# Patient Record
Sex: MALE | Race: WHITE | Employment: OTHER | URBAN - NONMETROPOLITAN AREA
[De-identification: names, ages, dates, MRNs, and addresses within clinical notes are randomized per-mention and may not be internally consistent; named-entity substitution may affect disease eponyms.]

---

## 2017-09-25 ENCOUNTER — HOSPITAL ENCOUNTER (INPATIENT)
Age: 42
LOS: 1 days | Discharge: PSYCHIATRIC OR SUBSTANCE USE FACILITY | DRG: 387 | End: 2017-09-26
Attending: INTERNAL MEDICINE | Admitting: INTERNAL MEDICINE
Payer: MEDICARE

## 2017-09-25 ENCOUNTER — APPOINTMENT (OUTPATIENT)
Dept: CT IMAGING | Age: 42
DRG: 387 | End: 2017-09-25
Payer: MEDICARE

## 2017-09-25 DIAGNOSIS — K51.911 ULCERATIVE COLITIS WITH RECTAL BLEEDING, UNSPECIFIED LOCATION (HCC): Primary | ICD-10-CM

## 2017-09-25 PROBLEM — K51.90 ULCERATIVE COLITIS (HCC): Status: ACTIVE | Noted: 2017-09-25

## 2017-09-25 LAB
ABO: NORMAL
ADENOVIRUS F 40 41 PCR: NOT DETECTED
ALBUMIN SERPL-MCNC: 3.7 G/DL (ref 3.5–5.1)
ALP BLD-CCNC: 89 U/L (ref 38–126)
ALT SERPL-CCNC: 45 U/L (ref 11–66)
ANION GAP SERPL CALCULATED.3IONS-SCNC: 12 MEQ/L (ref 8–16)
ANISOCYTOSIS: ABNORMAL
ANTIBODY SCREEN: NORMAL
AST SERPL-CCNC: 53 U/L (ref 5–40)
ASTROVIRUS PCR: NOT DETECTED
BASOPHILS # BLD: 0.3 %
BASOPHILS ABSOLUTE: 0 THOU/MM3 (ref 0–0.1)
BILIRUB SERPL-MCNC: 0.5 MG/DL (ref 0.3–1.2)
BILIRUBIN DIRECT: < 0.2 MG/DL (ref 0–0.3)
BUN BLDV-MCNC: 14 MG/DL (ref 7–22)
CALCIUM SERPL-MCNC: 8.6 MG/DL (ref 8.5–10.5)
CAMPYLOBACTER PCR: NOT DETECTED
CHLORIDE BLD-SCNC: 101 MEQ/L (ref 98–111)
CLOSTRIDIUM DIFFICILE, PCR: NOT DETECTED
CO2: 23 MEQ/L (ref 23–33)
CREAT SERPL-MCNC: 0.7 MG/DL (ref 0.4–1.2)
CRYPTOSPORIDIUM PCR: NOT DETECTED
CYCLOSPORIDIUM GI FILM ARRAY: NOT DETECTED
E COLI 0157 PCR: NORMAL
E COLI ENTEROAGGREGATIVE PCR: NOT DETECTED
E COLI ENTEROPATHOGENIC PCR: NOT DETECTED
E COLI ENTEROTOXIGENIC PCR: NOT DETECTED
E COLI SHIGA LIKE TOXIN PCR: NOT DETECTED
E COLI SHIGELLA/ENTEROINVASIVE PCR: NOT DETECTED
E HISTOLYTICA GI FILM ARRAY: NOT DETECTED
EOSINOPHIL # BLD: 3.5 %
EOSINOPHILS ABSOLUTE: 0.2 THOU/MM3 (ref 0–0.4)
GFR SERPL CREATININE-BSD FRML MDRD: > 90 ML/MIN/1.73M2
GIARDIA LAMBLIA PCR: NOT DETECTED
GLUCOSE BLD-MCNC: 109 MG/DL (ref 70–108)
HCT VFR BLD CALC: 35.8 % (ref 42–52)
HEMOCCULT STL QL: POSITIVE
HEMOGLOBIN: 11.4 GM/DL (ref 14–18)
HEMOGLOBIN: 12 GM/DL (ref 14–18)
LIPASE: 37.3 U/L (ref 5.6–51.3)
LYMPHOCYTES # BLD: 46.8 %
LYMPHOCYTES ABSOLUTE: 2.9 THOU/MM3 (ref 1–4.8)
MCH RBC QN AUTO: 30.2 PG (ref 27–31)
MCHC RBC AUTO-ENTMCNC: 33.6 GM/DL (ref 33–37)
MCV RBC AUTO: 89.9 FL (ref 80–94)
MONOCYTES # BLD: 15.4 %
MONOCYTES ABSOLUTE: 0.9 THOU/MM3 (ref 0.4–1.3)
NOROVIRUS GI GII PCR: NOT DETECTED
NUCLEATED RED BLOOD CELLS: 0 /100 WBC
OSMOLALITY CALCULATION: 273 MOSMOL/KG (ref 275–300)
PDW BLD-RTO: 17.3 % (ref 11.5–14.5)
PLATELET # BLD: 106 THOU/MM3 (ref 130–400)
PLESIOMONAS SHIGELLOIDES PCR: NOT DETECTED
PMV BLD AUTO: 8.1 MCM (ref 7.4–10.4)
POTASSIUM SERPL-SCNC: 3.8 MEQ/L (ref 3.5–5.2)
RBC # BLD: 3.98 MILL/MM3 (ref 4.7–6.1)
RBC # BLD: NORMAL 10*6/UL
RH FACTOR: NORMAL
ROTAVIRUS A PCR: NOT DETECTED
SALMONELLA PCR: NOT DETECTED
SAPOVIRUS PCR: NOT DETECTED
SEG NEUTROPHILS: 34 %
SEGMENTED NEUTROPHILS ABSOLUTE COUNT: 2.1 THOU/MM3 (ref 1.8–7.7)
SODIUM BLD-SCNC: 136 MEQ/L (ref 135–145)
TOTAL PROTEIN: 7 G/DL (ref 6.1–8)
VIBRIO CHOLERAE PCR: NOT DETECTED
VIBRIO PCR: NOT DETECTED
WBC # BLD: 6.1 THOU/MM3 (ref 4.8–10.8)
YERSINIA ENTEROCOLITICA PCR: NOT DETECTED

## 2017-09-25 PROCEDURE — 1210000002 HC MED SURG R&B - NEUROSCIENCE

## 2017-09-25 PROCEDURE — 6370000000 HC RX 637 (ALT 250 FOR IP): Performed by: INTERNAL MEDICINE

## 2017-09-25 PROCEDURE — 86900 BLOOD TYPING SEROLOGIC ABO: CPT

## 2017-09-25 PROCEDURE — 6360000002 HC RX W HCPCS: Performed by: INTERNAL MEDICINE

## 2017-09-25 PROCEDURE — 86850 RBC ANTIBODY SCREEN: CPT

## 2017-09-25 PROCEDURE — 99223 1ST HOSP IP/OBS HIGH 75: CPT | Performed by: INTERNAL MEDICINE

## 2017-09-25 PROCEDURE — 2580000003 HC RX 258: Performed by: PHYSICIAN ASSISTANT

## 2017-09-25 PROCEDURE — 82272 OCCULT BLD FECES 1-3 TESTS: CPT

## 2017-09-25 PROCEDURE — 36592 COLLECT BLOOD FROM PICC: CPT

## 2017-09-25 PROCEDURE — 85025 COMPLETE CBC W/AUTO DIFF WBC: CPT

## 2017-09-25 PROCEDURE — 76937 US GUIDE VASCULAR ACCESS: CPT

## 2017-09-25 PROCEDURE — 99285 EMERGENCY DEPT VISIT HI MDM: CPT

## 2017-09-25 PROCEDURE — 86901 BLOOD TYPING SEROLOGIC RH(D): CPT

## 2017-09-25 PROCEDURE — 36569 INSJ PICC 5 YR+ W/O IMAGING: CPT

## 2017-09-25 PROCEDURE — 05H733Z INSERTION OF INFUSION DEVICE INTO RIGHT AXILLARY VEIN, PERCUTANEOUS APPROACH: ICD-10-PCS | Performed by: INTERNAL MEDICINE

## 2017-09-25 PROCEDURE — C1751 CATH, INF, PER/CENT/MIDLINE: HCPCS

## 2017-09-25 PROCEDURE — 36415 COLL VENOUS BLD VENIPUNCTURE: CPT

## 2017-09-25 PROCEDURE — 85018 HEMOGLOBIN: CPT

## 2017-09-25 PROCEDURE — 80053 COMPREHEN METABOLIC PANEL: CPT

## 2017-09-25 PROCEDURE — 87507 IADNA-DNA/RNA PROBE TQ 12-25: CPT

## 2017-09-25 PROCEDURE — 83690 ASSAY OF LIPASE: CPT

## 2017-09-25 PROCEDURE — 82248 BILIRUBIN DIRECT: CPT

## 2017-09-25 PROCEDURE — 74176 CT ABD & PELVIS W/O CONTRAST: CPT

## 2017-09-25 PROCEDURE — 2580000003 HC RX 258: Performed by: INTERNAL MEDICINE

## 2017-09-25 RX ORDER — LOPERAMIDE HYDROCHLORIDE 2 MG/1
2 CAPSULE ORAL 4 TIMES DAILY PRN
Status: ON HOLD | COMMUNITY
End: 2017-09-26 | Stop reason: HOSPADM

## 2017-09-25 RX ORDER — BENZTROPINE MESYLATE 2 MG/1
2 TABLET ORAL 2 TIMES DAILY
Status: ON HOLD | COMMUNITY
End: 2017-09-25 | Stop reason: ALTCHOICE

## 2017-09-25 RX ORDER — METHYLPREDNISOLONE SODIUM SUCCINATE 40 MG/ML
40 INJECTION, POWDER, LYOPHILIZED, FOR SOLUTION INTRAMUSCULAR; INTRAVENOUS EVERY 6 HOURS
Status: DISCONTINUED | OUTPATIENT
Start: 2017-09-25 | End: 2017-09-26

## 2017-09-25 RX ORDER — ACETAMINOPHEN 325 MG/1
650 TABLET ORAL EVERY 4 HOURS PRN
Status: DISCONTINUED | OUTPATIENT
Start: 2017-09-25 | End: 2017-09-26 | Stop reason: HOSPADM

## 2017-09-25 RX ORDER — PANTOPRAZOLE SODIUM 40 MG/1
40 TABLET, DELAYED RELEASE ORAL 2 TIMES DAILY
COMMUNITY

## 2017-09-25 RX ORDER — HYDROXYZINE HYDROCHLORIDE 25 MG/1
25 TABLET, FILM COATED ORAL NIGHTLY PRN
Status: ON HOLD | COMMUNITY
End: 2017-09-26 | Stop reason: HOSPADM

## 2017-09-25 RX ORDER — POLYETHYLENE GLYCOL 3350 17 G/17G
17 POWDER, FOR SOLUTION ORAL
Status: DISCONTINUED | OUTPATIENT
Start: 2017-09-25 | End: 2017-09-26

## 2017-09-25 RX ORDER — BUPRENORPHINE AND NALOXONE 8; 2 MG/1; MG/1
1 FILM, SOLUBLE BUCCAL; SUBLINGUAL 2 TIMES DAILY
COMMUNITY
End: 2020-03-19 | Stop reason: ALTCHOICE

## 2017-09-25 RX ORDER — MULTIVITAMIN
TABLET ORAL
Status: ON HOLD | COMMUNITY
End: 2021-01-15 | Stop reason: HOSPADM

## 2017-09-25 RX ORDER — PAROXETINE HYDROCHLORIDE 20 MG/1
20 TABLET, FILM COATED ORAL NIGHTLY
Status: ON HOLD | COMMUNITY
End: 2020-08-17

## 2017-09-25 RX ORDER — PAROXETINE HYDROCHLORIDE 20 MG/1
20 TABLET, FILM COATED ORAL EVERY MORNING
Status: DISCONTINUED | OUTPATIENT
Start: 2017-09-25 | End: 2017-09-26 | Stop reason: HOSPADM

## 2017-09-25 RX ORDER — MESALAMINE 400 MG/1
800 CAPSULE, DELAYED RELEASE ORAL 3 TIMES DAILY
Status: DISCONTINUED | OUTPATIENT
Start: 2017-09-25 | End: 2017-09-26 | Stop reason: HOSPADM

## 2017-09-25 RX ORDER — SODIUM CHLORIDE 0.9 % (FLUSH) 0.9 %
10 SYRINGE (ML) INJECTION EVERY 12 HOURS SCHEDULED
Status: DISCONTINUED | OUTPATIENT
Start: 2017-09-25 | End: 2017-09-26 | Stop reason: HOSPADM

## 2017-09-25 RX ORDER — SODIUM CHLORIDE 9 MG/ML
INJECTION, SOLUTION INTRAVENOUS CONTINUOUS
Status: DISCONTINUED | OUTPATIENT
Start: 2017-09-25 | End: 2017-09-26 | Stop reason: HOSPADM

## 2017-09-25 RX ORDER — IBUPROFEN 400 MG/1
400 TABLET ORAL EVERY 6 HOURS PRN
Status: ON HOLD | COMMUNITY
End: 2017-09-26

## 2017-09-25 RX ORDER — ENTECAVIR 0.5 MG/1
0.5 TABLET, FILM COATED ORAL DAILY
Status: ON HOLD | COMMUNITY
End: 2017-09-26 | Stop reason: HOSPADM

## 2017-09-25 RX ORDER — PANTOPRAZOLE SODIUM 40 MG/1
40 TABLET, DELAYED RELEASE ORAL DAILY
Status: DISCONTINUED | OUTPATIENT
Start: 2017-09-25 | End: 2017-09-26 | Stop reason: HOSPADM

## 2017-09-25 RX ORDER — BUPRENORPHINE AND NALOXONE 8; 2 MG/1; MG/1
1 FILM, SOLUBLE BUCCAL; SUBLINGUAL 2 TIMES DAILY
Status: DISCONTINUED | OUTPATIENT
Start: 2017-09-25 | End: 2017-09-26 | Stop reason: HOSPADM

## 2017-09-25 RX ORDER — SODIUM CHLORIDE 0.9 % (FLUSH) 0.9 %
10 SYRINGE (ML) INJECTION PRN
Status: DISCONTINUED | OUTPATIENT
Start: 2017-09-25 | End: 2017-09-26 | Stop reason: HOSPADM

## 2017-09-25 RX ORDER — ONDANSETRON 2 MG/ML
4 INJECTION INTRAMUSCULAR; INTRAVENOUS EVERY 6 HOURS PRN
Status: DISCONTINUED | OUTPATIENT
Start: 2017-09-25 | End: 2017-09-26 | Stop reason: HOSPADM

## 2017-09-25 RX ORDER — LIDOCAINE HYDROCHLORIDE 10 MG/ML
5 INJECTION, SOLUTION EPIDURAL; INFILTRATION; INTRACAUDAL; PERINEURAL ONCE
Status: DISCONTINUED | OUTPATIENT
Start: 2017-09-25 | End: 2017-09-26 | Stop reason: HOSPADM

## 2017-09-25 RX ORDER — SENNA AND DOCUSATE SODIUM 50; 8.6 MG/1; MG/1
1 TABLET, FILM COATED ORAL DAILY
COMMUNITY
End: 2018-01-05

## 2017-09-25 RX ORDER — MESALAMINE 400 MG/1
800 CAPSULE, DELAYED RELEASE ORAL 3 TIMES DAILY
Status: ON HOLD | COMMUNITY
End: 2017-09-26

## 2017-09-25 RX ADMIN — Medication 10 ML: at 21:07

## 2017-09-25 RX ADMIN — POLYETHYLENE GLYCOL 3350 17 G: 17 POWDER, FOR SOLUTION ORAL at 21:17

## 2017-09-25 RX ADMIN — BUPRENORPHINE HYDROCHLORIDE, NALOXONE HYDROCHLORIDE 1 FILM: 8; 2 FILM, SOLUBLE BUCCAL; SUBLINGUAL at 13:53

## 2017-09-25 RX ADMIN — METHYLPREDNISOLONE SODIUM SUCCINATE 40 MG: 40 INJECTION, POWDER, FOR SOLUTION INTRAMUSCULAR; INTRAVENOUS at 22:20

## 2017-09-25 RX ADMIN — MESALAMINE 800 MG: 400 CAPSULE, DELAYED RELEASE ORAL at 14:45

## 2017-09-25 RX ADMIN — POLYETHYLENE GLYCOL 3350 17 G: 17 POWDER, FOR SOLUTION ORAL at 23:47

## 2017-09-25 RX ADMIN — SODIUM CHLORIDE: 9 INJECTION, SOLUTION INTRAVENOUS at 17:00

## 2017-09-25 RX ADMIN — POLYETHYLENE GLYCOL 3350 17 G: 17 POWDER, FOR SOLUTION ORAL at 23:57

## 2017-09-25 RX ADMIN — POLYETHYLENE GLYCOL 3350 17 G: 17 POWDER, FOR SOLUTION ORAL at 22:18

## 2017-09-25 RX ADMIN — Medication 10 ML: at 21:09

## 2017-09-25 RX ADMIN — POLYETHYLENE GLYCOL 3350 17 G: 17 POWDER, FOR SOLUTION ORAL at 21:21

## 2017-09-25 RX ADMIN — MESALAMINE 800 MG: 400 CAPSULE, DELAYED RELEASE ORAL at 21:07

## 2017-09-25 RX ADMIN — BUPRENORPHINE HYDROCHLORIDE, NALOXONE HYDROCHLORIDE 1 FILM: 8; 2 FILM, SOLUBLE BUCCAL; SUBLINGUAL at 21:07

## 2017-09-25 RX ADMIN — METHYLPREDNISOLONE SODIUM SUCCINATE 40 MG: 40 INJECTION, POWDER, FOR SOLUTION INTRAMUSCULAR; INTRAVENOUS at 17:00

## 2017-09-25 RX ADMIN — PAROXETINE HYDROCHLORIDE 20 MG: 20 TABLET, FILM COATED ORAL at 12:05

## 2017-09-25 RX ADMIN — PANTOPRAZOLE SODIUM 40 MG: 40 TABLET, DELAYED RELEASE ORAL at 12:05

## 2017-09-25 RX ADMIN — BISACODYL 10 MG: 5 TABLET, DELAYED RELEASE ORAL at 21:20

## 2017-09-25 ASSESSMENT — ENCOUNTER SYMPTOMS
RHINORRHEA: 0
DIARRHEA: 0
COUGH: 0
BACK PAIN: 0
EYE DISCHARGE: 0
VOMITING: 0
SORE THROAT: 0
CONSTIPATION: 0
SHORTNESS OF BREATH: 0
BLOOD IN STOOL: 1
EYE REDNESS: 0
WHEEZING: 0
NAUSEA: 0
ABDOMINAL PAIN: 1

## 2017-09-25 ASSESSMENT — PAIN DESCRIPTION - LOCATION
LOCATION: ABDOMEN
LOCATION: ABDOMEN

## 2017-09-25 ASSESSMENT — PAIN SCALES - GENERAL
PAINLEVEL_OUTOF10: 3
PAINLEVEL_OUTOF10: 2
PAINLEVEL_OUTOF10: 7

## 2017-09-25 ASSESSMENT — PAIN DESCRIPTION - PAIN TYPE: TYPE: ACUTE PAIN

## 2017-09-25 ASSESSMENT — PAIN DESCRIPTION - ORIENTATION: ORIENTATION: MID

## 2017-09-25 NOTE — ED NOTES
Report called to [address] [place] [address] Matt Hardin.      Richard Church RN  09/25/17 1133

## 2017-09-25 NOTE — ED PROVIDER NOTES
discharge, redness and visual disturbance. Respiratory: Negative for cough, shortness of breath and wheezing. Cardiovascular: Negative for chest pain, palpitations and leg swelling. Gastrointestinal: Positive for abdominal pain and blood in stool. Negative for constipation, diarrhea, nausea and vomiting. Genitourinary: Negative for decreased urine volume, difficulty urinating, dysuria, flank pain, frequency, hematuria and urgency. Musculoskeletal: Negative for arthralgias, back pain, joint swelling and neck pain. Skin: Negative for pallor and rash. Allergic/Immunologic: Negative for environmental allergies. Neurological: Negative for dizziness, syncope, weakness, light-headedness and headaches. Hematological: Negative for adenopathy. Psychiatric/Behavioral: Negative for agitation, confusion, dysphoric mood and suicidal ideas. The patient is not nervous/anxious. PAST MEDICAL HISTORY    has a past medical history of GERD (gastroesophageal reflux disease); Hepatic cirrhosis ([facility] Hepatitis B; Hepatitis C; Intravenous drug user; Psychiatric problem; and [facility] 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     CURRENT MEDICATIONS       Discharge Medication List as of 9/26/2017  3:48 PM      CONTINUE these medications which have NOT CHANGED    Details   LACTULOSE PO Take 20 g by mouth 3 times daily Historical Med      buprenorphine-naloxone (SUBOXONE) 8-2 MG FILM SL film Place 1 Film under the tongue 2 times daily Historical Med      pantoprazole (PROTONIX) 40 MG tablet Take 40 mg by mouth 2 times daily Historical Med      PARoxetine (PAXIL) 20 MG tablet Take 20 mg by mouth nightly Historical Med      sennosides-docusate sodium (SENOKOT-S) 8.6-50 MG tablet Take 1 tablet by mouth dailyHistorical Med      Multiple Vitamin (MULTI VITAMIN MENS) TABS Take by mouthHistorical Med      ALUM & MAG HYDROXIDE-SIMETH PO Take by mouthHistorical Med      magnesium hydroxide (MILK OF MAGNESIA CONCENTRATE) 2400 MG/10ML SUSP Take 2,400 mg by mouth once as needed, Oral, ONCE PRN, Until Discontinued, Historical Med             ALLERGIES     is allergic to penicillins. FAMILY HISTORY     has no family status information on file. family history is not on file. SOCIAL HISTORY      reports that he has been smoking. He has been smoking about 0.50 packs per day. He does not have any smokeless tobacco history on file. He reports that he uses drugs, including Opiates , Cocaine, and IV. He reports that he does not drink alcohol. PHYSICAL EXAM     INITIAL VITALS:  weight is 189 lb 6.4 oz (85.9 kg). His axillary temperature is 97.6 °F (36.4 °C). His blood pressure is 141/75 (abnormal) and his pulse is 72. His respiration is 16 and oxygen saturation is 97%. Physical Exam   Constitutional: He is oriented to person, place, and time. Vital signs are normal. He appears well-developed and well-nourished. He is active and cooperative. HENT:   Head: Normocephalic and atraumatic. Right Ear: External ear normal.   Left Ear: External ear normal.   Eyes: Conjunctivae and EOM are normal. Right eye exhibits no discharge. Left eye exhibits no discharge. No scleral icterus. Neck: Trachea normal and normal range of motion. Neck supple. No JVD present. Cardiovascular: Normal rate, normal heart sounds, intact distal pulses and normal pulses. Exam reveals no gallop and no friction rub. No murmur heard. Pulmonary/Chest: Effort normal and breath sounds normal. No accessory muscle usage. No respiratory distress. He has no decreased breath sounds. He has no wheezes. He has no rhonchi. He has no rales. Abdominal: Normal appearance. He exhibits no distension. There is tenderness (Diffuse). There is no rebound and no guarding. Abdomen is semi-firm.    Musculoskeletal: Normal range of motion. He exhibits no edema. Neurological: He is alert and oriented to person, place, and time. No sensory deficit. He exhibits normal muscle tone. He displays no seizure activity. GCS eye subscore is 4. GCS verbal subscore is 5. GCS motor subscore is 6. Skin: Skin is warm and dry. No rash noted. He is not diaphoretic. There is pallor. Psychiatric: He has a normal mood and affect. His speech is normal and behavior is normal. Thought content normal.   Nursing note and vitals reviewed. DIFFERENTIAL DIAGNOSIS:   Colitis, Perforation    DIAGNOSTIC RESULTS     EKG: All EKG's are interpreted by the Emergency Department Physician who either signs or Co-signs this chart in the absence of a cardiologist.    None    RADIOLOGY: non-plain film images(s) such as CT, Ultrasound and MRI are read by the radiologist.    RADIOLOGY REPORT   Final Result      CT ABDOMEN PELVIS WO IV CONTRAST Additional Contrast? Oral   Final Result   1. Diffuse bowel wall thickening and areas of the pericolonic edema, consistent with patient's known history of ulcerative colitis. 2. Fluid distended appendix. Borderline diameter. Appendicitis is not excludable. 3. Splenomegaly with possible esophageal varices   4. Retroperitoneal and intraperitoneal adenopathy            **This report has been created using voice recognition software. It may contain minor errors which are inherent in voice recognition technology. **      Final report electronically signed by Dr. Sal Given on 9/25/2017 9:50 AM          LABS:     Labs Reviewed   CBC WITH AUTO DIFFERENTIAL - Abnormal; Notable for the following:        Result Value    RBC 3.98 (*)     Hemoglobin 12.0 (*)     Hematocrit 35.8 (*)     RDW 17.3 (*)     Platelets 581 (*)     All other components within normal limits   BASIC METABOLIC PANEL - Abnormal; Notable for the following:     Glucose 109 (*)     All other components within normal limits   HEPATIC FUNCTION PANEL - Abnormal; Notable for the portions of this note were completed with a voice recognition program.  Efforts were made to edit the dictations but occasionally words are mis-transcribed.)    The patient was given an opportunity to see the Emergency Attending. The patient voiced understanding that I was a Mid-Level Provider and was in agreement with being seen independently by myself. Scribe:  Natanael Guzmán 9/25/17 2:54 AM Scribing for and in the presence of Danelle Heaton CNP. Signed by: Anika Barker, 10/16/17 9:03 AM    Provider:  I personally performed the services described in the documentation, reviewed and edited the documentation which was dictated to the scribe in my presence, and it accurately records my words and actions.     Danelle Heaton CNP 9/25/17 9:03 AM       Latonya Benito NP  10/16/17 [id number]

## 2017-09-25 NOTE — IP AVS SNAPSHOT
After Visit Summary  (Discharge Instructions)    Medication List for Home    Based on the information you provided to us as well as any changes during this visit, the following is your updated medication list.  Compare this with your prescription bottles at home. If you have any questions or concerns, contact your primary care physician's office. Daily Medication List (This medication list can be shared with any healthcare provider who is helping you manage your medications)      There are NEW medications for you. START taking them after you leave the hospital        Last Dose    Next Dose Due AM NOON PM NIGHT    folic acid 1 MG tablet   Commonly known as:  FOLVITE   Take 2 tablets by mouth daily   Notes to Patient:     To replace folic acid in your body                  Tomorrow  9/27/17                          predniSONE 10 MG tablet   Commonly known as:  DELTASONE   3 tabs daily for 2 weeks then 2 tabs daily for 2 weeks then 1 tab daily for 2 weeks then stop                30 mg on 9/26/2017  3:47 PM                              These are medications you told us you were taking at home, CONTINUE taking them after you leave the hospital        Last Dose    Next Dose Due AM NOON PM NIGHT    ALUM & MAG HYDROXIDE-SIMETH PO   Take by mouth                  As Needed                       buprenorphine-naloxone 8-2 MG Film SL film   Commonly known as:  SUBOXONE   Place 1 Film under the tongue 2 times daily                1 Film on 9/26/2017 12:39 PM     Tonight                             LACTULOSE PO   Take 20 g by mouth 3 times daily                  Tonight                                magnesium hydroxide 2400 MG/10ML Susp   Commonly known as:  MILK OF MAGNESIA CONCENTRATE   Take 2,400 mg by mouth once as needed                  As Needed                       mesalamine 400 MG Cpdr delayed release capsule   Commonly known as:  DELZICOL   Take 2 capsules by mouth 3 times daily 800 mg on 9/26/2017  3:47 PM     Tonight                                MULTI VITAMIN MENS Tabs   Take by mouth                  Tomorrow  9/27/17                            pantoprazole 40 MG tablet   Commonly known as:  PROTONIX   Take 40 mg by mouth 2 times daily                40 mg on 9/26/2017  5:56 AM     Tonight before supper                             PARoxetine 20 MG tablet   Commonly known as:  PAXIL   Take 20 mg by mouth nightly                20 mg on 9/26/2017  9:07 AM     Tomorrow  9/27/17                            sennosides-docusate sodium 8.6-50 MG tablet   Commonly known as:  SENOKOT-S   Take 1 tablet by mouth daily                  Tomorrow  9/27/17                              These are the medications you have told us you were taking at home, STOP taking them after you leave the hospital     aspirin 81 MG tablet       entecavir 0.5 MG tablet   Commonly known as:  BARACLUDE       hydrOXYzine 25 MG tablet   Commonly known as:  ATARAX       ibuprofen 400 MG tablet   Commonly known as:  ADVIL;MOTRIN       loperamide 2 MG capsule   Commonly known as:  IMODIUM            Where to Get Your Medications      You can get these medications from any pharmacy     Bring a paper prescription for each of these medications     folic acid 1 MG tablet    mesalamine 400 MG Cpdr delayed release capsule    predniSONE 10 MG tablet               Allergies as of 9/26/2017        Reactions    Penicillins Anaphylaxis      Immunizations as of 9/26/2017     Name Date Dose VIS Date Route    Influenza Vaccine, unspecified formulation 9/19/2017 -- -- --    External: Patient reported    Comment: recieved at [facility]    Pneumococcal Vaccine, unspecified formulation 9/19/2017 -- -- --    External: Patient reported    Comment: recieved [address]          Most Recent Value    Temp  97.6 °F (36.4 °C)    Pulse  72    Resp  16    BP  (!)  141/75         After Visit Summary This summary was created for you. Thank you for entrusting your care to us. The following information includes details about your hospital/visit stay along with steps you should take to help with your recovery once you leave the hospital.  In this packet, you will find information about the topics listed below:    · Instructions about your medications including a list of your home medications  · A summary of your hospital visit  · Follow-up appointments once you have left the hospital  · Your care plan at home      You may receive a survey regarding the care you received during your stay. Your input is valuable to us. We encourage you to complete and return your survey in the envelope provided. We hope you will choose us in the future for your healthcare needs. Patient Information     Patient Name CRISTO Bustamante Given 1975      Care Provided at:     Name Address Phone       [address] [phone]            Your Visit    Here you will find information about your visit, including the reason for your visit. Please take this sheet with you when you visit your doctor or other health care provider in the future. It will help determine the best possible medical care for you at that time. If you have any questions once you leave the hospital, please call the department phone number listed below. Why you were here     Your primary diagnosis was:  Ulcerative Colitis (Hcc)      Visit Information     Date & Time Provider Department Dept. Phone    2017 Stacie Brothers, [address] [phone]       Follow-up Appointments    Below is a list of your follow-up and future appointments. This may not be a complete list as you may have made appointments directly with providers that we are not aware of or your providers may have made some for you. Please call your providers to confirm appointments.

## 2017-09-25 NOTE — ED PROVIDER NOTES
Patient was seen and evaluated in the emergency department, patient appeared to be in no acute distress. Patient was seen and evaluated previously by Son Le CNP, from whom I took over this case and patient care. I have personally performed and/or participated in the history, exam and medical decision making and agree with all pertinent clinical information as noted by the previous provider. I have also reviewed and agree with the past medical, family and social history unless otherwise noted. I have personally performed a face-to-face diagnostic evaluation on this patient. I have reviewed the previous provider's orders for imaging and labs as well as the previous provider's documentation. The patient was seen and evaluated within the ED today with a 2 day history of abdominal pain and bloody stools secondary to UC. I took over care of this patient pending admission consult. Within the department, I observed the patient's vital signs to be within acceptable range, and he was afebrile. Patient was hypertensive during his stay, but this was believed to be secondary to pain. He will need to follow up with his PCP for further BP monitoring. Radiologic studies within the department revealed inflammatory changes in the colon consistent with UC. Laboratory work was reassuring, but he was hemoccult positive. I observed the patient's condition to remain stable during the duration of the stay. I explained my proposed course of treatment to the patient, who was amenable to my treatment and admission decisions. Dr. Jose Barnes was consulted and kindly agreed to admit the patient for further evaluation and management. The patient remained stable and maintained stable vital signs until admission to the floor.     Kaitlyn Barrientos PA-C  9/25/17  VELMA Bhatti  09/25/17 8628

## 2017-09-25 NOTE — ED NOTES
Pt's cot ajusted for comfort. Pt resting quietly in room no needs expressed. Side rails up x2 with call light in reach. Will continue to monitor.        Maribeth Ruff RN  09/25/17 1510

## 2017-09-25 NOTE — ED NOTES
Pt resting quietly in room no needs expressed. Respirations are even and unlabored. Side rails up x2 with call light in reach. Will continue to monitor.        Luke Louie RN  09/25/17 4588

## 2017-09-25 NOTE — H&P
History & Physical    Patient:  Tonny Agee  YOB: 1975  Date of Service: 9/25/2017  MRN: [id number]   Acct:  [de-identified]   Primary Care Physician: No primary care provider on file. Chief Complaint:rectal bleed    History of Present Illness:   History obtained from chart review and the patient. Tonny Agee is a 43 y.o. male with hx long standing hx of ulcerative colitis, chronic abd pain, who presents to the Ed for the evaluation of abdominal pain and rectal bleeding. Patient states that he has been experiencing abdominal cramping over the past 4 days, and that he has noticed blood in his stool since yesterday. He states that he had one bowel movement with bright red blood and melena in it yesterday and that he has had 2 similar bowel movements today. He rates his pain at an 7/10 in severity, and states that the pain is intermittent in duration. He states that the pain is cramping and stabbing in quality. Patient reports a history of ulcerative colitis, hepatitis B, hepatic cirrhosis and IV drug use. He states that he has not abused IV drugs in 50 days  And has been in behavioural- [facility]= . He states that he takes Suboxone and that he has been compliant with his ulcerative colitis medications. threatening to leave AMA if his subxone not resumed  Significant hx of heroine/opiate abuse in the past      Past Medical History:        Diagnosis Date    Hepatic cirrhosis (HCC)     Hepatitis B     Hepatitis C     Intravenous drug user     Ulcerative colitis ([facility]        Past Surgical History:        Procedure Laterality Date    ARM AMPUTATION AT SHOULDER Right        Home Medications:   No current facility-administered medications on file prior to encounter. No current outpatient prescriptions on file prior to encounter. Allergies:  Penicillins    Social History:    reports that he has been smoking. He has been smoking about 0.50 packs per day.  He does not have any smokeless tobacco history on file. He reports that he uses illicit drugs, including Opiates , Cocaine, and IV. He reports that he does not drink alcohol. Family History:   No family history on file. Review of systems:abd pain, rectal bleed  Constitutional: no fever, no night sweats, no fatigue  Head: no headache, no head injury, no migranes. Eye: no blurring of vision, no double vision. Ears: no hearing difficulty, no tinnitus  Mouth/throat: no ulceration, dental caries, dysphagia  Lungs: no cough, no shortness of breath, no wheeze  CVS: no palpitation, no chest pain, no shortness of breath  GI: no abdominal pain, no nausea , no vomiting, no constipation  BAKARI: no dysuria, frequency and urgency, no hematuria, no kidney stones  Musculoskeletal: no joint pain, swelling , stiffness  Endocrine: no polyuria, polydypsia, no cold or heat intolerence  Hematology: no anemia, no easy brusing or bleeding, no hx of clotting disorder  Dermatology: no skin rash, no eczema, no prurities,  Psychiatry: no depression, no anxiety,no panic attacks, no suicide ideation  Neurology: no syncope, no seizures, no numbness or tingling of hands, no numbness or tingling of feet, no paresis    10 point review of systems completed, all other than noted above are negative. Vitals:   Vitals:    09/25/17 0914   BP: (!) 140/93   Pulse: 65   Resp: 18   Temp: 97.7 °F (36.5 °C)   SpO2: 99%      BMI: There is no height or weight on file to calculate BMI.                 Exam:  Physical Examination: General appearance - alert, well appearing, and in no distress  Mental status - alert, oriented to person, place, and time  Neck - supple, no significant adenopathy, no JVD, or carotid bruits  Chest - clear to auscultation, no wheezes, rales or rhonchi, symmetric air entry  Heart - normal rate, regular rhythm, normal S1, S2, no murmurs, rubs, clicks or gallops  Abdomen - soft, nontender, nondistended, no masses or Neutrophils 34.0 %    Lymphocytes 46.8 %    Monocytes 15.4 %    Eosinophils 3.5 %    Basophils 0.3 %    nRBC 0 /100 wbc    Anisocytosis 1+     Segs Absolute 2.1 1.8 - 7.7 thou/mm3    Lymphocytes # 2.9 1.0 - 4.8 thou/mm3    Monocytes # 0.9 0.4 - 1.3 thou/mm3    Eosinophils # 0.2 0.0 - 0.4 thou/mm3    Basophils # 0.0 0.0 - 0.1 thou/mm3   Basic Metabolic Panel    Collection Time: 09/25/17  1:58 AM   Result Value Ref Range    Sodium 136 135 - 145 meq/L    Potassium 3.8 3.5 - 5.2 meq/L    Chloride 101 98 - 111 meq/L    CO2 23 23 - 33 meq/L    Glucose 109 (H) 70 - 108 mg/dL    BUN 14 7 - 22 mg/dL    CREATININE 0.7 0.4 - 1.2 mg/dL    Calcium 8.6 8.5 - 10.5 mg/dL   Lipase    Collection Time: 09/25/17  1:58 AM   Result Value Ref Range    Lipase 37.3 5.6 - 51.3 U/L   Hepatic function panel    Collection Time: 09/25/17  1:58 AM   Result Value Ref Range    Alb 3.7 3.5 - 5.1 g/dL    Total Bilirubin 0.5 0.3 - 1.2 mg/dL    Bilirubin, Direct <0.2 0.0 - 0.3 mg/dL    Alkaline Phosphatase 89 38 - 126 U/L    AST 53 (H) 5 - 40 U/L    ALT 45 11 - 66 U/L    Total Protein 7.0 6.1 - 8.0 g/dL   TYPE AND SCREEN    Collection Time: 09/25/17  1:58 AM   Result Value Ref Range    ABO A     Rh Factor POS     Antibody Screen NEG    Anion Gap    Collection Time: 09/25/17  1:58 AM   Result Value Ref Range    Anion Gap 12.0 8.0 - 16.0 meq/L   Glomerular Filtration Rate, Estimated    Collection Time: 09/25/17  1:58 AM   Result Value Ref Range    Est, Glom Filt Rate >90 ml/min/1.73m2   Osmolality    Collection Time: 09/25/17  1:58 AM   Result Value Ref Range    Osmolality Calc 273.0 (L) 275.0 - 300 mOsmol/kg       Radiology:     Ct Abdomen Pelvis Wo Iv Contrast Additional Contrast? Oral    Result Date: 9/25/2017  PRELIMINARY REPORT EXAM:  CT ABDOMEN PELVIS WO IV CONTRAST HISTORY:  rectal bleeding , known ulcerative colitis, IV drug use TECHNIQUE:  CT images obtained through the Abdomen and Pelvis following ingestion of positive enteric contrast and without without contrast. Transaxial,coronal and sagittal reformats are provided. PRIORS:  None. FINDINGS:  Imaged intrathoracic contents are unremarkable. Kidneys are normal in size, axis and position. No hydronephrosis or nephrolithiasis. The ureters are normal in course and caliber. No stones are seen within the urinary bladder. Pelvic phleboliths are noted. Minimally nodular contour of the liver. The gallbladder, pancreas, spleen, and adrenal glands demonstrate a normal noncontrast appearance. Positive enteric contrast is seen as far distally as the ileum. Distal colorectal mild wall thickening is present with fluid levels in the ascending and descending colon and rectum. Mild splenic flexure pericolonic stranding is suggested. Prominent pericolonic lymph nodes also seen in the region of the splenic flexure on axial series 2, image 17. The appendix is fluid-filled and minimally dilated in the 7 millimeter range. No periappendiceal edema identified. No intra-abdominal focal fluid collection to suggest abscess formation. No pneumoperitoneum. Aorta is normal in course and caliber. Superficial soft tissues are unremarkable. No acute or aggressive appearing skeletal findings. Impression:  Mild inflammatory findings involving the colon are compatible with known ulcerative colitis. No evident toxic megacolon or pneumoperitoneum. The appendix is fluid-filled and minimally dilated, which may be secondary to underlying inflammatory bowel disease or superimposed acute appendicitis. Clinical correlation is requested. Consider short interval clinical/imaging follow-up as warranted. Hepatic contour is slightly nodular. Please correlate for cirrhosis. Dr. Silvio Yeboah discussed findings with PAOLO Lowe at 6584 CST following the examination.  Electronically Signed By: Gustavo Connosr         EKG: normal sinus rhythm, no blocks or conduction defects, no ischemic changes    Assessment:    Principal Problem:    Ulcerative colitis

## 2017-09-26 ENCOUNTER — ANESTHESIA EVENT (OUTPATIENT)
Dept: ENDOSCOPY | Age: 42
End: 2017-09-26

## 2017-09-26 ENCOUNTER — ANESTHESIA (OUTPATIENT)
Dept: ENDOSCOPY | Age: 42
End: 2017-09-26

## 2017-09-26 VITALS
RESPIRATION RATE: 16 BRPM | HEART RATE: 72 BPM | TEMPERATURE: 97.6 F | DIASTOLIC BLOOD PRESSURE: 75 MMHG | OXYGEN SATURATION: 97 % | WEIGHT: 189.4 LBS | SYSTOLIC BLOOD PRESSURE: 141 MMHG

## 2017-09-26 LAB
ANION GAP SERPL CALCULATED.3IONS-SCNC: 13 MEQ/L (ref 8–16)
ANISOCYTOSIS: ABNORMAL
BASOPHILS # BLD: 0.2 %
BASOPHILS ABSOLUTE: 0 THOU/MM3 (ref 0–0.1)
BUN BLDV-MCNC: 7 MG/DL (ref 7–22)
CALCIUM SERPL-MCNC: 8.9 MG/DL (ref 8.5–10.5)
CHLORIDE BLD-SCNC: 103 MEQ/L (ref 98–111)
CO2: 24 MEQ/L (ref 23–33)
CREAT SERPL-MCNC: 0.5 MG/DL (ref 0.4–1.2)
DIFFERENTIAL TYPE: ABNORMAL
EOSINOPHIL # BLD: 0.6 %
EOSINOPHILS ABSOLUTE: 0 THOU/MM3 (ref 0–0.4)
GFR SERPL CREATININE-BSD FRML MDRD: > 90 ML/MIN/1.73M2
GLUCOSE BLD-MCNC: 176 MG/DL (ref 70–108)
HCT VFR BLD CALC: 35.3 % (ref 42–52)
HEMOGLOBIN: 12.1 GM/DL (ref 14–18)
HEMOGLOBIN: 12.3 GM/DL (ref 14–18)
LYMPHOCYTES # BLD: 40.2 %
LYMPHOCYTES ABSOLUTE: 1.4 THOU/MM3 (ref 1–4.8)
MCH RBC QN AUTO: 31.4 PG (ref 27–31)
MCHC RBC AUTO-ENTMCNC: 34.9 GM/DL (ref 33–37)
MCV RBC AUTO: 89.9 FL (ref 80–94)
MONOCYTES # BLD: 3.1 %
MONOCYTES ABSOLUTE: 0.1 THOU/MM3 (ref 0.4–1.3)
NUCLEATED RED BLOOD CELLS: 0 /100 WBC
PATHOLOGIST REVIEW: ABNORMAL
PDW BLD-RTO: 16.1 % (ref 11.5–14.5)
PLATELET # BLD: 89 THOU/MM3 (ref 130–400)
PLATELET ESTIMATE: ABNORMAL
PMV BLD AUTO: 8.2 MCM (ref 7.4–10.4)
POTASSIUM SERPL-SCNC: 3.8 MEQ/L (ref 3.5–5.2)
RBC # BLD: 3.93 MILL/MM3 (ref 4.7–6.1)
RBC # BLD: ABNORMAL 10*6/UL
SCAN OF BLOOD SMEAR: NORMAL
SEG NEUTROPHILS: 55.9 %
SEGMENTED NEUTROPHILS ABSOLUTE COUNT: 2 THOU/MM3 (ref 1.8–7.7)
SODIUM BLD-SCNC: 140 MEQ/L (ref 135–145)
WBC # BLD: 3.6 THOU/MM3 (ref 4.8–10.8)

## 2017-09-26 PROCEDURE — 6360000002 HC RX W HCPCS: Performed by: INTERNAL MEDICINE

## 2017-09-26 PROCEDURE — 99153 MOD SED SAME PHYS/QHP EA: CPT | Performed by: INTERNAL MEDICINE

## 2017-09-26 PROCEDURE — 36415 COLL VENOUS BLD VENIPUNCTURE: CPT

## 2017-09-26 PROCEDURE — 80048 BASIC METABOLIC PNL TOTAL CA: CPT

## 2017-09-26 PROCEDURE — 7100000000 HC PACU RECOVERY - FIRST 15 MIN: Performed by: INTERNAL MEDICINE

## 2017-09-26 PROCEDURE — 6370000000 HC RX 637 (ALT 250 FOR IP)

## 2017-09-26 PROCEDURE — 88305 TISSUE EXAM BY PATHOLOGIST: CPT

## 2017-09-26 PROCEDURE — 85025 COMPLETE CBC W/AUTO DIFF WBC: CPT

## 2017-09-26 PROCEDURE — 0DBL8ZX EXCISION OF TRANSVERSE COLON, VIA NATURAL OR ARTIFICIAL OPENING ENDOSCOPIC, DIAGNOSTIC: ICD-10-PCS | Performed by: INTERNAL MEDICINE

## 2017-09-26 PROCEDURE — 2580000003 HC RX 258: Performed by: INTERNAL MEDICINE

## 2017-09-26 PROCEDURE — 99239 HOSP IP/OBS DSCHRG MGMT >30: CPT | Performed by: HOSPITALIST

## 2017-09-26 PROCEDURE — 99152 MOD SED SAME PHYS/QHP 5/>YRS: CPT | Performed by: INTERNAL MEDICINE

## 2017-09-26 PROCEDURE — 3609010300 HC COLONOSCOPY W/BIOPSY SINGLE/MULTIPLE: Performed by: INTERNAL MEDICINE

## 2017-09-26 PROCEDURE — A6257 TRANSPARENT FILM <= 16 SQ IN: HCPCS

## 2017-09-26 PROCEDURE — 6370000000 HC RX 637 (ALT 250 FOR IP): Performed by: INTERNAL MEDICINE

## 2017-09-26 PROCEDURE — 0DBM8ZX EXCISION OF DESCENDING COLON, VIA NATURAL OR ARTIFICIAL OPENING ENDOSCOPIC, DIAGNOSTIC: ICD-10-PCS | Performed by: INTERNAL MEDICINE

## 2017-09-26 PROCEDURE — 0DBN8ZX EXCISION OF SIGMOID COLON, VIA NATURAL OR ARTIFICIAL OPENING ENDOSCOPIC, DIAGNOSTIC: ICD-10-PCS | Performed by: INTERNAL MEDICINE

## 2017-09-26 PROCEDURE — 0DBK8ZX EXCISION OF ASCENDING COLON, VIA NATURAL OR ARTIFICIAL OPENING ENDOSCOPIC, DIAGNOSTIC: ICD-10-PCS | Performed by: INTERNAL MEDICINE

## 2017-09-26 RX ORDER — PREDNISONE 10 MG/1
TABLET ORAL
Qty: 84 TABLET | Refills: 0 | Status: SHIPPED | OUTPATIENT
Start: 2017-09-26 | End: 2018-01-05

## 2017-09-26 RX ORDER — PREDNISONE 20 MG/1
20 TABLET ORAL DAILY
Qty: 14 TABLET | Refills: 0 | Status: SHIPPED | OUTPATIENT
Start: 2017-10-10 | End: 2017-09-26 | Stop reason: HOSPADM

## 2017-09-26 RX ORDER — SENNA AND DOCUSATE SODIUM 50; 8.6 MG/1; MG/1
1 TABLET, FILM COATED ORAL DAILY
Status: CANCELLED | OUTPATIENT
Start: 2017-09-26

## 2017-09-26 RX ORDER — HYDROXYZINE HYDROCHLORIDE 25 MG/1
25 TABLET, FILM COATED ORAL NIGHTLY PRN
Status: CANCELLED | OUTPATIENT
Start: 2017-09-26

## 2017-09-26 RX ORDER — PREDNISONE 20 MG/1
20 TABLET ORAL DAILY
Status: DISCONTINUED | OUTPATIENT
Start: 2017-10-10 | End: 2017-09-26 | Stop reason: HOSPADM

## 2017-09-26 RX ORDER — PREDNISONE 10 MG/1
10 TABLET ORAL DAILY
Status: DISCONTINUED | OUTPATIENT
Start: 2017-10-24 | End: 2017-09-26 | Stop reason: HOSPADM

## 2017-09-26 RX ORDER — LOPERAMIDE HYDROCHLORIDE 2 MG/1
2 CAPSULE ORAL 4 TIMES DAILY PRN
Status: CANCELLED | OUTPATIENT
Start: 2017-09-26

## 2017-09-26 RX ORDER — FOLIC ACID 1 MG/1
2 TABLET ORAL DAILY
Qty: 30 TABLET | Refills: 3 | Status: SHIPPED | OUTPATIENT
Start: 2017-09-26

## 2017-09-26 RX ORDER — FENTANYL CITRATE 50 UG/ML
INJECTION, SOLUTION INTRAMUSCULAR; INTRAVENOUS PRN
Status: DISCONTINUED | OUTPATIENT
Start: 2017-09-26 | End: 2017-09-26 | Stop reason: HOSPADM

## 2017-09-26 RX ORDER — ENTECAVIR 0.5 MG/1
0.5 TABLET, FILM COATED ORAL DAILY
Status: CANCELLED | OUTPATIENT
Start: 2017-09-26

## 2017-09-26 RX ORDER — MESALAMINE 400 MG/1
800 CAPSULE, DELAYED RELEASE ORAL 3 TIMES DAILY
Qty: 180 CAPSULE | Refills: 0 | Status: SHIPPED | OUTPATIENT
Start: 2017-09-26 | End: 2020-03-19 | Stop reason: ALTCHOICE

## 2017-09-26 RX ORDER — MESALAMINE 400 MG/1
1600 CAPSULE, DELAYED RELEASE ORAL 3 TIMES DAILY
Status: CANCELLED | OUTPATIENT
Start: 2017-09-26

## 2017-09-26 RX ORDER — PREDNISONE 10 MG/1
30 TABLET ORAL DAILY
Qty: 42 TABLET | Refills: 0 | Status: SHIPPED | OUTPATIENT
Start: 2017-09-26 | End: 2017-09-26 | Stop reason: HOSPADM

## 2017-09-26 RX ORDER — PREDNISONE 10 MG/1
10 TABLET ORAL DAILY
Qty: 14 TABLET | Refills: 0 | Status: SHIPPED | OUTPATIENT
Start: 2017-10-24 | End: 2017-09-26 | Stop reason: HOSPADM

## 2017-09-26 RX ORDER — MIDAZOLAM HYDROCHLORIDE 1 MG/ML
INJECTION INTRAMUSCULAR; INTRAVENOUS PRN
Status: DISCONTINUED | OUTPATIENT
Start: 2017-09-26 | End: 2017-09-26 | Stop reason: HOSPADM

## 2017-09-26 RX ADMIN — ACETAMINOPHEN 650 MG: 325 TABLET ORAL at 05:55

## 2017-09-26 RX ADMIN — POLYETHYLENE GLYCOL 3350 17 G: 17 POWDER, FOR SOLUTION ORAL at 02:47

## 2017-09-26 RX ADMIN — POLYETHYLENE GLYCOL 3350 17 G: 17 POWDER, FOR SOLUTION ORAL at 00:47

## 2017-09-26 RX ADMIN — MESALAMINE 800 MG: 400 CAPSULE, DELAYED RELEASE ORAL at 15:47

## 2017-09-26 RX ADMIN — SODIUM CHLORIDE: 9 INJECTION, SOLUTION INTRAVENOUS at 02:47

## 2017-09-26 RX ADMIN — POLYETHYLENE GLYCOL 3350 17 G: 17 POWDER, FOR SOLUTION ORAL at 07:46

## 2017-09-26 RX ADMIN — POLYETHYLENE GLYCOL 3350 17 G: 17 POWDER, FOR SOLUTION ORAL at 05:55

## 2017-09-26 RX ADMIN — POLYETHYLENE GLYCOL 3350 17 G: 17 POWDER, FOR SOLUTION ORAL at 05:48

## 2017-09-26 RX ADMIN — PREDNISONE 30 MG: 10 TABLET ORAL at 15:47

## 2017-09-26 RX ADMIN — PAROXETINE HYDROCHLORIDE 20 MG: 20 TABLET, FILM COATED ORAL at 09:07

## 2017-09-26 RX ADMIN — BUPRENORPHINE HYDROCHLORIDE, NALOXONE HYDROCHLORIDE 1 FILM: 8; 2 FILM, SOLUBLE BUCCAL; SUBLINGUAL at 12:39

## 2017-09-26 RX ADMIN — POLYETHYLENE GLYCOL 3350 17 G: 17 POWDER, FOR SOLUTION ORAL at 01:56

## 2017-09-26 RX ADMIN — POLYETHYLENE GLYCOL 3350 17 G: 17 POWDER, FOR SOLUTION ORAL at 03:58

## 2017-09-26 RX ADMIN — MESALAMINE 800 MG: 400 CAPSULE, DELAYED RELEASE ORAL at 09:07

## 2017-09-26 RX ADMIN — PANTOPRAZOLE SODIUM 40 MG: 40 TABLET, DELAYED RELEASE ORAL at 05:56

## 2017-09-26 RX ADMIN — Medication 10 ML: at 09:10

## 2017-09-26 RX ADMIN — METHYLPREDNISOLONE SODIUM SUCCINATE 40 MG: 40 INJECTION, POWDER, FOR SOLUTION INTRAMUSCULAR; INTRAVENOUS at 05:49

## 2017-09-26 ASSESSMENT — PAIN SCALES - GENERAL
PAINLEVEL_OUTOF10: 6
PAINLEVEL_OUTOF10: 0
PAINLEVEL_OUTOF10: 2

## 2017-09-26 NOTE — DISCHARGE SUMMARY
folic acid 1 MG tablet   Commonly known as:  FOLVITE   Take 2 tablets by mouth daily       * predniSONE 10 MG tablet   Commonly known as:  DELTASONE   Take 3 tablets by mouth daily for 14 days       * predniSONE 20 MG tablet   Commonly known as:  DELTASONE   Take 1 tablet by mouth daily for 14 days   Start taking on:  10/10/2017       * predniSONE 10 MG tablet   Commonly known as:  DELTASONE   Take 1 tablet by mouth daily for 14 days   Start taking on:  10/24/2017       * Notice: This list has 3 medication(s) that are the same as other medications prescribed for you. Read the directions carefully, and ask your doctor or other care provider to review them with you.       CONTINUE taking these medications          ALUM & MAG HYDROXIDE-SIMETH PO       buprenorphine-naloxone 8-2 MG Film SL film   Commonly known as:  SUBOXONE       LACTULOSE PO       magnesium hydroxide 2400 MG/10ML Susp   Commonly known as:  MILK OF MAGNESIA CONCENTRATE       mesalamine 400 MG Cpdr delayed release capsule   Commonly known as:  DELZICOL   Take 2 capsules by mouth 3 times daily       MULTI VITAMIN MENS Tabs       pantoprazole 40 MG tablet   Commonly known as:  PROTONIX       PARoxetine 20 MG tablet   Commonly known as:  PAXIL       sennosides-docusate sodium 8.6-50 MG tablet   Commonly known as:  SENOKOT-S         STOP taking these medications          aspirin 81 MG tablet       entecavir 0.5 MG tablet   Commonly known as:  BARACLUDE       hydrOXYzine 25 MG tablet   Commonly known as:  ATARAX       ibuprofen 400 MG tablet   Commonly known as:  ADVIL;MOTRIN       loperamide 2 MG capsule   Commonly known as:  IMODIUM            Where to Get Your Medications      You can get these medications from any pharmacy     Bring a paper prescription for each of these medications     folic acid 1 MG tablet    mesalamine 400 MG Cpdr delayed release capsule    predniSONE 10 MG tablet    predniSONE 10 MG tablet    predniSONE 20 MG tablet Physical Exam:    Vitals:  Vitals:    09/26/17 0415 09/26/17 0905 09/26/17 0930 09/26/17 1026   BP:  131/76 (!) 191/86 (!) 173/77   Pulse:  53 51 57   Resp:  16 16 16   Temp:  98 °F (36.7 °C)     TempSrc:  Axillary     SpO2:  96% 97% 97%   Weight: 189 lb 6.4 oz (85.9 kg)        Weight: Weight: 189 lb 6.4 oz (85.9 kg)     24 hour intake/output:  Intake/Output Summary (Last 24 hours) at 09/26/17 1119  Last data filed at 09/26/17 0418   Gross per 24 hour   Intake          3070.19 ml   Output                0 ml   Net          3070.19 ml       General appearance - alert awake appears to be in no acute distress  Chest - Bilateral air entry, no wheeze  Heart - S1S2 RRR, no murmurs or gallops  Abdomen - soft, non tender, normoactive bowel sounds  Neurological - non focal  Extremities - no edema  Skin - no rashes or lesions    Procedures:  EGD and colonoscopy     Diagnostic Test:  na    Radiology reports as per the Radiologist  Radiology:  Ct Abdomen Pelvis Wo Iv Contrast Additional Contrast? Oral    Result Date: 9/25/2017  PROCEDURE: CT ABDOMEN PELVIS WO IV CONTRAST CLINICAL INFORMATION: rectal bleeding . COMPARISON: No prior study. TECHNIQUE: Images of the abdomen and pelvis after oral contrast only. Multiplanar reconstructions. All CT scans at this facility use dose modulation, iterative reconstruction, and/or weight-based dosing when appropriate to reduce radiation dose to as low as reasonably achievable. FINDINGS: Lung bases Multiple retrocrural paraesophageal lymph nodes are present. At least one enlarged retrocrural node. Is also suggestion of possible varices. Lung bases are otherwise clear undersurface of the heart is unremarkable. Abdomen pelvis Solid organ evaluation is limited without IV contrast. The stomach wall appears thickened but this is likely due to incomplete distention with oral contrast. There is splenomegaly. The liver has an unusual contour but is otherwise normal in appearance.  The adrenals are normal. Kidneys are unremarkable. Multiple retroperitoneal lymph nodes. The pancreas appears normal. Gallbladder is distended. There is a diffuse bowel wall thickening beginning at the hepatic flexure involving the remainder of the colon to the distal rectosigmoid colon. Liquid stool is present throughout the colon. There is a pericolonic mesenteric edematous stranding best seen  along the descending colon. Additional scattered prominent mesenteric lymph nodes are identified. The appendix is fluid-filled and 7 mm in diameter early appendicitis is not excludable. No drainable fluid collections are seen. Urinary bladder demonstrates a thickened wall. Prostate gland is normal size. No suspicious bone lesions L4-5 disc space narrowing and vacuum disc phenomenon with marginal osteophytes and bilateral neural foraminal narrowing     1. Diffuse bowel wall thickening and areas of the pericolonic edema, consistent with patient's known history of ulcerative colitis. 2. Fluid distended appendix. Borderline diameter. Appendicitis is not excludable. 3. Splenomegaly with possible esophageal varices 4. Retroperitoneal and intraperitoneal adenopathy **This report has been created using voice recognition software. It may contain minor errors which are inherent in voice recognition technology. ** Final report electronically signed by Dr. Shira Galeana on 9/25/2017 9:50 AM      Results for orders placed or performed during the hospital encounter of 09/25/17   Gastrointestinal Panel by DNA   Result Value Ref Range    Campylobacter PCR Not Detected     Clostridium difficile, PCR Not Detected     Plesiomonas Shigelloides PCR Not Detected     Salmonella PCR Not Detected     Vibrio PCR Not Detected     Vibrio Cholerae PCR Not Detected     Yersinia Enterocolitica PCR Not Detected     E Coli Enteroaggregative PCR Not Detected     E Coli Enteropathogenic PCR Not Detected     E Coli Enterotoxigenic PCR Not Detected     E Coli Shiga Like Toxin PCR Not Detected     E Coli 0157 PCR NA     E Coli Shigella/Enteroinvasive PCR Not Detected     Cryptosporidium PCR Not Detected     CYCLOSPORIDIUM GI FILM ARRAY Not Detected     E HISTOLYTICA GI FILM ARRAY Not Detected     Giardia Lamblia PCR Not Detected     Adenovirus F 40 41 PCR Not Detected     Astrovirus PCR Not Detected     Norovirus GI GII PCR Not Detected     Rotavirus A PCR Not Detected     Sapovirus PCR Not Detected    CBC auto differential   Result Value Ref Range    WBC 6.1 4.8 - 10.8 thou/mm3    RBC 3.98 (L) 4.70 - 6.10 mill/mm3    Hemoglobin 12.0 (L) 14.0 - 18.0 gm/dl    Hematocrit 35.8 (L) 42.0 - 52.0 %    MCV 89.9 80.0 - 94.0 fL    MCH 30.2 27.0 - 31.0 pg    MCHC 33.6 33.0 - 37.0 gm/dl    RDW 17.3 (H) 11.5 - 14.5 %    Platelets 729 (L) 827 - 400 thou/mm3    MPV 8.1 7.4 - 10.4 mcm    RBC Morphology NORMAL     Seg Neutrophils 34.0 %    Lymphocytes 46.8 %    Monocytes 15.4 %    Eosinophils 3.5 %    Basophils 0.3 %    nRBC 0 /100 wbc    Anisocytosis 1+     Segs Absolute 2.1 1.8 - 7.7 thou/mm3    Lymphocytes # 2.9 1.0 - 4.8 thou/mm3    Monocytes # 0.9 0.4 - 1.3 thou/mm3    Eosinophils # 0.2 0.0 - 0.4 thou/mm3    Basophils # 0.0 0.0 - 0.1 thou/mm3   Basic Metabolic Panel   Result Value Ref Range    Sodium 136 135 - 145 meq/L    Potassium 3.8 3.5 - 5.2 meq/L    Chloride 101 98 - 111 meq/L    CO2 23 23 - 33 meq/L    Glucose 109 (H) 70 - 108 mg/dL    BUN 14 7 - 22 mg/dL    CREATININE 0.7 0.4 - 1.2 mg/dL    Calcium 8.6 8.5 - 10.5 mg/dL   Lipase   Result Value Ref Range    Lipase 37.3 5.6 - 51.3 U/L   Hepatic function panel   Result Value Ref Range    Alb 3.7 3.5 - 5.1 g/dL    Total Bilirubin 0.5 0.3 - 1.2 mg/dL    Bilirubin, Direct <0.2 0.0 - 0.3 mg/dL    Alkaline Phosphatase 89 38 - 126 U/L    AST 53 (H) 5 - 40 U/L    ALT 45 11 - 66 U/L    Total Protein 7.0 6.1 - 8.0 g/dL   Blood occult stool screen #1   Result Value Ref Range    OCCULT BLOOD FECAL Positive    Anion Gap   Result Value Ref Range (Patient may move about with assist as indicated or with supervision.)     Follow-up:  [facility]    Disposition: [facility]    Condition: Stable      Time Spent: 35 minutes    Electronically signed by Mai Weathers MD on 9/26/2017 at 11:19 AM    Discharging Hospitalist

## 2017-09-26 NOTE — PROGRESS NOTES
CLINICAL PHARMACY: DISCHARGE MED RECONCILIATION/REVIEW    [facility]) [facility] Patient?: No  Total # of Interventions Recommended: 0   - Increased Dose #: 0  Total # Interventions Accepted: 0  Intervention Severity:   - Level 1 Intervention Present?: No   - Level 2 #: 0   - Level 3 #: 0   Time Spent (min): 5    Coretta Adkins, PharmD  9/26/2017 11:43 AM

## 2017-09-26 NOTE — PLAN OF CARE
Problem: Pain:  Goal: Pain level will decrease  Pain level will decrease   Outcome: Ongoing  Pt able to use 0-10 pain scale. Denies pain so far this shift. Will monitor. Problem: Fluid Volume - Imbalance:  Goal: Absence of imbalanced fluid volume signs and symptoms  Absence of imbalanced fluid volume signs and symptoms   Outcome: Ongoing  Pt on receives IV and PO fluids. Pt has had 1 soft, medium brown/bright red stool so far this shift. Problem: Skin Integrity:  Goal: Skin integrity will stabilize  Skin integrity will stabilize   Outcome: Ongoing  No signs of new skin breakdown with each assessment. Skin remains warm, dry, intact. Mucous membranes pink & moist. Patient is able to turn self. Problem: Discharge Planning:  Goal: Patients continuum of care needs are met  Patients continuum of care needs are met   Outcome: Ongoing  Pt states he is from [place] but is in [facility] and would like to return there at discharge. SW on board. Comments:   Care plan reviewed with patient. Patient verbalizes understanding of the plan of care and contribute to goal setting.

## 2017-09-26 NOTE — CONSULTS
36.8, MCV 89.9, and  platelet count [phone]. The patient is undergoing further evaluation. PAST MEDICAL HISTORY:  History of ulcerative colitis, diagnosed more  than 6 years ago at the [facility], history of IV drug  use, history of cirrhosis of the liver, history of hepatitis C,  hepatitis B treated at the [facility]. PAST SURGICAL HISTORY:  Amputation of the right shoulder. FAMILY HISTORY:  No family history of any GI-related problems. SOCIAL HISTORY:  The patient has been smoking 0.5 packs per day for 30  years. History of illicit drug use including cocaine, history of IV  drug use. Denied any alcohol. ADMITTING MEDICATIONS:  Simethicone p.o. by mouth; aspirin 81 mg by  mouth daily; benztropine 2 mg by mouth two times daily;  buprenorphine/naloxone, Suboxone placed one film under the tongue  daily; entecavir 0.5 mg tablet by mouth daily; hydroxyzine 25 mg by  mouth three times daily; ibuprofen 400 mg tablet by mouth every 6  hours as needed; lactulose by mouth; loperamide 2 mg capsule by mouth  4 times daily; magnesium hydroxide 2400 mg by mouth as needed;  mesalamine 400 mg by mouth three times daily; multivitamin with  mineral by mouth daily; pantoprazole 40 mg by mouth daily; paroxetine  20 mg by mouth in the evening; Senokot-S one tablet by mouth daily. ALLERGIES:  PENICILLIN. REVIEW OF SYSTEMS:  A 12-point review of systems was reviewed. Pertinent positives was mentioned in HPI and past medical history. PHYSICAL EXAMINATION:  VITAL SIGNS:  Weight 188 pounds, temperature 97.9. blood pressure  133/93, pulse 76, respiratory rate 16. GENERAL:  A 43year-old pleasant male, well built, appears to be in no  acute distress. HEENT:  Normocephalic, atraumatic. Pupils are equal, round, reactive  to light. Anicteric sclerae. No erythema of oropharynx. NECK:  Supple. No JVD. No thyromegaly. CHEST:  No axillary or supraclavicular adenopathy.   LUNGS:  Equal

## 2017-09-26 NOTE — PLAN OF CARE
Problem: Discharge Planning:  Goal: Patients continuum of care needs are met  Patients continuum of care needs are met   Outcome: Ongoing  Patient is from [facility] and wants to return. See SW note for details.

## 2017-09-26 NOTE — CARE COORDINATION
9/26/17, 2:01 PM      [address]       Admitted from: ER, patient arrived from [facility] due to blood in stool and abdominal pain. 9/25/2017/ [address] day: 1   Location: [facility]12/012-A Reason for admit: Ulcerative colitis with rectal bleeding (HCC) [K51.911]  Ulcerative colitis, acute, other complication [Q94.370] Status: Inpt. Admit order signed?: yes  PMH:  has a past medical history of GERD (gastroesophageal reflux disease); Hepatic cirrhosis ([facility] [address] Hepatitis B; Hepatitis C; Intravenous drug user; Psychiatric problem; and Ulcerative colitis ([facility] [address] Procedure: N/A  Pertinent abnormal Imaging:CT of abdomen. Medications:  Scheduled Meds:   predniSONE  30 mg Oral Daily    Followed by   Alisha Hernandez ON 10/10/2017] predniSONE  20 mg Oral Daily    Followed by   Alisha Hernandez ON 10/24/2017] predniSONE  10 mg Oral Daily    lidocaine PF  5 mL Intradermal Once    sodium chloride flush  10 mL Intravenous 2 times per day    pantoprazole  40 mg Oral Daily    PARoxetine  20 mg Oral QAM    sodium chloride flush  10 mL Intravenous 2 times per day    buprenorphine-naloxone  1 Film Sublingual BID    mesalamine  800 mg Oral TID     Continuous Infusions:   sodium chloride 150 mL/hr at 09/26/17 0247      Pertinent Info/Orders/Treatment Plan:  IV fluids, Prednisone tapering dose, home medications addressed, discharge orders on chrt. Diet: DIET GENERAL;   DVT Prophylaxis: SCD's ordered  Smoking status:  reports that he has been smoking. He has been smoking about 0.50 packs per day. He does not have any smokeless tobacco history on file. Influenza Vaccination Screening Completed: Primary RN Kaur Sabine to address  Pneumonia Vaccination Screening Completed: Primary RN Kaur Sabine to address. Core measures: VTE  PCP: No primary care provider on file.   Readmission:   No  Risk Score: 0   Discharge Planning  Current Residence:  [facility] [address]:  Other (Comment) ([facility]) Support Systems:  Family Members  Current Services PTA:     Potential Assistance Needed:  N/A  Potential Assistance Purchasing Medications:  No  Does patient want to participate in local refill/ meds to beds program?  No  Type of Home Care Services:  None  Patient expects to be discharged to:  [address]  Expected Discharge date:  09/27/17  Follow Up Appointment: Best Day/ Time: Monday AM  Discharge Plan: Return to [facility].    Evaluation: yes

## 2017-09-27 NOTE — PROCEDURES
[address] [place] [address]                                PROCEDURE NOTE    PATIENT NAME: Donald Foster                           :       1975  MED REC NO:   [id number]                                      ROOM:      0012  ACCOUNT NO:   [de-identified]                                      ADMISSION  DATE:  2017  PROVIDER:     MALICK Nevarezer OF PROCEDURE:  2017        PROCEDURE:  Colonoscopy. INDICATION:  A 43year-old pleasant male with personal history of  ulcerative colitis diagnosed more than 6 years ago now presented to  the hospital with diarrhea and rectal bleeding. He is undergoing  further evaluation. PHYSICAL EXAMINATION:  VITAL SIGNS:  Afebrile. Vital signs are stable. Please see  electronic medical records for details. HEART:  Regular. Normal S1 and S2. No S3.  LUNGS:  Equal to expansion on inspection. ABDOMEN:  Soft. Normoactive bowel sounds. ASA CLASSIFICATION:  Class III. MEDICATIONS:  Fentanyl 125 mcg IV, Versed 5 mg IV. Conscious sedation  was given in incremental fashion to induce and sustain conscious  sedation. INSTRUMENT:  PCF-H190AL pediatric colonoscope. PHOTOGRAPHS:  Yes. BIOPSIES:  Yes. CONSENT:  Procedure, indications, and complications including but not  limited to perforation, bleeding, infection, adverse reaction to  medicine, very slight chance of missing significant lesions discussed  with the patient and the patient expressed his understanding and a  written consent was obtained. DESCRIPTION OF PROCEDURE:  The patient was placed in the left lateral  decubitus position in the endo room #11. The patient was placed on  appropriate monitoring of vitals including blood pressure, heart rate,  and pulse oximetry.   After the rectal examination, after the adequate  total intravenous anesthesia was given by Anesthesia, the PCF-H190AL  pediatric colonoscope was inserted into the rectum and advanced up to  the cecum without any difficulty and terminal ileum was intubated. On  careful inspection, the preparation was good. On withdrawal of the  scope, the terminal ileum looks normal as shown in picture #A2 and  #A3. Appendiceal orifice looks normal as shown in picture #A1. In  the cecum, the patient had fine mucosal erythema, loss of mucosal  markings consistent with active colitis noted as shown in picture #A1. Biopsies obtained in the ascending colon. Mild colitis noted as shown  in picture #A4. Multiple biopsies obtained. Distal ascending colon  looks normal as shown in picture #A5. In the transverse colon,  descending colon, and sigmoid colon, the patient had diffuse mucosal  erythema, loss of mucosal markings. Biopsies obtained. Distal  sigmoid colon looks normal.  On retroflex, rectum looks normal as  shown in picture #A9. Air was withdrawn as the scope was removed. The patient tolerated the procedure well without any immediate  complications. Vitals including blood pressure, heart rate, and pulse  oximetry were stable during the procedure and after the procedure. The patient transferred to the recovery room in stable condition. IMPRESSION:  Colonoscopy to distal ileum. Distal ileum looks normal.   The patient has fine mucosal erythema extending from cecum to the  rectum consistent with mild to moderate active colitis. Biopsies  obtained. MY RECOMMENDATIONS:  Increase the Asacol to 800 mg 2 tablets p.o.  t.i.d. total of 4.8 g, prednisone 40 mg tapering dose 40 mg for 2  weeks, 30 mg for 2 weeks, 10 mg for 2 weeks and follow up with me in 6  weeks, get the biopsy results and to reassess the clinical response. Folic acid 1 mg daily. If remained stable, may discharge home. Thank you Dr. Eduarda Prajapati for letting me do procedure on the patient. Do not  hesitate to call me if you have any questions.   My recommendations are  above.         Soniya Chaparro M.D.    D: 09/26/2017 10:30:07       T: 09/26/2017 12:19:40     [email or web address]  Job#: [id number]     Doc#: [id number]    CC:  Dr. Norma Msitry

## 2018-01-05 ENCOUNTER — APPOINTMENT (OUTPATIENT)
Dept: GENERAL RADIOLOGY | Age: 43
End: 2018-01-05
Payer: MEDICARE

## 2018-01-05 ENCOUNTER — HOSPITAL ENCOUNTER (EMERGENCY)
Age: 43
Discharge: HOME | End: 2018-01-05
Payer: MEDICARE

## 2018-01-05 VITALS
BODY MASS INDEX: 32.51 KG/M2 | HEART RATE: 94 BPM | RESPIRATION RATE: 19 BRPM | DIASTOLIC BLOOD PRESSURE: 88 MMHG | TEMPERATURE: 97.9 F | OXYGEN SATURATION: 98 % | HEIGHT: 72 IN | WEIGHT: 240 LBS | SYSTOLIC BLOOD PRESSURE: 132 MMHG

## 2018-01-05 DIAGNOSIS — R63.5 WEIGHT GAIN: ICD-10-CM

## 2018-01-05 DIAGNOSIS — R60.0 LOWER EXTREMITY EDEMA: Primary | ICD-10-CM

## 2018-01-05 DIAGNOSIS — Z87.19 HISTORY OF CIRRHOSIS: ICD-10-CM

## 2018-01-05 LAB
ALBUMIN SERPL-MCNC: 3.5 G/DL (ref 3.5–5.1)
ALP BLD-CCNC: 79 U/L (ref 38–126)
ALT SERPL-CCNC: 57 U/L (ref 11–66)
ANION GAP SERPL CALCULATED.3IONS-SCNC: 10 MEQ/L (ref 8–16)
ANISOCYTOSIS: ABNORMAL
AST SERPL-CCNC: 76 U/L (ref 5–40)
BASOPHILS # BLD: 0.6 %
BASOPHILS ABSOLUTE: 0 THOU/MM3 (ref 0–0.1)
BILIRUB SERPL-MCNC: 0.6 MG/DL (ref 0.3–1.2)
BILIRUBIN DIRECT: < 0.2 MG/DL (ref 0–0.3)
BUN BLDV-MCNC: 17 MG/DL (ref 7–22)
CALCIUM SERPL-MCNC: 8.7 MG/DL (ref 8.5–10.5)
CHLORIDE BLD-SCNC: 103 MEQ/L (ref 98–111)
CO2: 26 MEQ/L (ref 23–33)
CREAT SERPL-MCNC: 0.5 MG/DL (ref 0.4–1.2)
EKG ATRIAL RATE: 92 BPM
EKG P AXIS: 57 DEGREES
EKG P-R INTERVAL: 146 MS
EKG Q-T INTERVAL: 352 MS
EKG QRS DURATION: 74 MS
EKG QTC CALCULATION (BAZETT): 435 MS
EKG R AXIS: 21 DEGREES
EKG T AXIS: 45 DEGREES
EKG VENTRICULAR RATE: 92 BPM
EOSINOPHIL # BLD: 3.9 %
EOSINOPHILS ABSOLUTE: 0.2 THOU/MM3 (ref 0–0.4)
GFR SERPL CREATININE-BSD FRML MDRD: > 90 ML/MIN/1.73M2
GLUCOSE BLD-MCNC: 121 MG/DL (ref 70–108)
HCT VFR BLD CALC: 36.1 % (ref 42–52)
HEMOGLOBIN: 12.6 GM/DL (ref 14–18)
LYMPHOCYTES # BLD: 45.3 %
LYMPHOCYTES ABSOLUTE: 2.4 THOU/MM3 (ref 1–4.8)
MCH RBC QN AUTO: 32.4 PG (ref 27–31)
MCHC RBC AUTO-ENTMCNC: 34.8 GM/DL (ref 33–37)
MCV RBC AUTO: 93.1 FL (ref 80–94)
MONOCYTES # BLD: 11 %
MONOCYTES ABSOLUTE: 0.6 THOU/MM3 (ref 0.4–1.3)
NUCLEATED RED BLOOD CELLS: 0 /100 WBC
OSMOLALITY CALCULATION: 280.3 MOSMOL/KG (ref 275–300)
PDW BLD-RTO: 16.1 % (ref 11.5–14.5)
PLATELET # BLD: 88 THOU/MM3 (ref 130–400)
PLATELET ESTIMATE: ABNORMAL
PMV BLD AUTO: 8.7 MCM (ref 7.4–10.4)
POTASSIUM SERPL-SCNC: 4.8 MEQ/L (ref 3.5–5.2)
PRO-BNP: 28 PG/ML (ref 0–450)
RBC # BLD: 3.88 MILL/MM3 (ref 4.7–6.1)
SCAN OF BLOOD SMEAR: NORMAL
SEG NEUTROPHILS: 39.2 %
SEGMENTED NEUTROPHILS ABSOLUTE COUNT: 2.1 THOU/MM3 (ref 1.8–7.7)
SODIUM BLD-SCNC: 139 MEQ/L (ref 135–145)
TOTAL PROTEIN: 7.1 G/DL (ref 6.1–8)
WBC # BLD: 5.4 THOU/MM3 (ref 4.8–10.8)

## 2018-01-05 PROCEDURE — 93005 ELECTROCARDIOGRAM TRACING: CPT

## 2018-01-05 PROCEDURE — 71046 X-RAY EXAM CHEST 2 VIEWS: CPT

## 2018-01-05 PROCEDURE — 99284 EMERGENCY DEPT VISIT MOD MDM: CPT

## 2018-01-05 PROCEDURE — 83880 ASSAY OF NATRIURETIC PEPTIDE: CPT

## 2018-01-05 PROCEDURE — 36415 COLL VENOUS BLD VENIPUNCTURE: CPT

## 2018-01-05 PROCEDURE — 82248 BILIRUBIN DIRECT: CPT

## 2018-01-05 PROCEDURE — 80053 COMPREHEN METABOLIC PANEL: CPT

## 2018-01-05 PROCEDURE — 85025 COMPLETE CBC W/AUTO DIFF WBC: CPT

## 2018-01-05 RX ORDER — MESALAMINE 400 MG/1
400 CAPSULE, DELAYED RELEASE ORAL 2 TIMES DAILY
COMMUNITY

## 2018-01-05 RX ORDER — POTASSIUM CHLORIDE 1.5 G/1.77G
20 POWDER, FOR SOLUTION ORAL 2 TIMES DAILY
Status: ON HOLD | COMMUNITY
End: 2020-08-17

## 2018-01-05 RX ORDER — FUROSEMIDE 40 MG/1
60 TABLET ORAL 2 TIMES DAILY
Qty: 21 TABLET | Refills: 0 | Status: SHIPPED | OUTPATIENT
Start: 2018-01-05 | End: 2020-03-19 | Stop reason: ALTCHOICE

## 2018-01-05 RX ORDER — ENTECAVIR 0.5 MG/1
1 TABLET, FILM COATED ORAL DAILY
COMMUNITY
End: 2020-03-19 | Stop reason: ALTCHOICE

## 2018-01-05 RX ORDER — FUROSEMIDE 40 MG/1
40 TABLET ORAL 2 TIMES DAILY
COMMUNITY
End: 2018-01-05

## 2018-01-05 RX ORDER — HYDROXYZINE PAMOATE 25 MG/1
25 CAPSULE ORAL 3 TIMES DAILY PRN
COMMUNITY
End: 2018-08-23

## 2018-01-05 ASSESSMENT — ENCOUNTER SYMPTOMS
BLOOD IN STOOL: 0
ABDOMINAL DISTENTION: 1
NAUSEA: 0
RHINORRHEA: 0
CONSTIPATION: 0
SHORTNESS OF BREATH: 0
COUGH: 0
SINUS PRESSURE: 0
COLOR CHANGE: 0
WHEEZING: 0
SORE THROAT: 0
DIARRHEA: 1
VOICE CHANGE: 0
BACK PAIN: 0
EYE REDNESS: 0
ABDOMINAL PAIN: 0
VOMITING: 0
CHEST TIGHTNESS: 0
PHOTOPHOBIA: 0

## 2018-08-23 ENCOUNTER — APPOINTMENT (OUTPATIENT)
Dept: ULTRASOUND IMAGING | Age: 43
End: 2018-08-23
Payer: MEDICARE

## 2018-08-23 ENCOUNTER — APPOINTMENT (OUTPATIENT)
Dept: CT IMAGING | Age: 43
End: 2018-08-23
Payer: MEDICARE

## 2018-08-23 ENCOUNTER — APPOINTMENT (OUTPATIENT)
Dept: GENERAL RADIOLOGY | Age: 43
End: 2018-08-23
Payer: MEDICARE

## 2018-08-23 ENCOUNTER — HOSPITAL ENCOUNTER (EMERGENCY)
Age: 43
Discharge: HOME | End: 2018-08-23
Payer: MEDICARE

## 2018-08-23 VITALS
TEMPERATURE: 99.3 F | SYSTOLIC BLOOD PRESSURE: 108 MMHG | RESPIRATION RATE: 16 BRPM | BODY MASS INDEX: 32.51 KG/M2 | OXYGEN SATURATION: 96 % | DIASTOLIC BLOOD PRESSURE: 89 MMHG | HEIGHT: 72 IN | WEIGHT: 240 LBS | HEART RATE: 80 BPM

## 2018-08-23 DIAGNOSIS — R10.11 RIGHT UPPER QUADRANT ABDOMINAL PAIN: Primary | ICD-10-CM

## 2018-08-23 DIAGNOSIS — Z87.19 HISTORY OF CIRRHOSIS: ICD-10-CM

## 2018-08-23 DIAGNOSIS — Z86.19 HISTORY OF HEPATITIS B: ICD-10-CM

## 2018-08-23 DIAGNOSIS — R79.89 ELEVATED LFTS: ICD-10-CM

## 2018-08-23 DIAGNOSIS — Z86.19 HISTORY OF HEPATITIS C: ICD-10-CM

## 2018-08-23 DIAGNOSIS — R14.0 ABDOMINAL DISTENSION: ICD-10-CM

## 2018-08-23 LAB
ALBUMIN SERPL-MCNC: 3.9 G/DL (ref 3.5–5.1)
ALP BLD-CCNC: 101 U/L (ref 38–126)
ALT SERPL-CCNC: 181 U/L (ref 11–66)
AMPHETAMINE+METHAMPHETAMINE URINE SCREEN: NEGATIVE
ANION GAP SERPL CALCULATED.3IONS-SCNC: 14 MEQ/L (ref 8–16)
AST SERPL-CCNC: 147 U/L (ref 5–40)
BARBITURATE QUANTITATIVE URINE: NEGATIVE
BASOPHILS # BLD: 0.4 %
BASOPHILS ABSOLUTE: 0 THOU/MM3 (ref 0–0.1)
BENZODIAZEPINE QUANTITATIVE URINE: NEGATIVE
BILIRUB SERPL-MCNC: 0.8 MG/DL (ref 0.3–1.2)
BILIRUBIN DIRECT: 0.3 MG/DL (ref 0–0.3)
BILIRUBIN URINE: NEGATIVE
BLOOD, URINE: NEGATIVE
BUN BLDV-MCNC: 15 MG/DL (ref 7–22)
CALCIUM SERPL-MCNC: 9.1 MG/DL (ref 8.5–10.5)
CANNABINOID QUANTITATIVE URINE: NEGATIVE
CHARACTER, URINE: CLEAR
CHLORIDE BLD-SCNC: 95 MEQ/L (ref 98–111)
CO2: 22 MEQ/L (ref 23–33)
COCAINE METABOLITE QUANTITATIVE URINE: NEGATIVE
COLOR: YELLOW
CREAT SERPL-MCNC: 0.9 MG/DL (ref 0.4–1.2)
EKG ATRIAL RATE: 85 BPM
EKG P AXIS: 63 DEGREES
EKG P-R INTERVAL: 152 MS
EKG Q-T INTERVAL: 360 MS
EKG QRS DURATION: 80 MS
EKG QTC CALCULATION (BAZETT): 428 MS
EKG R AXIS: 20 DEGREES
EKG T AXIS: 36 DEGREES
EKG VENTRICULAR RATE: 85 BPM
EOSINOPHIL # BLD: 2.5 %
EOSINOPHILS ABSOLUTE: 0.1 THOU/MM3 (ref 0–0.4)
ERYTHROCYTE [DISTWIDTH] IN BLOOD BY AUTOMATED COUNT: 12.4 % (ref 11.5–14.5)
ERYTHROCYTE [DISTWIDTH] IN BLOOD BY AUTOMATED COUNT: 41.2 FL (ref 35–45)
GFR SERPL CREATININE-BSD FRML MDRD: > 90 ML/MIN/1.73M2
GLUCOSE BLD-MCNC: 135 MG/DL (ref 70–108)
GLUCOSE URINE: NEGATIVE MG/DL
HCT VFR BLD CALC: 35.8 % (ref 42–52)
HEMOGLOBIN: 13.3 GM/DL (ref 14–18)
IMMATURE GRANS (ABS): 0.02 THOU/MM3 (ref 0–0.07)
IMMATURE GRANULOCYTES: 0.4 %
KETONES, URINE: NEGATIVE
LEUKOCYTE ESTERASE, URINE: NEGATIVE
LIPASE: 23.6 U/L (ref 5.6–51.3)
LYMPHOCYTES # BLD: 37.1 %
LYMPHOCYTES ABSOLUTE: 2 THOU/MM3 (ref 1–4.8)
MAGNESIUM: 1.7 MG/DL (ref 1.6–2.4)
MCH RBC QN AUTO: 34.1 PG (ref 26–33)
MCHC RBC AUTO-ENTMCNC: 37.2 GM/DL (ref 32.2–35.5)
MCV RBC AUTO: 91.8 FL (ref 80–94)
MONOCYTES # BLD: 11.6 %
MONOCYTES ABSOLUTE: 0.6 THOU/MM3 (ref 0.4–1.3)
NITRITE, URINE: NEGATIVE
NUCLEATED RED BLOOD CELLS: 0 /100 WBC
OPIATES, URINE: NEGATIVE
OSMOLALITY CALCULATION: 265.5 MOSMOL/KG (ref 275–300)
OXYCODONE: NEGATIVE
PH UA: 6
PHENCYCLIDINE QUANTITATIVE URINE: NEGATIVE
PLATELET # BLD: 87 THOU/MM3 (ref 130–400)
PMV BLD AUTO: 9.9 FL (ref 9.4–12.4)
POTASSIUM SERPL-SCNC: 5.1 MEQ/L (ref 3.5–5.2)
PRO-BNP: 12.3 PG/ML (ref 0–450)
PROTEIN UA: NEGATIVE
RBC # BLD: 3.9 MILL/MM3 (ref 4.7–6.1)
SCAN OF BLOOD SMEAR: NORMAL
SEG NEUTROPHILS: 48 %
SEGMENTED NEUTROPHILS ABSOLUTE COUNT: 2.6 THOU/MM3 (ref 1.8–7.7)
SODIUM BLD-SCNC: 131 MEQ/L (ref 135–145)
SPECIFIC GRAVITY, URINE: 1.01 (ref 1–1.03)
TOTAL PROTEIN: 7 G/DL (ref 6.1–8)
TROPONIN T: < 0.01 NG/ML
UROBILINOGEN, URINE: 0.2 EU/DL
WBC # BLD: 5.5 THOU/MM3 (ref 4.8–10.8)

## 2018-08-23 PROCEDURE — 83880 ASSAY OF NATRIURETIC PEPTIDE: CPT

## 2018-08-23 PROCEDURE — 36415 COLL VENOUS BLD VENIPUNCTURE: CPT

## 2018-08-23 PROCEDURE — 71045 X-RAY EXAM CHEST 1 VIEW: CPT

## 2018-08-23 PROCEDURE — 85025 COMPLETE CBC W/AUTO DIFF WBC: CPT

## 2018-08-23 PROCEDURE — 2580000003 HC RX 258: Performed by: PHYSICIAN ASSISTANT

## 2018-08-23 PROCEDURE — 83690 ASSAY OF LIPASE: CPT

## 2018-08-23 PROCEDURE — 74177 CT ABD & PELVIS W/CONTRAST: CPT

## 2018-08-23 PROCEDURE — 81003 URINALYSIS AUTO W/O SCOPE: CPT

## 2018-08-23 PROCEDURE — 96374 THER/PROPH/DIAG INJ IV PUSH: CPT

## 2018-08-23 PROCEDURE — 93005 ELECTROCARDIOGRAM TRACING: CPT | Performed by: PHYSICIAN ASSISTANT

## 2018-08-23 PROCEDURE — 6360000004 HC RX CONTRAST MEDICATION: Performed by: PHYSICIAN ASSISTANT

## 2018-08-23 PROCEDURE — 2500000003 HC RX 250 WO HCPCS: Performed by: PHYSICIAN ASSISTANT

## 2018-08-23 PROCEDURE — 83735 ASSAY OF MAGNESIUM: CPT

## 2018-08-23 PROCEDURE — 80053 COMPREHEN METABOLIC PANEL: CPT

## 2018-08-23 PROCEDURE — S0028 INJECTION, FAMOTIDINE, 20 MG: HCPCS | Performed by: PHYSICIAN ASSISTANT

## 2018-08-23 PROCEDURE — 82248 BILIRUBIN DIRECT: CPT

## 2018-08-23 PROCEDURE — 84484 ASSAY OF TROPONIN QUANT: CPT

## 2018-08-23 PROCEDURE — 6360000002 HC RX W HCPCS: Performed by: PHYSICIAN ASSISTANT

## 2018-08-23 PROCEDURE — 80307 DRUG TEST PRSMV CHEM ANLYZR: CPT

## 2018-08-23 PROCEDURE — 6370000000 HC RX 637 (ALT 250 FOR IP): Performed by: PHYSICIAN ASSISTANT

## 2018-08-23 PROCEDURE — 99285 EMERGENCY DEPT VISIT HI MDM: CPT

## 2018-08-23 RX ORDER — DICYCLOMINE HYDROCHLORIDE 10 MG/1
10 CAPSULE ORAL ONCE
Status: COMPLETED | OUTPATIENT
Start: 2018-08-23 | End: 2018-08-23

## 2018-08-23 RX ORDER — MORPHINE SULFATE 2 MG/ML
2 INJECTION, SOLUTION INTRAMUSCULAR; INTRAVENOUS ONCE
Status: DISCONTINUED | OUTPATIENT
Start: 2018-08-23 | End: 2018-08-23

## 2018-08-23 RX ORDER — CYCLOBENZAPRINE HCL 10 MG
10 TABLET ORAL 3 TIMES DAILY PRN
COMMUNITY
End: 2020-03-12

## 2018-08-23 RX ORDER — DICYCLOMINE HYDROCHLORIDE 10 MG/1
10 CAPSULE ORAL EVERY 6 HOURS PRN
Qty: 20 CAPSULE | Refills: 0 | Status: ON HOLD | OUTPATIENT
Start: 2018-08-23 | End: 2021-01-04

## 2018-08-23 RX ORDER — 0.9 % SODIUM CHLORIDE 0.9 %
1000 INTRAVENOUS SOLUTION INTRAVENOUS ONCE
Status: COMPLETED | OUTPATIENT
Start: 2018-08-23 | End: 2018-08-23

## 2018-08-23 RX ORDER — ONDANSETRON 4 MG/1
4 TABLET, ORALLY DISINTEGRATING ORAL EVERY 8 HOURS PRN
Qty: 20 TABLET | Refills: 0 | Status: ON HOLD | OUTPATIENT
Start: 2018-08-23 | End: 2021-01-04

## 2018-08-23 RX ORDER — ONDANSETRON 4 MG/1
4 TABLET, ORALLY DISINTEGRATING ORAL ONCE
Status: COMPLETED | OUTPATIENT
Start: 2018-08-23 | End: 2018-08-23

## 2018-08-23 RX ORDER — SPIRONOLACTONE 50 MG/1
50 TABLET, FILM COATED ORAL DAILY
COMMUNITY
End: 2020-03-19 | Stop reason: ALTCHOICE

## 2018-08-23 RX ADMIN — FAMOTIDINE 20 MG: 10 INJECTION, SOLUTION INTRAVENOUS at 19:28

## 2018-08-23 RX ADMIN — IOPAMIDOL 80 ML: 755 INJECTION, SOLUTION INTRAVENOUS at 20:16

## 2018-08-23 RX ADMIN — DICYCLOMINE HYDROCHLORIDE 10 MG: 10 CAPSULE ORAL at 19:28

## 2018-08-23 RX ADMIN — ONDANSETRON 4 MG: 4 TABLET, ORALLY DISINTEGRATING ORAL at 19:28

## 2018-08-23 RX ADMIN — SODIUM CHLORIDE 1000 ML: 9 INJECTION, SOLUTION INTRAVENOUS at 19:30

## 2018-08-23 ASSESSMENT — ENCOUNTER SYMPTOMS
EYE DISCHARGE: 0
EYE REDNESS: 0
SORE THROAT: 0
RHINORRHEA: 0
ABDOMINAL DISTENTION: 1
BACK PAIN: 0
COLOR CHANGE: 0
ABDOMINAL PAIN: 1
NAUSEA: 1
SHORTNESS OF BREATH: 1
COUGH: 1
BLOOD IN STOOL: 0
DIARRHEA: 0
VOMITING: 0
CONSTIPATION: 0
WHEEZING: 0

## 2018-08-23 ASSESSMENT — PAIN SCALES - GENERAL: PAINLEVEL_OUTOF10: 8

## 2018-08-23 NOTE — ED PROVIDER NOTES
Positive for abdominal distention, abdominal pain and nausea. Negative for blood in stool, constipation, diarrhea and vomiting. Genitourinary: Negative for decreased urine volume, difficulty urinating and dysuria. Musculoskeletal: Negative for arthralgias, back pain, myalgias, neck pain and neck stiffness. Skin: Negative for color change, pallor and rash. Neurological: Negative for dizziness, syncope, weakness, light-headedness, numbness and headaches. Hematological: Negative for adenopathy. Psychiatric/Behavioral: Negative for agitation, confusion, dysphoric mood and suicidal ideas. The patient is not nervous/anxious. PAST MEDICAL HISTORY    has a past medical history of GERD (gastroesophageal reflux disease); Hepatic cirrhosis ([facility] [address] Hepatitis B; Hepatitis C; Intravenous drug user; Psychiatric problem; and Ulcerative colitis ([facility] [address] 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     CURRENT MEDICATIONS       Previous Medications    ALUM & MAG HYDROXIDE-SIMETH PO    Take by mouth    BUPRENORPHINE-NALOXONE (SUBOXONE) 8-2 MG FILM SL FILM    Place 1 Film under the tongue 2 times daily     CYCLOBENZAPRINE (FLEXERIL) 10 MG TABLET    Take 10 mg by mouth 3 times daily as needed for Muscle spasms    ENTECAVIR (BARACLUDE) 0.5 MG TABLET    Take 1 mg by mouth daily    FOLIC ACID (FOLVITE) 1 MG TABLET    Take 2 tablets by mouth daily    FUROSEMIDE (LASIX) 40 MG TABLET    Take 1.5 tablets by mouth 2 times daily for 7 days    LACTULOSE PO    Take 20 g by mouth 3 times daily     MESALAMINE (DELZICOL) 400 MG CPDR DELAYED RELEASE CAPSULE    Take 2 capsules by mouth 3 times daily    MESALAMINE (DELZICOL) 400 MG CPDR DELAYED RELEASE CAPSULE    Take 800 mg by mouth 3 times daily    MULTIPLE VITAMIN (MULTI VITAMIN MENS) TABS    Take by mouth    PANTOPRAZOLE (PROTONIX) 40 MG TABLET    Take 40 mg by mouth 2 Newman's sign. No hernia. Musculoskeletal: Normal range of motion. He exhibits no edema. Lymphadenopathy:     He has no cervical adenopathy. Neurological: He is alert and oriented to person, place, and time. He exhibits normal muscle tone. Coordination normal. GCS eye subscore is 4. GCS verbal subscore is 5. GCS motor subscore is 6. Skin: Skin is warm and dry. No rash noted. He is not diaphoretic. No erythema. No pallor. Psychiatric: He has a normal mood and affect. His behavior is normal. Thought content normal.   Nursing note and vitals reviewed. DIFFERENTIAL DIAGNOSIS:   Includes but not limited to: Gastritis, gastroenteritis, enteritis, colitis, diverticulitis, mesenteric adenitis, hepatitis, cirrhosis, ascites, pancreatitis, pyelonephritis, UTI, cholelithiasis, cholecystitis, cholangitis, IBS, IBD, SBO, PUD, GERD    DIAGNOSTIC RESULTS     EKG: All EKG's are interpreted by the Emergency Department Physician who either signs or Co-signs this chart in the absence of a cardiologist.  Aniyah Mckinley. Rate: 85 bpm  NC interval: 152 ms  QRS duration: 80 ms  QTc: 360/428 ms  P-R-T axes: 63, 20, 36  Normal sinus rhythm. Normal EKG. RADIOLOGY: non-plain film images(s) such as CT, Ultrasound and MRI are read by the radiologist.  Plain radiographic images are visualized and preliminarily interpreted by the emergency physician unless otherwise stated below. CT ABDOMEN PELVIS W IV CONTRAST Additional Contrast? None   Final Result      1. There is fat-containing paraesophageal hernia with small amount of fluid and upper abdominal lymph nodes. Multiple abdominal and retroperitoneal lymph nodes the majority of these were seen on prior study. Further evaluation with PET/CT can be performed if clinically indicated. 2. There is moderate stool throughout the colon. Correlate for constipation. **This report has been created using voice recognition software.  It may contain minor errors which are inherent in voice recognition technology. **      Final report electronically signed by Dr. Lonny Santos on 8/23/2018 8:48 PM      XR CHEST PORTABLE   Final Result   The interstitium is somewhat prominent. This is likely related to portable technique. No discrete lobar consolidation   There is blunting of the left costophrenic angle which could represent scarring or small left pleural effusion. Correlation with symptoms is advised. **This report has been created using voice recognition software. It may contain minor errors which are inherent in voice recognition technology. **      Final report electronically signed by Dr. Lonny Santos on 8/23/2018 8:06 PM          LABS:   Labs Reviewed   CBC WITH AUTO DIFFERENTIAL - Abnormal; Notable for the following:        Result Value    RBC 3.90 (*)     Hemoglobin 13.3 (*)     Hematocrit 35.8 (*)     MCH 34.1 (*)     MCHC 37.2 (*)     Platelets 87 (*)     All other components within normal limits   BASIC METABOLIC PANEL - Abnormal; Notable for the following:     Sodium 131 (*)     Chloride 95 (*)     CO2 22 (*)     Glucose 135 (*)     All other components within normal limits   HEPATIC FUNCTION PANEL - Abnormal; Notable for the following:      (*)      (*)     All other components within normal limits   OSMOLALITY - Abnormal; Notable for the following:     Osmolality Calc 265.5 (*)     All other components within normal limits   LIPASE   MAGNESIUM   TROPONIN   URINE DRUG SCREEN   URINE RT REFLEX TO CULTURE   BRAIN NATRIURETIC PEPTIDE   SCAN OF BLOOD SMEAR   ANION GAP   GLOMERULAR FILTRATION RATE, ESTIMATED       EMERGENCY DEPARTMENT COURSE:   Vitals:    Vitals:    08/23/18 1835 08/23/18 1841 08/23/18 1936 08/23/18 2101   BP: (!) 152/99  (!) 145/95 108/89   Pulse: 92  82 80   Resp: 16   16   Temp:  99.3 °F (37.4 °C)     TempSrc: Oral      SpO2: 95%  95% 96%   Weight: 240 lb (108.9 kg)      Height: 6' (1.829 m)        The patient was seen and evaluated within the ED today with right-sided abdominal pain and bloating. Within the department, I observed the patient's vital signs to be within acceptable range, and he was afebrile. On exam, I appreciated mild RUQ tenderness without rigidity, guarding, or rebound tenderness. Newman's sign was negative. Abdominal distension is noted, which is consistent with ascites. Radiologic studies within the department revealed a fat-containing paraesophageal hernia with a small amount of fluid and upper abdominal lymph nodes seen on prior studies. Laboratory work revealed AST of 147 and ALT of 181. Within the department, the patient was treated with IV saline, Bentyl, Zofran, and Pepcid. I offered the patient consult for admission and paracentesis, which he declined. He stated that he would rather follow up with his GI specialist first before accepting admission, but he did agree to return to the department if he develops any fevers, vomiting, or worsening or changing abdominal pain. I observed the patient's condition to improve during the duration of the stay. Abdominal re-exam was benign. I explained my proposed course of treatment to the patient, who was amenable to my treatment and discharge decisions. He was discharged home in stable condition with prescriptions for Bentyl and Zofran, and the patient will return to the ED if the symptoms become more severe in nature or otherwise change. I estimate there is LOW risk for ACUTE APPENDICITIS, BOWEL OBSTRUCTION, CHOLECYSTITIS, DIVERTICULITIS, INCARCERATED HERNIA, PANCREATITIS, or PERFORATED BOWEL or ULCER, thus I consider the discharge disposition reasonable. Also, there is no evidence of peritonitis, sepsis, or toxicity. The patient and I have discussed the diagnosis and risks, and we agree with discharging home to follow-up with his primary doctor. We also discussed returning to the Emergency Department immediately if new or worsening symptoms occur.  We have discussed the symptoms which are most concerning (e.g., bloody stool, fever, changing or worsening pain, vomiting) that necessitate immediate return. CRITICAL CARE:   None    CONSULTS:  Discussed the case with my attending physician in the Emergency Department, who agreed with my workup, treatment, and disposition decisions. PROCEDURES:  None    FINAL IMPRESSION      1. Right upper quadrant abdominal pain    2. Abdominal distension    3. History of cirrhosis    4. History of hepatitis B    5. History of hepatitis C    6. Elevated LFTs          DISPOSITION/PLAN     1. Right upper quadrant abdominal pain    2. Abdominal distension    3. History of cirrhosis    4. History of hepatitis B    5. History of hepatitis C    6. Elevated LFTs       I have given the patient strict written and verbal instructions about care at home, follow-up, and signs and symptoms of worsening of condition, and the patient did verbalize understanding of these instructions. Patient was discharged in stable condition. Will return if symptoms change or worsen, or for any sign or symptom deemed emergent by the patient or family members. Follow up as an outpatient or sooner if symptoms warrant.      PATIENT REFERRED TO:  Sudheer Alamo  [address] [facility]  [phone]    Call in 1 day  As needed, If symptoms worsen, For re-check    [address]  [phone]  Call in 1 day  As needed, If symptoms worsen    [facility] EMERGENCY DEPT  [address]  Go today  If symptoms worsen      DISCHARGE MEDICATIONS:  New Prescriptions    DICYCLOMINE (BENTYL) 10 MG CAPSULE    Take 1 capsule by mouth every 6 hours as needed (cramps)    ONDANSETRON (ZOFRAN ODT) 4 MG DISINTEGRATING TABLET    Take 1 tablet by mouth every 8 hours as needed for Nausea       (Please note that portions of this note were completed with a voice recognition program.  Efforts were made to edit the

## 2018-08-24 PROCEDURE — 93010 ELECTROCARDIOGRAM REPORT: CPT | Performed by: INTERNAL MEDICINE

## 2018-08-24 NOTE — ED NOTES
Urine sent to lab. Resp regular. Denies needs. Call light in reach.       Carlos Alberto Flores RN  08/23/18 2100

## 2018-09-28 NOTE — ED PROVIDER NOTES
[address] [facility]  NATRIURETIC PEPTIDE   ANION GAP   GLOMERULAR FILTRATION RATE, ESTIMATED   OSMOLALITY   SCAN OF BLOOD SMEAR       EMERGENCY DEPARTMENT COURSE:   Vitals:    Vitals:    01/05/18 1221   BP: 132/88   Pulse: 94   Resp: 19   Temp: 97.9 °F (36.6 °C)   TempSrc: Oral   SpO2: 98%   Weight: 240 lb (108.9 kg)   Height: 6' (1.829 m)       MDM    Patient seen and evaluated in the emergency department, patient appears to be in no acute distress, initial evaluation. He has some minimal nonpitting edema in his lower extremities. He has a distended abdomen with active bowel sounds. Labs are obtained no significant abnormalities are noted. Chest x-ray shows no data of any fluid overload or congestion. Patient likely has some ascites from history of cirrhosis causing fluid distention. Doesn't really meet any criteria for emergent paracentesis at this time. I discussed my findings my plan of care the patient will increase his diuretics for the next 7 days, advised follow-up with his family doctor for repeat evaluation. Also advised follow-up with his gastroenterologist for reevaluation of cirrhosis and ascites. He verbalized understanding of plan of care. While here in the emergency department he maintained stable course is appropriate for discharge. Medications - No data to display    Patient was seen independently by myself. The patient's final impression and disposition and plan was determined by myself. CRITICAL CARE:   None    CONSULTS:  None    PROCEDURES:  None    FINAL IMPRESSION      1. Lower extremity edema    2. History of cirrhosis    3.  Weight gain          DISPOSITION/PLAN   Patient discharged in stable condition    PATIENT REFERRED TO:  Donte Barrera NP  [address]  [phone]    Call in 1 week  For follow up and evaluation    Your gastroenterologist    Call in 3 days  For follow up and evaluation of Cirrosis and abdominal swelling      DISCHARGE MEDICATIONS:  Discharge Medication List

## 2019-04-04 ENCOUNTER — HOSPITAL ENCOUNTER (OUTPATIENT)
Age: 44
Setting detail: SPECIMEN
Discharge: HOME | End: 2019-04-04
Payer: MEDICARE

## 2019-04-04 LAB
ABSOLUTE EOS #: 0.14 K/UL (ref 0–0.44)
ABSOLUTE IMMATURE GRANULOCYTE: <0.03 K/UL (ref 0–0.3)
ABSOLUTE LYMPH #: 4.35 K/UL (ref 1.1–3.7)
ABSOLUTE MONO #: 0.65 K/UL (ref 0.1–1.2)
ALBUMIN SERPL-MCNC: 4.6 G/DL (ref 3.5–5.2)
ALBUMIN/GLOBULIN RATIO: 1.5 (ref 1–2.5)
ALP BLD-CCNC: 106 U/L (ref 40–129)
ALT SERPL-CCNC: 99 U/L (ref 5–41)
ANION GAP SERPL CALCULATED.3IONS-SCNC: 16 MMOL/L (ref 9–17)
AST SERPL-CCNC: 88 U/L
BASOPHILS # BLD: 1 % (ref 0–2)
BASOPHILS ABSOLUTE: 0.05 K/UL (ref 0–0.2)
BILIRUB SERPL-MCNC: 0.98 MG/DL (ref 0.3–1.2)
BUN BLDV-MCNC: 11 MG/DL (ref 6–20)
BUN/CREAT BLD: ABNORMAL (ref 9–20)
CALCIUM SERPL-MCNC: 9.5 MG/DL (ref 8.6–10.4)
CHLORIDE BLD-SCNC: 105 MMOL/L (ref 98–107)
CHOLESTEROL/HDL RATIO: 3.8
CHOLESTEROL: 100 MG/DL
CO2: 19 MMOL/L (ref 20–31)
CREAT SERPL-MCNC: 0.62 MG/DL (ref 0.7–1.2)
DIFFERENTIAL TYPE: ABNORMAL
EOSINOPHILS RELATIVE PERCENT: 2 % (ref 1–4)
GFR AFRICAN AMERICAN: >60 ML/MIN
GFR NON-AFRICAN AMERICAN: >60 ML/MIN
GFR SERPL CREATININE-BSD FRML MDRD: ABNORMAL ML/MIN/{1.73_M2}
GFR SERPL CREATININE-BSD FRML MDRD: ABNORMAL ML/MIN/{1.73_M2}
GLUCOSE BLD-MCNC: 115 MG/DL (ref 70–99)
HCT VFR BLD CALC: 52.5 % (ref 40.7–50.3)
HDLC SERPL-MCNC: 26 MG/DL
HEMOGLOBIN: 17.7 G/DL (ref 13–17)
IMMATURE GRANULOCYTES: 0 %
LDL CHOLESTEROL: 55 MG/DL (ref 0–130)
LYMPHOCYTES # BLD: 53 % (ref 24–43)
MCH RBC QN AUTO: 32.4 PG (ref 25.2–33.5)
MCHC RBC AUTO-ENTMCNC: 33.7 G/DL (ref 28.4–34.8)
MCV RBC AUTO: 96.2 FL (ref 82.6–102.9)
MONOCYTES # BLD: 8 % (ref 3–12)
NRBC AUTOMATED: 0 PER 100 WBC
PDW BLD-RTO: 12.3 % (ref 11.8–14.4)
PLATELET # BLD: 156 K/UL (ref 138–453)
PLATELET ESTIMATE: ABNORMAL
PMV BLD AUTO: 10.7 FL (ref 8.1–13.5)
POTASSIUM SERPL-SCNC: 4.4 MMOL/L (ref 3.7–5.3)
RBC # BLD: 5.46 M/UL (ref 4.21–5.77)
RBC # BLD: ABNORMAL 10*6/UL
SEG NEUTROPHILS: 36 % (ref 36–65)
SEGMENTED NEUTROPHILS ABSOLUTE COUNT: 2.92 K/UL (ref 1.5–8.1)
SODIUM BLD-SCNC: 140 MMOL/L (ref 135–144)
TOTAL PROTEIN: 7.7 G/DL (ref 6.4–8.3)
TRIGL SERPL-MCNC: 95 MG/DL
TSH SERPL DL<=0.05 MIU/L-ACNC: 0.85 MIU/L (ref 0.3–5)
VLDLC SERPL CALC-MCNC: ABNORMAL MG/DL (ref 1–30)
WBC # BLD: 8.1 K/UL (ref 3.5–11.3)
WBC # BLD: ABNORMAL 10*3/UL

## 2019-09-23 DIAGNOSIS — K51.911 ULCERATIVE COLITIS WITH RECTAL BLEEDING, UNSPECIFIED LOCATION (HCC): ICD-10-CM

## 2019-10-02 PROBLEM — K51.00 ULCERATIVE (CHRONIC) ENTEROCOLITIS (HCC): Status: ACTIVE | Noted: 2019-10-02

## 2019-10-02 PROBLEM — K51.90 MILD CHRONIC ULCERATIVE COLITIS (HCC): Status: ACTIVE | Noted: 2019-10-02

## 2019-10-02 RX ORDER — SODIUM CHLORIDE 9 MG/ML
INJECTION, SOLUTION INTRAVENOUS CONTINUOUS
Status: CANCELLED | OUTPATIENT
Start: 2019-10-02

## 2019-10-02 RX ORDER — METHYLPREDNISOLONE SODIUM SUCCINATE 125 MG/2ML
125 INJECTION, POWDER, LYOPHILIZED, FOR SOLUTION INTRAMUSCULAR; INTRAVENOUS ONCE
Status: CANCELLED | OUTPATIENT
Start: 2019-10-02

## 2019-10-02 RX ORDER — HEPARIN SODIUM (PORCINE) LOCK FLUSH IV SOLN 100 UNIT/ML 100 UNIT/ML
500 SOLUTION INTRAVENOUS PRN
Status: CANCELLED | OUTPATIENT
Start: 2019-10-02

## 2019-10-02 RX ORDER — SODIUM CHLORIDE 0.9 % (FLUSH) 0.9 %
10 SYRINGE (ML) INJECTION PRN
Status: CANCELLED | OUTPATIENT
Start: 2019-10-02

## 2019-10-02 RX ORDER — SODIUM CHLORIDE 0.9 % (FLUSH) 0.9 %
5 SYRINGE (ML) INJECTION PRN
Status: CANCELLED | OUTPATIENT
Start: 2019-10-02

## 2019-10-02 RX ORDER — DIPHENHYDRAMINE HYDROCHLORIDE 50 MG/ML
50 INJECTION INTRAMUSCULAR; INTRAVENOUS ONCE
Status: CANCELLED | OUTPATIENT
Start: 2019-10-02

## 2019-10-03 ENCOUNTER — HOSPITAL ENCOUNTER (OUTPATIENT)
Dept: NURSING | Age: 44
Discharge: HOME | End: 2019-10-03
Payer: MEDICARE

## 2019-10-03 VITALS
SYSTOLIC BLOOD PRESSURE: 124 MMHG | TEMPERATURE: 98 F | DIASTOLIC BLOOD PRESSURE: 84 MMHG | HEART RATE: 90 BPM | OXYGEN SATURATION: 98 % | RESPIRATION RATE: 18 BRPM

## 2019-10-03 DIAGNOSIS — K51.00 CHRONIC ULCERATIVE ENTEROCOLITIS WITHOUT COMPLICATION (HCC): ICD-10-CM

## 2019-10-03 DIAGNOSIS — K51.911: ICD-10-CM

## 2019-10-03 LAB
ALBUMIN SERPL-MCNC: 4.3 G/DL (ref 3.5–5.1)
ALP BLD-CCNC: 86 U/L (ref 38–126)
ALT SERPL-CCNC: 56 U/L (ref 11–66)
ANION GAP SERPL CALCULATED.3IONS-SCNC: 16 MEQ/L (ref 8–16)
AST SERPL-CCNC: 68 U/L (ref 5–40)
BASOPHILS # BLD: 0.4 %
BASOPHILS ABSOLUTE: 0 THOU/MM3 (ref 0–0.1)
BILIRUB SERPL-MCNC: 1.5 MG/DL (ref 0.3–1.2)
BUN BLDV-MCNC: 9 MG/DL (ref 7–22)
CALCIUM SERPL-MCNC: 9.4 MG/DL (ref 8.5–10.5)
CHLORIDE BLD-SCNC: 95 MEQ/L (ref 98–111)
CO2: 22 MEQ/L (ref 23–33)
CREAT SERPL-MCNC: 0.6 MG/DL (ref 0.4–1.2)
EOSINOPHIL # BLD: 2.3 %
EOSINOPHILS ABSOLUTE: 0.2 THOU/MM3 (ref 0–0.4)
ERYTHROCYTE [DISTWIDTH] IN BLOOD BY AUTOMATED COUNT: 11.7 % (ref 11.5–14.5)
ERYTHROCYTE [DISTWIDTH] IN BLOOD BY AUTOMATED COUNT: 41.7 FL (ref 35–45)
GFR SERPL CREATININE-BSD FRML MDRD: > 90 ML/MIN/1.73M2
GLUCOSE BLD-MCNC: 181 MG/DL (ref 70–108)
HBV SURFACE AB TITR SER: POSITIVE {TITER}
HCT VFR BLD CALC: 42.5 % (ref 42–52)
HEMOGLOBIN: 15 GM/DL (ref 14–18)
HEPATITIS B CORE IGM ANTIBODY: NEGATIVE
HEPATITIS B SURFACE ANTIGEN: POSITIVE
HEPATITIS C ANTIBODY: ABNORMAL
IMMATURE GRANS (ABS): 0.02 THOU/MM3 (ref 0–0.07)
IMMATURE GRANULOCYTES: 0.3 %
INR BLD: 1.09 (ref 0.85–1.13)
LYMPHOCYTES # BLD: 43.5 %
LYMPHOCYTES ABSOLUTE: 3 THOU/MM3 (ref 1–4.8)
MCH RBC QN AUTO: 34.2 PG (ref 26–33)
MCHC RBC AUTO-ENTMCNC: 35.3 GM/DL (ref 32.2–35.5)
MCV RBC AUTO: 96.8 FL (ref 80–94)
MONOCYTES # BLD: 8.7 %
MONOCYTES ABSOLUTE: 0.6 THOU/MM3 (ref 0.4–1.3)
NUCLEATED RED BLOOD CELLS: 0 /100 WBC
PLATELET # BLD: 116 THOU/MM3 (ref 130–400)
PMV BLD AUTO: 9.5 FL (ref 9.4–12.4)
POTASSIUM SERPL-SCNC: 3.3 MEQ/L (ref 3.5–5.2)
RBC # BLD: 4.39 MILL/MM3 (ref 4.7–6.1)
SEG NEUTROPHILS: 44.8 %
SEGMENTED NEUTROPHILS ABSOLUTE COUNT: 3.1 THOU/MM3 (ref 1.8–7.7)
SODIUM BLD-SCNC: 133 MEQ/L (ref 135–145)
TOTAL PROTEIN: 8.3 G/DL (ref 6.1–8)
TSH SERPL DL<=0.05 MIU/L-ACNC: 0.74 UIU/ML (ref 0.4–4.2)
WBC # BLD: 6.9 THOU/MM3 (ref 4.8–10.8)

## 2019-10-03 PROCEDURE — 86803 HEPATITIS C AB TEST: CPT

## 2019-10-03 PROCEDURE — 87389 HIV-1 AG W/HIV-1&-2 AB AG IA: CPT

## 2019-10-03 PROCEDURE — 85610 PROTHROMBIN TIME: CPT

## 2019-10-03 PROCEDURE — 87522 HEPATITIS C REVRS TRNSCRPJ: CPT

## 2019-10-03 PROCEDURE — 85025 COMPLETE CBC W/AUTO DIFF WBC: CPT

## 2019-10-03 PROCEDURE — 84460 ALANINE AMINO (ALT) (SGPT): CPT

## 2019-10-03 PROCEDURE — 87340 HEPATITIS B SURFACE AG IA: CPT

## 2019-10-03 PROCEDURE — 84443 ASSAY THYROID STIM HORMONE: CPT

## 2019-10-03 PROCEDURE — 80053 COMPREHEN METABOLIC PANEL: CPT

## 2019-10-03 PROCEDURE — 83883 ASSAY NEPHELOMETRY NOT SPEC: CPT

## 2019-10-03 PROCEDURE — 87517 HEPATITIS B DNA QUANT: CPT

## 2019-10-03 PROCEDURE — 82977 ASSAY OF GGT: CPT

## 2019-10-03 PROCEDURE — 86706 HEP B SURFACE ANTIBODY: CPT

## 2019-10-03 PROCEDURE — 86705 HEP B CORE ANTIBODY IGM: CPT

## 2019-10-03 PROCEDURE — 87902 NFCT AGT GNTYP ALYS HEP C: CPT

## 2019-10-03 PROCEDURE — 84520 ASSAY OF UREA NITROGEN: CPT

## 2019-10-03 PROCEDURE — 36415 COLL VENOUS BLD VENIPUNCTURE: CPT

## 2019-10-03 PROCEDURE — 84450 TRANSFERASE (AST) (SGOT): CPT

## 2019-10-03 RX ORDER — LISINOPRIL 20 MG/1
20 TABLET ORAL DAILY
Status: ON HOLD | COMMUNITY
End: 2021-01-15 | Stop reason: HOSPADM

## 2019-10-03 RX ORDER — FERROUS SULFATE 325(65) MG
325 TABLET ORAL
Status: ON HOLD | COMMUNITY
End: 2021-01-26 | Stop reason: SDUPTHER

## 2019-10-03 RX ORDER — DOCUSATE SODIUM 100 MG/1
100 CAPSULE, LIQUID FILLED ORAL DAILY
Status: ON HOLD | COMMUNITY
End: 2021-01-15 | Stop reason: HOSPADM

## 2019-10-03 ASSESSMENT — PAIN - FUNCTIONAL ASSESSMENT: PAIN_FUNCTIONAL_ASSESSMENT: 0-10

## 2019-10-03 ASSESSMENT — PAIN DESCRIPTION - DESCRIPTORS: DESCRIPTORS: SQUEEZING;ACHING

## 2019-10-03 NOTE — PROGRESS NOTES
1235 pt arrives ambulatory for entyvio infusion. Infusion explained and questions answered. PT RIGHTS AND RESPONSIBILITIES OFFERED TO PT. Pt states he is on day #1 of clindamycin out of 7 days. He is taking ATB for a tooth abscess. 200 Dr. Willy Yin cell phone called and voicemail left. 1244 Dr. Willy Yin office called. [address] Harley Quinteros called back and states to reschedule pt for infusion after ATB course is finished and tooth abscess is healed. Pt educated and verbalized understanding. 1303 labs drawn as ordered. 1315 pt discharged ambulatory with caregiver with instructions with no complaints.        __m__ Safety:       (Environmental)   [place] to environment   Ensure ID band is correct and in place/ allergy band as needed   Assess for fall risk   Initiate fall precautions as applicable (fall band, side rails, etc.)   Call light within reach   Bed in low position/ wheels locked    __m__ Pain:        Assess pain level and characteristics   Administer analgesics as ordered   Assess effectiveness of pain management and report to MD as needed    _m___ Knowledge Deficit:   Assess baseline knowledge   Provide teaching at level of understanding   Provide teaching via preferred learning method   Evaluate teaching effectiveness    _m___ Hemodynamic/Respiratory Status:       (Pre and Post Procedure Monitoring)   Assess/Monitor vital signs and LOC   Assess Baseline SpO2 prior to any sedation   Obtain weight/height   Assess vital signs/ LOC until patient meets discharge criteria   Monitor procedure site and notify MD of any issues

## 2019-10-06 LAB
HBV DNA IU/ML: 61 IU/ML
HCV QNT BY NAAT INTERPRETATION: DETECTED
HCV QNT BY NAAT IU/ML: ABNORMAL IU/ML
HCV QNT BY NAAT LOG IU/ML: 6.55 LOG IU/ML
HIV-2 AB: NEGATIVE
INTERPRETATION: DETECTED
LOG 10 HBV AS IU/ML: 1.79 LOG IU/ML

## 2019-10-07 LAB
ALANINE AMINOTRANSFERASE, FIBROMETER: 68 U/L (ref 5–50)
ALPHA-2-MACROGLOBULIN, FIBROMETER: 364 MG/DL (ref 131–293)
ASPARTATE AMINOTRANSFERASE, FIBROMETER: 71 U/L (ref 9–50)
CIRRHOMETER PATIENT SCORE: 0.24
EER FIBROMETER REPORT: ABNORMAL
FIBROMETER INTERPRETATION: ABNORMAL
FIBROMETER PATIENT SCORE: 0.87
FIBROMETER PLATELET COUNT: 124 K/UL
FIBROMETER PROTHROMBIN INDEX: 95 % (ref 90–120)
FIBROSIS METAVIR CLASSIFICATION: ABNORMAL
GAMMA GLUTAMYL TRANSFERASE, FIBROMETER: 174 U/L (ref 7–51)
HCV GENOTYPE: NORMAL
INFLAMETER METAVIR CLASSIFICATION: ABNORMAL
INFLAMETER PATIENT SCORE: 0.68
UREA NITROGEN, FIBROMETER: 9 MG/DL (ref 7–20)

## 2019-10-14 ENCOUNTER — HOSPITAL ENCOUNTER (OUTPATIENT)
Dept: NURSING | Age: 44
Discharge: HOME | End: 2019-10-14
Payer: MEDICARE

## 2019-10-14 VITALS
TEMPERATURE: 97.5 F | WEIGHT: 235 LBS | SYSTOLIC BLOOD PRESSURE: 140 MMHG | BODY MASS INDEX: 31.83 KG/M2 | HEART RATE: 70 BPM | OXYGEN SATURATION: 96 % | HEIGHT: 72 IN | RESPIRATION RATE: 18 BRPM | DIASTOLIC BLOOD PRESSURE: 88 MMHG

## 2019-10-14 DIAGNOSIS — K51.00 CHRONIC ULCERATIVE ENTEROCOLITIS WITHOUT COMPLICATION (HCC): Primary | ICD-10-CM

## 2019-10-14 PROCEDURE — 96365 THER/PROPH/DIAG IV INF INIT: CPT

## 2019-10-14 PROCEDURE — 2709999900 HC NON-CHARGEABLE SUPPLY

## 2019-10-14 PROCEDURE — 2580000003 HC RX 258: Performed by: INTERNAL MEDICINE

## 2019-10-14 PROCEDURE — 6360000002 HC RX W HCPCS: Performed by: INTERNAL MEDICINE

## 2019-10-14 RX ORDER — DIPHENHYDRAMINE HYDROCHLORIDE 50 MG/ML
50 INJECTION INTRAMUSCULAR; INTRAVENOUS ONCE
Status: CANCELLED | OUTPATIENT
Start: 2019-10-28

## 2019-10-14 RX ORDER — HEPARIN SODIUM (PORCINE) LOCK FLUSH IV SOLN 100 UNIT/ML 100 UNIT/ML
500 SOLUTION INTRAVENOUS PRN
Status: CANCELLED | OUTPATIENT
Start: 2019-10-28

## 2019-10-14 RX ORDER — SODIUM CHLORIDE 0.9 % (FLUSH) 0.9 %
10 SYRINGE (ML) INJECTION PRN
Status: CANCELLED | OUTPATIENT
Start: 2019-10-28

## 2019-10-14 RX ORDER — METHYLPREDNISOLONE SODIUM SUCCINATE 125 MG/2ML
125 INJECTION, POWDER, LYOPHILIZED, FOR SOLUTION INTRAMUSCULAR; INTRAVENOUS ONCE
Status: CANCELLED | OUTPATIENT
Start: 2019-10-28

## 2019-10-14 RX ORDER — SODIUM CHLORIDE 0.9 % (FLUSH) 0.9 %
5 SYRINGE (ML) INJECTION PRN
Status: CANCELLED | OUTPATIENT
Start: 2019-10-28

## 2019-10-14 RX ORDER — SODIUM CHLORIDE 0.9 % (FLUSH) 0.9 %
10 SYRINGE (ML) INJECTION PRN
Status: DISCONTINUED | OUTPATIENT
Start: 2019-10-14 | End: 2019-10-15 | Stop reason: HOSPADM

## 2019-10-14 RX ORDER — SODIUM CHLORIDE 9 MG/ML
INJECTION, SOLUTION INTRAVENOUS CONTINUOUS
Status: CANCELLED | OUTPATIENT
Start: 2019-10-28

## 2019-10-14 RX ADMIN — Medication 10 ML: at 13:31

## 2019-10-14 RX ADMIN — Medication 10 ML: at 13:33

## 2019-10-14 RX ADMIN — Medication 10 ML: at 13:32

## 2019-10-14 RX ADMIN — VEDOLIZUMAB 300 MG: 300 INJECTION, POWDER, LYOPHILIZED, FOR SOLUTION INTRAVENOUS at 12:26

## 2019-10-14 RX ADMIN — Medication 10 ML: at 12:26

## 2019-10-28 ENCOUNTER — APPOINTMENT (OUTPATIENT)
Dept: GENERAL RADIOLOGY | Age: 44
End: 2019-10-28
Payer: MEDICARE

## 2019-10-28 ENCOUNTER — HOSPITAL ENCOUNTER (OUTPATIENT)
Dept: NURSING | Age: 44
Discharge: HOME | End: 2019-10-28
Payer: MEDICARE

## 2019-10-28 ENCOUNTER — HOSPITAL ENCOUNTER (EMERGENCY)
Age: 44
Discharge: HOME | End: 2019-10-28
Payer: MEDICARE

## 2019-10-28 VITALS
OXYGEN SATURATION: 98 % | HEIGHT: 72 IN | BODY MASS INDEX: 31.83 KG/M2 | TEMPERATURE: 98.5 F | SYSTOLIC BLOOD PRESSURE: 160 MMHG | DIASTOLIC BLOOD PRESSURE: 99 MMHG | RESPIRATION RATE: 16 BRPM | HEART RATE: 61 BPM | WEIGHT: 235 LBS

## 2019-10-28 VITALS — WEIGHT: 235 LBS | BODY MASS INDEX: 31.87 KG/M2

## 2019-10-28 DIAGNOSIS — K51.00 UNIVERSAL ULCERATIVE COLITIS WITHOUT COMPLICATION (HCC): ICD-10-CM

## 2019-10-28 DIAGNOSIS — S52.102A CLOSED FRACTURE OF PROXIMAL END OF LEFT RADIUS AND ULNA, INITIAL ENCOUNTER: Primary | ICD-10-CM

## 2019-10-28 DIAGNOSIS — S22.31XA CLOSED FRACTURE OF ONE RIB OF RIGHT SIDE, INITIAL ENCOUNTER: ICD-10-CM

## 2019-10-28 DIAGNOSIS — K51.00 CHRONIC ULCERATIVE ENTEROCOLITIS WITHOUT COMPLICATION (HCC): Primary | ICD-10-CM
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
## 2019-10-28 DIAGNOSIS — S52.002A CLOSED FRACTURE OF PROXIMAL END OF LEFT RADIUS AND ULNA, INITIAL ENCOUNTER: Primary | ICD-10-CM

## 2019-10-28 PROCEDURE — 73090 X-RAY EXAM OF FOREARM: CPT

## 2019-10-28 PROCEDURE — 96372 THER/PROPH/DIAG INJ SC/IM: CPT

## 2019-10-28 PROCEDURE — 29105 APPLICATION LONG ARM SPLINT: CPT

## 2019-10-28 PROCEDURE — 71101 X-RAY EXAM UNILAT RIBS/CHEST: CPT

## 2019-10-28 PROCEDURE — 99284 EMERGENCY DEPT VISIT MOD MDM: CPT

## 2019-10-28 PROCEDURE — 6370000000 HC RX 637 (ALT 250 FOR IP): Performed by: PHYSICIAN ASSISTANT

## 2019-10-28 PROCEDURE — 2709999900 HC NON-CHARGEABLE SUPPLY

## 2019-10-28 PROCEDURE — 73070 X-RAY EXAM OF ELBOW: CPT

## 2019-10-28 PROCEDURE — 6360000002 HC RX W HCPCS: Performed by: PHYSICIAN ASSISTANT

## 2019-10-28 PROCEDURE — 73100 X-RAY EXAM OF WRIST: CPT

## 2019-10-28 RX ORDER — OXYCODONE HYDROCHLORIDE AND ACETAMINOPHEN 5; 325 MG/1; MG/1
1 TABLET ORAL EVERY 6 HOURS PRN
Qty: 10 TABLET | Refills: 0 | Status: SHIPPED | OUTPATIENT
Start: 2019-10-28 | End: 2019-10-31

## 2019-10-28 RX ORDER — SODIUM CHLORIDE 0.9 % (FLUSH) 0.9 %
10 SYRINGE (ML) INJECTION PRN
Status: DISCONTINUED | OUTPATIENT
Start: 2019-10-28 | End: 2019-10-28

## 2019-10-28 RX ORDER — SODIUM CHLORIDE 0.9 % (FLUSH) 0.9 %
10 SYRINGE (ML) INJECTION PRN
Status: CANCELLED | OUTPATIENT
Start: 2019-10-28

## 2019-10-28 RX ORDER — SODIUM CHLORIDE 0.9 % (FLUSH) 0.9 %
10 SYRINGE (ML) INJECTION PRN
Status: CANCELLED | OUTPATIENT
Start: 2019-11-11

## 2019-10-28 RX ORDER — METHYLPREDNISOLONE SODIUM SUCCINATE 125 MG/2ML
125 INJECTION, POWDER, LYOPHILIZED, FOR SOLUTION INTRAMUSCULAR; INTRAVENOUS ONCE
Status: CANCELLED | OUTPATIENT
Start: 2019-11-11

## 2019-10-28 RX ORDER — DIPHENHYDRAMINE HYDROCHLORIDE 50 MG/ML
50 INJECTION INTRAMUSCULAR; INTRAVENOUS ONCE
Status: CANCELLED | OUTPATIENT
Start: 2019-11-11

## 2019-10-28 RX ORDER — SODIUM CHLORIDE 9 MG/ML
INJECTION, SOLUTION INTRAVENOUS CONTINUOUS
Status: CANCELLED | OUTPATIENT
Start: 2019-11-11

## 2019-10-28 RX ORDER — KETOROLAC TROMETHAMINE 30 MG/ML
30 INJECTION, SOLUTION INTRAMUSCULAR; INTRAVENOUS ONCE
Status: COMPLETED | OUTPATIENT
Start: 2019-10-28 | End: 2019-10-28

## 2019-10-28 RX ORDER — METHYLPREDNISOLONE SODIUM SUCCINATE 125 MG/2ML
125 INJECTION, POWDER, LYOPHILIZED, FOR SOLUTION INTRAMUSCULAR; INTRAVENOUS ONCE
Status: CANCELLED | OUTPATIENT
Start: 2019-10-28

## 2019-10-28 RX ORDER — DIPHENHYDRAMINE HYDROCHLORIDE 50 MG/ML
50 INJECTION INTRAMUSCULAR; INTRAVENOUS ONCE
Status: CANCELLED | OUTPATIENT
Start: 2019-10-28

## 2019-10-28 RX ORDER — SODIUM CHLORIDE 9 MG/ML
INJECTION, SOLUTION INTRAVENOUS CONTINUOUS
Status: CANCELLED | OUTPATIENT
Start: 2019-10-28

## 2019-10-28 RX ORDER — HEPARIN SODIUM (PORCINE) LOCK FLUSH IV SOLN 100 UNIT/ML 100 UNIT/ML
500 SOLUTION INTRAVENOUS PRN
Status: CANCELLED | OUTPATIENT
Start: 2019-11-11

## 2019-10-28 RX ORDER — ONDANSETRON 4 MG/1
4 TABLET, ORALLY DISINTEGRATING ORAL ONCE
Status: COMPLETED | OUTPATIENT
Start: 2019-10-28 | End: 2019-10-28

## 2019-10-28 RX ORDER — POTASSIUM CITRATE 10 MEQ/1
TABLET, EXTENDED RELEASE ORAL
COMMUNITY
End: 2020-03-19 | Stop reason: ALTCHOICE

## 2019-10-28 RX ORDER — SODIUM CHLORIDE 0.9 % (FLUSH) 0.9 %
5 SYRINGE (ML) INJECTION PRN
Status: CANCELLED | OUTPATIENT
Start: 2019-10-28

## 2019-10-28 RX ORDER — SODIUM CHLORIDE 0.9 % (FLUSH) 0.9 %
5 SYRINGE (ML) INJECTION PRN
Status: CANCELLED | OUTPATIENT
Start: 2019-11-11

## 2019-10-28 RX ADMIN — ONDANSETRON 4 MG: 4 TABLET, ORALLY DISINTEGRATING ORAL at 11:05

## 2019-10-28 RX ADMIN — HYDROMORPHONE HYDROCHLORIDE 1 MG: 1 INJECTION, SOLUTION INTRAMUSCULAR; INTRAVENOUS; SUBCUTANEOUS at 11:05

## 2019-10-28 RX ADMIN — KETOROLAC TROMETHAMINE 30 MG: 30 INJECTION, SOLUTION INTRAMUSCULAR at 09:39

## 2019-10-28 ASSESSMENT — ENCOUNTER SYMPTOMS
BACK PAIN: 0
WHEEZING: 0
SHORTNESS OF BREATH: 0
DIARRHEA: 0
VOMITING: 0
SORE THROAT: 0
EYE DISCHARGE: 0
ABDOMINAL PAIN: 0
EYE REDNESS: 0
COUGH: 0
NAUSEA: 0
RHINORRHEA: 0

## 2019-10-28 ASSESSMENT — PAIN DESCRIPTION - PAIN TYPE
TYPE: ACUTE PAIN
TYPE: ACUTE PAIN

## 2019-10-28 ASSESSMENT — PAIN SCALES - GENERAL
PAINLEVEL_OUTOF10: 10

## 2019-10-28 ASSESSMENT — PAIN DESCRIPTION - LOCATION
LOCATION: ARM
LOCATION: ARM

## 2019-10-28 ASSESSMENT — PAIN DESCRIPTION - ORIENTATION
ORIENTATION: LEFT
ORIENTATION: LEFT

## 2019-10-28 NOTE — PROGRESS NOTES
0611 pt arrives to \[facility]\"" for Entyvio infusion. No recent infections noted. After releasing medication, pt states \"I fell at work last night and think I dislocated my arm. \" pt guarding arm, unable to move arm. This arm is the only arm to use for IV starts. Advised pt to go to the ER to get arm assessed, and to reschedule infusion. Contact information given  0850 poke with Ashley, pharmacist regarding therapy plan and medication release.  Will look at plan

## 2019-12-26 ENCOUNTER — HOSPITAL ENCOUNTER (OUTPATIENT)
Dept: ULTRASOUND IMAGING | Age: 44
Discharge: HOME | End: 2019-12-26
Payer: MEDICARE

## 2019-12-26 DIAGNOSIS — B18.2 HEPATITIS C CARRIER (HCC): ICD-10-CM

## 2019-12-26 PROCEDURE — 93975 VASCULAR STUDY: CPT

## 2019-12-26 PROCEDURE — 76705 ECHO EXAM OF ABDOMEN: CPT

## 2020-03-01 ENCOUNTER — HOSPITAL ENCOUNTER (EMERGENCY)
Age: 45
Discharge: HOME | End: 2020-03-01
Attending: EMERGENCY MEDICINE
Payer: MEDICARE

## 2020-03-01 VITALS
BODY MASS INDEX: 32.01 KG/M2 | WEIGHT: 236 LBS | OXYGEN SATURATION: 96 % | HEART RATE: 89 BPM | RESPIRATION RATE: 16 BRPM | SYSTOLIC BLOOD PRESSURE: 99 MMHG | DIASTOLIC BLOOD PRESSURE: 72 MMHG | TEMPERATURE: 98.1 F

## 2020-03-01 LAB
ALBUMIN SERPL-MCNC: 4.2 G/DL (ref 3.5–5.1)
ALP BLD-CCNC: 79 U/L (ref 38–126)
ALT SERPL-CCNC: 26 U/L (ref 11–66)
ANION GAP SERPL CALCULATED.3IONS-SCNC: 11 MEQ/L (ref 8–16)
AST SERPL-CCNC: 27 U/L (ref 5–40)
BASOPHILS # BLD: 0.6 %
BASOPHILS ABSOLUTE: 0 THOU/MM3 (ref 0–0.1)
BILIRUB SERPL-MCNC: 1 MG/DL (ref 0.3–1.2)
BILIRUBIN DIRECT: < 0.2 MG/DL (ref 0–0.3)
BUN BLDV-MCNC: 13 MG/DL (ref 7–22)
C-REACTIVE PROTEIN: 1.03 MG/DL (ref 0–1)
CALCIUM SERPL-MCNC: 9.2 MG/DL (ref 8.5–10.5)
CHLORIDE BLD-SCNC: 100 MEQ/L (ref 98–111)
CO2: 23 MEQ/L (ref 23–33)
CREAT SERPL-MCNC: 0.7 MG/DL (ref 0.4–1.2)
EKG ATRIAL RATE: 122 BPM
EKG P AXIS: 64 DEGREES
EKG P-R INTERVAL: 156 MS
EKG Q-T INTERVAL: 296 MS
EKG QRS DURATION: 72 MS
EKG QTC CALCULATION (BAZETT): 421 MS
EKG R AXIS: 46 DEGREES
EKG T AXIS: 72 DEGREES
EKG VENTRICULAR RATE: 122 BPM
EOSINOPHIL # BLD: 3.8 %
EOSINOPHILS ABSOLUTE: 0.3 THOU/MM3 (ref 0–0.4)
ERYTHROCYTE [DISTWIDTH] IN BLOOD BY AUTOMATED COUNT: 11.9 % (ref 11.5–14.5)
ERYTHROCYTE [DISTWIDTH] IN BLOOD BY AUTOMATED COUNT: 41.8 FL (ref 35–45)
GFR SERPL CREATININE-BSD FRML MDRD: > 90 ML/MIN/1.73M2
GLUCOSE BLD-MCNC: 156 MG/DL (ref 70–108)
HCT VFR BLD CALC: 43.6 % (ref 42–52)
HEMOCCULT STL QL: POSITIVE
HEMOGLOBIN: 15.7 GM/DL (ref 14–18)
IMMATURE GRANS (ABS): 0.04 THOU/MM3 (ref 0–0.07)
IMMATURE GRANULOCYTES: 0.6 %
LIPASE: 40.2 U/L (ref 5.6–51.3)
LYMPHOCYTES # BLD: 51.6 %
LYMPHOCYTES ABSOLUTE: 3.7 THOU/MM3 (ref 1–4.8)
MCH RBC QN AUTO: 34.4 PG (ref 26–33)
MCHC RBC AUTO-ENTMCNC: 36 GM/DL (ref 32.2–35.5)
MCV RBC AUTO: 95.4 FL (ref 80–94)
MONOCYTES # BLD: 14 %
MONOCYTES ABSOLUTE: 1 THOU/MM3 (ref 0.4–1.3)
NUCLEATED RED BLOOD CELLS: 0 /100 WBC
OSMOLALITY CALCULATION: 271.5 MOSMOL/KG (ref 275–300)
PLATELET # BLD: 139 THOU/MM3 (ref 130–400)
PMV BLD AUTO: 9.4 FL (ref 9.4–12.4)
POTASSIUM SERPL-SCNC: 3.7 MEQ/L (ref 3.5–5.2)
RBC # BLD: 4.57 MILL/MM3 (ref 4.7–6.1)
SEDIMENTATION RATE, ERYTHROCYTE: 20 MM/HR (ref 0–10)
SEG NEUTROPHILS: 29.4 %
SEGMENTED NEUTROPHILS ABSOLUTE COUNT: 2.1 THOU/MM3 (ref 1.8–7.7)
SODIUM BLD-SCNC: 134 MEQ/L (ref 135–145)
TOTAL PROTEIN: 7.8 G/DL (ref 6.1–8)
WBC # BLD: 7.1 THOU/MM3 (ref 4.8–10.8)

## 2020-03-01 PROCEDURE — 93005 ELECTROCARDIOGRAM TRACING: CPT | Performed by: EMERGENCY MEDICINE

## 2020-03-01 PROCEDURE — 86140 C-REACTIVE PROTEIN: CPT

## 2020-03-01 PROCEDURE — 96375 TX/PRO/DX INJ NEW DRUG ADDON: CPT

## 2020-03-01 PROCEDURE — 82272 OCCULT BLD FECES 1-3 TESTS: CPT

## 2020-03-01 PROCEDURE — 96374 THER/PROPH/DIAG INJ IV PUSH: CPT

## 2020-03-01 PROCEDURE — 6360000002 HC RX W HCPCS: Performed by: EMERGENCY MEDICINE

## 2020-03-01 PROCEDURE — 96361 HYDRATE IV INFUSION ADD-ON: CPT

## 2020-03-01 PROCEDURE — 83690 ASSAY OF LIPASE: CPT

## 2020-03-01 PROCEDURE — 36415 COLL VENOUS BLD VENIPUNCTURE: CPT

## 2020-03-01 PROCEDURE — 2580000003 HC RX 258: Performed by: EMERGENCY MEDICINE

## 2020-03-01 PROCEDURE — 85025 COMPLETE CBC W/AUTO DIFF WBC: CPT

## 2020-03-01 PROCEDURE — 82248 BILIRUBIN DIRECT: CPT

## 2020-03-01 PROCEDURE — 99283 EMERGENCY DEPT VISIT LOW MDM: CPT

## 2020-03-01 PROCEDURE — 85651 RBC SED RATE NONAUTOMATED: CPT

## 2020-03-01 PROCEDURE — 80053 COMPREHEN METABOLIC PANEL: CPT

## 2020-03-01 RX ORDER — ONDANSETRON 2 MG/ML
4 INJECTION INTRAMUSCULAR; INTRAVENOUS ONCE
Status: COMPLETED | OUTPATIENT
Start: 2020-03-01 | End: 2020-03-01

## 2020-03-01 RX ORDER — BUDESONIDE 3 MG/1
9 CAPSULE, COATED PELLETS ORAL EVERY MORNING
Qty: 90 CAPSULE | Refills: 0 | Status: SHIPPED | OUTPATIENT
Start: 2020-03-01 | End: 2020-03-12

## 2020-03-01 RX ORDER — METHYLPREDNISOLONE SODIUM SUCCINATE 125 MG/2ML
125 INJECTION, POWDER, LYOPHILIZED, FOR SOLUTION INTRAMUSCULAR; INTRAVENOUS ONCE
Status: COMPLETED | OUTPATIENT
Start: 2020-03-01 | End: 2020-03-01

## 2020-03-01 RX ORDER — SODIUM CHLORIDE 9 MG/ML
INJECTION, SOLUTION INTRAVENOUS CONTINUOUS
Status: DISCONTINUED | OUTPATIENT
Start: 2020-03-01 | End: 2020-03-01 | Stop reason: HOSPADM

## 2020-03-01 RX ADMIN — ONDANSETRON 4 MG: 2 INJECTION INTRAMUSCULAR; INTRAVENOUS at 11:50

## 2020-03-01 RX ADMIN — METHYLPREDNISOLONE SODIUM SUCCINATE 125 MG: 125 INJECTION, POWDER, FOR SOLUTION INTRAMUSCULAR; INTRAVENOUS at 13:15

## 2020-03-01 RX ADMIN — SODIUM CHLORIDE: 9 INJECTION, SOLUTION INTRAVENOUS at 11:49

## 2020-03-01 ASSESSMENT — PAIN DESCRIPTION - PAIN TYPE
TYPE: ACUTE PAIN
TYPE: ACUTE PAIN

## 2020-03-01 ASSESSMENT — ENCOUNTER SYMPTOMS
BLOOD IN STOOL: 1
VOMITING: 0
TROUBLE SWALLOWING: 0
RHINORRHEA: 0
COUGH: 0
SHORTNESS OF BREATH: 0
NAUSEA: 1
CONSTIPATION: 0
CHEST TIGHTNESS: 0
SORE THROAT: 0
VOICE CHANGE: 0
WHEEZING: 0
DIARRHEA: 1
ABDOMINAL PAIN: 1
SINUS PRESSURE: 0
BACK PAIN: 0

## 2020-03-01 ASSESSMENT — PAIN SCALES - GENERAL
PAINLEVEL_OUTOF10: 6
PAINLEVEL_OUTOF10: 6
PAINLEVEL_OUTOF10: 7

## 2020-03-01 ASSESSMENT — PAIN DESCRIPTION - LOCATION
LOCATION: ABDOMEN

## 2020-03-01 ASSESSMENT — PAIN DESCRIPTION - FREQUENCY: FREQUENCY: CONTINUOUS

## 2020-03-01 ASSESSMENT — PAIN DESCRIPTION - DESCRIPTORS: DESCRIPTORS: CRAMPING

## 2020-03-01 NOTE — ED PROVIDER NOTES
[address] [facility]        CHIEF COMPLAINT    Chief Complaint   Patient presents with    Rectal Bleeding     colitis flare up       Nurses Notes reviewed and I agree except as noted in the HPI. HPI    Paige Parks is a 40 y.o. male who presents for evaluation of blood in stool onset 4 days ago. Patient states he as Ulcerative Colitis and has not had an Entyvio injfusion in 4 months as the patient left upper extremity was broken and underwent surgery, patient surgeon do not want to have any IV infusion on the left upper extremity. Patient has been switched to mesalamine by his gastroenterologist however insurance has not been covering it since the beginning of this year and last mesalamine was 2 weeks ago. The plan is to put back again the patient on Entyvio infusion however patient has not been called back yet by his gastroenterologist.  Patient reports 4 episodes of diarrhea today, nausea, and abdominal cramping. The patient follows Dr. Olivier Ambriz (GI). The patient has a past medical history of GERD, Liver cirrhosis, and Hepatitis B and C. No further complaints at the time of the initial encounter. REVIEW OF SYSTEMS    Review of Systems   Constitutional: Negative for appetite change, chills, diaphoresis, fatigue and fever. HENT: Negative for congestion, ear discharge, ear pain, postnasal drip, rhinorrhea, sinus pressure, sore throat, trouble swallowing and voice change. Respiratory: Negative for cough, chest tightness, shortness of breath and wheezing. Cardiovascular: Negative for chest pain, palpitations and leg swelling. Gastrointestinal: Positive for abdominal pain (cramping), blood in stool, diarrhea and nausea. Negative for constipation and vomiting. Musculoskeletal: Negative for arthralgias, back pain, joint swelling, myalgias, neck pain and neck stiffness. Skin: Negative for rash.    Neurological: Negative for dizziness, syncope, weakness, light-headedness, numbness and headaches. PAST MEDICAL HISTORY     has a past medical history of GERD (gastroesophageal reflux disease), Hepatic cirrhosis ([facility] [address] Hepatitis B, Hepatitis C, Intravenous drug user, Psychiatric problem, and Ulcerative colitis ([facility] [address] 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     CURRENT MEDICATIONS    Previous Medications    ALUM & MAG HYDROXIDE-SIMETH PO    Take by mouth    BUPRENORPHINE-NALOXONE (SUBOXONE) 8-2 MG FILM SL FILM    Place 1 Film under the tongue 2 times daily     CYCLOBENZAPRINE (FLEXERIL) 10 MG TABLET    Take 10 mg by mouth 3 times daily as needed for Muscle spasms    DICYCLOMINE (BENTYL) 10 MG CAPSULE    Take 1 capsule by mouth every 6 hours as needed (cramps)    DOCUSATE SODIUM (COLACE) 100 MG CAPSULE    Take 100 mg by mouth daily    ENTECAVIR (BARACLUDE) 0.5 MG TABLET    Take 1 mg by mouth daily    FERROUS SULFATE 325 (65 FE) MG TABLET    Take 325 mg by mouth daily (with breakfast)    FOLIC ACID (FOLVITE) 1 MG TABLET    Take 2 tablets by mouth daily    FUROSEMIDE (LASIX) 40 MG TABLET    Take 1.5 tablets by mouth 2 times daily for 7 days    LACTULOSE PO    Take 20 g by mouth 3 times daily     LISINOPRIL (PRINIVIL;ZESTRIL) 20 MG TABLET    Take 20 mg by mouth daily    MESALAMINE (DELZICOL) 400 MG CPDR DELAYED RELEASE CAPSULE    Take 2 capsules by mouth 3 times daily    MESALAMINE (DELZICOL) 400 MG CPDR DELAYED RELEASE CAPSULE    Take 800 mg by mouth 3 times daily    MULTIPLE VITAMIN (MULTI VITAMIN MENS) TABS    Take by mouth    ONDANSETRON (ZOFRAN ODT) 4 MG DISINTEGRATING TABLET    Take 1 tablet by mouth every 8 hours as needed for Nausea    PANTOPRAZOLE (PROTONIX) 40 MG TABLET    Take 40 mg by mouth 2 times daily     PAROXETINE (PAXIL) 20 MG TABLET    Take 20 mg by mouth nightly     POTASSIUM CHLORIDE (KLOR-CON) 20 MEQ PACKET    Take 20 mEq by mouth 2 times daily    POTASSIUM CITRATE (UROCIT-K) 10 MEQ (1080 MG) EXTENDED RELEASE TABLET    Take by mouth 3 times daily (with meals)    SPIRONOLACTONE (ALDACTONE) 50 MG TABLET    Take 50 mg by mouth daily Unsure of dose       ALLERGIES    is allergic to penicillins. FAMILY HISTORY    has no family status information on file. family history is not on file. SOCIAL HISTORY     reports that he has been smoking. He has been smoking about 0.50 packs per day. He has never used smokeless tobacco. He reports that he does not drink alcohol or use drugs. PHYSICAL EXAM      INITIAL VITALS: BP 99/72   Pulse 78   Temp 98.1 °F (36.7 °C) (Oral)   Resp 16   Wt 236 lb (107 kg)   SpO2 99%   BMI 32.01 kg/m² Estimated body mass index is 32.01 kg/m² as calculated from the following:    Height as of 10/28/19: 6' (1.829 m). Weight as of this encounter: 236 lb (107 kg). Physical Exam  Vitals signs reviewed. Constitutional:       Appearance: He is well-developed. HENT:      Head: Normocephalic and atraumatic. Right Ear: External ear normal.      Left Ear: External ear normal.      Nose: Nose normal.   Eyes:      General: No scleral icterus. Conjunctiva/sclera: Conjunctivae normal.      Pupils: Pupils are equal, round, and reactive to light. Neck:      Musculoskeletal: Normal range of motion and neck supple. Thyroid: No thyromegaly. Vascular: No JVD. Cardiovascular:      Rate and Rhythm: Normal rate and regular rhythm. Heart sounds: No murmur. No friction rub. Pulmonary:      Effort: Pulmonary effort is normal.      Breath sounds: Normal breath sounds. No wheezing or rales. Chest:      Chest wall: No tenderness. Abdominal:      General: Bowel sounds are normal.      Palpations: Abdomen is soft. There is no mass. Tenderness: There is generalized abdominal tenderness.    Genitourinary:     Prostate: Normal.      Rectum: Normal.      Comments: Osmolality Calc 271.5 (*)     All other components within normal limits   HEPATIC FUNCTION PANEL   LIPASE   BLOOD OCCULT STOOL SCREEN #1   ANION GAP   GLOMERULAR FILTRATION RATE, ESTIMATED     All other unresulted laboratory test above are normal:    Vitals:    Vitals:    03/01/20 1113 03/01/20 1314   BP: 124/83 99/72   Pulse:  78   Resp: 15 16   Temp: 98.1 °F (36.7 °C)    TempSrc: Oral    SpO2: 96% 99%   Weight: 236 lb (107 kg)        EMERGENCY DEPARTMENT COURSE:    Medications   0.9 % sodium chloride infusion ( Intravenous New Bag 3/1/20 1149)   ondansetron (ZOFRAN) injection 4 mg (4 mg Intravenous Given 3/1/20 1150)   methylPREDNISolone sodium (SOLU-MEDROL) injection 125 mg (125 mg Intravenous Given 3/1/20 1315)       The pt was seen and evaluated by me. Within the department, I observed the pt's vitalsigns to be within acceptable range. Laboratory studies were performed, results were reviewed with the patient. Within the department, the pt was treated with IV fluid hydration Zofran and Solu-Medrol after discussing the case with patient's gastroenterologist Dr. Radha Phillip.  recommends budesonide at this time. I observed the pt's condition to more dynamically stable  during the duration of their stay. I explained my proposed course of treatment to the pt, and they were amenable to my decision. They were discharged home, and they will return to the ED if their symptoms become more severein nature, or otherwise change. CRITICAL CARE:   None. CONSULTS:  Dr Jeannette Edmonds 12:00 PM    PROCEDURES:  None. FINAL IMPRESSION       1.  Exacerbation of ulcerative colitis without complication [facility])          DISPOSITION/PLAN  PATIENT REFERRED TO:  Tresa Dunne  [address] [place] [address]    Schedule an appointment as soon as possible for a visit in 3 days      [address] [facility] [address] [address] EMERGENCY DEPT  [address] [place] [address]  [phone]    As needed    DISCHARGE MEDICATIONS:  New Prescriptions

## 2020-03-01 NOTE — ED TRIAGE NOTES
Patient presents to the ED with concerns of rectal bleeding/blood in stool for about 4 days. Patient has a history of colitis. He rates abdominal cramping at a 7/10. VSS. Respirations easy and regular. EKG completed. Patient states he has the continuous feeling of needing to have a bowel movement.

## 2020-03-02 PROCEDURE — 93010 ELECTROCARDIOGRAM REPORT: CPT | Performed by: INTERNAL MEDICINE

## 2020-03-12 ENCOUNTER — HOSPITAL ENCOUNTER (EMERGENCY)
Age: 45
Discharge: HOME | End: 2020-03-12
Payer: MEDICARE

## 2020-03-12 ENCOUNTER — APPOINTMENT (OUTPATIENT)
Dept: GENERAL RADIOLOGY | Age: 45
End: 2020-03-12
Payer: MEDICARE

## 2020-03-12 VITALS
OXYGEN SATURATION: 95 % | SYSTOLIC BLOOD PRESSURE: 129 MMHG | HEART RATE: 112 BPM | HEIGHT: 73 IN | DIASTOLIC BLOOD PRESSURE: 87 MMHG | WEIGHT: 237 LBS | TEMPERATURE: 99.1 F | BODY MASS INDEX: 31.41 KG/M2 | RESPIRATION RATE: 18 BRPM

## 2020-03-12 PROCEDURE — 6370000000 HC RX 637 (ALT 250 FOR IP): Performed by: NURSE PRACTITIONER

## 2020-03-12 PROCEDURE — 99282 EMERGENCY DEPT VISIT SF MDM: CPT

## 2020-03-12 PROCEDURE — 72100 X-RAY EXAM L-S SPINE 2/3 VWS: CPT

## 2020-03-12 PROCEDURE — 6370000000 HC RX 637 (ALT 250 FOR IP)

## 2020-03-12 RX ORDER — PREDNISONE 20 MG/1
60 TABLET ORAL ONCE
Status: COMPLETED | OUTPATIENT
Start: 2020-03-12 | End: 2020-03-12

## 2020-03-12 RX ORDER — PREDNISONE 20 MG/1
TABLET ORAL
Qty: 18 TABLET | Refills: 0 | Status: SHIPPED | OUTPATIENT
Start: 2020-03-12 | End: 2020-03-19 | Stop reason: ALTCHOICE

## 2020-03-12 RX ORDER — TIZANIDINE 4 MG/1
4 TABLET ORAL ONCE
Status: COMPLETED | OUTPATIENT
Start: 2020-03-12 | End: 2020-03-12

## 2020-03-12 RX ORDER — TIZANIDINE 4 MG/1
4 TABLET ORAL EVERY 6 HOURS PRN
Qty: 20 TABLET | Refills: 0 | Status: SHIPPED | OUTPATIENT
Start: 2020-03-12 | End: 2020-05-06 | Stop reason: ALTCHOICE

## 2020-03-12 RX ORDER — LIDOCAINE 4 G/G
1 PATCH TOPICAL DAILY
Qty: 30 PATCH | Refills: 0 | Status: SHIPPED | OUTPATIENT
Start: 2020-03-12 | End: 2020-04-11

## 2020-03-12 RX ORDER — LIDOCAINE 4 G/G
PATCH TOPICAL
Status: DISCONTINUED
Start: 2020-03-12 | End: 2020-03-12 | Stop reason: HOSPADM

## 2020-03-12 RX ORDER — LIDOCAINE 4 G/G
1 PATCH TOPICAL DAILY
Status: DISCONTINUED | OUTPATIENT
Start: 2020-03-13 | End: 2020-03-12 | Stop reason: HOSPADM

## 2020-03-12 RX ADMIN — PREDNISONE 60 MG: 20 TABLET ORAL at 21:03

## 2020-03-12 RX ADMIN — TIZANIDINE 4 MG: 4 TABLET ORAL at 21:03

## 2020-03-12 ASSESSMENT — ENCOUNTER SYMPTOMS
EYE REDNESS: 0
NAUSEA: 0
ABDOMINAL PAIN: 0
BACK PAIN: 1
WHEEZING: 0
VOICE CHANGE: 0
CHEST TIGHTNESS: 0
BLOOD IN STOOL: 0
SHORTNESS OF BREATH: 0
RHINORRHEA: 0
CONSTIPATION: 0
COUGH: 0
SINUS PRESSURE: 0
ABDOMINAL DISTENTION: 0
COLOR CHANGE: 0
VOMITING: 0
SORE THROAT: 0
DIARRHEA: 0
PHOTOPHOBIA: 0

## 2020-03-12 ASSESSMENT — PAIN DESCRIPTION - ORIENTATION: ORIENTATION: LOWER

## 2020-03-12 ASSESSMENT — PAIN DESCRIPTION - LOCATION: LOCATION: BACK;LEG

## 2020-03-12 ASSESSMENT — PAIN SCALES - GENERAL: PAINLEVEL_OUTOF10: 7

## 2020-03-12 NOTE — ED TRIAGE NOTES
Pt was seen at [facility] on Sunday with c/o sciatica pain. Pt states that he did not get an xray and he was upset about that. Pt was given Norco and Prednisone and it has not relieved the pain.

## 2020-03-13 RX ORDER — EPINEPHRINE 1 MG/ML
0.3 INJECTION, SOLUTION, CONCENTRATE INTRAVENOUS PRN
Status: CANCELLED | OUTPATIENT
Start: 2020-03-16

## 2020-03-13 RX ORDER — SODIUM CHLORIDE 0.9 % (FLUSH) 0.9 %
30 SYRINGE (ML) INJECTION ONCE
Status: CANCELLED | OUTPATIENT
Start: 2020-03-16

## 2020-03-13 RX ORDER — METHYLPREDNISOLONE SODIUM SUCCINATE 125 MG/2ML
125 INJECTION, POWDER, LYOPHILIZED, FOR SOLUTION INTRAMUSCULAR; INTRAVENOUS ONCE
Status: CANCELLED | OUTPATIENT
Start: 2020-03-16

## 2020-03-13 RX ORDER — SODIUM CHLORIDE 9 MG/ML
INJECTION, SOLUTION INTRAVENOUS CONTINUOUS
Status: CANCELLED | OUTPATIENT
Start: 2020-03-16

## 2020-03-13 RX ORDER — DIPHENHYDRAMINE HYDROCHLORIDE 50 MG/ML
50 INJECTION INTRAMUSCULAR; INTRAVENOUS ONCE
Status: CANCELLED | OUTPATIENT
Start: 2020-03-16

## 2020-03-13 NOTE — ED PROVIDER NOTES
[facility] Emergency Department    CHIEF COMPLAINT       Chief Complaint   Patient presents with    Back Pain       Nurses Notes reviewed and I agree except as noted in the HPI. HISTORY OF PRESENT ILLNESS    Jamie Mon barney 39 y.o. male who presents to the ED for evaluation of left lumbar back pain that radiates to the left leg which started last week. Patient states that the pain began the morning after physical therapy, so he believes that he must have overexerted himself. Patient also admits to numbness and weakness to the left leg. Patient tried stretching and going to the chiropractor but admits that this only exacerbated his pain. Patient denies a fever, chills, incontinence, and saddle anaesthesias. 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 Patient also has a history of UC for which he finished a steroid yesterday. No further concerns or complaints at this time. Pain description:  Onset: 1 week  Location: Left lumbar back  Duration: Constant  Character: Sharp, shooting  Aggravating factors: Worse with movement  Radiation: To left leg  Timing: Pain first began after physical therapy  Severity: 7/10    Experienced previously: Yes. Patient feels that this is likely a sciatica flare. HPI was provided by the patient. REVIEW OF SYSTEMS     Review of Systems   Constitutional: Negative for appetite change, chills, diaphoresis, fatigue, fever and unexpected weight change. HENT: Negative for congestion, hearing loss, postnasal drip, rhinorrhea, sinus pressure, sore throat and voice change. Eyes: Negative for photophobia, redness and visual disturbance. Respiratory: Negative for cough, chest tightness, shortness of breath and wheezing. Cardiovascular: Negative for chest pain and palpitations. Gastrointestinal: Negative for abdominal distention, abdominal pain, blood in stool, constipation, diarrhea, nausea and vomiting. Endocrine: Negative for cold intolerance, heat intolerance, polydipsia, polyphagia and polyuria. Genitourinary: Negative for decreased urine volume, difficulty urinating, dysuria, flank pain and frequency. Musculoskeletal: Positive for back pain (Left lumbar back, radaiting into the left leg). Negative for arthralgias, gait problem, joint swelling, neck pain and neck stiffness. Skin: Negative for color change and rash. Allergic/Immunologic: Negative for immunocompromised state. Neurological: Positive for weakness (To left leg) and numbness (To left leg). Negative for dizziness, tremors, light-headedness and headaches. Hematological: Does not bruise/bleed easily. Psychiatric/Behavioral: Negative for behavioral problems, confusion, decreased concentration, hallucinations, self-injury and suicidal ideas. The patient is not nervous/anxious. PAST MEDICAL HISTORY     Past Medical History:   Diagnosis Date    GERD (gastroesophageal reflux disease)     Hepatic cirrhosis (HCC)     Hepatitis B     Hepatitis C     Intravenous drug user     Psychiatric problem     Ulcerative colitis ([facility] [address]        SURGICALHISTORY      has a past surgical history that includes Arm amputation at shoulder (Right) and pr colonoscopy w/biopsy single/multiple (9/26/2017).     CURRENT MEDICATIONS       Discharge Medication List as of 3/12/2020  8:57 PM      CONTINUE these medications which have NOT CHANGED    Details   potassium citrate (UROCIT-K) 10 MEQ (1080 MG) extended release tablet Take by mouth 3 times daily (with meals)Historical Med      docusate sodium (COLACE) 100 MG capsule Take 100 mg by mouth dailyHistorical Med      ferrous sulfate 325 (65 Fe) MG tablet Take 325 mg by mouth daily (with breakfast)Historical Med      lisinopril (PRINIVIL;ZESTRIL) 20 MG tablet Take 20 mg by mouth dailyHistorical Med      spironolactone (ALDACTONE) 50 MG tablet Take 50 mg by mouth daily Unsure of doseHistorical Med dicyclomine (BENTYL) 10 MG capsule Take 1 capsule by mouth every 6 hours as needed (cramps), Disp-20 capsule, R-0Print      ondansetron (ZOFRAN ODT) 4 MG disintegrating tablet Take 1 tablet by mouth every 8 hours as needed for Nausea, Disp-20 tablet, R-0Print      mesalamine (DELZICOL) 400 MG CPDR delayed release capsule Take 800 mg by mouth 3 times dailyHistorical Med      entecavir (BARACLUDE) 0.5 MG tablet Take 1 mg by mouth dailyHistorical Med      potassium chloride (KLOR-CON) 20 MEQ packet Take 20 mEq by mouth 2 times dailyHistorical Med      furosemide (LASIX) 40 MG tablet Take 1.5 tablets by mouth 2 times daily for 7 days, Disp-21 tablet, [id number]      folic acid (FOLVITE) 1 MG tablet Take 2 tablets by mouth daily, Disp-30 tablet, R-3Print      LACTULOSE PO Take 20 g by mouth 3 times daily Historical Med      buprenorphine-naloxone (SUBOXONE) 8-2 MG FILM SL film Place 1 Film under the tongue 2 times daily Historical Med      pantoprazole (PROTONIX) 40 MG tablet Take 40 mg by mouth 2 times daily Historical Med      PARoxetine (PAXIL) 20 MG tablet Take 20 mg by mouth nightly Historical Med      Multiple Vitamin (MULTI VITAMIN MENS) TABS Take by mouthHistorical Med      ALUM & MAG HYDROXIDE-SIMETH PO Take by mouthHistorical Med             ALLERGIES     is allergic to penicillins. FAMILY HISTORY     has no family status information on file. family history is not on file.     SOCIAL HISTORY       Social History     Socioeconomic History    Marital status: Single     Spouse name: Not on file    Number of children: Not on file    Years of education: Not on file    Highest education level: Not on file   Occupational History    Not on file   Social Needs    Financial resource strain: Not on file    Food insecurity     Worry: Not on file     Inability: Not on file    Transportation needs     Medical: Not on file     Non-medical: Not on file   Tobacco Use    Smoking status: Current Every Day Smoker Packs/day: 0.50    Smokeless tobacco: Never Used   Substance and Sexual Activity    Alcohol use: No     Comment: Several months since last used    Drug use: No     Comment: 160 days since last use    Sexual activity: Not on file   Lifestyle    Physical activity     Days per week: Not on file     Minutes per session: Not on file    Stress: Not on file   Relationships    Social connections     Talks on phone: Not on file     Gets together: Not on file     Attends [employer] service: Not on file     Active member of club or organization: Not on file     Attends meetings of clubs or organizations: Not on file     Relationship status: Not on file    Intimate partner violence     Fear of current or ex partner: Not on file     Emotionally abused: Not on file     Physically abused: Not on file     Forced sexual activity: Not on file   Other Topics Concern    Not on file   Social History Narrative    Not on file       PHYSICAL EXAM     INITIAL VITALS:  height is 6' 1\" (1.854 m) and weight is 237 lb (107.5 kg). His temperature is 99.1 °F (37.3 °C). His blood pressure is 129/87 and his pulse is 112. His respiration is 18 and oxygen saturation is 95%. Physical Exam  Vitals signs and nursing note reviewed. Constitutional:       Appearance: Normal appearance. He is well-developed. HENT:      Head: Normocephalic. Right Ear: External ear normal.      Left Ear: External ear normal.      Nose: Nose normal.      Mouth/Throat:      Pharynx: Uvula midline. Eyes:      Conjunctiva/sclera: Conjunctivae normal.   Neck:      Musculoskeletal: Normal range of motion and neck supple. Cardiovascular:      Rate and Rhythm: Normal rate and regular rhythm. Heart sounds: Normal heart sounds, S1 normal and S2 normal.   Pulmonary:      Effort: Pulmonary effort is normal. No respiratory distress. Breath sounds: Normal breath sounds. Chest:      Chest wall: No tenderness.    Abdominal:      General: Bowel sounds are normal. There is no distension. Palpations: Abdomen is soft. Tenderness: There is no abdominal tenderness. Musculoskeletal: Normal range of motion. Comments: Patient has a negative left straight leg raise. Patient is able to ambulate and stand on tip-toes. Skin:     General: Skin is warm and dry. Coloration: Skin is not pale. Findings: No erythema or rash. Neurological:      Mental Status: He is alert and oriented to person, place, and time. Psychiatric:         Behavior: Behavior normal.         Thought Content: Thought content normal.         Judgment: Judgment normal.       DIFFERENTIAL DIAGNOSIS:   Sciatica, lumbar strain, do not suspect cauda equina syndrome or other acute cause of pain    DIAGNOSTIC RESULTS       RADIOLOGY: non-plainfilm images(s) such as CT, Ultrasound and MRI are read by the radiologist.  Plain radiographic images are visualized and preliminarily interpreted by the emergency physician unless otherwise stated below. XR LUMBAR SPINE (2-3 VIEWS)   Final Result    No fracture or subluxation. Moderate to severe L4-5 and mild L5-S1 degenerative disc disease. **This report has been created using voice recognition software. It may contain minor errors which are inherent in voice recognition technology. **      Final report electronically signed by Dr. Alexandra Fritz on 3/12/2020 8:22 PM            LABS:   Labs Reviewed - No data to display    EMERGENCY DEPARTMENT COURSE:   Vitals:    Vitals:    03/12/20 1947   BP: 129/87   Pulse: 112   Resp: 18   Temp: 99.1 °F (37.3 °C)   SpO2: 95%   Weight: 237 lb (107.5 kg)   Height: 6' 1\" (1.854 m)       MDM  Patient was seen and evaluated in the emergency department, patient appeared to be in no acute distress, vital signs were reviewed, tachycardia was noted.   Physical exam was completed, no tachycardia was noted on exam.  He has some mild left paraspinal tenderness, he has no significant spinal tenderness, he is a negative straight leg raise, his deep tendon reflexes are equal bilaterally. He is able to ambulate with no difficulty, he is able to stand on his toes no difficulty, he has no red flag symptoms, no incontinence, no saddle paresthesias, no history of cancer, no fevers. X-rays were obtained, no fracture or subluxation, moderate to severe L4/L5 and mild L5/S1 degenerative disc disease. I discussed my findings on plan of care the patient is amenable to discharge. Will place him on prednisone taper, given Zanaflex for muscle spasms, advised to use lidocaine patch, he verbalized understanding of plan of care. While here in the emergency department maintained stable course appropriate for discharge. Medications   tiZANidine (ZANAFLEX) tablet 4 mg (4 mg Oral Given 3/12/20 2103)   predniSONE (DELTASONE) tablet 60 mg (60 mg Oral Given 3/12/20 2103)       Patient was seenindependently by myself. The patient's final impression and disposition and plan was determined by myself. CRITICAL CARE:   None    CONSULTS:  None    PROCEDURES:  None    FINAL IMPRESSION     1. Strain of lumbar region, initial encounter    2. DDD (degenerative disc disease), lumbar    3.  Lumbar radicular pain          DISPOSITION/PLAN   Discharge    PATIENT REFERREDTO:  Daily Sonvickie [address] [place] [phone]  Call in 1 week  For follow up and evaluation, If symptoms worsen      DISCHARGE MEDICATIONS:  Discharge Medication List as of 3/12/2020  8:57 PM      START taking these medications    Details   predniSONE (DELTASONE) 20 MG tablet Take 60 mg (3 tabs) once daily for 3 days then 40 mg (2 tabs) once daily for 3 days then 20 mg (1 tab) once daily for 3 days, Disp-18 tablet, R-0Print      tiZANidine (ZANAFLEX) 4 MG tablet Take 1 tablet by mouth every 6 hours as needed (muscle spasm), Disp-20 tablet, R-0Print      lidocaine 4 % external patch Place 1 patch onto the skin daily, Transdermal, DAILY Starting Thu 3/12/2020, Until Sat 4/11/2020, For 30 days, Disp-30 patch, R-0, Print             (Please note that portions of this note were completed with a voice recognition program.  Efforts were made to edit the dictations but occasionally words are mis-transcribed.)    Scribe:  Bhakti Green 3/12/20 8:49 PM EDT Scribing for and in the presence of Blayne Mahajan CNP. Signed by: Anika Patrick, 03/13/20 12:40 AM    Provider:  I personally performed the services described in the documentation,reviewed and edited the documentation which was dictated to the scribe in my presence, and it accurately records my words and actions.     Blayne Mahajan CNP 03/13/20 12:40 AM    [facility] CRISTIAN Mahajan - JULIETTE     [facility], CRISTIAN - CNP  03/13/20 0041

## 2020-03-16 ENCOUNTER — HOSPITAL ENCOUNTER (OUTPATIENT)
Dept: NURSING | Age: 45
Discharge: HOME | End: 2020-03-16
Payer: MEDICARE

## 2020-03-19 ENCOUNTER — HOSPITAL ENCOUNTER (OUTPATIENT)
Dept: NURSING | Age: 45
Discharge: HOME | End: 2020-03-19
Payer: MEDICARE

## 2020-03-19 ENCOUNTER — HOSPITAL ENCOUNTER (OUTPATIENT)
Dept: NURSING | Age: 45
End: 2020-03-19
Payer: MEDICARE

## 2020-03-19 VITALS
HEART RATE: 90 BPM | TEMPERATURE: 96.1 F | SYSTOLIC BLOOD PRESSURE: 101 MMHG | RESPIRATION RATE: 16 BRPM | OXYGEN SATURATION: 98 % | BODY MASS INDEX: 31.66 KG/M2 | WEIGHT: 240 LBS | DIASTOLIC BLOOD PRESSURE: 65 MMHG

## 2020-03-19 DIAGNOSIS — K51.00 CHRONIC ULCERATIVE ENTEROCOLITIS WITHOUT COMPLICATION (HCC): Primary | ICD-10-CM

## 2020-03-19 PROCEDURE — 96365 THER/PROPH/DIAG IV INF INIT: CPT

## 2020-03-19 PROCEDURE — 6360000002 HC RX W HCPCS: Performed by: NURSE PRACTITIONER

## 2020-03-19 PROCEDURE — 2580000003 HC RX 258: Performed by: NURSE PRACTITIONER

## 2020-03-19 RX ORDER — SODIUM CHLORIDE 9 MG/ML
INJECTION, SOLUTION INTRAVENOUS CONTINUOUS
Status: CANCELLED | OUTPATIENT
Start: 2020-04-02

## 2020-03-19 RX ORDER — DIPHENHYDRAMINE HYDROCHLORIDE 50 MG/ML
50 INJECTION INTRAMUSCULAR; INTRAVENOUS ONCE
Status: CANCELLED | OUTPATIENT
Start: 2020-04-02

## 2020-03-19 RX ORDER — SODIUM CHLORIDE 0.9 % (FLUSH) 0.9 %
30 SYRINGE (ML) INJECTION ONCE
Status: CANCELLED | OUTPATIENT
Start: 2020-04-02

## 2020-03-19 RX ORDER — SODIUM CHLORIDE 0.9 % (FLUSH) 0.9 %
30 SYRINGE (ML) INJECTION ONCE
Status: COMPLETED | OUTPATIENT
Start: 2020-03-19 | End: 2020-03-19

## 2020-03-19 RX ORDER — CYCLOBENZAPRINE HCL 5 MG
5 TABLET ORAL 2 TIMES DAILY PRN
COMMUNITY
End: 2020-05-06 | Stop reason: ALTCHOICE

## 2020-03-19 RX ORDER — METHYLPREDNISOLONE SODIUM SUCCINATE 125 MG/2ML
125 INJECTION, POWDER, LYOPHILIZED, FOR SOLUTION INTRAMUSCULAR; INTRAVENOUS ONCE
Status: CANCELLED | OUTPATIENT
Start: 2020-04-02

## 2020-03-19 RX ORDER — BUDESONIDE 3 MG/1
9 CAPSULE, COATED PELLETS ORAL EVERY MORNING
Status: ON HOLD | COMMUNITY
End: 2021-01-04

## 2020-03-19 RX ADMIN — Medication 30 ML: at 10:42

## 2020-03-19 RX ADMIN — VEDOLIZUMAB 300 MG: 300 INJECTION, POWDER, LYOPHILIZED, FOR SOLUTION INTRAVENOUS at 10:03

## 2020-03-19 ASSESSMENT — PAIN - FUNCTIONAL ASSESSMENT: PAIN_FUNCTIONAL_ASSESSMENT: 0-10

## 2020-03-19 ASSESSMENT — PAIN DESCRIPTION - DESCRIPTORS: DESCRIPTORS: CRAMPING

## 2020-04-02 ENCOUNTER — HOSPITAL ENCOUNTER (OUTPATIENT)
Dept: NURSING | Age: 45
Discharge: HOME | End: 2020-04-02
Payer: MEDICARE

## 2020-04-02 VITALS
TEMPERATURE: 96 F | SYSTOLIC BLOOD PRESSURE: 140 MMHG | HEART RATE: 107 BPM | RESPIRATION RATE: 20 BRPM | WEIGHT: 245 LBS | DIASTOLIC BLOOD PRESSURE: 98 MMHG | BODY MASS INDEX: 32.32 KG/M2

## 2020-04-02 DIAGNOSIS — K51.00 CHRONIC ULCERATIVE ENTEROCOLITIS WITHOUT COMPLICATION (HCC): Primary | ICD-10-CM

## 2020-04-02 PROCEDURE — 96365 THER/PROPH/DIAG IV INF INIT: CPT

## 2020-04-02 PROCEDURE — 6360000002 HC RX W HCPCS: Performed by: NURSE PRACTITIONER

## 2020-04-02 PROCEDURE — 2580000003 HC RX 258: Performed by: NURSE PRACTITIONER

## 2020-04-02 RX ORDER — SODIUM CHLORIDE 0.9 % (FLUSH) 0.9 %
30 SYRINGE (ML) INJECTION ONCE
Status: CANCELLED | OUTPATIENT
Start: 2020-04-30

## 2020-04-02 RX ORDER — SODIUM CHLORIDE 0.9 % (FLUSH) 0.9 %
30 SYRINGE (ML) INJECTION ONCE
Status: COMPLETED | OUTPATIENT
Start: 2020-04-02 | End: 2020-04-02

## 2020-04-02 RX ORDER — DIPHENHYDRAMINE HYDROCHLORIDE 50 MG/ML
50 INJECTION INTRAMUSCULAR; INTRAVENOUS ONCE
Status: CANCELLED | OUTPATIENT
Start: 2020-04-30

## 2020-04-02 RX ORDER — SODIUM CHLORIDE 9 MG/ML
INJECTION, SOLUTION INTRAVENOUS CONTINUOUS
Status: CANCELLED | OUTPATIENT
Start: 2020-04-30

## 2020-04-02 RX ORDER — METHYLPREDNISOLONE SODIUM SUCCINATE 125 MG/2ML
125 INJECTION, POWDER, LYOPHILIZED, FOR SOLUTION INTRAMUSCULAR; INTRAVENOUS ONCE
Status: CANCELLED | OUTPATIENT
Start: 2020-04-30

## 2020-04-02 RX ADMIN — Medication 30 ML: at 10:36

## 2020-04-02 RX ADMIN — VEDOLIZUMAB 300 MG: 300 INJECTION, POWDER, LYOPHILIZED, FOR SOLUTION INTRAVENOUS at 09:52

## 2020-04-02 ASSESSMENT — PAIN - FUNCTIONAL ASSESSMENT: PAIN_FUNCTIONAL_ASSESSMENT: 0-10

## 2020-04-02 ASSESSMENT — PAIN DESCRIPTION - DESCRIPTORS: DESCRIPTORS: ACHING

## 2020-04-02 NOTE — PROGRESS NOTES
0900: arrives ambulatory for iv entyvio. Process reviewed and PT RIGHTS AND RESPONSIBILITIES OFFERED TO PT. Pt denies current signs of infection. 1010:  Pt tolerating infusion well. 1040: Tolerated infusion without complaints. Pt dISCHARGED ambulatory with instructions.     _M___ Safety:       (Environmental)   [place] to environment   Ensure ID band is correct and in place/ allergy band as needed   Assess for fall risk   Initiate fall precautions as applicable (fall band, side rails, etc.)   Call light within reach   Bed in low position/ wheels locked    __M__ Pain:        Assess pain level and characteristics   Administer analgesics as ordered   Assess effectiveness of pain management and report to MD as needed    M____ Knowledge Deficit:   Assess baseline knowledge   Provide teaching at level of understanding   Provide teaching via preferred learning method   Evaluate teaching effectiveness    _M___ Hemodynamic/Respiratory Status:       (Pre and Post Procedure Monitoring)   Assess/Monitor vital signs and LOC   Assess Baseline SpO2 prior to any sedation   Obtain weight/height   Assess vital signs/ LOC until patient meets discharge criteria   Monitor procedure site and notify MD of any issues

## 2020-04-29 NOTE — PROGRESS NOTES
1040  Patient contacted regarding COVID-19 screen. Following questions asked: In the last month, have you been in contact with someone who was confirmed or suspected to have Coronavirus/COVID-19:  Patient stated NO    Do you or family members have any of the following symptoms:  Cough-no   Muscle pain-no   Shortness of breath-no   Fever-no   Weakness-no  Severe headache-no   Sore throat-no   Respiratory symptoms-no    Have you traveled internationally in the last month?  No     Have you been to the emergency room recently-no

## 2020-04-30 ENCOUNTER — HOSPITAL ENCOUNTER (OUTPATIENT)
Dept: NURSING | Age: 45
Discharge: HOME | End: 2020-04-30
Payer: MEDICARE

## 2020-04-30 VITALS
DIASTOLIC BLOOD PRESSURE: 77 MMHG | WEIGHT: 240 LBS | HEIGHT: 72 IN | BODY MASS INDEX: 32.51 KG/M2 | SYSTOLIC BLOOD PRESSURE: 112 MMHG | HEART RATE: 75 BPM | OXYGEN SATURATION: 96 % | RESPIRATION RATE: 18 BRPM | TEMPERATURE: 97.5 F

## 2020-04-30 DIAGNOSIS — K51.00 CHRONIC ULCERATIVE ENTEROCOLITIS WITHOUT COMPLICATION (HCC): Primary | ICD-10-CM

## 2020-04-30 PROCEDURE — 6360000002 HC RX W HCPCS: Performed by: NURSE PRACTITIONER

## 2020-04-30 PROCEDURE — 96365 THER/PROPH/DIAG IV INF INIT: CPT

## 2020-04-30 PROCEDURE — 2580000003 HC RX 258: Performed by: NURSE PRACTITIONER

## 2020-04-30 RX ORDER — DIPHENHYDRAMINE HYDROCHLORIDE 50 MG/ML
50 INJECTION INTRAMUSCULAR; INTRAVENOUS ONCE
Status: CANCELLED | OUTPATIENT
Start: 2020-06-25

## 2020-04-30 RX ORDER — SODIUM CHLORIDE 0.9 % (FLUSH) 0.9 %
30 SYRINGE (ML) INJECTION ONCE
Status: CANCELLED | OUTPATIENT
Start: 2020-06-25

## 2020-04-30 RX ORDER — METHYLPREDNISOLONE SODIUM SUCCINATE 125 MG/2ML
125 INJECTION, POWDER, LYOPHILIZED, FOR SOLUTION INTRAMUSCULAR; INTRAVENOUS ONCE
Status: CANCELLED | OUTPATIENT
Start: 2020-06-25

## 2020-04-30 RX ORDER — SODIUM CHLORIDE 0.9 % (FLUSH) 0.9 %
30 SYRINGE (ML) INJECTION ONCE
Status: COMPLETED | OUTPATIENT
Start: 2020-04-30 | End: 2020-04-30

## 2020-04-30 RX ORDER — SODIUM CHLORIDE 9 MG/ML
INJECTION, SOLUTION INTRAVENOUS CONTINUOUS
Status: CANCELLED | OUTPATIENT
Start: 2020-06-25

## 2020-04-30 RX ADMIN — VEDOLIZUMAB 300 MG: 300 INJECTION, POWDER, LYOPHILIZED, FOR SOLUTION INTRAVENOUS at 09:44

## 2020-04-30 RX ADMIN — Medication 30 ML: at 10:21

## 2020-04-30 NOTE — PROGRESS NOTES
__M__ Safety:       (Environmental)   [place] to environment   Ensure ID band is correct and in place/ allergy band as needed   Assess for fall risk   Initiate fall precautions as applicable (fall band, side rails, etc.)   Call light within reach   Bed in low position/ wheels locked  M  ____ Pain:        Assess pain level and characteristics   Administer analgesics as ordered   Assess effectiveness of pain management and report to MD as needed    ___M_ Knowledge Deficit:   Assess baseline knowledge   Provide teaching at level of understanding   Provide teaching via preferred learning method   Evaluate teaching effectiveness    _M___ Hemodynamic/Respiratory Status:       (Pre and Post Procedure Monitoring)   Assess/Monitor vital signs and LOC   Assess Baseline SpO2 prior to any sedation   Obtain weight/height   Assess vital signs/ LOC until patient meets discharge criteria   Monitor procedure site and notify MD of any issues    _

## 2020-05-06 ENCOUNTER — APPOINTMENT (OUTPATIENT)
Dept: GENERAL RADIOLOGY | Age: 45
End: 2020-05-06
Payer: MEDICARE

## 2020-05-06 ENCOUNTER — HOSPITAL ENCOUNTER (EMERGENCY)
Age: 45
Discharge: HOME | End: 2020-05-07
Payer: MEDICARE

## 2020-05-06 VITALS
RESPIRATION RATE: 15 BRPM | SYSTOLIC BLOOD PRESSURE: 133 MMHG | OXYGEN SATURATION: 97 % | HEIGHT: 72 IN | DIASTOLIC BLOOD PRESSURE: 85 MMHG | TEMPERATURE: 98.3 F | BODY MASS INDEX: 32.51 KG/M2 | HEART RATE: 90 BPM | WEIGHT: 240 LBS

## 2020-05-06 PROCEDURE — 72100 X-RAY EXAM L-S SPINE 2/3 VWS: CPT

## 2020-05-06 PROCEDURE — 6360000002 HC RX W HCPCS: Performed by: PHYSICIAN ASSISTANT

## 2020-05-06 PROCEDURE — 96372 THER/PROPH/DIAG INJ SC/IM: CPT

## 2020-05-06 PROCEDURE — 99284 EMERGENCY DEPT VISIT MOD MDM: CPT

## 2020-05-06 PROCEDURE — 6370000000 HC RX 637 (ALT 250 FOR IP): Performed by: PHYSICIAN ASSISTANT

## 2020-05-06 RX ORDER — CYCLOBENZAPRINE HCL 10 MG
10 TABLET ORAL ONCE
Status: COMPLETED | OUTPATIENT
Start: 2020-05-06 | End: 2020-05-06

## 2020-05-06 RX ORDER — CYCLOBENZAPRINE HCL 5 MG
5 TABLET ORAL 2 TIMES DAILY PRN
Qty: 10 TABLET | Refills: 0 | Status: SHIPPED | OUTPATIENT
Start: 2020-05-06 | End: 2020-05-16

## 2020-05-06 RX ORDER — DIAZEPAM 5 MG/1
5 TABLET ORAL ONCE
Status: COMPLETED | OUTPATIENT
Start: 2020-05-06 | End: 2020-05-06

## 2020-05-06 RX ORDER — KETOROLAC TROMETHAMINE 30 MG/ML
30 INJECTION, SOLUTION INTRAMUSCULAR; INTRAVENOUS ONCE
Status: COMPLETED | OUTPATIENT
Start: 2020-05-06 | End: 2020-05-06

## 2020-05-06 RX ORDER — ACETAMINOPHEN 325 MG/1
650 TABLET ORAL ONCE
Status: COMPLETED | OUTPATIENT
Start: 2020-05-06 | End: 2020-05-06

## 2020-05-06 RX ADMIN — ACETAMINOPHEN 650 MG: 325 TABLET ORAL at 22:44

## 2020-05-06 RX ADMIN — KETOROLAC TROMETHAMINE 30 MG: 30 INJECTION, SOLUTION INTRAMUSCULAR at 21:09

## 2020-05-06 RX ADMIN — CYCLOBENZAPRINE HYDROCHLORIDE 10 MG: 10 TABLET, FILM COATED ORAL at 22:44

## 2020-05-06 RX ADMIN — DIAZEPAM 5 MG: 5 TABLET ORAL at 21:09

## 2020-05-06 ASSESSMENT — PAIN DESCRIPTION - LOCATION: LOCATION: BACK

## 2020-05-06 ASSESSMENT — ENCOUNTER SYMPTOMS
VOMITING: 0
BACK PAIN: 1
SHORTNESS OF BREATH: 0
DIARRHEA: 0
CHEST TIGHTNESS: 0
WHEEZING: 0
CONSTIPATION: 0
ABDOMINAL PAIN: 0
COUGH: 0
NAUSEA: 0

## 2020-05-06 ASSESSMENT — PAIN SCALES - GENERAL
PAINLEVEL_OUTOF10: 10
PAINLEVEL_OUTOF10: 7

## 2020-05-06 ASSESSMENT — PAIN DESCRIPTION - ORIENTATION: ORIENTATION: LOWER

## 2020-05-07 NOTE — ED PROVIDER NOTES
[facility] [address] [address] EMERGENCY DEPT    EMERGENCY MEDICINE     Pt Name: Ene Morales  MRN: [id number]  [place] 1975  Date of evaluation: 5/6/2020  Provider: DARIUSZ Casey    CHIEF COMPLAINT       Chief Complaint   Patient presents with    Back Pain       HISTORY OF PRESENT ILLNESS    Ene Morales is a 39 y.o. male who presents to the emergency department from home with a chief complaint of lower back pain for the past 3 days. Patient states that this is the same pain that he has experienced chronically due to his history of degenerative disc disease and scoliosis and states it is primarily affecting his lower back into his buttocks. Patient states that his pain is causing radiculopathy down both of his legs primarily affecting the left leg with associated paresthesias in his left foot. Patient states that he typically takes Tylenol and Flexeril for pain however these have provided little relief in his symptoms. Patient states that he has taken 2 doses of Tylenol today with last dose being at 5 PM.  Patient reports that he is unable to take NSAIDs related to his medical history of colitis and ulcers. Patient denies any fever, night pain, chills, abdominal pain, chest pain, HA, lightheadedness, or any other symptoms at this time. Triage notes and Nursing notes were reviewed by myself. Any discrepancies are addressed above.     PAST MEDICAL HISTORY     Past Medical History:   Diagnosis Date    GERD (gastroesophageal reflux disease)     Hepatic cirrhosis (HCC)     Hepatitis B     Hepatitis C     Intravenous drug user     Psychiatric problem     Ulcerative colitis ([facility] [address]        SURGICAL HISTORY       Past Surgical History:   Procedure Laterality Date    APPENDECTOMY      ARM AMPUTATION AT SHOULDER Right     BLADDER SURGERY  2005    ELBOW FRACTURE SURGERY  2020    [place] COLONOSCOPY W/BIOPSY SINGLE/MULTIPLE  9/26/2017    COLONOSCOPY WITH BIOPSY performed by Radhames Castro MD at [facility]       CURRENT MEDICATIONS       Discharge Medication List as of 5/6/2020 11:55 PM      CONTINUE these medications which have NOT CHANGED    Details   budesonide (ENTOCORT EC) 3 MG extended release capsule Take 9 mg by mouth every morningHistorical Med      docusate sodium (COLACE) 100 MG capsule Take 100 mg by mouth dailyHistorical Med      ferrous sulfate 325 (65 Fe) MG tablet Take 325 mg by mouth daily (with breakfast)Historical Med      lisinopril (PRINIVIL;ZESTRIL) 20 MG tablet Take 20 mg by mouth dailyHistorical Med      dicyclomine (BENTYL) 10 MG capsule Take 1 capsule by mouth every 6 hours as needed (cramps), Disp-20 capsule, R-0Print      ondansetron (ZOFRAN ODT) 4 MG disintegrating tablet Take 1 tablet by mouth every 8 hours as needed for Nausea, Disp-20 tablet, R-0Print      mesalamine (DELZICOL) 400 MG CPDR delayed release capsule Take 800 mg by mouth 3 times dailyHistorical Med      potassium chloride (KLOR-CON) 20 MEQ packet Take 20 mEq by mouth 2 times dailyHistorical Med      folic acid (FOLVITE) 1 MG tablet Take 2 tablets by mouth daily, Disp-30 tablet, R-3Print      LACTULOSE PO Take 20 g by mouth 3 times daily Historical Med      pantoprazole (PROTONIX) 40 MG tablet Take 40 mg by mouth 2 times daily Historical Med      PARoxetine (PAXIL) 20 MG tablet Take 20 mg by mouth nightly Historical Med      Multiple Vitamin (MULTI VITAMIN MENS) TABS Take by mouthHistorical Med             ALLERGIES     Penicillins and Trazodone and nefazodone    FAMILY HISTORY     History reviewed. No pertinent family history.      SOCIAL HISTORY       Social History     Socioeconomic History    Marital status: Single     Spouse name: None    Number of children: None    Years of education: None    Highest education level: None   Occupational History    None   Social Needs    Financial resource strain: None    Food insecurity     Worry: None     Inability: None    Transportation needs     Medical: None

## 2020-06-25 ENCOUNTER — HOSPITAL ENCOUNTER (OUTPATIENT)
Dept: NURSING | Age: 45
Discharge: HOME | End: 2020-06-25
Payer: MEDICARE

## 2020-06-25 VITALS
TEMPERATURE: 97.2 F | SYSTOLIC BLOOD PRESSURE: 116 MMHG | HEART RATE: 93 BPM | RESPIRATION RATE: 20 BRPM | DIASTOLIC BLOOD PRESSURE: 78 MMHG | OXYGEN SATURATION: 97 %

## 2020-06-25 NOTE — PROGRESS NOTES
0930:  ARRIVES AMBULATORY FOR IV ENTYVIO. PROCESS REVIEWED AND PT RIGHTS AND RESPONSIBILITIES OFFERED TO PT.    1030:  IV TEAM ATTEMPTED IV SITE 4 TIMES. PT STATES IT HURTS WHEN FLUSHED. FLUSHES WITH RESISTANCE. CALLED KIRSTEN LARA CNP OFFICE AND SPOKE TO JASON WITH IV UPDATE. ADVISED TO SEND PT HOME AND THEY WILL NOTIFY PATIENT REGARDING FUTURE INFUSIONS.   1040:  PT DISCHARGED AMBULATORY WITH INSTRUCTIONS.    M____ Safety:       (Environmental)   [place] to environment   Ensure ID band is correct and in place/ allergy band as needed   Assess for fall risk   Initiate fall precautions as applicable (fall band, side rails, etc.)   Call light within reach   Bed in low position/ wheels locked    _M___ Pain:        Assess pain level and characteristics   Administer analgesics as ordered   Assess effectiveness of pain management and report to MD as needed    _M___ Knowledge Deficit:   Assess baseline knowledge   Provide teaching at level of understanding   Provide teaching via preferred learning method   Evaluate teaching effectiveness    __M__ Hemodynamic/Respiratory Status:       (Pre and Post Procedure Monitoring)   Assess/Monitor vital signs and LOC   Assess Baseline SpO2 prior to any sedation   Obtain weight/height   Assess vital signs/ LOC until patient meets discharge criteria   Monitor procedure site and notify MD of any issues

## 2020-06-29 ENCOUNTER — HOSPITAL ENCOUNTER (EMERGENCY)
Age: 45
Discharge: HOME | End: 2020-06-29
Payer: MEDICARE

## 2020-06-29 VITALS
RESPIRATION RATE: 18 BRPM | TEMPERATURE: 96.3 F | HEART RATE: 72 BPM | BODY MASS INDEX: 32.4 KG/M2 | HEIGHT: 72 IN | SYSTOLIC BLOOD PRESSURE: 105 MMHG | DIASTOLIC BLOOD PRESSURE: 60 MMHG | OXYGEN SATURATION: 96 % | WEIGHT: 239.2 LBS

## 2020-06-29 PROCEDURE — 87075 CULTR BACTERIA EXCEPT BLOOD: CPT

## 2020-06-29 PROCEDURE — 87070 CULTURE OTHR SPECIMN AEROBIC: CPT

## 2020-06-29 PROCEDURE — 99213 OFFICE O/P EST LOW 20 MIN: CPT

## 2020-06-29 PROCEDURE — 87077 CULTURE AEROBIC IDENTIFY: CPT

## 2020-06-29 PROCEDURE — 87147 CULTURE TYPE IMMUNOLOGIC: CPT

## 2020-06-29 PROCEDURE — 99213 OFFICE O/P EST LOW 20 MIN: CPT | Performed by: NURSE PRACTITIONER

## 2020-06-29 PROCEDURE — 87205 SMEAR GRAM STAIN: CPT

## 2020-06-29 RX ORDER — MUPIROCIN CALCIUM 20 MG/G
CREAM TOPICAL
Qty: 15 G | Refills: 0 | Status: SHIPPED | OUTPATIENT
Start: 2020-06-29 | End: 2020-07-29

## 2020-06-29 RX ORDER — SULFAMETHOXAZOLE AND TRIMETHOPRIM 800; 160 MG/1; MG/1
1 TABLET ORAL 2 TIMES DAILY
Qty: 20 TABLET | Refills: 0 | Status: SHIPPED | OUTPATIENT
Start: 2020-06-29 | End: 2020-07-09

## 2020-06-29 ASSESSMENT — PAIN DESCRIPTION - DESCRIPTORS: DESCRIPTORS: DISCOMFORT;ACHING

## 2020-06-29 ASSESSMENT — ENCOUNTER SYMPTOMS
COLOR CHANGE: 1
NAUSEA: 0
DIARRHEA: 0
VOMITING: 0

## 2020-06-29 ASSESSMENT — PAIN SCALES - GENERAL: PAINLEVEL_OUTOF10: 5

## 2020-06-29 ASSESSMENT — PAIN DESCRIPTION - PAIN TYPE: TYPE: ACUTE PAIN

## 2020-06-29 ASSESSMENT — PAIN DESCRIPTION - LOCATION: LOCATION: GROIN

## 2020-06-29 ASSESSMENT — PAIN - FUNCTIONAL ASSESSMENT: PAIN_FUNCTIONAL_ASSESSMENT: ACTIVITIES ARE NOT PREVENTED

## 2020-06-29 ASSESSMENT — PAIN DESCRIPTION - ORIENTATION: ORIENTATION: RIGHT;LEFT

## 2020-06-29 NOTE — ED PROVIDER NOTES
[facility]  Urgent Care Encounter       CHIEF COMPLAINT       Chief Complaint   Patient presents with    Rash     painful redness on groin area       Nurses Notes reviewed and I agree except as noted in the HPI. HISTORY OF PRESENT ILLNESS   Dennise Fallon is a 39 y.o. male who presents     Patient states that for the last 2 or 3 days he has noticed increased redness and soreness to right groin. He states that he believes there is abscess there, as he has noticed some purulent drainage. He denies any fevers. The patient states that he has had MRSA in the past.          REVIEW OF SYSTEMS     Review of Systems   Constitutional: Negative for chills, fatigue and fever. Gastrointestinal: Negative for diarrhea, nausea and vomiting. Skin: Positive for color change. Negative for pallor and rash. PAST MEDICAL HISTORY         Diagnosis Date    GERD (gastroesophageal reflux disease)     Hepatic cirrhosis (HCC)     Hepatitis B     Hepatitis C     Intravenous drug user     Psychiatric problem     Ulcerative colitis ([facility] [address]        SURGICALHISTORY     Patient  has a past surgical history that includes Arm amputation at shoulder (Right); pr colonoscopy w/biopsy single/multiple (9/26/2017); Appendectomy; Bladder surgery (2005); and Elbow fracture surgery (2020).     CURRENT MEDICATIONS       Discharge Medication List as of 6/29/2020 10:38 AM      CONTINUE these medications which have NOT CHANGED    Details   entecavir (BARACLUDE) 0.05 MG/ML solution Take 0.5 mg by mouth dailyHistorical Med      ZOLPIDEM TARTRATE PO Take by mouthHistorical Med      NONFORMULARY Indications: muscle relaxer Historical Med      docusate sodium (COLACE) 100 MG capsule Take 100 mg by mouth dailyHistorical Med      ferrous sulfate 325 (65 Fe) MG tablet Take 325 mg by mouth daily (with breakfast)Historical Med      lisinopril (PRINIVIL;ZESTRIL) 20 MG tablet Take 20 mg by mouth dailyHistorical Med ondansetron (ZOFRAN ODT) 4 MG disintegrating tablet Take 1 tablet by mouth every 8 hours as needed for Nausea, Disp-20 tablet, R-0Print      mesalamine (DELZICOL) 400 MG CPDR delayed release capsule Take 800 mg by mouth 3 times dailyHistorical Med      potassium chloride (KLOR-CON) 20 MEQ packet Take 20 mEq by mouth 2 times dailyHistorical Med      folic acid (FOLVITE) 1 MG tablet Take 2 tablets by mouth daily, Disp-30 tablet, R-3Print      LACTULOSE PO Take 20 g by mouth 3 times daily Historical Med      pantoprazole (PROTONIX) 40 MG tablet Take 40 mg by mouth 2 times daily Historical Med      PARoxetine (PAXIL) 20 MG tablet Take 20 mg by mouth nightly Historical Med      Multiple Vitamin (MULTI VITAMIN MENS) TABS Take by mouthHistorical Med      budesonide (ENTOCORT EC) 3 MG extended release capsule Take 9 mg by mouth every morningHistorical Med      dicyclomine (BENTYL) 10 MG capsule Take 1 capsule by mouth every 6 hours as needed (cramps), Disp-20 capsule, R-0Print             ALLERGIES     Patient is is allergic to penicillins and trazodone and nefazodone. Patients   Immunization History   Administered Date(s) Administered    Influenza Vaccine, unspecified formulation 09/19/2017    Pneumococcal Conjugate Vaccine 09/19/2017       FAMILY HISTORY     Patient's family history is not on file. SOCIAL HISTORY     Patient  reports that he has quit smoking. His smokeless tobacco use includes snuff. He reports that he does not drink alcohol or use drugs. PHYSICAL EXAM     ED TRIAGE VITALS  BP: 105/60, Temp: 96.3 °F (35.7 °C), Pulse: 72, Resp: 18, SpO2: 96 %,Estimated body mass index is 32.44 kg/m² as calculated from the following:    Height as of this encounter: 6' (1.829 m). Weight as of this encounter: 239 lb 3.2 oz (108.5 kg). ,No LMP for male patient. Physical Exam  Constitutional:       General: He is not in acute distress. Appearance: Normal appearance.  He is not ill-appearing, toxic-appearing or diaphoretic. Musculoskeletal: Normal range of motion. Lymphadenopathy:      Lower Body: No right inguinal adenopathy. No left inguinal adenopathy. Skin:     General: Skin is warm. Capillary Refill: Capillary refill takes less than 2 seconds. Findings: Abscess and erythema present. No abrasion, acne, bruising, burn, ecchymosis, signs of injury, laceration, lesion, petechiae, rash or wound. Neurological:      General: No focal deficit present. Mental Status: He is alert and oriented to person, place, and time. Sensory: No sensory deficit. Psychiatric:         Mood and Affect: Mood normal.         Behavior: Behavior normal.         Thought Content: Thought content normal.         Judgment: Judgment normal.         DIAGNOSTIC RESULTS     Labs:No results found for this visit on 06/29/20. IMAGING:    No orders to display     URGENT CARE COURSE:     Vitals:    06/29/20 1005   BP: 105/60   Pulse: 72   Resp: 18   Temp: 96.3 °F (35.7 °C)   TempSrc: Temporal   SpO2: 96%   Weight: 239 lb 3.2 oz (108.5 kg)   Height: 6' (1.829 m)       Medications - No data to display         PROCEDURES:  None    FINAL IMPRESSION      1. Cellulitis of other specified site          DISPOSITION/ PLAN   Patient is discharged home with prescription for Bactrim as well as Bactroban ointment to apply over erythematous area to right groin. Aerobic and anaerobic cultures were obtained, patient is informed that these can take up to 3 days to result. May utilize over-the-counter Tylenol for any fevers. Keep site clean with soap and water. Should follow-up with primary care provider within 1 week if symptoms are not improving.         PATIENT REFERRED TO:  CRISTIAN Alaniz - CNP  [address] [facility] [address] / [place] [address]      DISCHARGE MEDICATIONS:  Discharge Medication List as of 6/29/2020 10:38 AM      START taking these medications    Details   sulfamethoxazole-trimethoprim (BACTRIM DS;SEPTRA DS) 800-160 MG per tablet Take 1 tablet by mouth 2 times daily for 10 days, Disp-20 tablet, R-0Print      mupirocin (BACTROBAN) 2 % cream Apply topically 2 times daily,1 week, Disp-15 g, R-0, Print             Discharge Medication List as of 6/29/2020 10:38 AM          Discharge Medication List as of 6/29/2020 10:38 AM          CRISTIAN Emanuel NP    (Please note that portions of this note were completed with a voice recognition program. Efforts were made to edit the dictations but occasionally words are mis-transcribed.)         CRISTIAN Gaming NP  06/29/20 8383

## 2020-07-02 LAB
AEROBIC CULTURE: ABNORMAL
ANAEROBIC CULTURE: ABNORMAL
GRAM STAIN RESULT: ABNORMAL
ORGANISM: ABNORMAL
ORGANISM: ABNORMAL

## 2020-07-10 ENCOUNTER — HOSPITAL ENCOUNTER (OUTPATIENT)
Dept: NURSING | Age: 45
Discharge: HOME | End: 2020-07-10
Payer: MEDICARE

## 2020-07-10 VITALS
RESPIRATION RATE: 18 BRPM | WEIGHT: 236.8 LBS | TEMPERATURE: 96.6 F | OXYGEN SATURATION: 96 % | SYSTOLIC BLOOD PRESSURE: 123 MMHG | BODY MASS INDEX: 32.12 KG/M2 | HEART RATE: 84 BPM | DIASTOLIC BLOOD PRESSURE: 94 MMHG

## 2020-07-10 DIAGNOSIS — K51.00 CHRONIC ULCERATIVE ENTEROCOLITIS WITHOUT COMPLICATION (HCC): Primary | ICD-10-CM

## 2020-07-10 PROCEDURE — 2580000003 HC RX 258: Performed by: NURSE PRACTITIONER

## 2020-07-10 PROCEDURE — 6360000002 HC RX W HCPCS: Performed by: NURSE PRACTITIONER

## 2020-07-10 PROCEDURE — 96374 THER/PROPH/DIAG INJ IV PUSH: CPT

## 2020-07-10 RX ORDER — SODIUM CHLORIDE 9 MG/ML
INJECTION, SOLUTION INTRAVENOUS CONTINUOUS
Status: CANCELLED | OUTPATIENT
Start: 2020-08-21

## 2020-07-10 RX ORDER — METHYLPREDNISOLONE SODIUM SUCCINATE 125 MG/2ML
125 INJECTION, POWDER, LYOPHILIZED, FOR SOLUTION INTRAMUSCULAR; INTRAVENOUS ONCE
Status: CANCELLED | OUTPATIENT
Start: 2020-08-21

## 2020-07-10 RX ORDER — DIPHENHYDRAMINE HYDROCHLORIDE 50 MG/ML
50 INJECTION INTRAMUSCULAR; INTRAVENOUS ONCE
Status: CANCELLED | OUTPATIENT
Start: 2020-08-21

## 2020-07-10 RX ORDER — SODIUM CHLORIDE 0.9 % (FLUSH) 0.9 %
30 SYRINGE (ML) INJECTION ONCE
Status: CANCELLED | OUTPATIENT
Start: 2020-08-21

## 2020-07-10 RX ORDER — SODIUM CHLORIDE 0.9 % (FLUSH) 0.9 %
30 SYRINGE (ML) INJECTION ONCE
Status: DISCONTINUED | OUTPATIENT
Start: 2020-07-10 | End: 2020-07-11 | Stop reason: HOSPADM

## 2020-07-10 RX ADMIN — VEDOLIZUMAB 300 MG: 300 INJECTION, POWDER, LYOPHILIZED, FOR SOLUTION INTRAVENOUS at 09:49

## 2020-07-10 ASSESSMENT — PAIN - FUNCTIONAL ASSESSMENT: PAIN_FUNCTIONAL_ASSESSMENT: 0-10

## 2020-07-10 NOTE — PROGRESS NOTES
2061 Patient arrived to [facility] ambulatory for IV   PT RIGHTS AND RESPONSIBILITIES OFFERED TO PT.  8751 IV team here for IV start unable to start times one attempt myself. 3006 IV team unable to start IV  [address] started IV in right upper arm   0956 Entyvio started at this time due to kink in IV. Instructed patient to call out if any problems  0569 Patient called out and stated IV burning, small amount of swelling noted medication stopped ice pack given to patient  1005 Discharge instructions given to patient verbalized understanding.  Patient discharged to home in stable condition  [address] [facility] called message left    __m__ Safety:       (Environmental)  Geovanna Later to environment   Ensure ID band is correct and in place/ allergy band as needed   Assess for fall risk   Initiate fall precautions as applicable (fall band, side rails, etc.)   Call light within reach   Bed in low position/ wheels locked    _m___ Pain:        Assess pain level and characteristics   Administer analgesics as ordered   Assess effectiveness of pain management and report to MD as needed    _m___ Knowledge Deficit:   Assess baseline knowledge   Provide teaching at level of understanding   Provide teaching via preferred learning method   Evaluate teaching effectiveness    __m__ Hemodynamic/Respiratory Status:       (Pre and Post Procedure Monitoring)   Assess/Monitor vital signs and LOC   Assess Baseline SpO2 prior to any sedation   Obtain weight/height   Assess vital signs/ LOC until patient meets discharge criteria   Monitor procedure site and notify MD of any issues

## 2020-08-11 ENCOUNTER — OFFICE VISIT (OUTPATIENT)
Dept: SURGERY | Age: 45
End: 2020-08-11
Payer: MEDICARE

## 2020-08-11 ENCOUNTER — HOSPITAL ENCOUNTER (OUTPATIENT)
Age: 45
Discharge: HOME | End: 2020-08-11
Payer: MEDICARE

## 2020-08-11 ENCOUNTER — TELEPHONE (OUTPATIENT)
Dept: SURGERY | Age: 45
End: 2020-08-11

## 2020-08-11 VITALS
TEMPERATURE: 96.9 F | SYSTOLIC BLOOD PRESSURE: 125 MMHG | HEART RATE: 88 BPM | OXYGEN SATURATION: 98 % | HEIGHT: 72 IN | BODY MASS INDEX: 32.03 KG/M2 | RESPIRATION RATE: 15 BRPM | WEIGHT: 236.5 LBS | DIASTOLIC BLOOD PRESSURE: 82 MMHG

## 2020-08-11 LAB
ANION GAP SERPL CALCULATED.3IONS-SCNC: 14 MEQ/L (ref 8–16)
BUN BLDV-MCNC: 14 MG/DL (ref 7–22)
CALCIUM SERPL-MCNC: 10 MG/DL (ref 8.5–10.5)
CHLORIDE BLD-SCNC: 101 MEQ/L (ref 98–111)
CO2: 24 MEQ/L (ref 23–33)
CREAT SERPL-MCNC: 0.6 MG/DL (ref 0.4–1.2)
GFR SERPL CREATININE-BSD FRML MDRD: > 90 ML/MIN/1.73M2
GLUCOSE BLD-MCNC: 83 MG/DL (ref 70–108)
POTASSIUM SERPL-SCNC: 4.5 MEQ/L (ref 3.5–5.2)
SODIUM BLD-SCNC: 139 MEQ/L (ref 135–145)

## 2020-08-11 PROCEDURE — G8427 DOCREV CUR MEDS BY ELIG CLIN: HCPCS | Performed by: SURGERY

## 2020-08-11 PROCEDURE — 36415 COLL VENOUS BLD VENIPUNCTURE: CPT

## 2020-08-11 PROCEDURE — 99203 OFFICE O/P NEW LOW 30 MIN: CPT | Performed by: SURGERY

## 2020-08-11 PROCEDURE — U0003 INFECTIOUS AGENT DETECTION BY NUCLEIC ACID (DNA OR RNA); SEVERE ACUTE RESPIRATORY SYNDROME CORONAVIRUS 2 (SARS-COV-2) (CORONAVIRUS DISEASE [COVID-19]), AMPLIFIED PROBE TECHNIQUE, MAKING USE OF HIGH THROUGHPUT TECHNOLOGIES AS DESCRIBED BY CMS-2020-01-R: HCPCS

## 2020-08-11 PROCEDURE — G8417 CALC BMI ABV UP PARAM F/U: HCPCS | Performed by: SURGERY

## 2020-08-11 PROCEDURE — 4004F PT TOBACCO SCREEN RCVD TLK: CPT | Performed by: SURGERY

## 2020-08-11 PROCEDURE — 80048 BASIC METABOLIC PNL TOTAL CA: CPT

## 2020-08-11 RX ORDER — PREGABALIN 150 MG/1
150 CAPSULE ORAL 2 TIMES DAILY
Status: ON HOLD | COMMUNITY
End: 2020-08-17

## 2020-08-11 RX ORDER — TIZANIDINE 4 MG/1
4 TABLET ORAL EVERY 8 HOURS PRN
Status: ON HOLD | COMMUNITY
End: 2021-01-04

## 2020-08-11 RX ORDER — FAMOTIDINE 40 MG/1
40 TABLET, FILM COATED ORAL DAILY
Status: ON HOLD | COMMUNITY
End: 2021-01-15 | Stop reason: HOSPADM

## 2020-08-11 ASSESSMENT — ENCOUNTER SYMPTOMS
EYE PAIN: 0
SINUS PRESSURE: 0
SORE THROAT: 0
WHEEZING: 0
BACK PAIN: 0
EYE DISCHARGE: 0
STRIDOR: 0
TROUBLE SWALLOWING: 0
SINUS PAIN: 0
RESPIRATORY NEGATIVE: 1
RHINORRHEA: 0
ALLERGIC/IMMUNOLOGIC NEGATIVE: 1
APNEA: 0
EYE REDNESS: 0
EYE ITCHING: 0
COUGH: 0
VOICE CHANGE: 0
CHOKING: 0
SHORTNESS OF BREATH: 0
EYES NEGATIVE: 1
CHEST TIGHTNESS: 0
COLOR CHANGE: 0
GASTROINTESTINAL NEGATIVE: 1
FACIAL SWELLING: 0
PHOTOPHOBIA: 0

## 2020-08-11 NOTE — PROGRESS NOTES
[address] [address]  Dept: [phone]  Dept Fax: [phone]  Loc: [phone]    Visit Date: 2020    Charlie Pimentel is a 39 y.o. male who presentstoday for:  Chief Complaint   Patient presents with    Surgical Consult     new patient- Ref N.  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 this was performed at [facility] patient has ulcerative colitis is in need of infusions but has no IV access patient also has known hepatitis B and C but has been referred for placement of a MediPort patient is in rather urgent need of his medication via the GI service and was referred    Past Medical History:   Diagnosis Date    GERD (gastroesophageal reflux disease)     Hepatic cirrhosis (HCC)     Hepatitis B     Hepatitis C     Intravenous drug user     Psychiatric problem     Ulcerative colitis ([facility] [address]       Past Surgical History:   Procedure Laterality Date    APPENDECTOMY      ARM AMPUTATION AT SHOULDER Right     BLADDER SURGERY  2005    ELBOW FRACTURE SURGERY      CT COLONOSCOPY W/BIOPSY SINGLE/MULTIPLE  2017    COLONOSCOPY WITH BIOPSY performed by Jese Herring MD at  [facility]        Family History   Problem Relation Age of Onset    High Blood Pressure Mother     High Blood Pressure Father         Social History     Tobacco Use    Smoking status: Former Smoker     Last attempt to quit: 2020     Years since quittin.3    Smokeless tobacco: Current User     Types: Snuff   Substance Use Topics    Alcohol use: No     Comment: Several months since last used          Current Outpatient Medications kg)   SpO2 98%   BMI 32.08 kg/m²     Physical Exam  Constitutional:       Appearance: He is well-developed. HENT:      Head: Normocephalic and atraumatic. Eyes:      General: No scleral icterus. Left eye: No discharge. Conjunctiva/sclera: Conjunctivae normal.      Pupils: Pupils are equal, round, and reactive to light. Neck:      Thyroid: No thyromegaly. Trachea: No tracheal deviation. Cardiovascular:      Rate and Rhythm: Normal rate and regular rhythm. Heart sounds: Normal heart sounds. No murmur. No friction rub. No gallop. Pulmonary:      Effort: Pulmonary effort is normal. No respiratory distress. Breath sounds: Normal breath sounds. No wheezing or rales. Chest:      Chest wall: No tenderness. Abdominal:      General: There is no distension. Palpations: There is no mass. Tenderness: There is no abdominal tenderness. There is no guarding. Hernia: No hernia is present. Musculoskeletal: Normal range of motion. General: Deformity present. No tenderness. Comments: Right arm amputation at the shoulder   Lymphadenopathy:      Cervical: No cervical adenopathy. Skin:     General: Skin is warm and dry. Coloration: Skin is not pale. Findings: No erythema or rash. Neurological:      Mental Status: He is alert and oriented to person, place, and time. Psychiatric:         Behavior: Behavior normal.         Thought Content: Thought content normal.         Judgment: Judgment normal.            Results for orders placed or performed during the hospital encounter of 06/29/20   Culture, Anaerobic and Aerobic    Specimen: Abscess   Result Value Ref Range    Aerobic Culture (A)      Culture also yielded moderate growth of Staphylococcus (coagulase negative) and gram positive bacilli most consistent with Corynebacterium species. Direct gram stain indicated few gram negative bacilli which did not grow on culture.  This could be inhibited understands and wishes to proceed with surgical intervention. 3. Restrictions discussed with Chance and he expresses understanding. 4. He is advised to call back directly if there are further questions/concerns, or if his symptoms worsen prior to surgery.             Electronicallysigned by Bee Beal MD on 8/11/2020 at 2:55 PM

## 2020-08-12 ENCOUNTER — PREP FOR PROCEDURE (OUTPATIENT)
Dept: SURGERY | Age: 45
End: 2020-08-12

## 2020-08-12 RX ORDER — SODIUM CHLORIDE 9 MG/ML
INJECTION, SOLUTION INTRAVENOUS CONTINUOUS
Status: CANCELLED | OUTPATIENT
Start: 2020-08-12

## 2020-08-13 LAB — SARS-COV-2: NOT DETECTED

## 2020-08-14 NOTE — PROGRESS NOTES
Patient contacted regarding COVID-19 screen.    Wife said that the patient lives in a nursing home and has already done covid screening

## 2020-08-16 NOTE — H&P
[address] [address]  History and Physical Update      Pt Name: Kenney Arrington  MRN: [id number]  YOB: 1975  Date of evaluation: 8/16/2020    [x] I have examined the patient and reviewed the H&P/Consult and there are no changes to the patient or plans. [] I have examined the patient and reviewed the H&P/Consult and have noted the following changes:         Syeda Hernandez  Electronically signed 8/16/2020 at 5:04 PM

## 2020-08-16 NOTE — H&P
[address] [address]  Dept: [phone]  Dept Fax: [phone]  Loc: [phone]    Visit Date: 2020    Carlos Henderson is a 39 y.o. male who presentstoday for:  Chief Complaint   Patient presents with    Surgical Consult     new patient- Ref N.  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 this was performed at [facility] patient has ulcerative colitis is in need of infusions but has no IV access patient also has known hepatitis B and C but has been referred for placement of a MediPort patient is in rather urgent need of his medication via the GI service and was referred    Past Medical History:   Diagnosis Date    GERD (gastroesophageal reflux disease)     Hepatic cirrhosis (HCC)     Hepatitis B     Hepatitis C     Intravenous drug user     Psychiatric problem     Ulcerative colitis ([address]       Past Surgical History:   Procedure Laterality Date    APPENDECTOMY      ARM AMPUTATION AT SHOULDER Right     BLADDER SURGERY  2005    ELBOW FRACTURE SURGERY      [place] COL[place] W/BI[place]SY SINGLE/MULTIPLE  2017    COLONOSCOPY WITH BIOPSY performed by Shayla Childers MD at [facility]        Family History   Problem Relation Age of Onset    High Blood Pressure Mother     High Blood Pressure Father         Social History     Tobacco Use    Smoking status: Former Smoker     Last attempt to quit: 2020     Years since quittin.3    Smokeless tobacco: Current User     Types: Snuff   Substance Use Topics    Alcohol use: No     Comment: Several months since last used          Current Outpatient Medications Medication Sig Dispense Refill    tiZANidine (ZANAFLEX) 4 MG tablet Take 4 mg by mouth every 8 hours as needed      famotidine (PEPCID) 40 MG tablet Take 40 mg by mouth daily      entecavir (BARACLUDE) 0.05 MG/ML solution Take 0.5 mg by mouth daily      budesonide (ENTOCORT EC) 3 MG extended release capsule Take 9 mg by mouth every morning      docusate sodium (COLACE) 100 MG capsule Take 100 mg by mouth daily      ferrous sulfate 325 (65 Fe) MG tablet Take 325 mg by mouth daily (with breakfast)      lisinopril (PRINIVIL;ZESTRIL) 20 MG tablet Take 20 mg by mouth daily      mesalamine (DELZICOL) 400 MG CPDR delayed release capsule Take 800 mg by mouth 3 times daily      potassium chloride (KLOR-CON) 20 MEQ packet Take 20 mEq by mouth 2 times daily      folic acid (FOLVITE) 1 MG tablet Take 2 tablets by mouth daily 30 tablet 3    LACTULOSE PO Take 20 g by mouth 3 times daily       pantoprazole (PROTONIX) 40 MG tablet Take 40 mg by mouth 2 times daily       PARoxetine (PAXIL) 20 MG tablet Take 20 mg by mouth nightly       Multiple Vitamin (MULTI VITAMIN MENS) TABS Take by mouth      ZOLPIDEM TARTRATE PO Take by mouth      NONFORMULARY Indications: muscle relaxer       dicyclomine (BENTYL) 10 MG capsule Take 1 capsule by mouth every 6 hours as needed (cramps) (Patient not taking: Reported on 8/11/2020) 20 capsule 0    ondansetron (ZOFRAN ODT) 4 MG disintegrating tablet Take 1 tablet by mouth every 8 hours as needed for Nausea (Patient not taking: Reported on 8/11/2020) 20 tablet 0     No current facility-administered medications for this visit. Allergies   Allergen Reactions    Penicillins Anaphylaxis    Trazodone And Nefazodone      RESTLESS LEGS       Subjective:      Review of Systems   Constitutional: Negative. Negative for activity change, appetite change, chills, diaphoresis, fatigue, fever and unexpected weight change. HENT: Negative.   Negative for congestion, dental problem, drooling, ear discharge, ear pain, facial swelling, hearing loss, mouth sores, nosebleeds, postnasal drip, rhinorrhea, sinus pressure, sinus pain, sneezing, sore throat, tinnitus, trouble swallowing and voice change. Eyes: Negative. Negative for photophobia, pain, discharge, redness, itching and visual disturbance. Respiratory: Negative. Negative for apnea, cough, choking, chest tightness, shortness of breath, wheezing and stridor. Cardiovascular: Negative. Negative for chest pain, palpitations and leg swelling. Gastrointestinal: Negative. Endocrine: Negative. Negative for cold intolerance, heat intolerance, polydipsia, polyphagia and polyuria. Genitourinary: Negative. Negative for decreased urine volume, difficulty urinating, discharge, dysuria, enuresis, flank pain, frequency, genital sores, hematuria, penile pain, penile swelling, scrotal swelling, testicular pain and urgency. Musculoskeletal: Negative. Negative for arthralgias, back pain, gait problem, joint swelling, myalgias, neck pain and neck stiffness. Skin: Negative. Negative for color change, pallor, rash and wound. Allergic/Immunologic: Negative. Negative for environmental allergies, food allergies and immunocompromised state. Neurological: Negative. Negative for dizziness, tremors, seizures, syncope, facial asymmetry, speech difficulty, weakness, light-headedness, numbness and headaches. Hematological: Negative. Negative for adenopathy. Does not bruise/bleed easily. Psychiatric/Behavioral: Negative. Negative for agitation, behavioral problems, confusion, decreased concentration, dysphoric mood, hallucinations, self-injury, sleep disturbance and suicidal ideas. The patient is not nervous/anxious and is not hyperactive.         Objective:   /82 (Site: Left Upper Arm, Position: Sitting, Cuff Size: Medium Adult)   Pulse 88   Temp 96.9 °F (36.1 °C) (Tympanic)   Resp 15   Ht 6' (1.829 m)   Wt 236 lb 8 oz (107.3 kg)   SpO2 98%   BMI 32.08 kg/m²     Physical Exam  Constitutional:       Appearance: He is well-developed. HENT:      Head: Normocephalic and atraumatic. Eyes:      General: No scleral icterus. Left eye: No discharge. Conjunctiva/sclera: Conjunctivae normal.      Pupils: Pupils are equal, round, and reactive to light. Neck:      Thyroid: No thyromegaly. Trachea: No tracheal deviation. Cardiovascular:      Rate and Rhythm: Normal rate and regular rhythm. Heart sounds: Normal heart sounds. No murmur. No friction rub. No gallop. Pulmonary:      Effort: Pulmonary effort is normal. No respiratory distress. Breath sounds: Normal breath sounds. No wheezing or rales. Chest:      Chest wall: No tenderness. Abdominal:      General: There is no distension. Palpations: There is no mass. Tenderness: There is no abdominal tenderness. There is no guarding. Hernia: No hernia is present. Musculoskeletal: Normal range of motion. General: Deformity present. No tenderness. Comments: Right arm amputation at the shoulder   Lymphadenopathy:      Cervical: No cervical adenopathy. Skin:     General: Skin is warm and dry. Coloration: Skin is not pale. Findings: No erythema or rash. Neurological:      Mental Status: He is alert and oriented to person, place, and time. Psychiatric:         Behavior: Behavior normal.         Thought Content: Thought content normal.         Judgment: Judgment normal.            Results for orders placed or performed during the hospital encounter of 06/29/20   Culture, Anaerobic and Aerobic    Specimen: Abscess   Result Value Ref Range    Aerobic Culture (A)      Culture also yielded moderate growth of Staphylococcus (coagulase negative) and gram positive bacilli most consistent with Corynebacterium species. Direct gram stain indicated few gram negative bacilli which did not grow on culture.  This could be inhibited growth due to antibiotic therapy, a fastidious organism or an anaerobic organism. If a true mixed aerobic and anaerobic infection is suspected, then broad spectrum empiric antibiotic therapy is indicated and should include coverage for anaerobic organisms. Current antibiotic therapy may be ineffective in vitro for isolates. Anaerobic Culture       Culture yielded moderate mixed growth which included anaerobic gram positive cocci and both gram positive and gram negative anaerobic bacilli. If a true mixed aerobic and anaerobic infection is suspected, then broad spectrum empiric antibiotic therapy is indicated and should include coverage for anaerobic organisms. Gram Stain Result       No segmented neutrophils observed. Rare epithelial cells observed. Moderate gram positive cocci occurring singly and in pairs. Few gram negative bacilli. Few gram positive bacilli. Organism Streptococcus agalactiae - (Group B) (A)     Aerobic Culture       heavy growth Group B streptococci are suspectible to ampicillin, penicillin and cefazolin, but may be erythromycin and/or clindamycin resistant. Contact microbiology if erythromycin and/or clindamycin testing is necessary. Organism Streptococcus equisimilis (Group C ) (A)     Aerobic Culture       moderate growth Isolates of beta hemolytic Streptococcus are sensitive to penicillin. Contact microbiology if erythromycin and/or clindamycin testing is necessary. Assessment:     Patient in need of a MediPort only option is going to be the left side discussed central line placement with the patient including the risk of arterial injury pneumothorax air embolism we did discuss to more long-term complications of infection patient is in need of his UC medications for the GI service will proceed with Mediport placement  Plan:     1. Schedule Chance for MediPort placement 2. The risks, benefits and alternatives were discussed with Chance.  He understands and wishes to proceed with surgical intervention. 3. Restrictions discussed with Chance and he expresses understanding. 4. He is advised to call back directly if there are further questions/concerns, or if his symptoms worsen prior to surgery.             Electronicallysigned by Bee Beal MD on 8/11/2020 at 2:55 PM

## 2020-08-17 ENCOUNTER — APPOINTMENT (OUTPATIENT)
Dept: GENERAL RADIOLOGY | Age: 45
End: 2020-08-17
Attending: SURGERY
Payer: MEDICARE

## 2020-08-17 ENCOUNTER — ANESTHESIA EVENT (OUTPATIENT)
Dept: OPERATING ROOM | Age: 45
End: 2020-08-17
Payer: MEDICARE

## 2020-08-17 ENCOUNTER — ANESTHESIA (OUTPATIENT)
Dept: OPERATING ROOM | Age: 45
End: 2020-08-17
Payer: MEDICARE

## 2020-08-17 ENCOUNTER — HOSPITAL ENCOUNTER (OUTPATIENT)
Age: 45
Setting detail: OUTPATIENT SURGERY
Discharge: HOME | End: 2020-08-17
Attending: SURGERY | Admitting: SURGERY
Payer: MEDICARE

## 2020-08-17 VITALS — SYSTOLIC BLOOD PRESSURE: 132 MMHG | OXYGEN SATURATION: 100 % | DIASTOLIC BLOOD PRESSURE: 95 MMHG

## 2020-08-17 VITALS
OXYGEN SATURATION: 97 % | DIASTOLIC BLOOD PRESSURE: 94 MMHG | RESPIRATION RATE: 12 BRPM | WEIGHT: 232.6 LBS | BODY MASS INDEX: 31.51 KG/M2 | SYSTOLIC BLOOD PRESSURE: 170 MMHG | HEART RATE: 62 BPM | HEIGHT: 72 IN | TEMPERATURE: 97.7 F

## 2020-08-17 PROCEDURE — 3209999900 FLUORO FOR SURGICAL PROCEDURES

## 2020-08-17 PROCEDURE — 2709999900 HC NON-CHARGEABLE SUPPLY: Performed by: SURGERY

## 2020-08-17 PROCEDURE — 7100000011 HC PHASE II RECOVERY - ADDTL 15 MIN: Performed by: SURGERY

## 2020-08-17 PROCEDURE — 6360000002 HC RX W HCPCS: Performed by: NURSE ANESTHETIST, CERTIFIED REGISTERED

## 2020-08-17 PROCEDURE — 3600000012 HC SURGERY LEVEL 2 ADDTL 15MIN: Performed by: SURGERY

## 2020-08-17 PROCEDURE — 2500000003 HC RX 250 WO HCPCS: Performed by: NURSE ANESTHETIST, CERTIFIED REGISTERED

## 2020-08-17 PROCEDURE — 36561 INSERT TUNNELED CV CATH: CPT | Performed by: SURGERY

## 2020-08-17 PROCEDURE — 2500000003 HC RX 250 WO HCPCS: Performed by: SURGERY

## 2020-08-17 PROCEDURE — 3700000000 HC ANESTHESIA ATTENDED CARE: Performed by: SURGERY

## 2020-08-17 PROCEDURE — 71045 X-RAY EXAM CHEST 1 VIEW: CPT

## 2020-08-17 PROCEDURE — 7100000010 HC PHASE II RECOVERY - FIRST 15 MIN: Performed by: SURGERY

## 2020-08-17 PROCEDURE — 3700000001 HC ADD 15 MINUTES (ANESTHESIA): Performed by: SURGERY

## 2020-08-17 PROCEDURE — 3600000002 HC SURGERY LEVEL 2 BASE: Performed by: SURGERY

## 2020-08-17 PROCEDURE — C1788 PORT, INDWELLING, IMP: HCPCS | Performed by: SURGERY

## 2020-08-17 PROCEDURE — 2580000003 HC RX 258

## 2020-08-17 PROCEDURE — 77001 FLUOROGUIDE FOR VEIN DEVICE: CPT | Performed by: SURGERY

## 2020-08-17 DEVICE — PORT INFUS SGL LUMN ATTCH POLYUR OPN END CATH 8FR POWERPRT: Type: IMPLANTABLE DEVICE | Site: SUBCLAVIAN | Status: FUNCTIONAL

## 2020-08-17 RX ORDER — LIDOCAINE HCL/PF 100 MG/5ML
SYRINGE (ML) INJECTION PRN
Status: DISCONTINUED | OUTPATIENT
Start: 2020-08-17 | End: 2020-08-17 | Stop reason: SDUPTHER

## 2020-08-17 RX ORDER — PROPOFOL 10 MG/ML
INJECTION, EMULSION INTRAVENOUS PRN
Status: DISCONTINUED | OUTPATIENT
Start: 2020-08-17 | End: 2020-08-17 | Stop reason: SDUPTHER

## 2020-08-17 RX ORDER — PAROXETINE HYDROCHLORIDE 40 MG/1
40 TABLET, FILM COATED ORAL EVERY MORNING
COMMUNITY

## 2020-08-17 RX ORDER — LIDOCAINE HYDROCHLORIDE AND EPINEPHRINE 10; 10 MG/ML; UG/ML
INJECTION, SOLUTION INFILTRATION; PERINEURAL PRN
Status: DISCONTINUED | OUTPATIENT
Start: 2020-08-17 | End: 2020-08-17 | Stop reason: ALTCHOICE

## 2020-08-17 RX ORDER — PREGABALIN 200 MG/1
200 CAPSULE ORAL DAILY
COMMUNITY

## 2020-08-17 RX ORDER — POTASSIUM CHLORIDE 750 MG/1
10 CAPSULE, EXTENDED RELEASE ORAL DAILY
Status: ON HOLD | COMMUNITY
End: 2021-01-15 | Stop reason: HOSPADM

## 2020-08-17 RX ORDER — FENTANYL CITRATE 50 UG/ML
INJECTION, SOLUTION INTRAMUSCULAR; INTRAVENOUS PRN
Status: DISCONTINUED | OUTPATIENT
Start: 2020-08-17 | End: 2020-08-17 | Stop reason: SDUPTHER

## 2020-08-17 RX ORDER — SODIUM CHLORIDE 9 MG/ML
INJECTION, SOLUTION INTRAVENOUS CONTINUOUS
Status: DISCONTINUED | OUTPATIENT
Start: 2020-08-17 | End: 2020-08-17 | Stop reason: HOSPADM

## 2020-08-17 RX ORDER — MIDAZOLAM HYDROCHLORIDE 1 MG/ML
INJECTION INTRAMUSCULAR; INTRAVENOUS PRN
Status: DISCONTINUED | OUTPATIENT
Start: 2020-08-17 | End: 2020-08-17 | Stop reason: SDUPTHER

## 2020-08-17 RX ADMIN — FENTANYL CITRATE 50 MCG: 50 INJECTION, SOLUTION INTRAMUSCULAR; INTRAVENOUS at 12:50

## 2020-08-17 RX ADMIN — Medication 50 MG: at 12:46

## 2020-08-17 RX ADMIN — PROPOFOL 80 MG: 10 INJECTION, EMULSION INTRAVENOUS at 12:52

## 2020-08-17 RX ADMIN — FENTANYL CITRATE 50 MCG: 50 INJECTION, SOLUTION INTRAMUSCULAR; INTRAVENOUS at 12:53

## 2020-08-17 RX ADMIN — SODIUM CHLORIDE: 9 INJECTION, SOLUTION INTRAVENOUS at 10:48

## 2020-08-17 RX ADMIN — MIDAZOLAM HYDROCHLORIDE 2 MG: 1 INJECTION, SOLUTION INTRAMUSCULAR; INTRAVENOUS at 12:46

## 2020-08-17 ASSESSMENT — PULMONARY FUNCTION TESTS
PIF_VALUE: 0
PIF_VALUE: 1
PIF_VALUE: 0
PIF_VALUE: 1
PIF_VALUE: 0
PIF_VALUE: 1
PIF_VALUE: 0
PIF_VALUE: 0
PIF_VALUE: 1
PIF_VALUE: 0
PIF_VALUE: 1

## 2020-08-17 ASSESSMENT — PAIN SCALES - GENERAL
PAINLEVEL_OUTOF10: 0
PAINLEVEL_OUTOF10: 0

## 2020-08-17 ASSESSMENT — PAIN - FUNCTIONAL ASSESSMENT: PAIN_FUNCTIONAL_ASSESSMENT: 0-10

## 2020-08-17 ASSESSMENT — PAIN DESCRIPTION - DESCRIPTORS: DESCRIPTORS: ACHING

## 2020-08-17 NOTE — PROGRESS NOTES
ADMITTED TO [facility] AND ORIENTED TO UNIT. SCDS ON. FALL AND ALLERGY BANDS ON. PT VERBALIZED APPROVAL FOR FIRST NAME, LAST INITIAL AND PHYSICIAN NAME ON UNIT WHITEBOARD. Patient to have a friend take him home. Patient needs to call post op.

## 2020-08-17 NOTE — ANESTHESIA POSTPROCEDURE EVALUATION
Department of Anesthesiology  Postprocedure Note    Patient: Solo Zayas  MRN: [id number]  YOB: 1975  Date of evaluation: 8/17/2020  Time:  2:23 PM     Procedure Summary     Date:  08/17/20 Room / Location:  [address] [facility] [place]    Anesthesia Start:  1239 Anesthesia Stop:  1320    Procedure:  SUBCLAVIAN SINGLE LUMEN MEDIPORT INSERTION (N/A Chest) Diagnosis:  (POOR VENOUS ACCESS)    Surgeon: Donovan Oreilly MD Responsible Provider:  Mercy Drake DO    Anesthesia Type:  MAC ASA Status:  3          Anesthesia Type: MAC    Vipul Phase I: Vipul Score: 10    Vipul Phase II:      Last vitals: Reviewed and per EMR flowsheets. Anesthesia Post Evaluation    Comments: Joni Clark 60  POST-ANESTHESIA NOTE       Name:  Solo Zayas                                         Age:  39 y.o. MRN:  [id number]      Last Vitals:  BP (!) 168/96   Pulse 70   Temp 97.7 °F (36.5 °C) (Temporal)   Resp 12   Ht 6' (1.829 m)   Wt 232 lb 9.6 oz (105.5 kg)   SpO2 97%   BMI 31.55 kg/m²   Patient Vitals in the past 4 hrs:  08/17/20 1343, BP:(!) 168/96, Temp:97.7 °F (36.5 °C), Temp src:Temporal, Pulse:70, Resp:12, SpO2:97 %    Level of Consciousness:  Awake    Respiratory:  Stable    Oxygen Saturation:  Stable    Cardiovascular:  Stable    Hydration:  Adequate    PONV:  Stable    Post-op Pain:  Adequate analgesia    Post-op Assessment:  No apparent anesthetic complications    Additional Follow-Up / Treatment / Comment:  Nahid Cordova DO  August 17, 2020   2:24 PM

## 2020-08-17 NOTE — ANESTHESIA PRE PROCEDURE
Department of Anesthesiology  Preprocedure Note       Name:  Yudith Demarco   Age:  39 y.o.  :  1975                                          MRN:  [id number]         Date:  2020      Surgeon: Dianne Rodriguez):  Cory Lynne MD    Procedure: Procedure(s):  SUBCLAVIAN SINGLE LUMEN MEDIPORT INSERTION    Medications prior to admission:   Prior to Admission medications    Medication Sig Start Date End Date Taking? Authorizing Provider   Multiple Vitamins-Minerals (MULTIVITAMIN ADULT PO) Take 1 tablet by mouth   Yes Historical Provider, MD   pregabalin (LYRICA) 200 MG capsule Take 200 mg by mouth 3 times daily.    Yes Historical Provider, MD   PARoxetine (PAXIL) 40 MG tablet Take 40 mg by mouth every morning   Yes Historical Provider, MD   potassium chloride (MICRO-K) 10 MEQ extended release capsule Take 10 mEq by mouth daily   Yes Historical Provider, MD   tiZANidine (ZANAFLEX) 4 MG tablet Take 4 mg by mouth every 8 hours as needed   Yes Historical Provider, MD   famotidine (PEPCID) 40 MG tablet Take 40 mg by mouth daily   Yes Historical Provider, MD   entecavir (BARACLUDE) 0.05 MG/ML solution Take 0.5 mg by mouth daily   Yes Historical Provider, MD   docusate sodium (COLACE) 100 MG capsule Take 100 mg by mouth daily   Yes Historical Provider, MD   ferrous sulfate 325 (65 Fe) MG tablet Take 325 mg by mouth daily (with breakfast)   Yes Historical Provider, MD   lisinopril (PRINIVIL;ZESTRIL) 20 MG tablet Take 20 mg by mouth daily Indications: with hydrochlorothiazide 25 mg    Yes Historical Provider, MD   folic acid (FOLVITE) 1 MG tablet Take 2 tablets by mouth daily 17  Yes Debra Marina MD   LACTULOSE PO Take 20 g by mouth 3 times daily    Yes Historical Provider, MD   NONFORMULARY Indications: muscle relaxer     Historical Provider, MD   budesonide (ENTOCORT EC) 3 MG extended release capsule Take 9 mg by mouth every morning    Historical Provider, MD   dicyclomine (BENTYL) 10 MG capsule Take 1 capsule by mouth every 6 hours as needed (cramps)  Patient not taking: Reported on 8/11/2020 8/23/18   Debbie Clayton PA-C   ondansetron (ZOFRAN ODT) 4 MG disintegrating tablet Take 1 tablet by mouth every 8 hours as needed for Nausea  Patient not taking: Reported on 8/11/2020 8/23/18   Debbie Clayton PA-C   mesalamine (DELZICOL) 400 MG CPDR delayed release capsule Take 800 mg by mouth 3 times daily    Historical Provider, MD   pantoprazole (PROTONIX) 40 MG tablet Take 40 mg by mouth 2 times daily     Historical Provider, MD   Multiple Vitamin (MULTI VITAMIN MENS) TABS Take by mouth    Historical Provider, MD       Current medications:    Current Facility-Administered Medications   Medication Dose Route Frequency Provider Last Rate Last Dose    0.9 % sodium chloride infusion   Intravenous Continuous Elva Lee LPN 20 mL/hr at 27/70/62 1048         Allergies:     Allergies   Allergen Reactions    Penicillins Anaphylaxis    Trazodone And Nefazodone      RESTLESS LEGS       Problem List:    Patient Active Problem List   Diagnosis Code    Ulcerative colitis ([facility] K51.90    Ulcerative (chronic) enterocolitis ([facility] K51.00    Ulcerative colitis, universal ([facility] K51.00       Past Medical History:        Diagnosis Date    Alcohol abuse     in remission    Amputation of right arm (HCC)     Ascites     GERD (gastroesophageal reflux disease)     Hepatic cirrhosis (HCC)     Hepatitis B     Hepatitis C     Hypertension     Intravenous drug user     in remission    Osteomyelitis ([address]     right arm/shoulder amputation    Psychiatric problem     PTSD (post-traumatic stress disorder)     Sciatica     Splenomegaly     Thrombocytopenia (HCC)     Ulcerative colitis ([facility]        Past Surgical History:        Procedure Laterality Date    APPENDECTOMY      [place] AMP[place] AT [place]OULDER Right 2017    [place]    BLADDER SURGERY  2005    ELBOW FRACTURE SURGERY  2020    HI COLONOSCOPY W/BIOPSY SINGLE/MULTIPLE ALT 26 03/01/2020       POC Tests: No results for input(s): POCGLU, POCNA, POCK, POCCL, POCBUN, POCHEMO, POCHCT in the last 72 hours. Coags:   Lab Results   Component Value Date    INR 1.09 10/03/2019       HCG (If Applicable): No results found for: PREGTESTUR, PREGSERUM, HCG, HCGQUANT     ABGs: No results found for: PHART, PO2ART, HBM6CAA, AJF3XTC, BEART, Y0NUAVSR     Type & Screen (If Applicable):  Lab Results   Component Value Date    LABRH POS 09/25/2017       Drug/Infectious Status (If Applicable):  Lab Results   Component Value Date    HEPCAB POS 10/03/2019       COVID-19 Screening (If Applicable):   Lab Results   Component Value Date    COVID19 Not Detected 08/11/2020         Anesthesia Evaluation  Patient summary reviewed  Airway: Mallampati: II  TM distance: >3 FB   Neck ROM: full  Mouth opening: > = 3 FB Dental:          Pulmonary:                              Cardiovascular:    (+) hypertension:,                   Neuro/Psych:   (+) neuromuscular disease:, psychiatric history:            GI/Hepatic/Renal:   (+) GERD:, hepatitis:, liver disease:,           Endo/Other:                     Abdominal:           Vascular:                                        Anesthesia Plan      MAC     ASA 3       Induction: intravenous. Anesthetic plan and risks discussed with patient. Plan discussed with CRNA. Pennie Strickland.  DO Art   8/17/2020

## 2020-08-17 NOTE — BRIEF OP NOTE
Brief Postoperative Note      Patient: Ken Kapoor  YOB: 1975  MRN: [id number]    Date of Procedure: 8/17/2020    Pre-Op Diagnosis: POOR VENOUS ACCESS    Post-Op Diagnosis: same       Procedure(s):  SUBCLAVIAN SINGLE LUMEN MEDIPORT INSERTION    Surgeon(s):  Rajani Martinez MD    Assistant:  Surgical Assistant: Eloy Negron    Anesthesia: Monitor Anesthesia Care    Estimated Blood Loss (mL):5 ml    Complications:none    Specimens:       Implants:  Implant Name Type Inv.  Item Serial No.  Lot No. LRB No. Used Action   PORT POWER CLEARVUE SINGLE LUMEN 8FR Port PORT POWER CLEARVUE SINGLE LUMEN 8FR  CR [facility] [id number] N/A 1 Implanted         Drains:     Findings: see op note    Electronically signed by Rajani Martinez MD on 8/17/2020 at 1:29 PM

## 2020-08-18 NOTE — PROGRESS NOTES
ADMITTED FROM SURGERY. FRIEND WITH PT. CALL LIGHT WITHIN PT REACH. IV INFUSING WITH NO REDNESS OR SWELLING.

## 2020-09-04 ENCOUNTER — HOSPITAL ENCOUNTER (OUTPATIENT)
Dept: NURSING | Age: 45
Discharge: HOME | End: 2020-09-04
Payer: MEDICARE

## 2020-09-04 VITALS
RESPIRATION RATE: 16 BRPM | SYSTOLIC BLOOD PRESSURE: 122 MMHG | HEART RATE: 62 BPM | OXYGEN SATURATION: 98 % | DIASTOLIC BLOOD PRESSURE: 87 MMHG | TEMPERATURE: 96.6 F

## 2020-09-04 DIAGNOSIS — K51.00 UNIVERSAL ULCERATIVE COLITIS WITHOUT COMPLICATION (HCC): ICD-10-CM

## 2020-09-04 DIAGNOSIS — K51.00 CHRONIC ULCERATIVE ENTEROCOLITIS WITHOUT COMPLICATION (HCC): Primary | ICD-10-CM

## 2020-09-04 PROCEDURE — 6360000002 HC RX W HCPCS: Performed by: NURSE PRACTITIONER

## 2020-09-04 PROCEDURE — 2580000003 HC RX 258: Performed by: NURSE PRACTITIONER

## 2020-09-04 PROCEDURE — 96365 THER/PROPH/DIAG IV INF INIT: CPT

## 2020-09-04 RX ORDER — SODIUM CHLORIDE 9 MG/ML
INJECTION, SOLUTION INTRAVENOUS CONTINUOUS
Status: CANCELLED | OUTPATIENT
Start: 2020-10-30

## 2020-09-04 RX ORDER — SODIUM CHLORIDE 0.9 % (FLUSH) 0.9 %
30 SYRINGE (ML) INJECTION ONCE
Status: COMPLETED | OUTPATIENT
Start: 2020-09-04 | End: 2020-09-04

## 2020-09-04 RX ORDER — SODIUM CHLORIDE 0.9 % (FLUSH) 0.9 %
30 SYRINGE (ML) INJECTION ONCE
Status: CANCELLED | OUTPATIENT
Start: 2020-10-30

## 2020-09-04 RX ORDER — DIPHENHYDRAMINE HYDROCHLORIDE 50 MG/ML
50 INJECTION INTRAMUSCULAR; INTRAVENOUS ONCE
Status: CANCELLED | OUTPATIENT
Start: 2020-10-30

## 2020-09-04 RX ORDER — SODIUM CHLORIDE 0.9 % (FLUSH) 0.9 %
20 SYRINGE (ML) INJECTION PRN
Status: CANCELLED | OUTPATIENT
Start: 2020-09-04

## 2020-09-04 RX ORDER — METHYLPREDNISOLONE SODIUM SUCCINATE 125 MG/2ML
125 INJECTION, POWDER, LYOPHILIZED, FOR SOLUTION INTRAMUSCULAR; INTRAVENOUS ONCE
Status: CANCELLED | OUTPATIENT
Start: 2020-10-30

## 2020-09-04 RX ORDER — HEPARIN SODIUM (PORCINE) LOCK FLUSH IV SOLN 100 UNIT/ML 100 UNIT/ML
500 SOLUTION INTRAVENOUS PRN
Status: DISCONTINUED | OUTPATIENT
Start: 2020-09-04 | End: 2020-09-05 | Stop reason: HOSPADM

## 2020-09-04 RX ORDER — HEPARIN SODIUM (PORCINE) LOCK FLUSH IV SOLN 100 UNIT/ML 100 UNIT/ML
500 SOLUTION INTRAVENOUS PRN
Status: CANCELLED | OUTPATIENT
Start: 2020-09-04

## 2020-09-04 RX ORDER — SODIUM CHLORIDE 0.9 % (FLUSH) 0.9 %
10 SYRINGE (ML) INJECTION PRN
Status: DISCONTINUED | OUTPATIENT
Start: 2020-09-04 | End: 2020-09-05 | Stop reason: HOSPADM

## 2020-09-04 RX ORDER — SODIUM CHLORIDE 0.9 % (FLUSH) 0.9 %
10 SYRINGE (ML) INJECTION PRN
Status: CANCELLED | OUTPATIENT
Start: 2020-09-04

## 2020-09-04 RX ADMIN — VEDOLIZUMAB 300 MG: 300 INJECTION, POWDER, LYOPHILIZED, FOR SOLUTION INTRAVENOUS at 09:24

## 2020-09-04 RX ADMIN — Medication 10 ML: at 09:58

## 2020-09-04 RX ADMIN — HEPARIN 500 UNITS: 100 SYRINGE at 09:58

## 2020-09-04 NOTE — PROGRESS NOTES
1003 INFUSION COMPLETE TOLERATED WELL.  HOME INSTRUCTIONS TO PT VERBALIZED UNDERSTANDING.   [address]

## 2020-09-04 NOTE — PROGRESS NOTES
0800 ALERT MALE ADMITTED FOR ENTYVIO PROCEDURE REVIEWED WITH PT VERBALIZED UNDERSTANDING. JOHANAS INFECTIOUS PROCESS. Pt rights and responsibilities offered to pt to read.     __MET__ Safety:       (Environmental)   [place] to environment   Ensure ID band is correct and in place/ allergy band as needed   Assess for fall risk   Initiate fall precautions as applicable (fall band, side rails, etc.)   Call light within reach   Bed in low position/ wheels locked    __MET__ Pain:        Assess pain level and characteristics   Administer analgesics as ordered   Assess effectiveness of pain management and report to MD as needed    _MET___ Knowledge Deficit:   Assess baseline knowledge   Provide teaching at level of understanding   Provide teaching via preferred learning method   Evaluate teaching effectiveness    __MET__ Hemodynamic/Respiratory Status:       (Pre and Post Procedure Monitoring)   Assess/Monitor vital signs and LOC   Assess Baseline SpO2 prior to any sedation   Obtain weight/height   Assess vital signs/ LOC until patient meets discharge criteria   Monitor procedure site and notify MD of any issues    ___MET_ Infection-Risk of Central Venous Catheter:   Monitor for infection signs and symptoms (catheter site redness, temperature elevation, etc)   Assess for infection risks   Educate regarding infection prevention   Manage central venous catheter (flushes/ dressing changes per protocol)

## 2020-10-02 ENCOUNTER — HOSPITAL ENCOUNTER (OUTPATIENT)
Dept: NURSING | Age: 45
Discharge: HOME | End: 2020-10-02
Payer: MEDICARE

## 2020-10-02 VITALS
TEMPERATURE: 97 F | RESPIRATION RATE: 18 BRPM | HEART RATE: 108 BPM | WEIGHT: 244.8 LBS | SYSTOLIC BLOOD PRESSURE: 143 MMHG | DIASTOLIC BLOOD PRESSURE: 84 MMHG | BODY MASS INDEX: 33.2 KG/M2 | OXYGEN SATURATION: 96 %

## 2020-10-02 DIAGNOSIS — K51.00 UNIVERSAL ULCERATIVE COLITIS WITHOUT COMPLICATION (HCC): Primary | ICD-10-CM

## 2020-10-02 PROCEDURE — 99212 OFFICE O/P EST SF 10 MIN: CPT

## 2020-10-02 PROCEDURE — 6360000002 HC RX W HCPCS: Performed by: NURSE PRACTITIONER

## 2020-10-02 RX ORDER — SODIUM CHLORIDE 0.9 % (FLUSH) 0.9 %
30 SYRINGE (ML) INJECTION ONCE
Status: CANCELLED | OUTPATIENT
Start: 2020-10-30

## 2020-10-02 RX ORDER — SODIUM CHLORIDE 0.9 % (FLUSH) 0.9 %
20 SYRINGE (ML) INJECTION PRN
Status: CANCELLED | OUTPATIENT
Start: 2020-10-02

## 2020-10-02 RX ORDER — METHYLPREDNISOLONE SODIUM SUCCINATE 125 MG/2ML
125 INJECTION, POWDER, LYOPHILIZED, FOR SOLUTION INTRAMUSCULAR; INTRAVENOUS ONCE
Status: CANCELLED | OUTPATIENT
Start: 2020-10-30

## 2020-10-02 RX ORDER — SODIUM CHLORIDE 0.9 % (FLUSH) 0.9 %
10 SYRINGE (ML) INJECTION PRN
Status: DISCONTINUED | OUTPATIENT
Start: 2020-10-02 | End: 2020-10-03 | Stop reason: HOSPADM

## 2020-10-02 RX ORDER — SODIUM CHLORIDE 9 MG/ML
INJECTION, SOLUTION INTRAVENOUS CONTINUOUS
Status: CANCELLED | OUTPATIENT
Start: 2020-10-30

## 2020-10-02 RX ORDER — DIPHENHYDRAMINE HYDROCHLORIDE 50 MG/ML
50 INJECTION INTRAMUSCULAR; INTRAVENOUS ONCE
Status: CANCELLED | OUTPATIENT
Start: 2020-10-30

## 2020-10-02 RX ORDER — HEPARIN SODIUM (PORCINE) LOCK FLUSH IV SOLN 100 UNIT/ML 100 UNIT/ML
500 SOLUTION INTRAVENOUS PRN
Status: DISCONTINUED | OUTPATIENT
Start: 2020-10-02 | End: 2020-10-03 | Stop reason: HOSPADM

## 2020-10-02 RX ORDER — HEPARIN SODIUM (PORCINE) LOCK FLUSH IV SOLN 100 UNIT/ML 100 UNIT/ML
500 SOLUTION INTRAVENOUS PRN
Status: CANCELLED | OUTPATIENT
Start: 2020-10-02

## 2020-10-02 RX ORDER — SODIUM CHLORIDE 0.9 % (FLUSH) 0.9 %
10 SYRINGE (ML) INJECTION PRN
Status: CANCELLED | OUTPATIENT
Start: 2020-10-02

## 2020-10-02 RX ADMIN — HEPARIN 500 UNITS: 100 SYRINGE at 11:12

## 2020-10-02 ASSESSMENT — PAIN - FUNCTIONAL ASSESSMENT: PAIN_FUNCTIONAL_ASSESSMENT: 0-10

## 2020-10-02 NOTE — PROGRESS NOTES
1100  Patient ambulated into room for port flush. Pt rights and responsibilities offered to patient. Port accessed and flush completed, heparin given and port deaccessed. Patient tolerated well.     1130  Patient ambulated out for discharge with friend.            __m__ Safety:       (Environmental)  [place] to environment   Ensure ID band is correct and in place/ allergy band as needed   Assess for fall risk   Initiate fall precautions as applicable (fall band, side rails, etc.)   Call light within reach   Bed in low position/ wheels locked    __m__ Pain:        Assess pain level and characteristics   Administer analgesics as ordered   Assess effectiveness of pain management and report to MD as needed    __m__ Knowledge Deficit:   Assess baseline knowledge   Provide teaching at level of understanding   Provide teaching via preferred learning method   Evaluate teaching effectiveness    __m__ Hemodynamic/Respiratory Status:       (Pre and Post Procedure Monitoring)   Assess/Monitor vital signs and LOC   Assess Baseline SpO2 prior to any sedation   Obtain weight/height   Assess vital signs/ LOC until patient meets discharge criteria   Monitor procedure site and notify MD of any issues    __m__ Infection-Risk of Central Venous Catheter:   Monitor for infection signs and symptoms (catheter site redness, temperature elevation, etc)   Assess for infection risks   Educate regarding infection prevention   Manage central venous catheter (flushes/ dressing changes per protocol)

## 2020-10-30 ENCOUNTER — HOSPITAL ENCOUNTER (OUTPATIENT)
Dept: NURSING | Age: 45
Discharge: HOME | End: 2020-10-30
Payer: MEDICARE

## 2020-10-30 VITALS
HEIGHT: 72 IN | SYSTOLIC BLOOD PRESSURE: 107 MMHG | DIASTOLIC BLOOD PRESSURE: 68 MMHG | RESPIRATION RATE: 16 BRPM | OXYGEN SATURATION: 96 % | WEIGHT: 240 LBS | HEART RATE: 64 BPM | TEMPERATURE: 96.1 F | BODY MASS INDEX: 32.51 KG/M2

## 2020-10-30 DIAGNOSIS — K51.00 UNIVERSAL ULCERATIVE COLITIS WITHOUT COMPLICATION (HCC): ICD-10-CM

## 2020-10-30 DIAGNOSIS — K51.00 CHRONIC ULCERATIVE ENTEROCOLITIS WITHOUT COMPLICATION (HCC): Primary | ICD-10-CM

## 2020-10-30 PROCEDURE — 2580000003 HC RX 258: Performed by: NURSE PRACTITIONER

## 2020-10-30 PROCEDURE — 6360000002 HC RX W HCPCS: Performed by: NURSE PRACTITIONER

## 2020-10-30 PROCEDURE — 96365 THER/PROPH/DIAG IV INF INIT: CPT

## 2020-10-30 RX ORDER — HEPARIN SODIUM (PORCINE) LOCK FLUSH IV SOLN 100 UNIT/ML 100 UNIT/ML
500 SOLUTION INTRAVENOUS PRN
Status: DISCONTINUED | OUTPATIENT
Start: 2020-10-30 | End: 2020-10-31 | Stop reason: HOSPADM

## 2020-10-30 RX ORDER — SODIUM CHLORIDE 0.9 % (FLUSH) 0.9 %
30 SYRINGE (ML) INJECTION ONCE
Status: CANCELLED | OUTPATIENT
Start: 2020-12-25

## 2020-10-30 RX ORDER — DIPHENHYDRAMINE HYDROCHLORIDE 50 MG/ML
50 INJECTION INTRAMUSCULAR; INTRAVENOUS ONCE
Status: CANCELLED | OUTPATIENT
Start: 2020-12-25

## 2020-10-30 RX ORDER — SODIUM CHLORIDE 0.9 % (FLUSH) 0.9 %
20 SYRINGE (ML) INJECTION PRN
Status: CANCELLED | OUTPATIENT
Start: 2020-10-30

## 2020-10-30 RX ORDER — HEPARIN SODIUM (PORCINE) LOCK FLUSH IV SOLN 100 UNIT/ML 100 UNIT/ML
500 SOLUTION INTRAVENOUS PRN
Status: CANCELLED | OUTPATIENT
Start: 2020-10-30

## 2020-10-30 RX ORDER — METHYLPREDNISOLONE SODIUM SUCCINATE 125 MG/2ML
125 INJECTION, POWDER, LYOPHILIZED, FOR SOLUTION INTRAMUSCULAR; INTRAVENOUS ONCE
Status: CANCELLED | OUTPATIENT
Start: 2020-12-25

## 2020-10-30 RX ORDER — SODIUM CHLORIDE 0.9 % (FLUSH) 0.9 %
10 SYRINGE (ML) INJECTION PRN
Status: DISCONTINUED | OUTPATIENT
Start: 2020-10-30 | End: 2020-10-31 | Stop reason: HOSPADM

## 2020-10-30 RX ORDER — SODIUM CHLORIDE 0.9 % (FLUSH) 0.9 %
10 SYRINGE (ML) INJECTION PRN
Status: CANCELLED | OUTPATIENT
Start: 2020-10-30

## 2020-10-30 RX ORDER — SODIUM CHLORIDE 9 MG/ML
INJECTION, SOLUTION INTRAVENOUS CONTINUOUS
Status: CANCELLED | OUTPATIENT
Start: 2020-12-25

## 2020-10-30 RX ORDER — SODIUM CHLORIDE 0.9 % (FLUSH) 0.9 %
30 SYRINGE (ML) INJECTION ONCE
Status: COMPLETED | OUTPATIENT
Start: 2020-10-30 | End: 2020-10-30

## 2020-10-30 RX ADMIN — Medication 10 ML: at 11:00

## 2020-10-30 RX ADMIN — HEPARIN 500 UNITS: 100 SYRINGE at 11:56

## 2020-10-30 RX ADMIN — Medication 30 ML: at 11:55

## 2020-10-30 RX ADMIN — VEDOLIZUMAB 300 MG: 300 INJECTION, POWDER, LYOPHILIZED, FOR SOLUTION INTRAVENOUS at 11:25

## 2020-10-30 ASSESSMENT — PAIN DESCRIPTION - DESCRIPTORS: DESCRIPTORS: ACHING;TINGLING;NUMBNESS

## 2020-10-30 ASSESSMENT — PAIN DESCRIPTION - LOCATION: LOCATION: BACK;LEG

## 2020-10-30 ASSESSMENT — PAIN SCALES - GENERAL
PAINLEVEL_OUTOF10: 5
PAINLEVEL_OUTOF10: 5

## 2020-10-30 ASSESSMENT — PAIN DESCRIPTION - PAIN TYPE: TYPE: CHRONIC PAIN

## 2020-10-30 NOTE — PROGRESS NOTES
1015  pt admitted to [facility] per ambulation for an entyvio infusion. Pt states he has not been sick. Denies antibiotic use. PT RIGHTS AND RESPONSIBILITIES OFFERED TO PT.    __m__ Safety:       (Environmental)   [place] to environment   Ensure ID band is correct and in place/ allergy band as needed   Assess for fall risk   Initiate fall precautions as applicable (fall band, side rails, etc.)   Call light within reach   Bed in low position/ wheels locked    __m__ Pain:        Assess pain level and characteristics   Administer analgesics as ordered   Assess effectiveness of pain management and report to MD as needed    ___m_ Knowledge Deficit:   Assess baseline knowledge   Provide teaching at level of understanding   Provide teaching via preferred learning method   Evaluate teaching effectiveness    __m__ Hemodynamic/Respiratory Status:       (Pre and Post Procedure Monitoring)   Assess/Monitor vital signs and LOC   Assess Baseline SpO2 prior to any sedation   Obtain weight/height   Assess vital signs/ LOC until patient meets discharge criteria   Monitor procedure site and notify MD of any issues    ___m_ Infection-Risk of Central Venous Catheter:   Monitor for infection signs and symptoms (catheter site redness, temperature elevation, etc)   Assess for infection risks   Educate regarding infection prevention   Manage central venous catheter (flushes/ dressing changes per protocol)      1100 port accessed with good blood return. Flushes easily. 1130 infusion in progress. 1200 infusion completed. Pt suyapa well. Denies sob, itching. Port deaccessed. Discharge instructions given to patient. Verbalize understanding.     1205 discharge per ambulation

## 2020-11-25 ENCOUNTER — HOSPITAL ENCOUNTER (OUTPATIENT)
Dept: NURSING | Age: 45
Discharge: HOME | End: 2020-11-25
Payer: MEDICARE

## 2020-11-25 VITALS
TEMPERATURE: 97.9 F | BODY MASS INDEX: 33.5 KG/M2 | WEIGHT: 247 LBS | SYSTOLIC BLOOD PRESSURE: 116 MMHG | OXYGEN SATURATION: 97 % | HEART RATE: 87 BPM | RESPIRATION RATE: 18 BRPM | DIASTOLIC BLOOD PRESSURE: 72 MMHG

## 2020-11-25 PROCEDURE — 2580000003 HC RX 258: Performed by: NURSE PRACTITIONER

## 2020-11-25 PROCEDURE — 6360000002 HC RX W HCPCS: Performed by: NURSE PRACTITIONER

## 2020-11-25 PROCEDURE — 99212 OFFICE O/P EST SF 10 MIN: CPT

## 2020-11-25 RX ORDER — SODIUM CHLORIDE 0.9 % (FLUSH) 0.9 %
10 SYRINGE (ML) INJECTION PRN
Status: DISCONTINUED | OUTPATIENT
Start: 2020-11-25 | End: 2020-11-26 | Stop reason: HOSPADM

## 2020-11-25 RX ORDER — HEPARIN SODIUM (PORCINE) LOCK FLUSH IV SOLN 100 UNIT/ML 100 UNIT/ML
500 SOLUTION INTRAVENOUS PRN
Status: DISCONTINUED | OUTPATIENT
Start: 2020-11-25 | End: 2020-11-26 | Stop reason: HOSPADM

## 2020-11-25 RX ADMIN — Medication 10 ML: at 10:05

## 2020-11-25 RX ADMIN — HEPARIN 500 UNITS: 100 SYRINGE at 10:05

## 2020-11-25 ASSESSMENT — PAIN - FUNCTIONAL ASSESSMENT: PAIN_FUNCTIONAL_ASSESSMENT: 0-10

## 2020-11-25 NOTE — PROGRESS NOTES
_m___ Safety:       (Environmental)   [place] to environment   Ensure ID band is correct and in place/ allergy band as needed   Assess for fall risk   Initiate fall precautions as applicable (fall band, side rails, etc.)   Call light within reach   Bed in low position/ wheels locked    __m__ Pain:        Assess pain level and characteristics   Administer analgesics as ordered   Assess effectiveness of pain management and report to MD as needed    __m__ Knowledge Deficit:   Assess baseline knowledge   Provide teaching at level of understanding   Provide teaching via preferred learning method   Evaluate teaching effectiveness    __m__ Hemodynamic/Respiratory Status:       (Pre and Post Procedure Monitoring)   Assess/Monitor vital signs and LOC   Assess Baseline SpO2 prior to any sedation   Obtain weight/height   Assess vital signs/ LOC until patient meets discharge criteria   Monitor procedure site and notify MD of any issues    __m__ Infection-Risk of Central Venous Catheter:   Monitor for infection signs and symptoms (catheter site redness, temperature elevation, etc)   Assess for infection risks   Educate regarding infection prevention   Manage central venous catheter (flushes/ dressing changes per protocol)

## 2020-12-03 ENCOUNTER — HOSPITAL ENCOUNTER (OUTPATIENT)
Age: 45
Setting detail: SPECIMEN
Discharge: HOME | End: 2020-12-03
Payer: MEDICARE

## 2020-12-04 LAB
MRSA, DNA, NASAL: NORMAL
SPECIMEN DESCRIPTION: NORMAL

## 2020-12-08 ENCOUNTER — HOSPITAL ENCOUNTER (OUTPATIENT)
Age: 45
Discharge: HOME | End: 2020-12-08
Payer: MEDICARE

## 2020-12-08 LAB
ANION GAP SERPL CALCULATED.3IONS-SCNC: 13 MEQ/L (ref 8–16)
APTT: 30 SECONDS (ref 22–38)
BUN BLDV-MCNC: 12 MG/DL (ref 7–22)
CALCIUM SERPL-MCNC: 9.7 MG/DL (ref 8.5–10.5)
CHLORIDE BLD-SCNC: 98 MEQ/L (ref 98–111)
CO2: 26 MEQ/L (ref 23–33)
CREAT SERPL-MCNC: 0.6 MG/DL (ref 0.4–1.2)
EKG ATRIAL RATE: 119 BPM
EKG P AXIS: 48 DEGREES
EKG P-R INTERVAL: 142 MS
EKG Q-T INTERVAL: 312 MS
EKG QRS DURATION: 72 MS
EKG QTC CALCULATION (BAZETT): 438 MS
EKG R AXIS: 25 DEGREES
EKG T AXIS: 3 DEGREES
EKG VENTRICULAR RATE: 119 BPM
ERYTHROCYTE [DISTWIDTH] IN BLOOD BY AUTOMATED COUNT: 12.6 % (ref 11.5–14.5)
ERYTHROCYTE [DISTWIDTH] IN BLOOD BY AUTOMATED COUNT: 44.2 FL (ref 35–45)
GFR SERPL CREATININE-BSD FRML MDRD: > 90 ML/MIN/1.73M2
GLUCOSE BLD-MCNC: 175 MG/DL (ref 70–108)
HCT VFR BLD CALC: 43.7 % (ref 42–52)
HEMOGLOBIN: 15.5 GM/DL (ref 14–18)
INR BLD: 1.05 (ref 0.85–1.13)
MCH RBC QN AUTO: 34.2 PG (ref 26–33)
MCHC RBC AUTO-ENTMCNC: 35.5 GM/DL (ref 32.2–35.5)
MCV RBC AUTO: 96.5 FL (ref 80–94)
MRSA SCREEN RT-PCR: NEGATIVE
PLATELET # BLD: 121 THOU/MM3 (ref 130–400)
PMV BLD AUTO: 10.3 FL (ref 9.4–12.4)
POTASSIUM SERPL-SCNC: 4.6 MEQ/L (ref 3.5–5.2)
RBC # BLD: 4.53 MILL/MM3 (ref 4.7–6.1)
SODIUM BLD-SCNC: 137 MEQ/L (ref 135–145)
WBC # BLD: 7.5 THOU/MM3 (ref 4.8–10.8)

## 2020-12-08 PROCEDURE — 93005 ELECTROCARDIOGRAM TRACING: CPT | Performed by: PHYSICIAN ASSISTANT

## 2020-12-08 PROCEDURE — 36415 COLL VENOUS BLD VENIPUNCTURE: CPT

## 2020-12-08 PROCEDURE — 85610 PROTHROMBIN TIME: CPT

## 2020-12-08 PROCEDURE — 85730 THROMBOPLASTIN TIME PARTIAL: CPT

## 2020-12-08 PROCEDURE — 87641 MR-STAPH DNA AMP PROBE: CPT

## 2020-12-08 PROCEDURE — 93010 ELECTROCARDIOGRAM REPORT: CPT | Performed by: NUCLEAR MEDICINE

## 2020-12-08 PROCEDURE — 80048 BASIC METABOLIC PNL TOTAL CA: CPT

## 2020-12-08 PROCEDURE — 85027 COMPLETE CBC AUTOMATED: CPT

## 2021-01-03 ENCOUNTER — HOSPITAL ENCOUNTER (INPATIENT)
Age: 46
LOS: 5 days | Discharge: HOME HEALTH | DRG: 908 | End: 2021-01-08
Attending: EMERGENCY MEDICINE | Admitting: HOSPITALIST
Payer: MEDICARE

## 2021-01-03 ENCOUNTER — APPOINTMENT (OUTPATIENT)
Dept: CT IMAGING | Age: 46
DRG: 908 | End: 2021-01-03
Payer: MEDICARE

## 2021-01-03 DIAGNOSIS — E87.1 HYPONATREMIA: ICD-10-CM

## 2021-01-03 DIAGNOSIS — R33.9 URINARY RETENTION: Primary | ICD-10-CM

## 2021-01-03 LAB
ALBUMIN SERPL-MCNC: 3.6 G/DL (ref 3.5–5.1)
ALP BLD-CCNC: 62 U/L (ref 38–126)
ALT SERPL-CCNC: 28 U/L (ref 11–66)
AMMONIA: 28 UMOL/L (ref 11–60)
ANION GAP SERPL CALCULATED.3IONS-SCNC: 11 MEQ/L (ref 8–16)
APTT: 25.5 SECONDS (ref 22–38)
AST SERPL-CCNC: 29 U/L (ref 5–40)
BASOPHILS # BLD: 0.5 %
BASOPHILS ABSOLUTE: 0.1 THOU/MM3 (ref 0–0.1)
BILIRUB SERPL-MCNC: 1.2 MG/DL (ref 0.3–1.2)
BUN BLDV-MCNC: 23 MG/DL (ref 7–22)
CALCIUM SERPL-MCNC: 8.8 MG/DL (ref 8.5–10.5)
CHLORIDE BLD-SCNC: 91 MEQ/L (ref 98–111)
CO2: 22 MEQ/L (ref 23–33)
CREAT SERPL-MCNC: 0.6 MG/DL (ref 0.4–1.2)
EKG ATRIAL RATE: 91 BPM
EKG P AXIS: 57 DEGREES
EKG P-R INTERVAL: 154 MS
EKG Q-T INTERVAL: 354 MS
EKG QRS DURATION: 78 MS
EKG QTC CALCULATION (BAZETT): 435 MS
EKG R AXIS: 38 DEGREES
EKG T AXIS: 42 DEGREES
EKG VENTRICULAR RATE: 91 BPM
EOSINOPHIL # BLD: 2.5 %
EOSINOPHILS ABSOLUTE: 0.3 THOU/MM3 (ref 0–0.4)
ERYTHROCYTE [DISTWIDTH] IN BLOOD BY AUTOMATED COUNT: 13.4 % (ref 11.5–14.5)
ERYTHROCYTE [DISTWIDTH] IN BLOOD BY AUTOMATED COUNT: 47.1 FL (ref 35–45)
GFR SERPL CREATININE-BSD FRML MDRD: > 90 ML/MIN/1.73M2
GLUCOSE BLD-MCNC: 158 MG/DL (ref 70–108)
HCT VFR BLD CALC: 27 % (ref 42–52)
HEMOGLOBIN: 9.7 GM/DL (ref 14–18)
IMMATURE GRANS (ABS): 0.42 THOU/MM3 (ref 0–0.07)
IMMATURE GRANULOCYTES: 4.1 %
INR BLD: 1 (ref 0.85–1.13)
LYMPHOCYTES # BLD: 25.6 %
LYMPHOCYTES ABSOLUTE: 2.6 THOU/MM3 (ref 1–4.8)
MCH RBC QN AUTO: 34.8 PG (ref 26–33)
MCHC RBC AUTO-ENTMCNC: 35.9 GM/DL (ref 32.2–35.5)
MCV RBC AUTO: 96.8 FL (ref 80–94)
MONOCYTES # BLD: 9.7 %
MONOCYTES ABSOLUTE: 1 THOU/MM3 (ref 0.4–1.3)
NUCLEATED RED BLOOD CELLS: 0 /100 WBC
OSMOLALITY CALCULATION: 256.6 MOSMOL/KG (ref 275–300)
PLATELET # BLD: 128 THOU/MM3 (ref 130–400)
PMV BLD AUTO: 9.7 FL (ref 9.4–12.4)
POTASSIUM SERPL-SCNC: 3.6 MEQ/L (ref 3.5–5.2)
RBC # BLD: 2.79 MILL/MM3 (ref 4.7–6.1)
SEG NEUTROPHILS: 57.6 %
SEGMENTED NEUTROPHILS ABSOLUTE COUNT: 5.9 THOU/MM3 (ref 1.8–7.7)
SODIUM BLD-SCNC: 124 MEQ/L (ref 135–145)
TOTAL PROTEIN: 6.3 G/DL (ref 6.1–8)
WBC # BLD: 10.3 THOU/MM3 (ref 4.8–10.8)

## 2021-01-03 PROCEDURE — 6360000002 HC RX W HCPCS: Performed by: EMERGENCY MEDICINE

## 2021-01-03 PROCEDURE — 1200000000 HC SEMI PRIVATE

## 2021-01-03 PROCEDURE — U0002 COVID-19 LAB TEST NON-CDC: HCPCS

## 2021-01-03 PROCEDURE — 82140 ASSAY OF AMMONIA: CPT

## 2021-01-03 PROCEDURE — 80053 COMPREHEN METABOLIC PANEL: CPT

## 2021-01-03 PROCEDURE — 96361 HYDRATE IV INFUSION ADD-ON: CPT

## 2021-01-03 PROCEDURE — 36415 COLL VENOUS BLD VENIPUNCTURE: CPT

## 2021-01-03 PROCEDURE — 85025 COMPLETE CBC W/AUTO DIFF WBC: CPT

## 2021-01-03 PROCEDURE — 74177 CT ABD & PELVIS W/CONTRAST: CPT

## 2021-01-03 PROCEDURE — 2580000003 HC RX 258: Performed by: EMERGENCY MEDICINE

## 2021-01-03 PROCEDURE — 85730 THROMBOPLASTIN TIME PARTIAL: CPT

## 2021-01-03 PROCEDURE — 99222 1ST HOSP IP/OBS MODERATE 55: CPT | Performed by: PHYSICIAN ASSISTANT

## 2021-01-03 PROCEDURE — 96374 THER/PROPH/DIAG INJ IV PUSH: CPT

## 2021-01-03 PROCEDURE — P9612 CATHETERIZE FOR URINE SPEC: HCPCS

## 2021-01-03 PROCEDURE — 85610 PROTHROMBIN TIME: CPT

## 2021-01-03 PROCEDURE — 76376 3D RENDER W/INTRP POSTPROCES: CPT

## 2021-01-03 PROCEDURE — 99285 EMERGENCY DEPT VISIT HI MDM: CPT

## 2021-01-03 PROCEDURE — 6360000004 HC RX CONTRAST MEDICATION: Performed by: EMERGENCY MEDICINE

## 2021-01-03 PROCEDURE — 93005 ELECTROCARDIOGRAM TRACING: CPT | Performed by: EMERGENCY MEDICINE

## 2021-01-03 RX ORDER — MORPHINE SULFATE 4 MG/ML
4 INJECTION, SOLUTION INTRAMUSCULAR; INTRAVENOUS ONCE
Status: COMPLETED | OUTPATIENT
Start: 2021-01-03 | End: 2021-01-03

## 2021-01-03 RX ORDER — 0.9 % SODIUM CHLORIDE 0.9 %
1000 INTRAVENOUS SOLUTION INTRAVENOUS ONCE
Status: COMPLETED | OUTPATIENT
Start: 2021-01-03 | End: 2021-01-03

## 2021-01-03 RX ADMIN — SODIUM CHLORIDE 1000 ML: 9 INJECTION, SOLUTION INTRAVENOUS at 21:13

## 2021-01-03 RX ADMIN — IOPAMIDOL 80 ML: 755 INJECTION, SOLUTION INTRAVENOUS at 20:42

## 2021-01-03 RX ADMIN — MORPHINE SULFATE 4 MG: 4 INJECTION, SOLUTION INTRAMUSCULAR; INTRAVENOUS at 18:31

## 2021-01-03 ASSESSMENT — PAIN SCALES - GENERAL
PAINLEVEL_OUTOF10: 6
PAINLEVEL_OUTOF10: 10

## 2021-01-03 NOTE — ED PROVIDER NOTES
[facility] ED  Emergency Department Encounter  Emergency Medicine Attending Note     Pt Name: Nataliya Calderon  MRN: [id number]  Lore 1975   Date of evaluation: 1/3/2021  PCP:  CRISTIAN Crandall CNP    CHIEF COMPLAINT       Chief Complaint   Patient presents with    Post-op Problem    Abdominal Pain       HISTORY OF PRESENT ILLNESS  (Location/Symptom, Timing/Onset, Context/Setting, Quality, Duration, Modifying Factors, Severity.)      Nataliya Calderon is a 39 y.o. male who presents with recent lumbar fusion done on the 29th and drains removed yesterday. Coming in with abdominal distention. Patient has a history of hepatitis C and ulcerative colitis. History of having to have paracentesis done after he had had his amputation surgery 2017. He was incontinent of stool and urine today, however he states that he been taking his lactulose for his constipation, and he knew he was going to have a bowel movement was unable to get up and move fast enough. Therefore he had sensation that he needed defecate. States he had some residual numbness around the rectum since his surgery. Also had urination just prior to arrival himself, states that it made his abdominal pain feel better. Has been having a large amount of bowel movements. Is on lactulose. PAST MEDICAL / SURGICAL / SOCIAL / FAMILY HISTORY     Past Medical History:  has a past medical history of Alcohol abuse, Amputation of right arm ([address] Ascites, GERD (gastroesophageal reflux disease), Hepatic cirrhosis ([address] Hepatitis B, Hepatitis C, Hypertension, Intravenous drug user, Osteomyelitis ([address] Psychiatric problem, PTSD (post-traumatic stress disorder), Sciatica, Splenomegaly, Thrombocytopenia ([address] and Ulcerative colitis ([address] Past Surgical History:  has a past surgical history that includes Arm amputation at shoulder (Right, 2017); pr colonoscopy w/biopsy single/multiple (9/26/2017);  Appendectomy; Bladder surgery (2005); Elbow fracture surgery (2020); and Port Surgery (N/A, 8/17/2020). Allergies:  Penicillins and Trazodone and nefazodone     Home Meds:   Prior to Visit Medications    Medication Sig Taking? Authorizing Provider   Multiple Vitamins-Minerals (MULTIVITAMIN ADULT PO) Take 1 tablet by mouth  Historical Provider, MD   pregabalin (LYRICA) 200 MG capsule Take 200 mg by mouth 3 times daily.   Historical Provider, MD   PARoxetine (PAXIL) 40 MG tablet Take 40 mg by mouth every morning  Historical Provider, MD   potassium chloride (MICRO-K) 10 MEQ extended release capsule Take 10 mEq by mouth daily  Historical Provider, MD   tiZANidine (ZANAFLEX) 4 MG tablet Take 4 mg by mouth every 8 hours as needed  Historical Provider, MD   famotidine (PEPCID) 40 MG tablet Take 40 mg by mouth daily  Historical Provider, MD   entecavir (BARACLUDE) 0.05 MG/ML solution Take 0.5 mg by mouth daily  Historical Provider, MD   NONFORMULARY Indications: muscle relaxer   Historical Provider, MD   budesonide (ENTOCORT EC) 3 MG extended release capsule Take 9 mg by mouth every morning  Historical Provider, MD   docusate sodium (COLACE) 100 MG capsule Take 100 mg by mouth daily  Historical Provider, MD   ferrous sulfate 325 (65 Fe) MG tablet Take 325 mg by mouth daily (with breakfast)  Historical Provider, MD   lisinopril (PRINIVIL;ZESTRIL) 20 MG tablet Take 20 mg by mouth daily Indications: with hydrochlorothiazide 25 mg   Historical Provider, MD   dicyclomine (BENTYL) 10 MG capsule Take 1 capsule by mouth every 6 hours as needed (cramps)  Patient not taking: Reported on 8/11/2020  Bhupinder Cisneros PA-C   ondansetron (ZOFRAN ODT) 4 MG disintegrating tablet Take 1 tablet by mouth every 8 hours as needed for Nausea  Patient not taking: Reported on 8/11/2020  Bhupinder Cisneros PA-C   mesalamine (DELZICOL) 400 MG CPDR delayed release capsule Take 800 mg by mouth 3 times daily  Historical Provider, MD   folic acid (FOLVITE) 1 MG tablet Take 2 tablets by mouth daily  Lyndsey Ortega MD   LACTULOSE PO Take 20 g by mouth 3 times daily   Historical Provider, MD   pantoprazole (PROTONIX) 40 MG tablet Take 40 mg by mouth 2 times daily   Historical Provider, MD   Multiple Vitamin (MULTI VITAMIN MENS) TABS Take by mouth  Historical Provider, MD     Please note that medications prescribed at discharge will auto-populate into this medication list when note is refreshed. Please look at prescription date andprescriber to clarify. Family History:family history includes High Blood Pressure in his father and mother. Social History: He reports that he quit smoking about 8 months ago. His smokeless tobacco use includes snuff. He reports that he does not drink alcohol or use drugs. He has no history on file for sexual activity. REVIEW OF SYSTEMS    (2-9 systems for level 4, 10 or more for level 5)      Review of Systems   Constitutional: Negative for chills and fever. HENT: Negative for congestion and sore throat. Eyes: Negative for visual disturbance. Respiratory: Negative for cough. Cardiovascular: Negative for chest pain. Gastrointestinal: Positive for abdominal distention, abdominal pain and diarrhea. Negative for constipation, nausea and vomiting. Genitourinary: Positive for decreased urine volume and difficulty urinating. Negative for urgency. Musculoskeletal: Negative for back pain and myalgias. Skin: Negative for rash and wound. Neurological: Negative for dizziness, light-headedness and headaches. Hematological: Negative for adenopathy. Does not bruise/bleed easily. PHYSICAL EXAM   (up to 7 for level 4, 8 or more for level 5)      Initial Vitals   ED Triage Vitals [01/03/21 1724]   BP Temp Temp Source Pulse Resp SpO2 Height Weight   130/86 98.1 °F (36.7 °C) Oral 90 16 100 % 6' (1.829 m) 250 lb (113.4 kg)       Physical Exam  Vitals signs and nursing note reviewed.    Constitutional:       General: He is not in acute distress. Appearance: He is well-developed. HENT:      Head: Normocephalic and atraumatic. Mouth/Throat:      Comments: Patient is currently wearing a facial mask. Given that patient is not complaining of sore throat or difficulty swallowing, mask was left in place to decrease risk of aersolization of particles in the setting of current CoVID-19 pandemic    Eyes:      Extraocular Movements: Extraocular movements intact. Conjunctiva/sclera: Conjunctivae normal.   Neck:      Musculoskeletal: Normal range of motion. No neck rigidity. Cardiovascular:      Rate and Rhythm: Regular rhythm. Tachycardia present. Heart sounds: No murmur. No friction rub. No gallop. Pulmonary:      Effort: Pulmonary effort is normal.      Breath sounds: Normal breath sounds. No wheezing. Abdominal:      Comments: Abdomen is distended and firm. No fluid wave. There is tenderness over the lower abdomen. No rebound or guarding. Musculoskeletal:      Comments: Amputation of right arm previously. No deformity of the left upper extremity. Bilateral right lower extremities with 1+ pitting edema. Posterior spine with incision well-appearing dry. There is some ecchymosis around incision, but no erythema or warmth. No tenderness over the area. Skin:     General: Skin is warm. Comments: See MSK exam for back exam.  No other jaundice. Neurological:      General: No focal deficit present. Mental Status: He is alert and oriented to person, place, and time. Motor: No weakness. Coordination: Coordination normal.         DIFFERENTIAL DIAGNOSIS/IMPRESSION     DDX: urinary retention, ileus, SBO, ascites    Impression: 39 y.o. male who presents with abdominal pain after surgery. Lumbar surgical site is well-appearing with no signs of infection or erythema. Abdomen is distended and firm. But no obvious fluid wave. Concern for possible ileus versus UTI versus urinary obstruction.   Does have tenderness, but nonperitoneal.  Will check basic labs, CT back, and CT abdomen pelvis. Will give analgesics. DIAGNOSTIC RESULTS     EKG: All EKG's are interpreted by the Emergency Department Physician who either signs or Co-signs this chart in the absence of a cardiologist.    EKG Interpretation    Interpreted by emergency department physician    Clinical Impression: Normal sinus with early transition point. Compared to EKG on 12/8/2020, no longer inverted in lead III. No signs of acute ischemia. Melissa Roche    LABS: Labswere reviewed by me and abnormal results are displayed above     Labs Reviewed   CBC WITH AUTO DIFFERENTIAL - Abnormal; Notable for the following components:       Result Value    RBC 2.79 (*)     Hemoglobin 9.7 (*)     Hematocrit 27.0 (*)     MCV 96.8 (*)     MCH 34.8 (*)     MCHC 35.9 (*)     RDW-SD 47.1 (*)     Platelets 167 (*)     Immature Grans (Abs) 0.42 (*)     All other components within normal limits   COMPREHENSIVE METABOLIC PANEL - Abnormal; Notable for the following components:    Glucose 158 (*)     BUN 23 (*)     Sodium 124 (*)     Chloride 91 (*)     CO2 22 (*)     All other components within normal limits   OSMOLALITY - Abnormal; Notable for the following components:    Osmolality Calc 256.6 (*)     All other components within normal limits   APTT   PROTIME-INR   AMMONIA   ANION GAP   GLOMERULAR FILTRATION RATE, ESTIMATED   COVID-19       RADIOLOGY: All plain film, CT, MRI, and formal ultrasound images (except ED bedside ultrasound) are read by the radiologist, see reports below, unless otherwise noted in ED Course, MDM or here. Ct Abdomen Pelvis W Iv Contrast Additional Contrast? None    Result Date: 1/3/2021  CT Abdomen and Pelvis W Contrast COMPARISON:  CT,[facility]  - CT ABDOMEN PELVIS W IV CONTRAST  - 08/23/2018 08:18 PM EDT FINDINGS: Small hiatal hernia containing fat and small fluid. Nodular liver contours consistent with hepatic cirrhosis. Normal spleen.  Mild bilateral hydronephrosis and ureterectasis. No visible urolithiasis. Normal adrenal glands. Normal pancreas. No calcified gallstones. No biliary dilatation. Liquid stool with air-fluid levels in the colon. No evidence of bowel obstruction. Patient appears to be status post appendectomy. Distended bladder. Small gas in the bladder. No ascites. No pneumoperitoneum. Prominent retroperitoneal lymph nodes. No acute fracture. Postsurgical changes in the lumbar spine. See separate lumbar spine CT report. No abdominal aortic aneurysm. Mild anasarca. Mild bilateral hydronephrosis and ureterectasis without visible obstruction. Distended bladder. Small gas in the bladder could be due to recent instrumentation or gas-forming infection. Findings consistent with diarrheal illness/colitis. Mild anasarca. Possible reactive lymph nodes. Nonemergent/incidental findings in the report. This document has been electronically signed by: Gary Bowens MD on 01/03/2021 10:31 PM All CT scans at this facility use dose modulation, iterative reconstruction, and/or weight-based dosing when appropriate to reduce radiation dose to as low as reasonably achievable. Ct Lumbar Reconstruction Wo Post Process    Result Date: 1/3/2021  CT Lumbar Spine WO Contrast COMPARISON: None FINDINGS: No acute fracture or malalignment. Status post posterior L4-S1 fusion and bilateral L4 and L5 laminectomies. Hardware appears intact. There is scattered soft tissue emphysema in the surgical bed, likely related to recent surgery. There is a possible seroma in the surgical bed. Degenerative changes in the spine. Possible postsurgical seroma in the surgical bed. Nonemergent/incidental findings in the report. See separate abdomen/pelvis CT report.  This document has been electronically signed by: Gary Bowens MD on 01/03/2021 09:47 PM All CT scans at this facility use dose modulation, iterative reconstruction, and/or weight-based dosing when appropriate to

## 2021-01-03 NOTE — ED TRIAGE NOTES
Pt arrives to ER with c/o abd painand bloating, and pain in back. Pt report left leg numbness. Pt had recent lumbar 3-5 surgery on Dec. 29th by Dr. Royal Lena Harris. Pt reports pain 10/10. breathin labored at times, pt states due to pain. Pt reports he is to reports to therapy after ER. Visit. This nurse to monitor pt.

## 2021-01-03 NOTE — ED NOTES
Medicated per MAR. Updated on poc. Call light within reach. Light dimmed for comfort. Will monitor.       Keli Munroe RN  01/03/21 0474

## 2021-01-04 ENCOUNTER — APPOINTMENT (OUTPATIENT)
Dept: INTERVENTIONAL RADIOLOGY/VASCULAR | Age: 46
DRG: 908 | End: 2021-01-04
Payer: MEDICARE

## 2021-01-04 LAB
ANION GAP SERPL CALCULATED.3IONS-SCNC: 10 MEQ/L (ref 8–16)
ANION GAP SERPL CALCULATED.3IONS-SCNC: 10 MEQ/L (ref 8–16)
ANION GAP SERPL CALCULATED.3IONS-SCNC: 12 MEQ/L (ref 8–16)
ANION GAP SERPL CALCULATED.3IONS-SCNC: 9 MEQ/L (ref 8–16)
BASOPHILS # BLD: 0.5 %
BASOPHILS ABSOLUTE: 0 THOU/MM3 (ref 0–0.1)
BUN BLDV-MCNC: 14 MG/DL (ref 7–22)
BUN BLDV-MCNC: 15 MG/DL (ref 7–22)
BUN BLDV-MCNC: 16 MG/DL (ref 7–22)
BUN BLDV-MCNC: 19 MG/DL (ref 7–22)
CALCIUM SERPL-MCNC: 8.7 MG/DL (ref 8.5–10.5)
CALCIUM SERPL-MCNC: 9 MG/DL (ref 8.5–10.5)
CALCIUM SERPL-MCNC: 9.1 MG/DL (ref 8.5–10.5)
CALCIUM SERPL-MCNC: 9.2 MG/DL (ref 8.5–10.5)
CHLORIDE BLD-SCNC: 93 MEQ/L (ref 98–111)
CHLORIDE BLD-SCNC: 94 MEQ/L (ref 98–111)
CHLORIDE BLD-SCNC: 94 MEQ/L (ref 98–111)
CHLORIDE BLD-SCNC: 97 MEQ/L (ref 98–111)
CO2: 20 MEQ/L (ref 23–33)
CO2: 22 MEQ/L (ref 23–33)
CO2: 22 MEQ/L (ref 23–33)
CO2: 26 MEQ/L (ref 23–33)
CREAT SERPL-MCNC: 0.5 MG/DL (ref 0.4–1.2)
CREAT SERPL-MCNC: 0.6 MG/DL (ref 0.4–1.2)
CREAT SERPL-MCNC: 0.7 MG/DL (ref 0.4–1.2)
CREAT SERPL-MCNC: 0.7 MG/DL (ref 0.4–1.2)
EOSINOPHIL # BLD: 3.3 %
EOSINOPHILS ABSOLUTE: 0.3 THOU/MM3 (ref 0–0.4)
ERYTHROCYTE [DISTWIDTH] IN BLOOD BY AUTOMATED COUNT: 13.7 % (ref 11.5–14.5)
ERYTHROCYTE [DISTWIDTH] IN BLOOD BY AUTOMATED COUNT: 48.6 FL (ref 35–45)
GFR SERPL CREATININE-BSD FRML MDRD: > 90 ML/MIN/1.73M2
GLUCOSE BLD-MCNC: 153 MG/DL (ref 70–108)
GLUCOSE BLD-MCNC: 157 MG/DL (ref 70–108)
GLUCOSE BLD-MCNC: 164 MG/DL (ref 70–108)
GLUCOSE BLD-MCNC: 200 MG/DL (ref 70–108)
HCT VFR BLD CALC: 27.5 % (ref 42–52)
HEMOGLOBIN: 9.5 GM/DL (ref 14–18)
IMMATURE GRANS (ABS): 0.38 THOU/MM3 (ref 0–0.07)
IMMATURE GRANULOCYTES: 4.7 %
LYMPHOCYTES # BLD: 34.2 %
LYMPHOCYTES ABSOLUTE: 2.8 THOU/MM3 (ref 1–4.8)
MCH RBC QN AUTO: 34.3 PG (ref 26–33)
MCHC RBC AUTO-ENTMCNC: 34.5 GM/DL (ref 32.2–35.5)
MCV RBC AUTO: 99.3 FL (ref 80–94)
MONOCYTES # BLD: 9.3 %
MONOCYTES ABSOLUTE: 0.8 THOU/MM3 (ref 0.4–1.3)
NUCLEATED RED BLOOD CELLS: 0 /100 WBC
OSMOLALITY URINE: 313 MOSMOL/KG (ref 250–750)
OSMOLALITY: 276 MOSMOL/KG (ref 275–295)
PLATELET # BLD: 125 THOU/MM3 (ref 130–400)
PMV BLD AUTO: 9.8 FL (ref 9.4–12.4)
POTASSIUM REFLEX MAGNESIUM: 3.6 MEQ/L (ref 3.5–5.2)
POTASSIUM REFLEX MAGNESIUM: 3.9 MEQ/L (ref 3.5–5.2)
POTASSIUM REFLEX MAGNESIUM: 4.2 MEQ/L (ref 3.5–5.2)
POTASSIUM REFLEX MAGNESIUM: 4.8 MEQ/L (ref 3.5–5.2)
RBC # BLD: 2.77 MILL/MM3 (ref 4.7–6.1)
SARS-COV-2, NAAT: NOT DETECTED
SEG NEUTROPHILS: 48 %
SEGMENTED NEUTROPHILS ABSOLUTE COUNT: 3.9 THOU/MM3 (ref 1.8–7.7)
SODIUM BLD-SCNC: 125 MEQ/L (ref 135–145)
SODIUM BLD-SCNC: 125 MEQ/L (ref 135–145)
SODIUM BLD-SCNC: 129 MEQ/L (ref 135–145)
SODIUM BLD-SCNC: 130 MEQ/L (ref 135–145)
WBC # BLD: 8.1 THOU/MM3 (ref 4.8–10.8)

## 2021-01-04 PROCEDURE — 99233 SBSQ HOSP IP/OBS HIGH 50: CPT | Performed by: INTERNAL MEDICINE

## 2021-01-04 PROCEDURE — 93970 EXTREMITY STUDY: CPT

## 2021-01-04 PROCEDURE — 2580000003 HC RX 258: Performed by: PHYSICIAN ASSISTANT

## 2021-01-04 PROCEDURE — 99222 1ST HOSP IP/OBS MODERATE 55: CPT | Performed by: INTERNAL MEDICINE

## 2021-01-04 PROCEDURE — 80048 BASIC METABOLIC PNL TOTAL CA: CPT

## 2021-01-04 PROCEDURE — 2580000003 HC RX 258: Performed by: INTERNAL MEDICINE

## 2021-01-04 PROCEDURE — 85025 COMPLETE CBC W/AUTO DIFF WBC: CPT

## 2021-01-04 PROCEDURE — 36415 COLL VENOUS BLD VENIPUNCTURE: CPT

## 2021-01-04 PROCEDURE — 6370000000 HC RX 637 (ALT 250 FOR IP): Performed by: PHYSICIAN ASSISTANT

## 2021-01-04 PROCEDURE — 1200000000 HC SEMI PRIVATE

## 2021-01-04 PROCEDURE — 83930 ASSAY OF BLOOD OSMOLALITY: CPT

## 2021-01-04 PROCEDURE — 83935 ASSAY OF URINE OSMOLALITY: CPT

## 2021-01-04 PROCEDURE — 6360000002 HC RX W HCPCS: Performed by: PHYSICIAN ASSISTANT

## 2021-01-04 RX ORDER — FERROUS SULFATE 325(65) MG
325 TABLET ORAL
Status: DISCONTINUED | OUTPATIENT
Start: 2021-01-04 | End: 2021-01-08 | Stop reason: HOSPADM

## 2021-01-04 RX ORDER — PROMETHAZINE HYDROCHLORIDE 25 MG/1
12.5 TABLET ORAL EVERY 6 HOURS PRN
Status: DISCONTINUED | OUTPATIENT
Start: 2021-01-04 | End: 2021-01-06

## 2021-01-04 RX ORDER — DOCUSATE SODIUM 100 MG/1
100 CAPSULE, LIQUID FILLED ORAL DAILY
Status: DISCONTINUED | OUTPATIENT
Start: 2021-01-04 | End: 2021-01-04

## 2021-01-04 RX ORDER — BUDESONIDE 3 MG/1
9 CAPSULE, COATED PELLETS ORAL EVERY MORNING
Status: DISCONTINUED | OUTPATIENT
Start: 2021-01-04 | End: 2021-01-04

## 2021-01-04 RX ORDER — ONDANSETRON 2 MG/ML
4 INJECTION INTRAMUSCULAR; INTRAVENOUS EVERY 6 HOURS PRN
Status: DISCONTINUED | OUTPATIENT
Start: 2021-01-04 | End: 2021-01-06

## 2021-01-04 RX ORDER — OXYCODONE AND ACETAMINOPHEN 10; 325 MG/1; MG/1
1 TABLET ORAL EVERY 6 HOURS PRN
Status: DISCONTINUED | OUTPATIENT
Start: 2021-01-04 | End: 2021-01-06

## 2021-01-04 RX ORDER — LACTULOSE 10 G/15ML
20 SOLUTION ORAL 3 TIMES DAILY
Status: DISCONTINUED | OUTPATIENT
Start: 2021-01-04 | End: 2021-01-08 | Stop reason: HOSPADM

## 2021-01-04 RX ORDER — ACETAMINOPHEN 325 MG/1
650 TABLET ORAL EVERY 6 HOURS PRN
Status: DISCONTINUED | OUTPATIENT
Start: 2021-01-04 | End: 2021-01-08 | Stop reason: HOSPADM

## 2021-01-04 RX ORDER — MESALAMINE 400 MG/1
800 CAPSULE, DELAYED RELEASE ORAL 3 TIMES DAILY
Status: DISCONTINUED | OUTPATIENT
Start: 2021-01-04 | End: 2021-01-08 | Stop reason: HOSPADM

## 2021-01-04 RX ORDER — ACETAMINOPHEN 650 MG/1
650 SUPPOSITORY RECTAL EVERY 6 HOURS PRN
Status: DISCONTINUED | OUTPATIENT
Start: 2021-01-04 | End: 2021-01-08 | Stop reason: HOSPADM

## 2021-01-04 RX ORDER — SODIUM CHLORIDE 450 MG/100ML
INJECTION, SOLUTION INTRAVENOUS CONTINUOUS
Status: DISCONTINUED | OUTPATIENT
Start: 2021-01-04 | End: 2021-01-06

## 2021-01-04 RX ORDER — PREGABALIN 75 MG/1
150 CAPSULE ORAL 2 TIMES DAILY
Status: DISCONTINUED | OUTPATIENT
Start: 2021-01-04 | End: 2021-01-08 | Stop reason: HOSPADM

## 2021-01-04 RX ORDER — LISINOPRIL 20 MG/1
20 TABLET ORAL DAILY
Status: DISCONTINUED | OUTPATIENT
Start: 2021-01-04 | End: 2021-01-08 | Stop reason: HOSPADM

## 2021-01-04 RX ORDER — LISINOPRIL 20 MG/1
20 TABLET ORAL DAILY
Status: DISCONTINUED | OUTPATIENT
Start: 2021-01-04 | End: 2021-01-04 | Stop reason: CLARIF

## 2021-01-04 RX ORDER — PAROXETINE HYDROCHLORIDE 20 MG/1
40 TABLET, FILM COATED ORAL EVERY MORNING
Status: DISCONTINUED | OUTPATIENT
Start: 2021-01-04 | End: 2021-01-08 | Stop reason: HOSPADM

## 2021-01-04 RX ORDER — FAMOTIDINE 20 MG/1
40 TABLET, FILM COATED ORAL DAILY
Status: DISCONTINUED | OUTPATIENT
Start: 2021-01-04 | End: 2021-01-08 | Stop reason: HOSPADM

## 2021-01-04 RX ORDER — DOCUSATE SODIUM 100 MG/1
100 CAPSULE, LIQUID FILLED ORAL 2 TIMES DAILY
Status: DISCONTINUED | OUTPATIENT
Start: 2021-01-04 | End: 2021-01-06

## 2021-01-04 RX ORDER — SODIUM CHLORIDE 0.9 % (FLUSH) 0.9 %
10 SYRINGE (ML) INJECTION PRN
Status: DISCONTINUED | OUTPATIENT
Start: 2021-01-04 | End: 2021-01-06

## 2021-01-04 RX ORDER — HYDROCHLOROTHIAZIDE 25 MG/1
25 TABLET ORAL DAILY
Status: DISCONTINUED | OUTPATIENT
Start: 2021-01-04 | End: 2021-01-08 | Stop reason: HOSPADM

## 2021-01-04 RX ORDER — TIZANIDINE 4 MG/1
4 TABLET ORAL EVERY 8 HOURS PRN
Status: DISCONTINUED | OUTPATIENT
Start: 2021-01-04 | End: 2021-01-08 | Stop reason: HOSPADM

## 2021-01-04 RX ORDER — POLYETHYLENE GLYCOL 3350 17 G/17G
17 POWDER, FOR SOLUTION ORAL DAILY PRN
Status: DISCONTINUED | OUTPATIENT
Start: 2021-01-04 | End: 2021-01-06

## 2021-01-04 RX ORDER — SODIUM CHLORIDE 9 MG/ML
INJECTION, SOLUTION INTRAVENOUS CONTINUOUS
Status: DISCONTINUED | OUTPATIENT
Start: 2021-01-04 | End: 2021-01-04

## 2021-01-04 RX ORDER — PANTOPRAZOLE SODIUM 40 MG/1
40 TABLET, DELAYED RELEASE ORAL
Status: DISCONTINUED | OUTPATIENT
Start: 2021-01-04 | End: 2021-01-08 | Stop reason: HOSPADM

## 2021-01-04 RX ORDER — SODIUM CHLORIDE 0.9 % (FLUSH) 0.9 %
10 SYRINGE (ML) INJECTION EVERY 12 HOURS SCHEDULED
Status: DISCONTINUED | OUTPATIENT
Start: 2021-01-04 | End: 2021-01-06

## 2021-01-04 RX ORDER — FOLIC ACID 1 MG/1
2 TABLET ORAL DAILY
Status: DISCONTINUED | OUTPATIENT
Start: 2021-01-04 | End: 2021-01-08 | Stop reason: HOSPADM

## 2021-01-04 RX ADMIN — OXYCODONE HYDROCHLORIDE AND ACETAMINOPHEN 1 TABLET: 10; 325 TABLET ORAL at 14:53

## 2021-01-04 RX ADMIN — PANTOPRAZOLE SODIUM 40 MG: 40 TABLET, DELAYED RELEASE ORAL at 14:53

## 2021-01-04 RX ADMIN — PAROXETINE HYDROCHLORIDE 40 MG: 20 TABLET, FILM COATED ORAL at 08:33

## 2021-01-04 RX ADMIN — DOCUSATE SODIUM 100 MG: 100 CAPSULE, LIQUID FILLED ORAL at 08:33

## 2021-01-04 RX ADMIN — FAMOTIDINE 40 MG: 20 TABLET, FILM COATED ORAL at 08:33

## 2021-01-04 RX ADMIN — SODIUM CHLORIDE: 4.5 INJECTION, SOLUTION INTRAVENOUS at 22:20

## 2021-01-04 RX ADMIN — TIZANIDINE 4 MG: 4 TABLET ORAL at 20:09

## 2021-01-04 RX ADMIN — PREGABALIN 150 MG: 75 CAPSULE ORAL at 20:09

## 2021-01-04 RX ADMIN — FERROUS SULFATE TAB 325 MG (65 MG ELEMENTAL FE) 325 MG: 325 (65 FE) TAB at 08:33

## 2021-01-04 RX ADMIN — LACTULOSE 20 G: 20 SOLUTION ORAL at 14:53

## 2021-01-04 RX ADMIN — SODIUM CHLORIDE: 9 INJECTION, SOLUTION INTRAVENOUS at 06:19

## 2021-01-04 RX ADMIN — PANTOPRAZOLE SODIUM 40 MG: 40 TABLET, DELAYED RELEASE ORAL at 06:19

## 2021-01-04 RX ADMIN — LACTULOSE 20 G: 20 SOLUTION ORAL at 08:32

## 2021-01-04 RX ADMIN — MESALAMINE 800 MG: 400 CAPSULE, DELAYED RELEASE ORAL at 14:53

## 2021-01-04 RX ADMIN — OXYCODONE HYDROCHLORIDE AND ACETAMINOPHEN 1 TABLET: 10; 325 TABLET ORAL at 21:18

## 2021-01-04 RX ADMIN — FOLIC ACID 2 MG: 1 TABLET ORAL at 08:33

## 2021-01-04 RX ADMIN — MESALAMINE 800 MG: 400 CAPSULE, DELAYED RELEASE ORAL at 20:09

## 2021-01-04 RX ADMIN — MESALAMINE 800 MG: 400 CAPSULE, DELAYED RELEASE ORAL at 08:33

## 2021-01-04 RX ADMIN — PREGABALIN 150 MG: 75 CAPSULE ORAL at 08:33

## 2021-01-04 RX ADMIN — OXYCODONE HYDROCHLORIDE AND ACETAMINOPHEN 1 TABLET: 10; 325 TABLET ORAL at 08:33

## 2021-01-04 RX ADMIN — LISINOPRIL 20 MG: 20 TABLET ORAL at 08:33

## 2021-01-04 RX ADMIN — ENOXAPARIN SODIUM 40 MG: 40 INJECTION SUBCUTANEOUS at 08:33

## 2021-01-04 RX ADMIN — HYDROCHLOROTHIAZIDE 25 MG: 25 TABLET ORAL at 08:33

## 2021-01-04 RX ADMIN — SODIUM CHLORIDE: 4.5 INJECTION, SOLUTION INTRAVENOUS at 12:16

## 2021-01-04 ASSESSMENT — ENCOUNTER SYMPTOMS
VOMITING: 0
ABDOMINAL PAIN: 1
RESPIRATORY NEGATIVE: 1
NAUSEA: 0
EYES NEGATIVE: 1
BACK PAIN: 0
BACK PAIN: 1
DIARRHEA: 0
DIARRHEA: 1
SHORTNESS OF BREATH: 1
ABDOMINAL PAIN: 0
CONSTIPATION: 0
ALLERGIC/IMMUNOLOGIC NEGATIVE: 1
ABDOMINAL DISTENTION: 1
SORE THROAT: 0
NAUSEA: 1
COUGH: 0

## 2021-01-04 ASSESSMENT — PAIN SCALES - GENERAL
PAINLEVEL_OUTOF10: 6
PAINLEVEL_OUTOF10: 7
PAINLEVEL_OUTOF10: 8

## 2021-01-04 ASSESSMENT — PAIN DESCRIPTION - LOCATION: LOCATION: BACK

## 2021-01-04 ASSESSMENT — PAIN DESCRIPTION - PAIN TYPE: TYPE: SURGICAL PAIN

## 2021-01-04 NOTE — ED NOTES
Upon first contact with patient this RN receives bedside shift report PAPA Salazar. Patient resting in bed in a semi fowlers position. Pt denies needs at this time.         Benjamin Pal RN  01/04/21 3808

## 2021-01-04 NOTE — ED NOTES
Clamp undone. 1500 expelled from leg bag. Clamp applied. Will monitor.       Nacho Fitzgerald, PAPA  01/03/21 9126

## 2021-01-04 NOTE — CARE COORDINATION
DISASTER CHARTING    1/4/21, 1:25 PM EST    DISCHARGE ONGOING EVALUATION:     Jasonnina Dash day: 1  Location: [address] [place] [address]-A Reason for admit: Hyponatremia [E87.1]   Barriers to Discharge: Patient presented due to inability to urinate. Na on admission 125, 130 this a.m. Nephrology consult, IV fluids, Lovenox, Lactulose, BMP q 6 hr., dopplers of bilateral lower extremities, up with assistance. PCP: CRISTIAN Boswell - CNP  Patient Goals/Plan/Treatment Preferences: Neena Sotelo is from [address] [place] [address]. He may need assistance with transportation at discharge. He has insurance and a PCP. He is off the unit for testing at this time. Plan to follow-up tomorrow for meet and greet.

## 2021-01-04 NOTE — ED NOTES
1000mls of urine obtained in leg bag.  Line clamped at this time     Yvette Bowser RN  01/03/21 1238

## 2021-01-04 NOTE — ED NOTES
Resting in bed with eyes closed. No distress noted. Breathing easy and unlabored. Call light on lap will monitor.       Gisselle Monzon RN  01/03/21 9372

## 2021-01-04 NOTE — CONSULTS
[facility]          [facility] [address]        [address] [facility]        [phone]           Inpatient Initial consult note         1/4/2021 9:26 AM    Patient Name:   Nataliya Calderon  YOB: 1975  Primary Care Physician:  CRISTIAN Crandall CNP     History Obtained From:  patient     Consultation requested by : Gumaro Yeboah MD    requested for  : Evaluation of  hyponatremia     History of presentingillness   Nataliya Calderon is a 39 y.o.   male with Past Medical History as stated below presented with a chief complaint of Post-op Problem and Abdominal Pain   on 1/3/2021 . Patient presented with chief complaints of unable to urinate, this has been happening since he had the surgery done on his back, symptoms very severe associated with swelling bilateral lower extremities and also abdominal distention. No specific modifying factors. Patient had a bladder scan which suggested he has more than 2.5 L in the bladder and he subsequently had a Cullen catheter placed and he had almost 5000 mL of urine output since.     Patient also did have a sodium level of 124 last night around 6 PM and this morning it is up to 130 and so nephrology has been consulted for further evaluation and management     Past History      Past Medical History:   Diagnosis Date    Alcohol abuse     in remission    Amputation of right arm (HCC)     Ascites     GERD (gastroesophageal reflux disease)     Hepatic cirrhosis ([address]     Hepatitis B     Hepatitis C     Hypertension     Intravenous drug user     in remission    Osteomyelitis ([address]     right arm/shoulder amputation    Psychiatric problem     PTSD (post-traumatic stress disorder)     Sciatica     Splenomegaly     Thrombocytopenia ([address]     Ulcerative colitis ([address]      Past Surgical History:   Procedure Laterality Date    APPENDECTOMY      ARM AMPUTATION AT SHOULDER Right 2017    [facility] Prior to Admission medications    Medication Sig Start Date End Date Taking? Authorizing Provider   pregabalin (LYRICA) 200 MG capsule Take 200 mg by mouth 3 times daily.    Yes Historical Provider, MD   PARoxetine (PAXIL) 40 MG tablet Take 40 mg by mouth every morning   Yes Historical Provider, MD   potassium chloride (MICRO-K) 10 MEQ extended release capsule Take 10 mEq by mouth daily   Yes Historical Provider, MD   famotidine (PEPCID) 40 MG tablet Take 40 mg by mouth daily   Yes Historical Provider, MD   docusate sodium (COLACE) 100 MG capsule Take 100 mg by mouth daily   Yes Historical Provider, MD   ferrous sulfate 325 (65 Fe) MG tablet Take 325 mg by mouth daily (with breakfast)   Yes Historical Provider, MD   lisinopril (PRINIVIL;ZESTRIL) 20 MG tablet Take 20 mg by mouth daily Indications: with hydrochlorothiazide 25 mg    Yes Historical Provider, MD   mesalamine (DELZICOL) 400 MG CPDR delayed release capsule Take 800 mg by mouth 3 times daily   Yes Historical Provider, MD   folic acid (FOLVITE) 1 MG tablet Take 2 tablets by mouth daily 9/26/17  Yes Rachelle Whitman MD   LACTULOSE PO Take 20 g by mouth 3 times daily    Yes Historical Provider, MD   pantoprazole (PROTONIX) 40 MG tablet Take 40 mg by mouth 2 times daily    Yes Historical Provider, MD   Multiple Vitamin (MULTI VITAMIN MENS) TABS Take by mouth   Yes Historical Provider, MD   Multiple Vitamins-Minerals (MULTIVITAMIN ADULT PO) Take 1 tablet by mouth    Historical Provider, MD     Allergies: Penicillins and Trazodone and nefazodone  IP meds : Scheduled Meds:   docusate sodium  100 mg Oral Daily    famotidine  40 mg Oral Daily    ferrous sulfate  325 mg Oral Daily with breakfast    folic acid  2 mg Oral Daily    lactulose  20 g Oral TID    mesalamine  800 mg Oral TID    pantoprazole  40 mg Oral BID AC    PARoxetine  40 mg Oral QAM    sodium chloride flush  10 mL Intravenous 2 times per day    enoxaparin  40 mg Subcutaneous Daily    Palpations: Abdomen is soft. Tenderness: There is no abdominal tenderness. Comments: Some abdominal edema noted   Musculoskeletal:         General: Swelling present. No tenderness. Right lower leg: Edema present. Left lower leg: Edema present. Skin:     General: Skin is warm and dry. Findings: No erythema or rash. Neurological:      General: No focal deficit present. Mental Status: He is alert and oriented to person, place, and time. Psychiatric:         Mood and Affect: Mood normal.         Behavior: Behavior normal.           Vitals:    01/04/21 0828   BP: 123/62   Pulse: 113   Resp: 18   Temp: 98.4 °F (36.9 °C)   SpO2: 98%     Labs, Radiology and Tests       Recent Labs     01/03/21 1822 01/04/21  0339   WBC 10.3 8.1   RBC 2.79* 2.77*   HGB 9.7* 9.5*   HCT 27.0* 27.5*   MCV 96.8* 99.3*   MCH 34.8* 34.3*   MCHC 35.9* 34.5   * 125*     Recent Labs     01/03/21 1822 01/04/21  0335 01/04/21  0821   * 125* 130*   K 3.6 3.6 3.9   CL 91* 93* 94*   CO2 22* 22* 26   BUN 23* 19 16   CREATININE 0.6 0.6 0.5   CALCIUM 8.8 8.7 9.0   PROT 6.3  --   --    LABALBU 3.6  --   --    BILITOT 1.2  --   --    ALKPHOS 62  --   --    AST 29  --   --    ALT 28  --   --        Radiology : CT scan of the abdomen  Mild bilateral hydronephrosis and ureterectasis without visible    obstruction. Distended bladder. Small gas in the bladder could be due to recent    instrumentation or gas-forming infection. Findings consistent with diarrheal illness/colitis. Mild anasarca. Possible reactive lymph nodes. Nonemergent/incidental findings in the report. Other : All lab data have been reviewed and noted patient does not have any significant hyponatremia in the past    Assessment    1 Renal -renal function appears to be stable at baseline  2 Hyponatremia most likely secondary to increased oral intake of liquids and unable to urinate.   ? Now having a lot of urine output and expect renal function to improve rapidly. ? It is already up by 6 mEq in 12 hours. ? We will start him on some hypotonic fluids for now. Closely monitor sodium levels  ? He also is significantly fluid overloaded no need of any diuretics for now this may be related to urinary retention follow closely    3 Mild metabolic acidosis  4 Essential hypertension  5 Fluid overload started to urinate better should improve hold diuretic for now  6 Recent back surgery  7 Meds reviewed and discussed with patient in detail      **This report has been created using voice recognition software. It maycontain minor  errors which are inherent in voice recognition technology. **    Alfonso Mccarthy M.D  [facility].

## 2021-01-04 NOTE — H&P
Hospitalist - History & Physical      Patient: Scottie Martin    Unit/Bed:01/001A  YOB: 1975  MRN: [id number]   Acct: [de-identified]   PCP: CRISTIAN Javed - CNP      Assessment and Plan:        1. Hyponatremia: most likely from excessive free water in an attempt to urinate and have a bowel movement (>1 gallon) slow replacement, limit free water to 2L, saline infusion 100mL/hr, q 6 hour BMPs  2. Urinary retention: has happened post operatively previously, Cullen in now, urology consult if unable to remove the Cullen. CC:  Unable to urinate    HPI: Patient presents to the ED stating that since his recent back surgery he has had a hard time passing his urine. His abdomen feels distended. The patient used lactulose and over a gallon of free water to attempt to urinate and have a bowel movement. The patient was found to have >2.5L in his bladder on presentation to the ED. The patient was also found to have a marked hyponatremia most likely due free water dilution. ROS: Review of Systems   Constitutional: Negative. HENT: Negative. Eyes: Negative. Respiratory: Negative. Cardiovascular: Negative. Gastrointestinal: Positive for abdominal distention. Endocrine: Negative. Genitourinary: Positive for difficulty urinating. Musculoskeletal: Positive for back pain. Allergic/Immunologic: Negative. Neurological: Negative. Hematological: Negative. Psychiatric/Behavioral: Negative.       PMH:    Past Medical History:   Diagnosis Date    Alcohol abuse     in remission    Amputation of right arm (HCC)     Ascites     GERD (gastroesophageal reflux disease)     Hepatic cirrhosis (HCC)     Hepatitis B     Hepatitis C     Hypertension     Intravenous drug user     in remission    Osteomyelitis (HCC)     right arm/shoulder amputation    Psychiatric problem     PTSD (post-traumatic stress disorder)     Sciatica     Splenomegaly     Thrombocytopenia ([facility] [address] [facility]  [facility]      SHX:    Social History     Socioeconomic History    Marital status:      Spouse name: Not on file    Number of children: 1    Years of education: Not on file    Highest education level: Not on file   Occupational History    Not on file   Social Needs    Financial resource strain: Not on file    Food insecurity     Worry: Not on file     Inability: Not on file   [employer] needs     Medical: Not on file     Non-medical: Not on file   Tobacco Use    Smoking status: Former Smoker     Quit date: 2020     Years since quittin.7    Smokeless tobacco: Current User     Types: Snuff   Substance and Sexual Activity    Alcohol use: No     Comment: Several months since last used    Drug use: No     Comment: 160 days since last use    Sexual activity: Not on file   Lifestyle    Physical activity     Days per week: Not on file     Minutes per session: Not on file    Stress: Not on file   Relationships    Social connections     Talks on phone: Not on file     Gets together: Not on file     Attends Church service: Not on file     Active member of club or organization: Not on file     Attends meetings of clubs or organizations: Not on file     Relationship status: Not on file    Intimate partner violence     Fear of current or ex partner: Not on file     Emotionally abused: Not on file     Physically abused: Not on file     Forced sexual activity: Not on file   Other Topics Concern    Not on file   Social History Narrative    Not on file     FHX:   Family History   Problem Relation Age of Onset    High Blood Pressure Mother     High Blood Pressure Father      Allergies: Allergies   Allergen Reactions    Penicillins Anaphylaxis    Trazodone And Nefazodone      RESTLESS LEGS     Medications:      No current facility-administered medications on file prior to encounter.       Current Outpatient Medications on File Prior to Encounter   Medication Sig Dispense Refill    pregabalin (LYRICA) 200 MG capsule Take 200 mg by mouth 3 times daily.       PARoxetine (PAXIL) 40 MG tablet Take 40 mg by mouth every morning      potassium chloride (MICRO-K) 10 MEQ extended release capsule Take 10 mEq by mouth daily      famotidine (PEPCID) 40 MG tablet Take 40 mg by mouth daily      docusate sodium (COLACE) 100 MG capsule Take 100 mg by mouth daily      ferrous sulfate 325 (65 Fe) MG tablet Take 325 mg by mouth daily (with breakfast)      lisinopril (PRINIVIL;ZESTRIL) 20 MG tablet Take 20 mg by mouth daily Indications: with hydrochlorothiazide 25 mg       mesalamine (DELZICOL) 400 MG CPDR delayed release capsule Take 800 mg by mouth 3 times daily      folic acid (FOLVITE) 1 MG tablet Take 2 tablets by mouth daily 30 tablet 3    LACTULOSE PO Take 20 g by mouth 3 times daily       pantoprazole (PROTONIX) 40 MG tablet Take 40 mg by mouth 2 times daily       Multiple Vitamin (MULTI VITAMIN MENS) TABS Take by mouth      Multiple Vitamins-Minerals (MULTIVITAMIN ADULT PO) Take 1 tablet by mouth             Labs:   Recent Results (from the past 24 hour(s))   CBC auto differential    Collection Time: 01/03/21  6:22 PM   Result Value Ref Range    WBC 10.3 4.8 - 10.8 thou/mm3    RBC 2.79 (L) 4.70 - 6.10 mill/mm3    Hemoglobin 9.7 (L) 14.0 - 18.0 gm/dl    Hematocrit 27.0 (L) 42.0 - 52.0 %    MCV 96.8 (H) 80.0 - 94.0 fL    MCH 34.8 (H) 26.0 - 33.0 pg    MCHC 35.9 (H) 32.2 - 35.5 gm/dl    RDW-CV 13.4 11.5 - 14.5 %    RDW-SD 47.1 (H) 35.0 - 45.0 fL    Platelets 195 (L) 089 - 400 thou/mm3    MPV 9.7 9.4 - 12.4 fL    Seg Neutrophils 57.6 %    Lymphocytes 25.6 %    Monocytes 9.7 %    Eosinophils 2.5 %    Basophils 0.5 %    Immature Granulocytes 4.1 %    Segs Absolute 5.9 1.8 - 7.7 thou/mm3    Lymphocytes Absolute 2.6 1.0 - 4.8 thou/mm3    Monocytes Absolute 1.0 0.4 - 1.3 thou/mm3    Eosinophils Absolute 0.3 0.0 - 0.4 thou/mm3    Basophils Absolute 0.1 0.0 - 0.1 thou/mm3    Immature Grans (Abs) 0.42 (H) 0.00 - 0.07 thou/mm3    nRBC 0 /100 wbc   Comprehensive Metabolic Panel    Collection Time: 01/03/21  6:22 PM   Result Value Ref Range    Glucose 158 (H) 70 - 108 mg/dL    CREATININE 0.6 0.4 - 1.2 mg/dL    BUN 23 (H) 7 - 22 mg/dL    Sodium 124 (L) 135 - 145 meq/L    Potassium 3.6 3.5 - 5.2 meq/L    Chloride 91 (L) 98 - 111 meq/L    CO2 22 (L) 23 - 33 meq/L    Calcium 8.8 8.5 - 10.5 mg/dL    AST 29 5 - 40 U/L    Alkaline Phosphatase 62 38 - 126 U/L    Total Protein 6.3 6.1 - 8.0 g/dL    Alb 3.6 3.5 - 5.1 g/dL    Total Bilirubin 1.2 0.3 - 1.2 mg/dL    ALT 28 11 - 66 U/L   APTT    Collection Time: 01/03/21  6:22 PM   Result Value Ref Range    aPTT 25.5 22.0 - 38.0 seconds   Protime-INR    Collection Time: 01/03/21  6:22 PM   Result Value Ref Range    INR 1.00 0.85 - 1.13   Ammonia    Collection Time: 01/03/21  6:22 PM   Result Value Ref Range    Ammonia 28 11 - 60 umol/L   Anion Gap    Collection Time: 01/03/21  6:22 PM   Result Value Ref Range    Anion Gap 11.0 8.0 - 16.0 meq/L   Glomerular Filtration Rate, Estimated    Collection Time: 01/03/21  6:22 PM   Result Value Ref Range    Est, Glom Filt Rate >90 ml/min/1.73m2   Osmolality    Collection Time: 01/03/21  6:22 PM   Result Value Ref Range    Osmolality Calc 256.6 (L) 275.0 - 300.0 mOsmol/kg         Vital Signs: T: 98.1F P: 105 RR: 18 B/P: 118/76: FiO2: RA: O2 Sat:97%: I/O:     Intake/Output Summary (Last 24 hours) at 1/3/2021 2754  Last data filed at 1/3/2021 2326  Gross per 24 hour   Intake --   Output 3000 ml   Net -3000 ml         General:   Generally well appearing, no acute distress  HEENT:  normocephalic and atraumatic. No scleral icterus. PEARLA, mucous membranes moist  Neck: supple. Trachea midline. No JVD. Full ROM, no meningismus. Lungs: clear to auscultation. No retractions, no accessory muscle use. Cardiac: RRR, no murmur, 2+ pulses  Abdomen: soft. Nontender.  Bowel sounds active  Extremities:  No clubbing, cyanosis x 3, RUE is amputated, no edema    Vasculature: capillary refill < 3 seconds. Skin:  warm and dry. no visible rashes  Psych:  Alert and oriented x3. Affect appropriate  Lymph:  No supraclavicular adenopathy. Neurologic:  CN II-XII grossly intact. No focal deficit. Data: (All radiographs, tracings, PFTs, and imaging are personally viewed and interpreted unless otherwise noted).       EKG: none        Electronically signed by  Wilman Navarrete PA-C

## 2021-01-04 NOTE — ED NOTES
Pt resting in bed resting with eyes closed. Breathing easy and unlabored. Call light within reach. Will monitor.       Ayden Thompson RN  01/04/21 2231

## 2021-01-04 NOTE — PROGRESS NOTES
Assessment and Plan:        Urinary retention: Patient states he has had this issue following surgery in the past.  Given his surgery was in the lumbar sacral region will have to monitor closely.  -Cullen catheter placed. Having brisk urine output. We will keep in for now continue to monitor urine volume. May need to monitor for postobstructive diuresis    Constipation: Patient states he is also had this problem following surgery in the past.  -Monitor closely as mentioned above  -Stool softeners    Hyponatremia: Likely due to urine retention, free water intake. Overall improving. Giving hypotonic fluid now as he corrected 6 mEq/L in the last 24-hour. Nephrology assisting    Recent lumbar spine surgery: Patient states his left lower extremity weakness is improved since the surgery. States his pain is under control at this time. History of cirrhosis: Due to history of hepatitis B and C. continue lactulose. 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 History of ulcerative colitis: Continue mesalamine    Thrombocytopenia: Due to liver disease. Continue to monitor    Bilateral lower extremity edema: Ultrasound obtained today. No evidence of DVT. Suspect could be related to urinary retention. We will continue to monitor      CC:  Unable to urinate  Hospital course: Patient presents to the ED stating that since his recent back surgery he has had a hard time passing his urine. His abdomen feels distended. The patient used lactulose and over a gallon of free water to attempt to urinate and have a bowel movement. The patient was found to have >2.5L in his bladder on presentation to the ED. The patient was also found to have a marked hyponatremia most likely due free water dilution.     Subjective: (12 point review of systems completed. Pertinent positives noted. Otherwise ROS is negative) : Patient states he feels somewhat improved. He is having brisk urine output. He states his left lower extremity weakness is improved status post surgery. He denies any fevers or chills. PMH:  Per HPI  SHX:    Social History     Tobacco Use    Smoking status: Former Smoker     Quit date: 2020     Years since quittin.7    Smokeless tobacco: Current User     Types: Snuff   Substance Use Topics    Alcohol use: No     Comment: Several months since last used    Drug use: No     Comment: 160 days since last use     FHX:   Family History   Problem Relation Age of Onset    High Blood Pressure Mother     High Blood Pressure Father      Allergies: Allergies   Allergen Reactions    Penicillins Anaphylaxis    Trazodone And Nefazodone      RESTLESS LEGS     Medications:     sodium chloride 100 mL/hr at 21 1216      docusate sodium  100 mg Oral Daily    famotidine  40 mg Oral Daily    ferrous sulfate  325 mg Oral Daily with breakfast    folic acid  2 mg Oral Daily    lactulose  20 g Oral TID    mesalamine  800 mg Oral TID    pantoprazole  40 mg Oral BID AC    PARoxetine  40 mg Oral QAM    sodium chloride flush  10 mL Intravenous 2 times per day    enoxaparin  40 mg Subcutaneous Daily    lisinopril  20 mg Oral Daily    And    [Held by provider] hydroCHLOROthiazide  25 mg Oral Daily    pregabalin  150 mg Oral BID       Vital Signs:   /66   Pulse 100   Temp 98.4 °F (36.9 °C) (Oral)   Resp 18   Ht 6' (1.829 m)   Wt 250 lb (113.4 kg)   SpO2 98%   BMI 33.91 kg/m²      Intake/Output Summary (Last 24 hours) at 2021 1532  Last data filed at 2021 1334  Gross per 24 hour   Intake 2115.73 ml   Output 7525 ml   Net -5409.27 ml        General:   Resting in bed, in NAD  HEENT:  normocephalic and atraumatic. No scleral icterus. PERR. Neck: supple. No JVD. No thyromegaly. Lungs: clear to auscultation.   No retractions  Cardiac: Mild tachycardia without murmur. Abdomen: soft. Nontender. Bowel sounds positive. Extremities:  No clubbing, cyanosis, 2+ LE edema, bilaterally. S/P Rt arm amputation  Vasculature: capillary refill < 3 seconds. Palpable LE pulses bilaterally. Skin:  warm and dry. Psych:  Alert and oriented x3. Somewhat slow to respond but has appropriate responses. Affect appropriate  Lymph:  No supraclavicular adenopathy. Neurologic:  No focal deficit. No seizures. Data: (All radiographs, tracings, PFTs, and imaging are personally viewed and interpreted unless otherwise noted).    Recent Results (from the past 24 hour(s))   CBC auto differential    Collection Time: 01/03/21  6:22 PM   Result Value Ref Range    WBC 10.3 4.8 - 10.8 thou/mm3    RBC 2.79 (L) 4.70 - 6.10 mill/mm3    Hemoglobin 9.7 (L) 14.0 - 18.0 gm/dl    Hematocrit 27.0 (L) 42.0 - 52.0 %    MCV 96.8 (H) 80.0 - 94.0 fL    MCH 34.8 (H) 26.0 - 33.0 pg    MCHC 35.9 (H) 32.2 - 35.5 gm/dl    RDW-CV 13.4 11.5 - 14.5 %    RDW-SD 47.1 (H) 35.0 - 45.0 fL    Platelets 068 (L) 374 - 400 thou/mm3    MPV 9.7 9.4 - 12.4 fL    Seg Neutrophils 57.6 %    Lymphocytes 25.6 %    Monocytes 9.7 %    Eosinophils 2.5 %    Basophils 0.5 %    Immature Granulocytes 4.1 %    Segs Absolute 5.9 1.8 - 7.7 thou/mm3    Lymphocytes Absolute 2.6 1.0 - 4.8 thou/mm3    Monocytes Absolute 1.0 0.4 - 1.3 thou/mm3    Eosinophils Absolute 0.3 0.0 - 0.4 thou/mm3    Basophils Absolute 0.1 0.0 - 0.1 thou/mm3    Immature Grans (Abs) 0.42 (H) 0.00 - 0.07 thou/mm3    nRBC 0 /100 wbc   Comprehensive Metabolic Panel    Collection Time: 01/03/21  6:22 PM   Result Value Ref Range    Glucose 158 (H) 70 - 108 mg/dL    CREATININE 0.6 0.4 - 1.2 mg/dL    BUN 23 (H) 7 - 22 mg/dL    Sodium 124 (L) 135 - 145 meq/L    Potassium 3.6 3.5 - 5.2 meq/L    Chloride 91 (L) 98 - 111 meq/L    CO2 22 (L) 23 - 33 meq/L    Calcium 8.8 8.5 - 10.5 mg/dL    AST 29 5 - 40 U/L    Alkaline Phosphatase 62 38 - 126 U/L    Total Protein 6.3 6.1 - 8.0 g/dL    Alb 3.6 3.5 - 5.1 g/dL    Total Bilirubin 1.2 0.3 - 1.2 mg/dL    ALT 28 11 - 66 U/L   APTT    Collection Time: 01/03/21  6:22 PM   Result Value Ref Range    aPTT 25.5 22.0 - 38.0 seconds   Protime-INR    Collection Time: 01/03/21  6:22 PM   Result Value Ref Range    INR 1.00 0.85 - 1.13   Ammonia    Collection Time: 01/03/21  6:22 PM   Result Value Ref Range    Ammonia 28 11 - 60 umol/L   Anion Gap    Collection Time: 01/03/21  6:22 PM   Result Value Ref Range    Anion Gap 11.0 8.0 - 16.0 meq/L   Glomerular Filtration Rate, Estimated    Collection Time: 01/03/21  6:22 PM   Result Value Ref Range    Est, Glom Filt Rate >90 ml/min/1.73m2   Osmolality    Collection Time: 01/03/21  6:22 PM   Result Value Ref Range    Osmolality Calc 256.6 (L) 275.0 - 300.0 mOsmol/kg   COVID-19    Collection Time: 01/03/21 11:56 PM   Result Value Ref Range    SARS-CoV-2, NAAT NOT DETECTED NOT DETECTED   Osmolality, Serum    Collection Time: 01/04/21  3:35 AM   Result Value Ref Range    Osmolality 276 275 - 295 mosmol/kg   Basic Metabolic Panel w/ Reflex to MG    Collection Time: 01/04/21  3:35 AM   Result Value Ref Range    Sodium 125 (L) 135 - 145 meq/L    Potassium reflex Magnesium 3.6 3.5 - 5.2 meq/L    Chloride 93 (L) 98 - 111 meq/L    CO2 22 (L) 23 - 33 meq/L    Glucose 157 (H) 70 - 108 mg/dL    BUN 19 7 - 22 mg/dL    CREATININE 0.6 0.4 - 1.2 mg/dL    Calcium 8.7 8.5 - 10.5 mg/dL   Anion Gap    Collection Time: 01/04/21  3:35 AM   Result Value Ref Range    Anion Gap 10.0 8.0 - 16.0 meq/L   Glomerular Filtration Rate, Estimated    Collection Time: 01/04/21  3:35 AM   Result Value Ref Range    Est, Glom Filt Rate >90 ml/min/1.73m2   CBC auto differential    Collection Time: 01/04/21  3:39 AM   Result Value Ref Range    WBC 8.1 4.8 - 10.8 thou/mm3    RBC 2.77 (L) 4.70 - 6.10 mill/mm3    Hemoglobin 9.5 (L) 14.0 - 18.0 gm/dl    Hematocrit 27.5 (L) 42.0 - 52.0 %    MCV 99.3 (H) 80.0 - 94.0 fL    MCH 34.3 (H) 26.0 - 33.0 pg    MCHC 34.5 32.2 - 35.5 gm/dl    RDW-CV 13.7 11.5 - 14.5 %    RDW-SD 48.6 (H) 35.0 - 45.0 fL    Platelets 015 (L) 206 - 400 thou/mm3    MPV 9.8 9.4 - 12.4 fL    Seg Neutrophils 48.0 %    Lymphocytes 34.2 %    Monocytes 9.3 %    Eosinophils 3.3 %    Basophils 0.5 %    Immature Granulocytes 4.7 %    Segs Absolute 3.9 1.8 - 7.7 thou/mm3    Lymphocytes Absolute 2.8 1.0 - 4.8 thou/mm3    Monocytes Absolute 0.8 0.4 - 1.3 thou/mm3    Eosinophils Absolute 0.3 0.0 - 0.4 thou/mm3    Basophils Absolute 0.0 0.0 - 0.1 thou/mm3    Immature Grans (Abs) 0.38 (H) 0.00 - 0.07 thou/mm3    nRBC 0 /100 wbc   Osmolality, Urine    Collection Time: 01/04/21  7:00 AM   Result Value Ref Range    Osmolality, Ur 313 250 - 750 mosmol/kg   Basic Metabolic Panel w/ Reflex to MG    Collection Time: 01/04/21  8:21 AM   Result Value Ref Range    Sodium 130 (L) 135 - 145 meq/L    Potassium reflex Magnesium 3.9 3.5 - 5.2 meq/L    Chloride 94 (L) 98 - 111 meq/L    CO2 26 23 - 33 meq/L    Glucose 164 (H) 70 - 108 mg/dL    BUN 16 7 - 22 mg/dL    CREATININE 0.5 0.4 - 1.2 mg/dL    Calcium 9.0 8.5 - 10.5 mg/dL   Anion Gap    Collection Time: 01/04/21  8:21 AM   Result Value Ref Range    Anion Gap 10.0 8.0 - 16.0 meq/L   Glomerular Filtration Rate, Estimated    Collection Time: 01/04/21  8:21 AM   Result Value Ref Range    Est, Glom Filt Rate >90 YN/DSK/9.08W0   Basic Metabolic Panel w/ Reflex to MG    Collection Time: 01/04/21  2:55 PM   Result Value Ref Range    Sodium 129 (L) 135 - 145 meq/L    Potassium reflex Magnesium 4.8 3.5 - 5.2 meq/L    Chloride 97 (L) 98 - 111 meq/L    CO2 20 (L) 23 - 33 meq/L    Glucose 200 (H) 70 - 108 mg/dL    BUN 15 7 - 22 mg/dL    CREATININE 0.7 0.4 - 1.2 mg/dL    Calcium 9.2 8.5 - 10.5 mg/dL   Anion Gap    Collection Time: 01/04/21  2:55 PM   Result Value Ref Range    Anion Gap 12.0 8.0 - 16.0 meq/L   Glomerular Filtration Rate, Estimated    Collection Time: 01/04/21  2:55 PM   Result Value Ref Range    Est, Glom Filt Rate >90 ml/min/1.73m2         VL DUP LOWER EXTREMITY VENOUS BILATERAL   Final Result   No evidence of a DVT. **This report has been created using voice recognition software. It may contain minor errors which are inherent in voice recognition technology. **      Final report electronically signed by Dr Shakila Dee on 1/4/2021 2:41 PM      CT ABDOMEN PELVIS W IV CONTRAST Additional Contrast? None   Final Result   Mild bilateral hydronephrosis and ureterectasis without visible    obstruction. Distended bladder. Small gas in the bladder could be due to recent    instrumentation or gas-forming infection. Findings consistent with diarrheal illness/colitis. Mild anasarca. Possible reactive lymph nodes. Nonemergent/incidental findings in the report. This document has been electronically signed by: Samantha Rogers MD on    01/03/2021 10:31 PM      All CT scans at this facility use dose modulation, iterative    reconstruction, and/or weight-based   dosing when appropriate to reduce radiation dose to as low as reasonably    achievable. CT LUMBAR RECONSTRUCTION WO POST PROCESS   Final Result   Possible postsurgical seroma in the surgical bed. Nonemergent/incidental findings in the report. See separate abdomen/pelvis CT report. This document has been electronically signed by: Samantha Rogers MD on    01/03/2021 09:47 PM      All CT scans at this facility use dose modulation, iterative    reconstruction, and/or weight-based   dosing when appropriate to reduce radiation dose to as low as reasonably    achievable.                 Electronically signed by Brent Canales MD on 1/4/2021 at 3:32 PM

## 2021-01-04 NOTE — ED NOTES
ED to inpatient nurses report    Chief Complaint   Patient presents with    Post-op Problem    Abdominal Pain      Present to ED from group [facility]hab [facility]   LOC: alert and orientated to name, place, date  Vital signs   Vitals:    01/03/21 2114 01/03/21 2213 01/03/21 2305 01/04/21 0029   BP: 117/88 136/87 120/87 118/76   Pulse: 113 87 102 105   Resp: 20 18 22 18   Temp:       TempSrc:       SpO2: 99% 97% 97% 97%   Weight:       Height:          Oxygen Baseline 97%    Current needs required none Bipap/Cpap No  LDAs:   Peripheral IV 07/10/20 Left;Proximal Upper arm (Active)     Mobility: Requires assistance * 2  Pending ED orders: none  Present condition: stable     Electronically signed by Ayden Thompson RN on 1/4/2021 at 12:36 AM     Ayden Thompson RN  01/04/21 7328

## 2021-01-05 ENCOUNTER — APPOINTMENT (OUTPATIENT)
Dept: MRI IMAGING | Age: 46
DRG: 908 | End: 2021-01-05
Payer: MEDICARE

## 2021-01-05 LAB
ANION GAP SERPL CALCULATED.3IONS-SCNC: 11 MEQ/L (ref 8–16)
ANION GAP SERPL CALCULATED.3IONS-SCNC: 11 MEQ/L (ref 8–16)
BUN BLDV-MCNC: 11 MG/DL (ref 7–22)
BUN BLDV-MCNC: 13 MG/DL (ref 7–22)
CALCIUM SERPL-MCNC: 8.7 MG/DL (ref 8.5–10.5)
CALCIUM SERPL-MCNC: 8.8 MG/DL (ref 8.5–10.5)
CHLORIDE BLD-SCNC: 93 MEQ/L (ref 98–111)
CHLORIDE BLD-SCNC: 95 MEQ/L (ref 98–111)
CO2: 25 MEQ/L (ref 23–33)
CO2: 26 MEQ/L (ref 23–33)
CREAT SERPL-MCNC: 0.5 MG/DL (ref 0.4–1.2)
CREAT SERPL-MCNC: 0.5 MG/DL (ref 0.4–1.2)
GFR SERPL CREATININE-BSD FRML MDRD: > 90 ML/MIN/1.73M2
GFR SERPL CREATININE-BSD FRML MDRD: > 90 ML/MIN/1.73M2
GLUCOSE BLD-MCNC: 142 MG/DL (ref 70–108)
GLUCOSE BLD-MCNC: 176 MG/DL (ref 70–108)
POTASSIUM REFLEX MAGNESIUM: 3.8 MEQ/L (ref 3.5–5.2)
POTASSIUM REFLEX MAGNESIUM: 3.9 MEQ/L (ref 3.5–5.2)
SODIUM BLD-SCNC: 129 MEQ/L (ref 135–145)
SODIUM BLD-SCNC: 132 MEQ/L (ref 135–145)

## 2021-01-05 PROCEDURE — 80048 BASIC METABOLIC PNL TOTAL CA: CPT

## 2021-01-05 PROCEDURE — 99233 SBSQ HOSP IP/OBS HIGH 50: CPT | Performed by: INTERNAL MEDICINE

## 2021-01-05 PROCEDURE — 6360000002 HC RX W HCPCS: Performed by: PHYSICIAN ASSISTANT

## 2021-01-05 PROCEDURE — 6370000000 HC RX 637 (ALT 250 FOR IP): Performed by: PHYSICIAN ASSISTANT

## 2021-01-05 PROCEDURE — 2580000003 HC RX 258: Performed by: INTERNAL MEDICINE

## 2021-01-05 PROCEDURE — 51798 US URINE CAPACITY MEASURE: CPT

## 2021-01-05 PROCEDURE — 36415 COLL VENOUS BLD VENIPUNCTURE: CPT

## 2021-01-05 PROCEDURE — 94760 N-INVAS EAR/PLS OXIMETRY 1: CPT

## 2021-01-05 PROCEDURE — 99232 SBSQ HOSP IP/OBS MODERATE 35: CPT | Performed by: INTERNAL MEDICINE

## 2021-01-05 PROCEDURE — 1200000000 HC SEMI PRIVATE

## 2021-01-05 PROCEDURE — 2580000003 HC RX 258: Performed by: PHYSICIAN ASSISTANT

## 2021-01-05 RX ADMIN — FAMOTIDINE 40 MG: 20 TABLET, FILM COATED ORAL at 07:41

## 2021-01-05 RX ADMIN — PANTOPRAZOLE SODIUM 40 MG: 40 TABLET, DELAYED RELEASE ORAL at 06:25

## 2021-01-05 RX ADMIN — ENOXAPARIN SODIUM 40 MG: 40 INJECTION SUBCUTANEOUS at 07:41

## 2021-01-05 RX ADMIN — FOLIC ACID 2 MG: 1 TABLET ORAL at 07:41

## 2021-01-05 RX ADMIN — TIZANIDINE 4 MG: 4 TABLET ORAL at 07:41

## 2021-01-05 RX ADMIN — OXYCODONE HYDROCHLORIDE AND ACETAMINOPHEN 1 TABLET: 10; 325 TABLET ORAL at 12:30

## 2021-01-05 RX ADMIN — PREGABALIN 150 MG: 75 CAPSULE ORAL at 07:40

## 2021-01-05 RX ADMIN — PAROXETINE HYDROCHLORIDE 40 MG: 20 TABLET, FILM COATED ORAL at 07:41

## 2021-01-05 RX ADMIN — PREGABALIN 150 MG: 75 CAPSULE ORAL at 20:27

## 2021-01-05 RX ADMIN — OXYCODONE HYDROCHLORIDE AND ACETAMINOPHEN 1 TABLET: 10; 325 TABLET ORAL at 19:21

## 2021-01-05 RX ADMIN — FERROUS SULFATE TAB 325 MG (65 MG ELEMENTAL FE) 325 MG: 325 (65 FE) TAB at 07:41

## 2021-01-05 RX ADMIN — MESALAMINE 800 MG: 400 CAPSULE, DELAYED RELEASE ORAL at 15:43

## 2021-01-05 RX ADMIN — PANTOPRAZOLE SODIUM 40 MG: 40 TABLET, DELAYED RELEASE ORAL at 15:43

## 2021-01-05 RX ADMIN — Medication 10 ML: at 20:30

## 2021-01-05 RX ADMIN — MESALAMINE 800 MG: 400 CAPSULE, DELAYED RELEASE ORAL at 20:27

## 2021-01-05 RX ADMIN — LISINOPRIL 20 MG: 20 TABLET ORAL at 07:41

## 2021-01-05 RX ADMIN — SODIUM CHLORIDE: 4.5 INJECTION, SOLUTION INTRAVENOUS at 08:58

## 2021-01-05 RX ADMIN — MESALAMINE 800 MG: 400 CAPSULE, DELAYED RELEASE ORAL at 07:41

## 2021-01-05 RX ADMIN — OXYCODONE HYDROCHLORIDE AND ACETAMINOPHEN 1 TABLET: 10; 325 TABLET ORAL at 06:21

## 2021-01-05 RX ADMIN — TIZANIDINE 4 MG: 4 TABLET ORAL at 20:27

## 2021-01-05 ASSESSMENT — PAIN SCALES - GENERAL
PAINLEVEL_OUTOF10: 10
PAINLEVEL_OUTOF10: 5
PAINLEVEL_OUTOF10: 7
PAINLEVEL_OUTOF10: 6
PAINLEVEL_OUTOF10: 6
PAINLEVEL_OUTOF10: 5

## 2021-01-05 ASSESSMENT — PAIN DESCRIPTION - PAIN TYPE: TYPE: SURGICAL PAIN

## 2021-01-05 ASSESSMENT — PAIN DESCRIPTION - ORIENTATION: ORIENTATION: LEFT

## 2021-01-05 NOTE — PROCEDURES
A Bladder scan was performed at 1442 . The patient's last void was at 1435 . The residual amount was measured to be >999 ML. Report of results was given to [facility].

## 2021-01-05 NOTE — PROGRESS NOTES
Assessment and Plan:        Urinary retention, recent lumbar surgery: Patient states he has had this issue following surgery in the past.  Given his surgery was in the lumbar sacral region will have to monitor closely. Patient states his left lower extremity weakness is improved since the surgery. States his pain is under control at this time. - patient failed void trial. Today he endorses saddle anesthesia. - Replace Subhash, updated Dr. Angie Singh, STAT lumbar spine MRI ordered to evaluate for cord compression. Constipation: Patient states he is also had this problem following surgery in the past.  -Monitor closely as mentioned above  -Stool softeners    Hyponatremia: Likely due to urine retention, free water intake. Overall improving. Nephrology assisting    History of cirrhosis: Due to history of hepatitis B and C. continue lactulose. 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 History of ulcerative colitis: Continue mesalamine    Thrombocytopenia: Due to liver disease. Continue to monitor    Bilateral lower extremity edema: Ultrasound obtained today. No evidence of DVT. Suspect could be related to urinary retention. We will continue to monitor      CC:  Unable to urinate  Hospital course: Patient presents to the ED stating that since his recent back surgery he has had a hard time passing his urine. His abdomen feels distended. The patient used lactulose and over a gallon of free water to attempt to urinate and have a bowel movement. The patient was found to have >2.5L in his bladder on presentation to the ED. The patient was also found to have a marked hyponatremia most likely due free water dilution.     Subjective: (12 point review of systems completed. Pertinent positives noted.  Otherwise ROS is negative) : Patient failed void trial today. Endorsing decreased sensation in his saddle region. No fevers or chills. States Left lower leg weakness improved. PMH:  Per HPI  SHX:    Social History     Tobacco Use    Smoking status: Former Smoker     Quit date: 2020     Years since quittin.7    Smokeless tobacco: Current User     Types: Snuff   Substance Use Topics    Alcohol use: No     Comment: Several months since last used    Drug use: No     Comment: 160 days since last use     FHX:   Family History   Problem Relation Age of Onset    High Blood Pressure Mother     High Blood Pressure Father      Allergies: Allergies   Allergen Reactions    Penicillins Anaphylaxis    Trazodone And Nefazodone      RESTLESS LEGS     Medications:     sodium chloride 100 mL/hr at 21 0858      famotidine  40 mg Oral Daily    ferrous sulfate  325 mg Oral Daily with breakfast    folic acid  2 mg Oral Daily    lactulose  20 g Oral TID    mesalamine  800 mg Oral TID    pantoprazole  40 mg Oral BID AC    PARoxetine  40 mg Oral QAM    sodium chloride flush  10 mL Intravenous 2 times per day    enoxaparin  40 mg Subcutaneous Daily    lisinopril  20 mg Oral Daily    And    [Held by provider] hydroCHLOROthiazide  25 mg Oral Daily    pregabalin  150 mg Oral BID    docusate sodium  100 mg Oral BID       Vital Signs:   /64   Pulse 94   Temp 98.3 °F (36.8 °C) (Oral)   Resp 16   Ht 6' (1.829 m)   Wt 250 lb (113.4 kg)   SpO2 98%   BMI 33.91 kg/m²      Intake/Output Summary (Last 24 hours) at 2021 1724  Last data filed at 2021 1513  Gross per 24 hour   Intake 3913.2 ml   Output 4900 ml   Net -986.8 ml        General:   Resting in bed, in NAD  HEENT:  normocephalic and atraumatic. No scleral icterus. PERR. Neck: supple. No JVD. No thyromegaly. Lungs: clear to auscultation. No retractions  Cardiac: Mild tachycardia without murmur. Abdomen: soft. Nontender. Bowel sounds positive.   Extremities:  No clubbing, cyanosis, 2+ LE edema, bilaterally. S/P Rt arm amputation  Vasculature: capillary refill < 3 seconds. Palpable LE pulses bilaterally. Skin:  warm and dry. Psych:  Alert and oriented x3. Somewhat slow to respond but has appropriate responses. Affect appropriate  Lymph:  No supraclavicular adenopathy. Neurologic:  No focal deficit. No seizures. Data: (All radiographs, tracings, PFTs, and imaging are personally viewed and interpreted unless otherwise noted).    Recent Results (from the past 24 hour(s))   Basic Metabolic Panel w/ Reflex to MG    Collection Time: 01/04/21  8:42 PM   Result Value Ref Range    Sodium 125 (L) 135 - 145 meq/L    Potassium reflex Magnesium 4.2 3.5 - 5.2 meq/L    Chloride 94 (L) 98 - 111 meq/L    CO2 22 (L) 23 - 33 meq/L    Glucose 153 (H) 70 - 108 mg/dL    BUN 14 7 - 22 mg/dL    CREATININE 0.7 0.4 - 1.2 mg/dL    Calcium 9.1 8.5 - 10.5 mg/dL   Anion Gap    Collection Time: 01/04/21  8:42 PM   Result Value Ref Range    Anion Gap 9.0 8.0 - 16.0 meq/L   Glomerular Filtration Rate, Estimated    Collection Time: 01/04/21  8:42 PM   Result Value Ref Range    Est, Glom Filt Rate >90 KB/HNM/0.56Z3   Basic Metabolic Panel w/ Reflex to MG    Collection Time: 01/05/21  3:36 AM   Result Value Ref Range    Sodium 129 (L) 135 - 145 meq/L    Potassium reflex Magnesium 3.8 3.5 - 5.2 meq/L    Chloride 93 (L) 98 - 111 meq/L    CO2 25 23 - 33 meq/L    Glucose 142 (H) 70 - 108 mg/dL    BUN 13 7 - 22 mg/dL    CREATININE 0.5 0.4 - 1.2 mg/dL    Calcium 8.8 8.5 - 10.5 mg/dL   Anion Gap    Collection Time: 01/05/21  3:36 AM   Result Value Ref Range    Anion Gap 11.0 8.0 - 16.0 meq/L   Glomerular Filtration Rate, Estimated    Collection Time: 01/05/21  3:36 AM   Result Value Ref Range    Est, Glom Filt Rate >90 AM/ORY/6.55D4   Basic Metabolic Panel w/ Reflex to MG    Collection Time: 01/05/21  9:50 AM   Result Value Ref Range    Sodium 132 (L) 135 - 145 meq/L    Potassium reflex Magnesium 3.9 3.5 - 5.2 meq/L as reasonably    achievable.          MRI LUMBAR SPINE W WO CONTRAST    (Results Pending)         Electronically signed by Gayle Hollis MD on 1/5/2021 at 5:24 PM

## 2021-01-05 NOTE — PROGRESS NOTES
[facility]    Renal Inpatient Follow-up note  1/5/2021 10:27 AM       Pt Name:   Paul Christianson  YOB: 1975  Attending:   Jimi Goodwin MD     Chief Complaint : Paul Christianson is a 39 y.o. male beingfollowed by nephrology for hyponatremia. Interval History :   Patient seen and examined. Endorses bilateral leg swelling. Good urine output. Denies any chest pain, shortness of breath, abdominal pain, diarrhea, headache, nausea and Vomiting     Scheduled Medications :   famotidine  40 mg Oral Daily    ferrous sulfate  325 mg Oral Daily with breakfast    folic acid  2 mg Oral Daily    lactulose  20 g Oral TID    mesalamine  800 mg Oral TID    pantoprazole  40 mg Oral BID AC    PARoxetine  40 mg Oral QAM    sodium chloride flush  10 mL Intravenous 2 times per day    enoxaparin  40 mg Subcutaneous Daily    lisinopril  20 mg Oral Daily    And    [Held by provider] hydroCHLOROthiazide  25 mg Oral Daily    pregabalin  150 mg Oral BID    docusate sodium  100 mg Oral BID      sodium chloride 100 mL/hr at 01/05/21 0858       Vitals :  /73   Pulse 108   Temp 98 °F (36.7 °C) (Oral)   Resp 18   Ht 6' (1.829 m)   Wt 250 lb (113.4 kg)   SpO2 95%   BMI 33.91 kg/m²     24HR INTAKE/OUTPUT:      Intake/Output Summary (Last 24 hours) at 1/5/2021 1027  Last data filed at 1/5/2021 0902  Gross per 24 hour   Intake 3465.41 ml   Output 5725 ml   Net -2259.59 ml        Physical Exam :  Nursing note and vitals reviewed. Constitutional: Patient appears well-developed and well-nourished. No distress. Mouth/Throat: Oropharynx is clear and moist. No oropharyngeal exudate. No oral thrush. Eyes: Conjunctivae are normal. Pupils are equal, round, and reactive to light. Neck: Neck supple. No JVD present. No tracheal deviation present. No thyromegaly present. Cardiovascular: Normal rate, regular rhythm, normal heart sounds.     Pulmonary/Chest: Effort normal and breath sounds normal. No stridor. No respiratory distress. No wheezes. Abdominal: Hard. Distended and no mass. No tenderness. Musculoskeletal: Normal range of motion. Extremities: Patient exhibits bilateral lower leg edema and no tenderness. Lymphadenopathy: No cervical adenopathy. Neurological: Patient is alert and oriented to person, place, and time. Skin: Skin is warm and dry. No rash noted. Patient is not diaphoretic. Psychiatric: Patient  has a normal mood and affect. Patient behavior is normal.      Last 3 CBC  Recent Labs     01/03/21 1822 01/04/21  0339   WBC 10.3 8.1   RBC 2.79* 2.77*   HGB 9.7* 9.5*   HCT 27.0* 27.5*   MCV 96.8* 99.3*   * 125*     Last 3 CMP  Recent Labs     01/03/21 1822 01/03/21 1822 01/04/21  1455 01/04/21  2042 01/05/21  0336   *   < > 129* 125* 129*   K 3.6   < > 4.8 4.2 3.8   CL 91*   < > 97* 94* 93*   CO2 22*   < > 20* 22* 25   BUN 23*   < > 15 14 13   CREATININE 0.6   < > 0.7 0.7 0.5   CALCIUM 8.8   < > 9.2 9.1 8.8   LABALBU 3.6  --   --   --   --    BILITOT 1.2  --   --   --   --     < > = values in this interval not displayed. Assessment / Plan :  1. Hyponatremia: Most likely from increased fluid intake and inability to urinate. Improvement in sodium levels since horn placement and increased urine output. Total of 10.8 L output since admission. Will continue on hypotonic fluids. Will monitor sodium levels closely. 2. Renal Function: currently at baseline. Will continue to monitor. 3. Mild metabolic acidosis: Resolved. Will continue to monitor. 4. Essential Hypertension: Controlled. Continue with current meds. 5. Fluid overload state: Should see improvement with increased urine output. Holding diuretic for now. 6. S/p Lumbar Spine Surgery: History. Isis Ibarra M.D  [facility].

## 2021-01-05 NOTE — PROGRESS NOTES
[facility]         Renal Inpatient Follow-Up note         1/5/2021 1:56 PM    Pt Name:   Joy Baez  YOB: 1975  Attending:   Miriam Dickerson MD    Chief Complaint : Joy Baez is a 39 y.o. male being followed by nephrology for hyponatremia    Interval History :   Patient seen and examined by me. No distress  Feels well denies any complaints no chest pain or shortness of breath  Patient Clulen has been removed and will be having bladder scan later today     Scheduled Medications :    famotidine  40 mg Oral Daily    ferrous sulfate  325 mg Oral Daily with breakfast    folic acid  2 mg Oral Daily    lactulose  20 g Oral TID    mesalamine  800 mg Oral TID    pantoprazole  40 mg Oral BID AC    PARoxetine  40 mg Oral QAM    sodium chloride flush  10 mL Intravenous 2 times per day    enoxaparin  40 mg Subcutaneous Daily    lisinopril  20 mg Oral Daily    And    [Held by provider] hydroCHLOROthiazide  25 mg Oral Daily    pregabalin  150 mg Oral BID    docusate sodium  100 mg Oral BID      sodium chloride 100 mL/hr at 01/05/21 0858       Vitals :  /70   Pulse 95   Temp 97.7 °F (36.5 °C) (Oral)   Resp 18   Ht 6' (1.829 m)   Wt 250 lb (113.4 kg)   SpO2 99%   BMI 33.91 kg/m²     24HR INTAKE/OUTPUT:      Intake/Output Summary (Last 24 hours) at 1/5/2021 1356  Last data filed at 1/5/2021 1327  Gross per 24 hour   Intake 2329.68 ml   Output 3300 ml   Net -970.32 ml     Last 3 weights  Wt Readings from Last 3 Encounters:   01/03/21 250 lb (113.4 kg)   11/25/20 247 lb (112 kg)   10/30/20 240 lb (108.9 kg)           Physical Exam :  General Appearance:  Well developed.  No distress  Mouth/Throat:  Oral mucosa moist  Neck:  Supple, no JVD  Lungs:  Breath sounds: clear  Heart[de-identified]  S1,S2 heard  Abdomen:  Soft, non - tender  Musculoskeletal:  Edema -noted but improving         Last 3 CBC   Recent Labs     01/03/21  1822 01/04/21  0339   WBC 10.3 8. 1   RBC 2.79* 2.77*   HGB 9.7* 9.5*   HCT 27.0* 27.5*   * 125*     Last 3 CMP  Recent Labs     01/03/21  1822 01/03/21  1822 01/04/21 2042 01/05/21  0336 01/05/21  0950   *   < > 125* 129* 132*   K 3.6   < > 4.2 3.8 3.9   CL 91*   < > 94* 93* 95*   CO2 22*   < > 22* 25 26   BUN 23*   < > 14 13 11   CREATININE 0.6   < > 0.7 0.5 0.5   CALCIUM 8.8   < > 9.1 8.8 8.7   LABALBU 3.6  --   --   --   --    BILITOT 1.2  --   --   --   --     < > = values in this interval not displayed. ASSESSMENT / Plan   1. Renal -renal function appears to be stable at baseline . 2. Hyponatremia most likely secondary to increased oral intake of liquids and unable to urinate. ? Overall improved well this morning it is 132.  ? Due to his polyuria from obstruction have been giving some hypotonic fluids will discontinue today and monitor sodium level     3. Mild metabolic acidosis improved  4. Essential hypertension  5. Fluid overload started to urinate better we will plan on a diuretic soon. 6. Recent back surgery  7. Urinary retention Cullen was removed this morning will have a bladder scan in a few hours  8. Meds reviewed and discussed with patient in detail    CARLOS Arzola D.  [facility].

## 2021-01-06 ENCOUNTER — ANESTHESIA (OUTPATIENT)
Dept: OPERATING ROOM | Age: 46
DRG: 908 | End: 2021-01-06
Payer: MEDICARE

## 2021-01-06 ENCOUNTER — APPOINTMENT (OUTPATIENT)
Dept: MRI IMAGING | Age: 46
DRG: 908 | End: 2021-01-06
Payer: MEDICARE

## 2021-01-06 ENCOUNTER — ANESTHESIA EVENT (OUTPATIENT)
Dept: OPERATING ROOM | Age: 46
DRG: 908 | End: 2021-01-06
Payer: MEDICARE

## 2021-01-06 VITALS — OXYGEN SATURATION: 96 % | DIASTOLIC BLOOD PRESSURE: 91 MMHG | TEMPERATURE: 98.4 F | SYSTOLIC BLOOD PRESSURE: 133 MMHG

## 2021-01-06 LAB
ANION GAP SERPL CALCULATED.3IONS-SCNC: 8 MEQ/L (ref 8–16)
BUN BLDV-MCNC: 12 MG/DL (ref 7–22)
CALCIUM SERPL-MCNC: 8.6 MG/DL (ref 8.5–10.5)
CHLORIDE BLD-SCNC: 99 MEQ/L (ref 98–111)
CO2: 28 MEQ/L (ref 23–33)
CREAT SERPL-MCNC: 0.5 MG/DL (ref 0.4–1.2)
GFR SERPL CREATININE-BSD FRML MDRD: > 90 ML/MIN/1.73M2
GLUCOSE BLD-MCNC: 117 MG/DL (ref 70–108)
POTASSIUM SERPL-SCNC: 4.1 MEQ/L (ref 3.5–5.2)
SARS-COV-2, NAAT: NOT DETECTED
SODIUM BLD-SCNC: 135 MEQ/L (ref 135–145)

## 2021-01-06 PROCEDURE — 6360000002 HC RX W HCPCS: Performed by: PHYSICIAN ASSISTANT

## 2021-01-06 PROCEDURE — 6370000000 HC RX 637 (ALT 250 FOR IP): Performed by: PHYSICIAN ASSISTANT

## 2021-01-06 PROCEDURE — 1200000000 HC SEMI PRIVATE

## 2021-01-06 PROCEDURE — 0SB20ZZ EXCISION OF LUMBAR VERTEBRAL DISC, OPEN APPROACH: ICD-10-PCS | Performed by: ORTHOPAEDIC SURGERY

## 2021-01-06 PROCEDURE — 2500000003 HC RX 250 WO HCPCS: Performed by: NURSE ANESTHETIST, CERTIFIED REGISTERED

## 2021-01-06 PROCEDURE — 2580000003 HC RX 258: Performed by: INTERNAL MEDICINE

## 2021-01-06 PROCEDURE — 99233 SBSQ HOSP IP/OBS HIGH 50: CPT | Performed by: INTERNAL MEDICINE

## 2021-01-06 PROCEDURE — A9579 GAD-BASE MR CONTRAST NOS,1ML: HCPCS | Performed by: INTERNAL MEDICINE

## 2021-01-06 PROCEDURE — 3700000000 HC ANESTHESIA ATTENDED CARE: Performed by: ORTHOPAEDIC SURGERY

## 2021-01-06 PROCEDURE — 72158 MRI LUMBAR SPINE W/O & W/DYE: CPT

## 2021-01-06 PROCEDURE — 6360000004 HC RX CONTRAST MEDICATION: Performed by: INTERNAL MEDICINE

## 2021-01-06 PROCEDURE — 6360000002 HC RX W HCPCS: Performed by: INTERNAL MEDICINE

## 2021-01-06 PROCEDURE — 6360000002 HC RX W HCPCS: Performed by: ANESTHESIOLOGY

## 2021-01-06 PROCEDURE — 36415 COLL VENOUS BLD VENIPUNCTURE: CPT

## 2021-01-06 PROCEDURE — 2500000003 HC RX 250 WO HCPCS: Performed by: PHYSICIAN ASSISTANT

## 2021-01-06 PROCEDURE — 2580000003 HC RX 258: Performed by: NURSE ANESTHETIST, CERTIFIED REGISTERED

## 2021-01-06 PROCEDURE — U0002 COVID-19 LAB TEST NON-CDC: HCPCS

## 2021-01-06 PROCEDURE — 2580000003 HC RX 258: Performed by: PHYSICIAN ASSISTANT

## 2021-01-06 PROCEDURE — 6360000002 HC RX W HCPCS: Performed by: NURSE ANESTHETIST, CERTIFIED REGISTERED

## 2021-01-06 PROCEDURE — 7100000001 HC PACU RECOVERY - ADDTL 15 MIN: Performed by: ORTHOPAEDIC SURGERY

## 2021-01-06 PROCEDURE — 2780000010 HC IMPLANT OTHER: Performed by: ORTHOPAEDIC SURGERY

## 2021-01-06 PROCEDURE — 3600000015 HC SURGERY LEVEL 5 ADDTL 15MIN: Performed by: ORTHOPAEDIC SURGERY

## 2021-01-06 PROCEDURE — 6360000002 HC RX W HCPCS: Performed by: ORTHOPAEDIC SURGERY

## 2021-01-06 PROCEDURE — 3700000001 HC ADD 15 MINUTES (ANESTHESIA): Performed by: ORTHOPAEDIC SURGERY

## 2021-01-06 PROCEDURE — 01NB0ZZ RELEASE LUMBAR NERVE, OPEN APPROACH: ICD-10-PCS | Performed by: ORTHOPAEDIC SURGERY

## 2021-01-06 PROCEDURE — 99232 SBSQ HOSP IP/OBS MODERATE 35: CPT | Performed by: INTERNAL MEDICINE

## 2021-01-06 PROCEDURE — 7100000000 HC PACU RECOVERY - FIRST 15 MIN: Performed by: ORTHOPAEDIC SURGERY

## 2021-01-06 PROCEDURE — 72157 MRI CHEST SPINE W/O & W/DYE: CPT

## 2021-01-06 PROCEDURE — 00CY0ZZ EXTIRPATION OF MATTER FROM LUMBAR SPINAL CORD, OPEN APPROACH: ICD-10-PCS | Performed by: ORTHOPAEDIC SURGERY

## 2021-01-06 PROCEDURE — 3600000005 HC SURGERY LEVEL 5 BASE: Performed by: ORTHOPAEDIC SURGERY

## 2021-01-06 PROCEDURE — 2709999900 HC NON-CHARGEABLE SUPPLY: Performed by: ORTHOPAEDIC SURGERY

## 2021-01-06 PROCEDURE — 80048 BASIC METABOLIC PNL TOTAL CA: CPT

## 2021-01-06 RX ORDER — MORPHINE SULFATE 4 MG/ML
4 INJECTION, SOLUTION INTRAMUSCULAR; INTRAVENOUS
Status: DISCONTINUED | OUTPATIENT
Start: 2021-01-06 | End: 2021-01-08 | Stop reason: HOSPADM

## 2021-01-06 RX ORDER — SODIUM PHOSPHATE, DIBASIC AND SODIUM PHOSPHATE, MONOBASIC 7; 19 G/133ML; G/133ML
1 ENEMA RECTAL DAILY PRN
Status: DISCONTINUED | OUTPATIENT
Start: 2021-01-06 | End: 2021-01-08 | Stop reason: HOSPADM

## 2021-01-06 RX ORDER — DEXAMETHASONE SODIUM PHOSPHATE 10 MG/ML
INJECTION, EMULSION INTRAMUSCULAR; INTRAVENOUS PRN
Status: DISCONTINUED | OUTPATIENT
Start: 2021-01-06 | End: 2021-01-06 | Stop reason: SDUPTHER

## 2021-01-06 RX ORDER — ONDANSETRON 2 MG/ML
4 INJECTION INTRAMUSCULAR; INTRAVENOUS EVERY 6 HOURS PRN
Status: DISCONTINUED | OUTPATIENT
Start: 2021-01-06 | End: 2021-01-08 | Stop reason: HOSPADM

## 2021-01-06 RX ORDER — BISACODYL 10 MG
10 SUPPOSITORY, RECTAL RECTAL DAILY PRN
Status: DISCONTINUED | OUTPATIENT
Start: 2021-01-06 | End: 2021-01-08 | Stop reason: HOSPADM

## 2021-01-06 RX ORDER — PROPOFOL 10 MG/ML
INJECTION, EMULSION INTRAVENOUS PRN
Status: DISCONTINUED | OUTPATIENT
Start: 2021-01-06 | End: 2021-01-06 | Stop reason: SDUPTHER

## 2021-01-06 RX ORDER — SODIUM CHLORIDE 9 MG/ML
INJECTION, SOLUTION INTRAVENOUS CONTINUOUS PRN
Status: DISCONTINUED | OUTPATIENT
Start: 2021-01-06 | End: 2021-01-06 | Stop reason: SDUPTHER

## 2021-01-06 RX ORDER — VANCOMYCIN HYDROCHLORIDE 1 G/20ML
INJECTION, POWDER, LYOPHILIZED, FOR SOLUTION INTRAVENOUS PRN
Status: DISCONTINUED | OUTPATIENT
Start: 2021-01-06 | End: 2021-01-06 | Stop reason: HOSPADM

## 2021-01-06 RX ORDER — FENTANYL CITRATE 50 UG/ML
25 INJECTION, SOLUTION INTRAMUSCULAR; INTRAVENOUS EVERY 5 MIN PRN
Status: DISCONTINUED | OUTPATIENT
Start: 2021-01-06 | End: 2021-01-06 | Stop reason: HOSPADM

## 2021-01-06 RX ORDER — MIDAZOLAM HYDROCHLORIDE 1 MG/ML
INJECTION INTRAMUSCULAR; INTRAVENOUS PRN
Status: DISCONTINUED | OUTPATIENT
Start: 2021-01-06 | End: 2021-01-06 | Stop reason: SDUPTHER

## 2021-01-06 RX ORDER — FENTANYL CITRATE 50 UG/ML
INJECTION, SOLUTION INTRAMUSCULAR; INTRAVENOUS PRN
Status: DISCONTINUED | OUTPATIENT
Start: 2021-01-06 | End: 2021-01-06 | Stop reason: SDUPTHER

## 2021-01-06 RX ORDER — DEXAMETHASONE SODIUM PHOSPHATE 10 MG/ML
10 INJECTION, EMULSION INTRAMUSCULAR; INTRAVENOUS EVERY 8 HOURS
Status: DISCONTINUED | OUTPATIENT
Start: 2021-01-06 | End: 2021-01-08 | Stop reason: HOSPADM

## 2021-01-06 RX ORDER — CEFAZOLIN SODIUM 1 G/3ML
INJECTION, POWDER, FOR SOLUTION INTRAMUSCULAR; INTRAVENOUS PRN
Status: DISCONTINUED | OUTPATIENT
Start: 2021-01-06 | End: 2021-01-06 | Stop reason: SDUPTHER

## 2021-01-06 RX ORDER — ROCURONIUM BROMIDE 10 MG/ML
INJECTION, SOLUTION INTRAVENOUS PRN
Status: DISCONTINUED | OUTPATIENT
Start: 2021-01-06 | End: 2021-01-06 | Stop reason: SDUPTHER

## 2021-01-06 RX ORDER — SENNA AND DOCUSATE SODIUM 50; 8.6 MG/1; MG/1
1 TABLET, FILM COATED ORAL 2 TIMES DAILY
Status: DISCONTINUED | OUTPATIENT
Start: 2021-01-06 | End: 2021-01-08 | Stop reason: HOSPADM

## 2021-01-06 RX ORDER — SODIUM CHLORIDE 0.9 % (FLUSH) 0.9 %
10 SYRINGE (ML) INJECTION EVERY 12 HOURS SCHEDULED
Status: DISCONTINUED | OUTPATIENT
Start: 2021-01-06 | End: 2021-01-08 | Stop reason: HOSPADM

## 2021-01-06 RX ORDER — SUCCINYLCHOLINE/SOD CL,ISO/PF 200MG/10ML
SYRINGE (ML) INTRAVENOUS PRN
Status: DISCONTINUED | OUTPATIENT
Start: 2021-01-06 | End: 2021-01-06 | Stop reason: SDUPTHER

## 2021-01-06 RX ORDER — SODIUM CHLORIDE 0.9 % (FLUSH) 0.9 %
10 SYRINGE (ML) INJECTION PRN
Status: DISCONTINUED | OUTPATIENT
Start: 2021-01-06 | End: 2021-01-08 | Stop reason: HOSPADM

## 2021-01-06 RX ORDER — MORPHINE SULFATE 2 MG/ML
2 INJECTION, SOLUTION INTRAMUSCULAR; INTRAVENOUS
Status: DISCONTINUED | OUTPATIENT
Start: 2021-01-06 | End: 2021-01-08 | Stop reason: HOSPADM

## 2021-01-06 RX ORDER — CLINDAMYCIN PHOSPHATE 900 MG/50ML
900 INJECTION INTRAVENOUS EVERY 8 HOURS
Status: COMPLETED | OUTPATIENT
Start: 2021-01-06 | End: 2021-01-07

## 2021-01-06 RX ORDER — PROMETHAZINE HYDROCHLORIDE 25 MG/1
12.5 TABLET ORAL EVERY 6 HOURS PRN
Status: DISCONTINUED | OUTPATIENT
Start: 2021-01-06 | End: 2021-01-08 | Stop reason: HOSPADM

## 2021-01-06 RX ORDER — SODIUM CHLORIDE 9 MG/ML
INJECTION, SOLUTION INTRAVENOUS CONTINUOUS
Status: DISCONTINUED | OUTPATIENT
Start: 2021-01-06 | End: 2021-01-07

## 2021-01-06 RX ORDER — ONDANSETRON 2 MG/ML
INJECTION INTRAMUSCULAR; INTRAVENOUS PRN
Status: DISCONTINUED | OUTPATIENT
Start: 2021-01-06 | End: 2021-01-06 | Stop reason: SDUPTHER

## 2021-01-06 RX ORDER — POLYETHYLENE GLYCOL 3350 17 G/17G
17 POWDER, FOR SOLUTION ORAL DAILY
Status: DISCONTINUED | OUTPATIENT
Start: 2021-01-06 | End: 2021-01-08 | Stop reason: HOSPADM

## 2021-01-06 RX ORDER — NEOSTIGMINE METHYLSULFATE 5 MG/5 ML
SYRINGE (ML) INTRAVENOUS PRN
Status: DISCONTINUED | OUTPATIENT
Start: 2021-01-06 | End: 2021-01-06 | Stop reason: SDUPTHER

## 2021-01-06 RX ORDER — OXYCODONE HYDROCHLORIDE AND ACETAMINOPHEN 5; 325 MG/1; MG/1
2 TABLET ORAL EVERY 6 HOURS PRN
Status: DISCONTINUED | OUTPATIENT
Start: 2021-01-06 | End: 2021-01-08 | Stop reason: HOSPADM

## 2021-01-06 RX ORDER — OXYCODONE HYDROCHLORIDE AND ACETAMINOPHEN 5; 325 MG/1; MG/1
1 TABLET ORAL EVERY 6 HOURS PRN
Status: DISCONTINUED | OUTPATIENT
Start: 2021-01-06 | End: 2021-01-08 | Stop reason: HOSPADM

## 2021-01-06 RX ORDER — GLYCOPYRROLATE 1 MG/5 ML
SYRINGE (ML) INTRAVENOUS PRN
Status: DISCONTINUED | OUTPATIENT
Start: 2021-01-06 | End: 2021-01-06 | Stop reason: SDUPTHER

## 2021-01-06 RX ORDER — LIDOCAINE HCL/PF 100 MG/5ML
SYRINGE (ML) INJECTION PRN
Status: DISCONTINUED | OUTPATIENT
Start: 2021-01-06 | End: 2021-01-06 | Stop reason: SDUPTHER

## 2021-01-06 RX ADMIN — GADOTERIDOL 20 ML: 279.3 INJECTION, SOLUTION INTRAVENOUS at 10:33

## 2021-01-06 RX ADMIN — FENTANYL CITRATE 150 MCG: 50 INJECTION, SOLUTION INTRAMUSCULAR; INTRAVENOUS at 15:17

## 2021-01-06 RX ADMIN — OXYCODONE HYDROCHLORIDE AND ACETAMINOPHEN 1 TABLET: 10; 325 TABLET ORAL at 07:30

## 2021-01-06 RX ADMIN — SODIUM CHLORIDE: 4.5 INJECTION, SOLUTION INTRAVENOUS at 12:34

## 2021-01-06 RX ADMIN — ONDANSETRON HYDROCHLORIDE 4 MG: 4 INJECTION, SOLUTION INTRAMUSCULAR; INTRAVENOUS at 16:37

## 2021-01-06 RX ADMIN — Medication 80 MG: at 15:17

## 2021-01-06 RX ADMIN — PROPOFOL 100 MG: 10 INJECTION, EMULSION INTRAVENOUS at 16:38

## 2021-01-06 RX ADMIN — BISACODYL 5 MG: 5 TABLET, COATED ORAL at 21:04

## 2021-01-06 RX ADMIN — ENOXAPARIN SODIUM 40 MG: 40 INJECTION SUBCUTANEOUS at 08:11

## 2021-01-06 RX ADMIN — FOLIC ACID 2 MG: 1 TABLET ORAL at 08:11

## 2021-01-06 RX ADMIN — Medication 10 ML: at 21:07

## 2021-01-06 RX ADMIN — PAROXETINE HYDROCHLORIDE 40 MG: 20 TABLET, FILM COATED ORAL at 08:11

## 2021-01-06 RX ADMIN — DEXAMETHASONE SODIUM PHOSPHATE 10 MG: 10 INJECTION, EMULSION INTRAMUSCULAR; INTRAVENOUS at 12:33

## 2021-01-06 RX ADMIN — SODIUM CHLORIDE: 9 INJECTION, SOLUTION INTRAVENOUS at 15:12

## 2021-01-06 RX ADMIN — TIZANIDINE 4 MG: 4 TABLET ORAL at 08:11

## 2021-01-06 RX ADMIN — MIDAZOLAM HYDROCHLORIDE 2 MG: 1 INJECTION, SOLUTION INTRAMUSCULAR; INTRAVENOUS at 15:12

## 2021-01-06 RX ADMIN — PROPOFOL 150 MG: 10 INJECTION, EMULSION INTRAVENOUS at 15:17

## 2021-01-06 RX ADMIN — HYDROMORPHONE HYDROCHLORIDE 0.5 MG: 1 INJECTION, SOLUTION INTRAMUSCULAR; INTRAVENOUS; SUBCUTANEOUS at 17:23

## 2021-01-06 RX ADMIN — HYDROMORPHONE HYDROCHLORIDE 0.5 MG: 1 INJECTION, SOLUTION INTRAMUSCULAR; INTRAVENOUS; SUBCUTANEOUS at 17:11

## 2021-01-06 RX ADMIN — LISINOPRIL 20 MG: 20 TABLET ORAL at 08:11

## 2021-01-06 RX ADMIN — Medication 150 MG: at 15:17

## 2021-01-06 RX ADMIN — OXYCODONE AND ACETAMINOPHEN 1 TABLET: 5; 325 TABLET ORAL at 21:22

## 2021-01-06 RX ADMIN — MESALAMINE 800 MG: 400 CAPSULE, DELAYED RELEASE ORAL at 21:04

## 2021-01-06 RX ADMIN — DEXAMETHASONE SODIUM PHOSPHATE 10 MG: 10 INJECTION, EMULSION INTRAMUSCULAR; INTRAVENOUS at 15:44

## 2021-01-06 RX ADMIN — DOCUSATE SODIUM 50 MG AND SENNOSIDES 8.6 MG 1 TABLET: 8.6; 5 TABLET, FILM COATED ORAL at 21:07

## 2021-01-06 RX ADMIN — MORPHINE SULFATE 2 MG: 2 INJECTION, SOLUTION INTRAMUSCULAR; INTRAVENOUS at 23:55

## 2021-01-06 RX ADMIN — PREGABALIN 150 MG: 75 CAPSULE ORAL at 08:11

## 2021-01-06 RX ADMIN — PREGABALIN 150 MG: 75 CAPSULE ORAL at 21:07

## 2021-01-06 RX ADMIN — POLYETHYLENE GLYCOL 3350 17 G: 17 POWDER, FOR SOLUTION ORAL at 21:07

## 2021-01-06 RX ADMIN — OXYCODONE HYDROCHLORIDE AND ACETAMINOPHEN 1 TABLET: 10; 325 TABLET ORAL at 01:16

## 2021-01-06 RX ADMIN — HYDROMORPHONE HYDROCHLORIDE 1 MG: 1 INJECTION, SOLUTION INTRAMUSCULAR; INTRAVENOUS; SUBCUTANEOUS at 17:05

## 2021-01-06 RX ADMIN — MESALAMINE 800 MG: 400 CAPSULE, DELAYED RELEASE ORAL at 08:11

## 2021-01-06 RX ADMIN — Medication 4 MG: at 16:24

## 2021-01-06 RX ADMIN — DEXAMETHASONE SODIUM PHOSPHATE 10 MG: 10 INJECTION, EMULSION INTRAMUSCULAR; INTRAVENOUS at 21:04

## 2021-01-06 RX ADMIN — FENTANYL CITRATE 50 MCG: 50 INJECTION, SOLUTION INTRAMUSCULAR; INTRAVENOUS at 16:10

## 2021-01-06 RX ADMIN — ROCURONIUM BROMIDE 20 MG: 10 INJECTION INTRAVENOUS at 15:29

## 2021-01-06 RX ADMIN — CEFAZOLIN 3000 MG: 1 INJECTION, POWDER, FOR SOLUTION INTRAMUSCULAR; INTRAVENOUS; PARENTERAL at 15:47

## 2021-01-06 RX ADMIN — PANTOPRAZOLE SODIUM 40 MG: 40 TABLET, DELAYED RELEASE ORAL at 07:30

## 2021-01-06 RX ADMIN — HYDROMORPHONE HYDROCHLORIDE 0.5 MG: 1 INJECTION, SOLUTION INTRAMUSCULAR; INTRAVENOUS; SUBCUTANEOUS at 17:05

## 2021-01-06 RX ADMIN — CLINDAMYCIN IN 5 PERCENT DEXTROSE 900 MG: 18 INJECTION, SOLUTION INTRAVENOUS at 21:04

## 2021-01-06 RX ADMIN — Medication 0.8 MG: at 16:24

## 2021-01-06 RX ADMIN — FERROUS SULFATE TAB 325 MG (65 MG ELEMENTAL FE) 325 MG: 325 (65 FE) TAB at 08:12

## 2021-01-06 RX ADMIN — HYDROMORPHONE HYDROCHLORIDE 1 MG: 1 INJECTION, SOLUTION INTRAMUSCULAR; INTRAVENOUS; SUBCUTANEOUS at 09:02

## 2021-01-06 RX ADMIN — HYDROMORPHONE HYDROCHLORIDE 0.5 MG: 1 INJECTION, SOLUTION INTRAMUSCULAR; INTRAVENOUS; SUBCUTANEOUS at 17:28

## 2021-01-06 RX ADMIN — SODIUM CHLORIDE: 9 INJECTION, SOLUTION INTRAVENOUS at 21:03

## 2021-01-06 RX ADMIN — ROCURONIUM BROMIDE 20 MG: 10 INJECTION INTRAVENOUS at 15:41

## 2021-01-06 ASSESSMENT — PAIN DESCRIPTION - LOCATION
LOCATION: BACK

## 2021-01-06 ASSESSMENT — PULMONARY FUNCTION TESTS
PIF_VALUE: 28
PIF_VALUE: 29
PIF_VALUE: 28
PIF_VALUE: 30
PIF_VALUE: 28
PIF_VALUE: 28
PIF_VALUE: 29
PIF_VALUE: 31
PIF_VALUE: 29
PIF_VALUE: 28
PIF_VALUE: 32
PIF_VALUE: 22
PIF_VALUE: 1
PIF_VALUE: 30
PIF_VALUE: 2
PIF_VALUE: 1
PIF_VALUE: 23
PIF_VALUE: 23
PIF_VALUE: 25
PIF_VALUE: 30
PIF_VALUE: 28
PIF_VALUE: 6
PIF_VALUE: 28
PIF_VALUE: 30
PIF_VALUE: 30
PIF_VALUE: 22
PIF_VALUE: 1
PIF_VALUE: 23
PIF_VALUE: 29
PIF_VALUE: 27
PIF_VALUE: 29
PIF_VALUE: 28
PIF_VALUE: 1
PIF_VALUE: 22
PIF_VALUE: 7
PIF_VALUE: 27
PIF_VALUE: 22
PIF_VALUE: 22
PIF_VALUE: 23
PIF_VALUE: 4
PIF_VALUE: 29
PIF_VALUE: 31
PIF_VALUE: 29
PIF_VALUE: 22
PIF_VALUE: 27
PIF_VALUE: 29
PIF_VALUE: 28

## 2021-01-06 ASSESSMENT — PAIN DESCRIPTION - DESCRIPTORS
DESCRIPTORS: BURNING

## 2021-01-06 ASSESSMENT — PAIN DESCRIPTION - FREQUENCY
FREQUENCY: CONTINUOUS

## 2021-01-06 ASSESSMENT — PAIN SCALES - GENERAL
PAINLEVEL_OUTOF10: 7
PAINLEVEL_OUTOF10: 6
PAINLEVEL_OUTOF10: 7
PAINLEVEL_OUTOF10: 5
PAINLEVEL_OUTOF10: 5
PAINLEVEL_OUTOF10: 7
PAINLEVEL_OUTOF10: 7

## 2021-01-06 ASSESSMENT — PAIN DESCRIPTION - ONSET
ONSET: ON-GOING

## 2021-01-06 ASSESSMENT — PAIN DESCRIPTION - PAIN TYPE
TYPE: SURGICAL PAIN

## 2021-01-06 ASSESSMENT — PAIN DESCRIPTION - PROGRESSION
CLINICAL_PROGRESSION: NOT CHANGED
CLINICAL_PROGRESSION: GRADUALLY WORSENING
CLINICAL_PROGRESSION: NOT CHANGED
CLINICAL_PROGRESSION: GRADUALLY IMPROVING
CLINICAL_PROGRESSION: NOT CHANGED

## 2021-01-06 ASSESSMENT — PAIN DESCRIPTION - ORIENTATION
ORIENTATION: LOWER

## 2021-01-06 NOTE — CARE COORDINATION
DISASTER CHARTING    1/6/21, 3:05 PM EST    DISCHARGE ONGOING EVALUATION:     Sruthi Lorenzo day: 3  Location: [id number]-55/[id number] Reason for admit: Hyponatremia [E87.1]   Barriers to Discharge: recent surgery lumbar fusion at [facility] with Dr Aileen Denver, fell after surgery, unable to complete MRI 01/05, IVF  planned MRI L/S today:  Severe stenosis of the thecal sac and compression of the nerve roots of the cauda equina at the L3-4 and L4-5 levels due to a fluid collection in the laminectomy bed exhibiting mass effect upon the thecal sac. Enhancement of the nerve roots of the cauda equina posterior to L3. This suggests inflammation versus ischemia of those nerve roots. 10.3 cm fluid collection in the laminectomy bed. The differential diagnosis includes infection, seroma and hematoma. This does surround the bases of the pedicle screws. Moderate stenosis of the thecal sac at the L2-3 level. NPO, planning surgery today with ortho Lumbar I and D, consent for surgery today. PCP: CRISTIAN Evans CNP  Patient Goals/Plan/Treatment Preferences: Felicia Eldridge was off floor this am for MRI and later on he was getting a bath around noon. Unable to speak to him; will try tomorrow.

## 2021-01-06 NOTE — CARE COORDINATION
DISCHARGE/PLANNING EVALUATION  1/6/21, 12:02 PM EST    Reason for Referral: current with home health  Mental Status: alert, oriented   Decision Making:  Makes own decisions   Family/Social/Home Environment:  Yaakov resides at [address] [place] [address] supported sober living. He plans to return there at discharge. He has support from friends, family and [facility] staff. Current Services including food security, transportation and housekeeping:  Resides at [address] [place] [address] supported living, has [facility] for [facility] and PT/OT  Current Equipment:   Payment Source: medicare, medicaid   Concerns or Barriers to Discharge: none identified   Post acute provider list with quality measures, geographic area and applicable managed care information provided. Questions regarding selection process answered: declined, prefers current agency     Teach Back Method used with patient regarding care plan and discharge plan  Patient  verbalizes understanding of the plan of care and contributes to goal setting. Patient goals, treatment preferences and discharge plan:  Spoke with Chance about discharge plan. He plans to return to [address] [place] [address], continue with [facility].        Electronically signed by KAT Gottlieb on 1/6/2021 at 12:02 PM

## 2021-01-06 NOTE — ANESTHESIA POSTPROCEDURE EVALUATION
Department of Anesthesiology  Postprocedure Note    Patient: Darylene Sport  MRN: [id number]  YOB: 1975  Date of evaluation: 1/6/2021  Time:  5:46 PM     Procedure Summary     Date: 01/06/21 Room / Location: [address] [place] [address]    Anesthesia Start: 6544 Anesthesia Stop: 1700    Procedure: LUMBAR I&D (N/A ) Diagnosis: (LUMBAR RADICULOPATHY)    Surgeons: Fannie Young MD Responsible Provider: Carrie Weber MD    Anesthesia Type: general ASA Status: 3          Anesthesia Type: general    Vipul Phase I: Vipul Score: 9    Vipul Phase II:      Last vitals: Reviewed and per EMR flowsheets.        Anesthesia Post Evaluation    Patient location during evaluation: PACU  Patient participation: complete - patient participated  Level of consciousness: awake  Airway patency: patent  Nausea & Vomiting: no vomiting and no nausea  Complications: no  Cardiovascular status: hemodynamically stable  Respiratory status: acceptable  Hydration status: stable

## 2021-01-06 NOTE — ANESTHESIA PRE PROCEDURE
Department of Anesthesiology  Preprocedure Note       Name:  Corinne Printers   Age:  39 y.o.  :  1975                                          MRN:  [id number]         Date:  2021      Surgeon: Mathieu Mirza):  Nataliya Mauricio MD    Procedure: Procedure(s):  LUMBAR I&D    Medications prior to admission:   Prior to Admission medications    Medication Sig Start Date End Date Taking? Authorizing Provider   pregabalin (LYRICA) 200 MG capsule Take 200 mg by mouth 3 times daily.    Yes Historical Provider, MD   PARoxetine (PAXIL) 40 MG tablet Take 40 mg by mouth every morning   Yes Historical Provider, MD   potassium chloride (MICRO-K) 10 MEQ extended release capsule Take 10 mEq by mouth daily   Yes Historical Provider, MD   famotidine (PEPCID) 40 MG tablet Take 40 mg by mouth daily   Yes Historical Provider, MD   docusate sodium (COLACE) 100 MG capsule Take 100 mg by mouth daily   Yes Historical Provider, MD   ferrous sulfate 325 (65 Fe) MG tablet Take 325 mg by mouth daily (with breakfast)   Yes Historical Provider, MD   lisinopril (PRINIVIL;ZESTRIL) 20 MG tablet Take 20 mg by mouth daily Indications: with hydrochlorothiazide 25 mg    Yes Historical Provider, MD   mesalamine (DELZICOL) 400 MG CPDR delayed release capsule Take 800 mg by mouth 3 times daily   Yes Historical Provider, MD   folic acid (FOLVITE) 1 MG tablet Take 2 tablets by mouth daily 17  Yes Lizbeth See MD   LACTULOSE PO Take 20 g by mouth 3 times daily    Yes Historical Provider, MD   pantoprazole (PROTONIX) 40 MG tablet Take 40 mg by mouth 2 times daily    Yes Historical Provider, MD   Multiple Vitamin (MULTI VITAMIN MENS) TABS Take by mouth   Yes Historical Provider, MD   Multiple Vitamins-Minerals (MULTIVITAMIN ADULT PO) Take 1 tablet by mouth    Historical Provider, MD       Current medications:    Current Facility-Administered Medications   Medication Dose Route Frequency Provider Last Rate Last Admin    HYDROmorphone LISBET BautistaC   20 mg at 01/06/21 0849    And    [Held by provider] hydroCHLOROthiazide (HYDRODIURIL) tablet 25 mg  25 mg Oral Daily [employer], PA-C   25 mg at 01/04/21 1304    pregabalin (LYRICA) capsule 150 mg  150 mg Oral BID [employer], PA-C   150 mg at 01/06/21 0811    oxyCODONE-acetaminophen (PERCOCET)  MG per tablet 1 tablet  1 tablet Oral Q6H PRN [employer], PA-C   1 tablet at 01/06/21 0730    0.45 % sodium chloride infusion   Intravenous Continuous Forrest Carlos  mL/hr at 01/06/21 1234 New Bag at 01/06/21 1234    docusate sodium (COLACE) capsule 100 mg  100 mg Oral BID Amie Schaffer MD           Allergies:     Allergies   Allergen Reactions    Penicillins Anaphylaxis    Trazodone And Nefazodone      RESTLESS LEGS       Problem List:    Patient Active Problem List   Diagnosis Code    Ulcerative colitis ([facility] K51.90    Ulcerative (chronic) enterocolitis ([facility] K51.00    Ulcerative colitis, universal ([facility] K51.00    Hyponatremia E87.1    Urinary retention R33.9    Essential hypertension S50    Metabolic acidosis U57.9       Past Medical History:        Diagnosis Date    Alcohol abuse     in remission    Amputation of right arm (HCC)     Ascites     GERD (gastroesophageal reflux disease)     Hepatic cirrhosis (HCC)     Hepatitis B     Hepatitis C     Hypertension     Intravenous drug user     in remission    Osteomyelitis ([facility]     right arm/shoulder amputation    Psychiatric problem     PTSD (post-traumatic stress disorder)     Sciatica     Splenomegaly     Thrombocytopenia (HCC)     Ulcerative colitis ([facility]        Past Surgical History:        Procedure Laterality Date    APPENDECTOMY      ARM AMP[place]ATION AT [place]ER Right 2017    [place]    BLADDER SURGERY  2005    ELBOW FRACTURE SURGERY  2020    PORT SURGERY N/A 8/17/2020    SUBCLAVIAN SINGLE LUMEN MEDIPORT INSERTION performed by Austin Martinez MD at [address] [place] [address] SINGLE/MULTIPLE  2017    COLONOSCOPY WITH BIOPSY performed by Waldemar Barba MD at [address] [facility]       Social History:    Social History     Tobacco Use    Smoking status: Former Smoker     Quit date: 2020     Years since quittin.7    Smokeless tobacco: Current User     Types: Snuff   Substance Use Topics    Alcohol use: No     Comment: Several months since last used                                Ready to quit: Not Answered  Counseling given: Not Answered      Vital Signs (Current):   Vitals:    21 2314 21 0331 21 0804 21 0913   BP: 112/60 113/65 118/74 123/79   Pulse: 94 85 83 95   Resp: 16  18   Temp: 98.5 °F (36.9 °C) 98.1 °F (36.7 °C) 98.4 °F (36.9 °C) 98.7 °F (37.1 °C)   TempSrc: Oral Oral Oral Oral   SpO2: 98% 98% 100% 97%   Weight:       Height:                                                  BP Readings from Last 3 Encounters:   21 123/79   20 116/72   10/30/20 107/68       NPO Status:                                                                                 BMI:   Wt Readings from Last 3 Encounters:   21 250 lb (113.4 kg)   20 247 lb (112 kg)   10/30/20 240 lb (108.9 kg)     Body mass index is 33.91 kg/m².     CBC:   Lab Results   Component Value Date    WBC 8.1 2021    RBC 2.77 2021    HGB 9.5 2021    HCT 27.5 2021    MCV 99.3 2021    RDW 13.2 2019     2021       CMP:   Lab Results   Component Value Date     2021    K 4.1 2021    K 3.9 2021    CL 99 2021    CO2 28 2021    BUN 12 2021    CREATININE 0.5 2021    GFRAA >60 2019    GFRAA >60 10/14/2011    AGRATIO 1.2 2019    LABGLOM >90 2021    GLUCOSE 117 2021    GLUCOSE 166 2019    PROT 6.3 2021    PROT 7.6 10/13/2011    CALCIUM 8.6 2021    BILITOT 1.2 2021    ALKPHOS 62 2021    AST 29 2021    ALT 28 2021       POC Tests: No results for input(s): POCGLU, POCNA, POCK, POCCL, POCBUN, POCHEMO, POCHCT in the last 72 hours. Coags:   Lab Results   Component Value Date    INR 1.00 01/03/2021    APTT 25.5 01/03/2021       HCG (If Applicable): No results found for: PREGTESTUR, PREGSERUM, HCG, HCGQUANT     ABGs: No results found for: PHART, PO2ART, QRA0USD, UIJ2HUC, BEART, H2SFGKZH     Type & Screen (If Applicable):  Lab Results   Component Value Date    LABRH POS 09/25/2017       Drug/Infectious Status (If Applicable):  Lab Results   Component Value Date    HEPCAB POS 10/03/2019       COVID-19 Screening (If Applicable):   Lab Results   Component Value Date    COVID19 NOT DETECTED 01/06/2021    COVID19 Not Detected 08/11/2020         Anesthesia Evaluation   no history of anesthetic complications:   Airway: Mallampati: II  TM distance: >3 FB   Neck ROM: full  Mouth opening: > = 3 FB Dental:          Pulmonary:normal exam              Patient did not smoke on day of surgery. Cardiovascular:    (+) hypertension:,                   Neuro/Psych:   (+) psychiatric history:            GI/Hepatic/Renal:   (+) GERD:, hepatitis:, liver disease:, morbid obesity         ROS comment: Ulcerative colitis. Endo/Other:              Pt had no PAT visit       Abdominal:   (+) obese,         Vascular: negative vascular ROS. Anesthesia Plan      general     ASA 3       Induction: intravenous. MIPS: Postoperative opioids intended and Prophylactic antiemetics administered. Anesthetic plan and risks discussed with patient. Plan discussed with CRNA.                   Rosendo Ramsay MD   1/6/2021

## 2021-01-06 NOTE — PROGRESS NOTES
Pt from recovery call light in reach bed alarm on, pt states \" I feel better\" pt taking clear liquids tolerating well

## 2021-01-06 NOTE — CARE COORDINATION
DISASTER CHARTING    1/6/21, 8:59 AM EST    DISCHARGE ONGOING EVALUATION:     Julietrosey Danielle day: 3  Location: -/055-A Reason for admit: Hyponatremia [E87.1]   Barriers to Discharge: Nephrology following, , planning to start diuretics per note. Bladder scan completed yesterday indicated >999. Cullen catheter reinserted. Ortho consult, NPO, IV fluids, Lovenox, MRI of lumbar and thoracic spine, PT/OT, up with assistance. PCP: CRISTIAN Hassan CNP  Patient Goals/Plan/Treatment Preferences: Met with Chance. He is from home residing at [facility] ([facility] house in the basement of a [employer]). He states he has been there for the past three years after being at [facility]. Chance plans to return there at discharge. Ambrosio Campbell is current with [facility] for skilled nursing, PT/OT. Ambrosio Campbell is concerned that he may need rehab but is concerned with COVID. Monitoring for therapy evaluations.

## 2021-01-06 NOTE — PROGRESS NOTES
[facility]         Renal Inpatient Follow-Up note         1/6/2021 8:22 AM    Pt Name:   Joy Baez  YOB: 1975  Attending:   Keagan George MD    Chief Complaint : Joy Baez is a 39 y.o. male being followed by nephrology for hyponatremia    Interval History :   Patient seen and examined by me. No distress  Feels well denies any complaints no chest pain or shortness of breath  Went into retention again after the Cullen has been removed. Scheduled Medications :    famotidine  40 mg Oral Daily    ferrous sulfate  325 mg Oral Daily with breakfast    folic acid  2 mg Oral Daily    lactulose  20 g Oral TID    mesalamine  800 mg Oral TID    pantoprazole  40 mg Oral BID AC    PARoxetine  40 mg Oral QAM    sodium chloride flush  10 mL Intravenous 2 times per day    enoxaparin  40 mg Subcutaneous Daily    lisinopril  20 mg Oral Daily    And    [Held by provider] hydroCHLOROthiazide  25 mg Oral Daily    pregabalin  150 mg Oral BID    docusate sodium  100 mg Oral BID      sodium chloride 100 mL/hr at 01/05/21 0858       Vitals :  /74   Pulse 83   Temp 98.4 °F (36.9 °C) (Oral)   Resp 18   Ht 6' (1.829 m)   Wt 250 lb (113.4 kg)   SpO2 100%   BMI 33.91 kg/m²     24HR INTAKE/OUTPUT:      Intake/Output Summary (Last 24 hours) at 1/6/2021 0822  Last data filed at 1/6/2021 0723  Gross per 24 hour   Intake 3643.52 ml   Output 5350 ml   Net -1706.48 ml     Last 3 weights  Wt Readings from Last 3 Encounters:   01/03/21 250 lb (113.4 kg)   11/25/20 247 lb (112 kg)   10/30/20 240 lb (108.9 kg)           Physical Exam :  General Appearance:  Well developed.  No distress  Mouth/Throat:  Oral mucosa moist  Neck:  Supple, no JVD  Lungs:  Breath sounds: clear  Heart[de-identified]  S1,S2 heard  Abdomen:  Soft, non - tender  Musculoskeletal:  Edema -noted but improving         Last 3 CBC   Recent Labs     01/03/21  1822 01/04/21  0339   WBC 10.3 8.1   RBC 2.79* 2.77*   HGB 9.7* 9.5*   HCT 27.0* 27.5*   * 125*     Last 3 CMP  Recent Labs     01/03/21  1822 01/03/21  1822 01/05/21  0336 01/05/21  0950 01/06/21  0655   *   < > 129* 132* 135   K 3.6   < > 3.8 3.9 4.1   CL 91*   < > 93* 95* 99   CO2 22*   < > 25 26 28   BUN 23*   < > 13 11 12   CREATININE 0.6   < > 0.5 0.5 0.5   CALCIUM 8.8   < > 8.8 8.7 8.6   LABALBU 3.6  --   --   --   --    BILITOT 1.2  --   --   --   --     < > = values in this interval not displayed. ASSESSMENT / Plan   1. Renal -renal function appears to be stable at baseline . 2. Hyponatremia most likely secondary to increased oral intake of liquids and unable to urinate. ? Overall improved well this morning it is 135.  ? Making a lot of urine at this time follow renal function and sodium level tomorrow there is a possibility that the sodium might go higher due to his polyuria     3. Mild metabolic acidosis improved  4. Essential hypertension  5. Fluid overload-having excellent urine output will monitor once the urine output tapers we will start him on a diuretic  6. Recent back surgery  7. Urinary retention in retention again concern for any cord compression MRI has been done  8. Meds reviewed and discussed with patient in detail    CARLOS Sam D.  [facility].

## 2021-01-06 NOTE — PROGRESS NOTES
Assessment and Plan:          1. Cauda Equina/Cord Compression: Urinary retention, saddle anesthesia, LE numbness and weakness. Recent lumbar surgery. MRI lumbar showing enhancement of the nerve roots of the cauda equina posterior to L3. This suggests inflammation versus ischemia of those nerve roots. Spoke with Dr. Andrea Cagle, will plan for emergent surgery today. Start IV Decadron. 2. Constipation: Patient states he is also had this problem following surgery in the past. Monitor closely as mentioned above. Stool softeners    3. Hyponatremia: Likely due to urine retention, free water intake. Overall improving. Nephrology assisting    4. History of cirrhosis: Due to history of hepatitis B and C. continue lactulose. Otherwise does not appear to be decompensated at this time    5. Macrocytic anemia: Suspect related to liver disease continue to monitor    6. 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    7. History of ulcerative colitis: Continue mesalamine    8. Thrombocytopenia: Due to liver disease. Continue to monitor    9. Bilateral lower extremity edema: Ultrasound obtained today. No evidence of DVT. Suspect could be related to urinary retention. We will continue to monitor      CC:  Unable to urinate  Hospital course: Patient presents to the ED stating that since his recent back surgery he has had a hard time passing his urine. His abdomen feels distended. The patient used lactulose and over a gallon of free water to attempt to urinate and have a bowel movement. The patient was found to have >2.5L in his bladder on presentation to the ED. The patient was also found to have a marked hyponatremia most likely due free water dilution.     Subjective: (12 point review of systems completed. Pertinent positives noted. Otherwise ROS is negative) : pt still having LE numbness, weakness, unable to urinate with saddle anesthesia.  Spoke with Dr. Andrea Cagle, plan for emergent contain minor errors which are inherent in voice recognition technology. **      Final report electronically signed by Dr. Iam Harkins on 1/6/2021 11:24 AM      MRI THORACIC SPINE W WO CONTRAST   Final Result       1. Normal thoracic spinal cord. 2. No evidence of spinal canal stenosis. **This report has been created using voice recognition software. It may contain minor errors which are inherent in voice recognition technology. **      Final report electronically signed by Dr. Iam Harkins on 1/6/2021 11:01 AM      VL DUP LOWER EXTREMITY VENOUS BILATERAL   Final Result   No evidence of a DVT. **This report has been created using voice recognition software. It may contain minor errors which are inherent in voice recognition technology. **      Final report electronically signed by Dr Valerie Anderson on 1/4/2021 2:41 PM      CT ABDOMEN PELVIS W IV CONTRAST Additional Contrast? None   Final Result   Mild bilateral hydronephrosis and ureterectasis without visible    obstruction. Distended bladder. Small gas in the bladder could be due to recent    instrumentation or gas-forming infection. Findings consistent with diarrheal illness/colitis. Mild anasarca. Possible reactive lymph nodes. Nonemergent/incidental findings in the report. This document has been electronically signed by: Kendell Paige MD on    01/03/2021 10:31 PM      All CT scans at this facility use dose modulation, iterative    reconstruction, and/or weight-based   dosing when appropriate to reduce radiation dose to as low as reasonably    achievable. CT LUMBAR RECONSTRUCTION WO POST PROCESS   Final Result   Possible postsurgical seroma in the surgical bed. Nonemergent/incidental findings in the report. See separate abdomen/pelvis CT report.       This document has been electronically signed by: Kendell Paige MD on    01/03/2021 09:47 PM      All CT scans at this facility use dose modulation, iterative reconstruction, and/or weight-based   dosing when appropriate to reduce radiation dose to as low as reasonably    achievable.                Electronically signed by Chely Lacey MD on 1/6/2021 at 12:09 PM

## 2021-01-06 NOTE — CONSULTS
Inpatient Consultation    Paul Christianson (1975)  1/6/2021      CHIEF COMPLAINT: 1 week s/p lumbar surgery with urinary retention    History Obtained From:  patient    HISTORY OF PRESENT ILLNESS:                The patient is a 39 y.o. male who presents with above chief complaint. Patient is 1 week s/p L3-5 decompression and fusion. Patient is reporting left leg pain and numbness with numbness into the right foot. Patient is also having urinary retention. Denies any bowel dysfunction. Patient reports his pain has progressively gotten worse and it is becoming more difficult to ambulate. MRI shows severe stenosis L3-5 due to fluid collection.     Past Medical History:        Diagnosis Date    Alcohol abuse     in remission    Amputation of right arm (HCC)     Ascites     GERD (gastroesophageal reflux disease)     Hepatic cirrhosis (HCC)     Hepatitis B     Hepatitis C     Hypertension     Intravenous drug user     in remission    Osteomyelitis (HCC)     right arm/shoulder amputation    Psychiatric problem     PTSD (post-traumatic stress disorder)     Sciatica     Splenomegaly     Thrombocytopenia (HCC)     Ulcerative colitis ([address]      Past Surgical History:        Procedure Laterality Date    APPENDECTOMY      ARM AMPUTATION AT SHOULDER Right 2017    [address]  2005    ELBOW FRACTURE SURGERY  2020    PORT SURGERY N/A 8/17/2020    SUBCLAVIAN SINGLE LUMEN MEDIPORT INSERTION performed by Marcos Reese MD at [address] W/BIOPSY SINGLE/MULTIPLE  9/26/2017    COLONOSCOPY WITH BIOPSY performed by Kelly Reaves MD at [facility]     Current Medications:   Current Facility-Administered Medications: HYDROmorphone (DILAUDID) injection 1 mg, 1 mg, Intravenous, Q4H PRN  dexamethasone (PF) (DECADRON) injection 10 mg, 10 mg, Intravenous, Q8H  famotidine (PEPCID) tablet 40 mg, 40 mg, Oral, Daily  ferrous sulfate (IRON 325) tablet 325 mg, 325 mg, Oral, Daily with breakfast  folic acid (FOLVITE) tablet 2 mg, 2 mg, Oral, Daily  lactulose (CHRONULAC) 10 GM/15ML solution 20 g, 20 g, Oral, TID  mesalamine (DELZICOL) delayed release capsule 800 mg, 800 mg, Oral, TID  pantoprazole (PROTONIX) tablet 40 mg, 40 mg, Oral, BID AC  PARoxetine (PAXIL) tablet 40 mg, 40 mg, Oral, QAM  tiZANidine (ZANAFLEX) tablet 4 mg, 4 mg, Oral, Q8H PRN  sodium chloride flush 0.9 % injection 10 mL, 10 mL, Intravenous, 2 times per day  sodium chloride flush 0.9 % injection 10 mL, 10 mL, Intravenous, PRN  enoxaparin (LOVENOX) injection 40 mg, 40 mg, Subcutaneous, Daily  promethazine (PHENERGAN) tablet 12.5 mg, 12.5 mg, Oral, Q6H PRN **OR** ondansetron (ZOFRAN) injection 4 mg, 4 mg, Intravenous, Q6H PRN  polyethylene glycol (GLYCOLAX) packet 17 g, 17 g, Oral, Daily PRN  acetaminophen (TYLENOL) tablet 650 mg, 650 mg, Oral, Q6H PRN **OR** acetaminophen (TYLENOL) suppository 650 mg, 650 mg, Rectal, Q6H PRN  lisinopril (PRINIVIL;ZESTRIL) tablet 20 mg, 20 mg, Oral, Daily **AND** [Held by provider] hydroCHLOROthiazide (HYDRODIURIL) tablet 25 mg, 25 mg, Oral, Daily  pregabalin (LYRICA) capsule 150 mg, 150 mg, Oral, BID  oxyCODONE-acetaminophen (PERCOCET)  MG per tablet 1 tablet, 1 tablet, Oral, Q6H PRN  0.45 % sodium chloride infusion, , Intravenous, Continuous  docusate sodium (COLACE) capsule 100 mg, 100 mg, Oral, BID  Allergies:  Penicillins and Trazodone and nefazodone    Social History:   TOBACCO:   reports that he quit smoking about 8 months ago. His smokeless tobacco use includes snuff. ETOH:   reports no history of alcohol use. DRUGS:   reports no history of drug use.   Family History:       Problem Relation Age of Onset    High Blood Pressure Mother     High Blood Pressure Father        REVIEW OF SYSTEMS:    CONSTITUTIONAL:  negative for  fevers, chills, fatigue and weight loss  RESPIRATORY:  negative for  cough with sputum and dyspnea  CARDIOVASCULAR:  negative for  chest pain, dyspnea, exertional chest pressure/discomfort  GASTROINTESTINAL:  negative for nausea, vomiting, diarrhea and abdominal pain  GENITOURINARY: positive urinary retention  MUSCULOSKELETAL:  positive for back and bilateral leg Pain  NEUROLOGICAL:  negative for headaches, dizziness and syncope  BEHAVIOR/PSYCH:  negative for depressed mood, increased agitation and anxiety    PHYSICAL EXAM:      CONSTITUTIONAL:  awake, alert, cooperative, no apparent distress, and appears stated age  EYES:  Lids and lashes normal, pupils equal, round and reactive to light, extra ocular muscles intact, sclera clear, conjunctiva normal  NECK:  Supple, symmetrical, trachea midline, no adenopathy, thyroid symmetric, not enlarged and no tenderness, skin normal  BACK:  Well healing lumbar incision. LUNGS:  No increased work of breathing, good air exchange, clear to auscultation bilaterally, no crackles or wheezing  CARDIOVASCULAR:  Normal apical impulse, regular rate and rhythm, normal S1 and S2, no S3 or S4, and no murmur noted  ABDOMEN:  No scars, normal bowel sounds, soft, non-distended, non-tender, no masses palpated, no hepatosplenomegally  MUSCULOSKELETAL:  There is no redness, warmth, or swelling of the joints. Full range of motion noted. Motor strength is 5 out of 5 lower extremities bilaterally. Tone is normal.  NEUROLOGIC:  Awake, alert, oriented to name, place and time. Cranial nerves II-XII are grossly intact. Motor is 5 out of 5 bilaterally.   Sensory is intact except decreased left leg and right foot and toes    DATA:    CBC:   Lab Results   Component Value Date    WBC 8.1 01/04/2021    RBC 2.77 01/04/2021    HGB 9.5 01/04/2021    HCT 27.5 01/04/2021    MCV 99.3 01/04/2021    MCH 34.3 01/04/2021    MCHC 34.5 01/04/2021    RDW 13.2 12/16/2019     01/04/2021    MPV 9.8 01/04/2021     WBC:    Lab Results   Component Value Date    WBC 8.1 01/04/2021     Hemoglobin/Hematocrit:    Lab Results   Component Value Date    HGB 9.5 01/04/2021    HCT 27.5 01/04/2021     CMP:    Lab Results   Component Value Date     01/06/2021    K 4.1 01/06/2021    K 3.9 01/05/2021    CL 99 01/06/2021    CO2 28 01/06/2021    BUN 12 01/06/2021    CREATININE 0.5 01/06/2021    GFRAA >60 04/04/2019    GFRAA >60 10/14/2011    AGRATIO 1.2 12/16/2019    LABGLOM >90 01/06/2021    GLUCOSE 117 01/06/2021    GLUCOSE 166 12/16/2019    PROT 6.3 01/03/2021    PROT 7.6 10/13/2011    LABALBU 3.6 01/03/2021    CALCIUM 8.6 01/06/2021    BILITOT 1.2 01/03/2021    ALKPHOS 62 01/03/2021    AST 29 01/03/2021    ALT 28 01/03/2021           Radiology:  MRI Lumbar     Impression:         1. Severe stenosis of the thecal sac and compression of the nerve roots of the cauda equina at the L3-4 and L4-5 levels due to a fluid collection in the laminectomy bed exhibiting mass effect upon the thecal sac. 2. Enhancement of the nerve roots of the cauda equina posterior to L3. This suggests inflammation versus ischemia of those nerve roots. 3. 10.3 cm fluid collection in the laminectomy bed. The differential diagnosis includes infection, seroma and hematoma. This does surround the bases of the pedicle screws. 4. Moderate stenosis of the thecal sac at the L2-3 level. IMPRESSION/RECOMMENDATIONS:    Assessment:   1) 1 week s/p L3-5 decompression and fusion with fluid collection causing severe stenosis    Plan:  Discussed with Dr. Irma Waters. Will take the patient to the OR today for a Lumbar I&D. Mayito Boss PAC

## 2021-01-07 LAB
ANION GAP SERPL CALCULATED.3IONS-SCNC: 11 MEQ/L (ref 8–16)
BASOPHILS # BLD: 0 %
BASOPHILS ABSOLUTE: 0 THOU/MM3 (ref 0–0.1)
BUN BLDV-MCNC: 15 MG/DL (ref 7–22)
CALCIUM SERPL-MCNC: 8.1 MG/DL (ref 8.5–10.5)
CHLORIDE BLD-SCNC: 98 MEQ/L (ref 98–111)
CO2: 24 MEQ/L (ref 23–33)
CREAT SERPL-MCNC: 0.6 MG/DL (ref 0.4–1.2)
EOSINOPHIL # BLD: 0.2 %
EOSINOPHILS ABSOLUTE: 0 THOU/MM3 (ref 0–0.4)
ERYTHROCYTE [DISTWIDTH] IN BLOOD BY AUTOMATED COUNT: 13.2 % (ref 11.5–14.5)
ERYTHROCYTE [DISTWIDTH] IN BLOOD BY AUTOMATED COUNT: 46.1 FL (ref 35–45)
GFR SERPL CREATININE-BSD FRML MDRD: > 90 ML/MIN/1.73M2
GLUCOSE BLD-MCNC: 295 MG/DL (ref 70–108)
HCT VFR BLD CALC: 22.5 % (ref 42–52)
HEMOGLOBIN: 7.9 GM/DL (ref 14–18)
IMMATURE GRANS (ABS): 0.22 THOU/MM3 (ref 0–0.07)
IMMATURE GRANULOCYTES: 4.9 %
LYMPHOCYTES # BLD: 27 %
LYMPHOCYTES ABSOLUTE: 1.2 THOU/MM3 (ref 1–4.8)
MAGNESIUM: 1.6 MG/DL (ref 1.6–2.4)
MCH RBC QN AUTO: 34.2 PG (ref 26–33)
MCHC RBC AUTO-ENTMCNC: 35.1 GM/DL (ref 32.2–35.5)
MCV RBC AUTO: 97.4 FL (ref 80–94)
MONOCYTES # BLD: 2.4 %
MONOCYTES ABSOLUTE: 0.1 THOU/MM3 (ref 0.4–1.3)
NUCLEATED RED BLOOD CELLS: 0 /100 WBC
PLATELET # BLD: 109 THOU/MM3 (ref 130–400)
PMV BLD AUTO: 9.5 FL (ref 9.4–12.4)
POTASSIUM SERPL-SCNC: 4.8 MEQ/L (ref 3.5–5.2)
RBC # BLD: 2.31 MILL/MM3 (ref 4.7–6.1)
SEG NEUTROPHILS: 65.5 %
SEGMENTED NEUTROPHILS ABSOLUTE COUNT: 2.9 THOU/MM3 (ref 1.8–7.7)
SODIUM BLD-SCNC: 133 MEQ/L (ref 135–145)
WBC # BLD: 4.5 THOU/MM3 (ref 4.8–10.8)

## 2021-01-07 PROCEDURE — 2500000003 HC RX 250 WO HCPCS: Performed by: PHYSICIAN ASSISTANT

## 2021-01-07 PROCEDURE — 6370000000 HC RX 637 (ALT 250 FOR IP): Performed by: PHYSICIAN ASSISTANT

## 2021-01-07 PROCEDURE — 80048 BASIC METABOLIC PNL TOTAL CA: CPT

## 2021-01-07 PROCEDURE — 1200000000 HC SEMI PRIVATE

## 2021-01-07 PROCEDURE — 97162 PT EVAL MOD COMPLEX 30 MIN: CPT

## 2021-01-07 PROCEDURE — 85025 COMPLETE CBC W/AUTO DIFF WBC: CPT

## 2021-01-07 PROCEDURE — 97110 THERAPEUTIC EXERCISES: CPT

## 2021-01-07 PROCEDURE — 6360000002 HC RX W HCPCS: Performed by: PHYSICIAN ASSISTANT

## 2021-01-07 PROCEDURE — 2580000003 HC RX 258: Performed by: PHYSICIAN ASSISTANT

## 2021-01-07 PROCEDURE — 99233 SBSQ HOSP IP/OBS HIGH 50: CPT | Performed by: INTERNAL MEDICINE

## 2021-01-07 PROCEDURE — 97530 THERAPEUTIC ACTIVITIES: CPT

## 2021-01-07 PROCEDURE — 83735 ASSAY OF MAGNESIUM: CPT

## 2021-01-07 PROCEDURE — 36415 COLL VENOUS BLD VENIPUNCTURE: CPT

## 2021-01-07 PROCEDURE — 99232 SBSQ HOSP IP/OBS MODERATE 35: CPT | Performed by: INTERNAL MEDICINE

## 2021-01-07 RX ADMIN — MORPHINE SULFATE 4 MG: 4 INJECTION, SOLUTION INTRAMUSCULAR; INTRAVENOUS at 08:43

## 2021-01-07 RX ADMIN — BISACODYL 5 MG: 5 TABLET, COATED ORAL at 08:42

## 2021-01-07 RX ADMIN — LACTULOSE 20 G: 20 SOLUTION ORAL at 13:47

## 2021-01-07 RX ADMIN — LACTULOSE 20 G: 20 SOLUTION ORAL at 08:41

## 2021-01-07 RX ADMIN — OXYCODONE AND ACETAMINOPHEN 2 TABLET: 5; 325 TABLET ORAL at 10:30

## 2021-01-07 RX ADMIN — PREGABALIN 150 MG: 75 CAPSULE ORAL at 08:40

## 2021-01-07 RX ADMIN — FERROUS SULFATE TAB 325 MG (65 MG ELEMENTAL FE) 325 MG: 325 (65 FE) TAB at 08:42

## 2021-01-07 RX ADMIN — FAMOTIDINE 40 MG: 20 TABLET, FILM COATED ORAL at 08:40

## 2021-01-07 RX ADMIN — OXYCODONE AND ACETAMINOPHEN 2 TABLET: 5; 325 TABLET ORAL at 03:56

## 2021-01-07 RX ADMIN — SODIUM CHLORIDE: 9 INJECTION, SOLUTION INTRAVENOUS at 10:07

## 2021-01-07 RX ADMIN — DOCUSATE SODIUM 50 MG AND SENNOSIDES 8.6 MG 1 TABLET: 8.6; 5 TABLET, FILM COATED ORAL at 08:41

## 2021-01-07 RX ADMIN — CLINDAMYCIN IN 5 PERCENT DEXTROSE 900 MG: 18 INJECTION, SOLUTION INTRAVENOUS at 04:41

## 2021-01-07 RX ADMIN — PANTOPRAZOLE SODIUM 40 MG: 40 TABLET, DELAYED RELEASE ORAL at 05:26

## 2021-01-07 RX ADMIN — LACTULOSE 20 G: 20 SOLUTION ORAL at 19:42

## 2021-01-07 RX ADMIN — POLYETHYLENE GLYCOL 3350 17 G: 17 POWDER, FOR SOLUTION ORAL at 08:40

## 2021-01-07 RX ADMIN — DEXAMETHASONE SODIUM PHOSPHATE 10 MG: 10 INJECTION, EMULSION INTRAMUSCULAR; INTRAVENOUS at 13:47

## 2021-01-07 RX ADMIN — PANTOPRAZOLE SODIUM 40 MG: 40 TABLET, DELAYED RELEASE ORAL at 16:12

## 2021-01-07 RX ADMIN — MORPHINE SULFATE 4 MG: 4 INJECTION, SOLUTION INTRAMUSCULAR; INTRAVENOUS at 14:41

## 2021-01-07 RX ADMIN — SODIUM CHLORIDE: 9 INJECTION, SOLUTION INTRAVENOUS at 01:34

## 2021-01-07 RX ADMIN — MORPHINE SULFATE 4 MG: 4 INJECTION, SOLUTION INTRAMUSCULAR; INTRAVENOUS at 19:42

## 2021-01-07 RX ADMIN — PAROXETINE HYDROCHLORIDE 40 MG: 20 TABLET, FILM COATED ORAL at 08:42

## 2021-01-07 RX ADMIN — LISINOPRIL 20 MG: 20 TABLET ORAL at 08:42

## 2021-01-07 RX ADMIN — MESALAMINE 800 MG: 400 CAPSULE, DELAYED RELEASE ORAL at 08:41

## 2021-01-07 RX ADMIN — DOCUSATE SODIUM 50 MG AND SENNOSIDES 8.6 MG 1 TABLET: 8.6; 5 TABLET, FILM COATED ORAL at 19:42

## 2021-01-07 RX ADMIN — DEXAMETHASONE SODIUM PHOSPHATE 10 MG: 10 INJECTION, EMULSION INTRAMUSCULAR; INTRAVENOUS at 19:42

## 2021-01-07 RX ADMIN — DEXAMETHASONE SODIUM PHOSPHATE 10 MG: 10 INJECTION, EMULSION INTRAMUSCULAR; INTRAVENOUS at 04:41

## 2021-01-07 RX ADMIN — FOLIC ACID 2 MG: 1 TABLET ORAL at 08:42

## 2021-01-07 RX ADMIN — OXYCODONE AND ACETAMINOPHEN 2 TABLET: 5; 325 TABLET ORAL at 22:50

## 2021-01-07 RX ADMIN — OXYCODONE AND ACETAMINOPHEN 2 TABLET: 5; 325 TABLET ORAL at 16:38

## 2021-01-07 RX ADMIN — PREGABALIN 150 MG: 75 CAPSULE ORAL at 19:42

## 2021-01-07 ASSESSMENT — PAIN SCALES - GENERAL
PAINLEVEL_OUTOF10: 7
PAINLEVEL_OUTOF10: 6
PAINLEVEL_OUTOF10: 6
PAINLEVEL_OUTOF10: 5
PAINLEVEL_OUTOF10: 7
PAINLEVEL_OUTOF10: 4
PAINLEVEL_OUTOF10: 7

## 2021-01-07 ASSESSMENT — PAIN DESCRIPTION - ORIENTATION: ORIENTATION: MID

## 2021-01-07 ASSESSMENT — PAIN - FUNCTIONAL ASSESSMENT: PAIN_FUNCTIONAL_ASSESSMENT: PREVENTS OR INTERFERES SOME ACTIVE ACTIVITIES AND ADLS

## 2021-01-07 ASSESSMENT — PAIN DESCRIPTION - PAIN TYPE: TYPE: SURGICAL PAIN

## 2021-01-07 ASSESSMENT — PAIN DESCRIPTION - LOCATION: LOCATION: BACK

## 2021-01-07 ASSESSMENT — PAIN DESCRIPTION - ONSET: ONSET: ON-GOING

## 2021-01-07 ASSESSMENT — PAIN DESCRIPTION - FREQUENCY: FREQUENCY: CONTINUOUS

## 2021-01-07 NOTE — FLOWSHEET NOTE
01/07/21 8250   Encounter Summary   Services provided to: Patient   Referral/Consult From: Rounding   Continue Visiting Yes  (1/7)   Complexity of Encounter Moderate   Length of Encounter 15 minutes   Routine   Type Initial   Assessment Approachable   Intervention Nurtured hope   Outcome Comfort   Assessment: In my encounter with the 39 yr old patient, while rounding  the unit 6E,  I provided spiritual care to patient through conversation, I also came to assess the patients spiritual needs present. Interventions:  I provided prayer, emotional support and words of comfort.  provided a listening presence and encouraged pt to share their beliefs and how they support him during their hospitalization. Outcomes: The patient was encouraged and didnt share any further spiritual needs at this time. The pt remains optimistic and hopeful. The pt shared that they were appreciative for the support. Plan:  1. Chaplains will follow-up at a later time for assessment of any spiritual care needs present.         2.   The Chaplains will be available to provide further emotional support per request.

## 2021-01-07 NOTE — PROGRESS NOTES
Department of Orthopedic Surgery  Spine Service  Attending Progress Note        Subjective:  Patient reports doing really well. Noting \"this is the best i've felt\" he also notes resolution of saddle anesthesia as well as near complete resolution of left leg numbness. Does not some numbness in the right toe but is moving it a lot better.  Would like to go home tomorrow if possible    Vitals  VITALS:  /77   Pulse 89   Temp 98.1 °F (36.7 °C) (Oral)   Resp 18   Ht 6' (1.829 m)   Wt 250 lb (113.4 kg)   SpO2 99%   BMI 33.91 kg/m²   24HR INTAKE/OUTPUT:      Intake/Output Summary (Last 24 hours) at 1/7/2021 0925  Last data filed at 1/7/2021 0345  Gross per 24 hour   Intake 2970.95 ml   Output 3450 ml   Net -479.05 ml     URINARY CATHETER OUTPUT (Cullen):  [REMOVED] Urethral Catheter Straight-tip-Output (mL): 300 mL  DRAIN/TUBE OUTPUT:  Closed/Suction Drain Inferior;Left;Medial Back Accordion-Output (ml): 50 ml  Closed/Suction Drain Inferior;Right Back Accordion-Output (ml): 50 ml  Ureteral Catheter 16 fr-Output (ml): 1250 ml    PHYSICAL EXAM:    Orientation:  alert and oriented to person, place and time    Incision:  dressing in place, clean, dry, intact    Upper Extremity Motor :   Deltoid:  5/5  Biceps:  5/5  Triceps:  5/5  Wrist Flexion:  5/5  Wrist Extension:  5/5  Finger Flexion:  5/5  Finger Extension:  5/5    Upper Motor Neuron Signs:  None  Upper Extremity Sensory:  Intact C3-T1 distribution    Lower Extremity Motor :  quadriceps, extensor hallucis longus, dorsiflexion, plantarflexion 5/5 bilaterally  Lower Extremity Sensory:  Intact L1-S1    Flatus:  positive    ABNORMAL EXAM FINDINGS:  none    LABS:    HgB:    Lab Results   Component Value Date    HGB 7.9 01/07/2021     CBC:   Lab Results   Component Value Date    WBC 4.5 01/07/2021    RBC 2.31 01/07/2021    HGB 7.9 01/07/2021    HCT 22.5 01/07/2021    MCV 97.4 01/07/2021    MCH 34.2 01/07/2021    MCHC 35.1 01/07/2021    RDW 13.2 12/16/2019     01/07/2021    MPV 9.5 01/07/2021     BMP:    Lab Results   Component Value Date     01/07/2021    K 4.8 01/07/2021    K 3.9 01/05/2021    CL 98 01/07/2021    CO2 24 01/07/2021    BUN 15 01/07/2021    LABALBU 3.6 01/03/2021    CREATININE 0.6 01/07/2021    CALCIUM 8.1 01/07/2021    GFRAA >60 04/04/2019    GFRAA >60 10/14/2011    LABGLOM >90 01/07/2021    GLUCOSE 295 01/07/2021    GLUCOSE 166 12/16/2019       ASSESSMENT AND PLAN:    Post operative day 1 status post I&D and revision of Lumbar decompression and fusion with significant improvement in pre-op sx. 1:  Monitor labs and drain output (170cc last 24). Maintain drains in place for now   2. Discontinue horn this AM for voiding trial.   3:  Activity Level:  Weight bearing as tolerated. 4:  Pain Control: PO PRN pain meds. 5:  Discharge Planning:  Plan for likely d/c to home tomorrow.  Once Alec Elena MD

## 2021-01-07 NOTE — PROGRESS NOTES
Assessment and Plan:          1. S/P Lumbar Decompression complicated with Cord Compression: Urinary retention, saddle anesthesia, LE numbness and weakness. Recent lumbar surgery. MRI lumbar showing enhancement of the nerve roots of the cauda equina posterior to L3. This suggests inflammation versus ischemia of those nerve roots. Spoke with Dr. Zahra Hardin, will plan for emergent surgery today. Start IV Decadron. --1/7: underwent emergent lumbar hematoma evacuation with L3-L4 discectomy yesterday with orthopedic surgery. This morning pt having no saddle anesthesia or LE numbness, improvement in weakness. Cullen cathter still in, plan for void trial today. Will need recs on Decadron from Ortho. PT/OT. Pain control. 2. Constipation: Patient states he is also had this problem following surgery in the past. Monitor closely as mentioned above. Stool softeners    3. Hyponatremia: Likely due to urine retention, free water intake. Overall improving. Nephrology assisting    4. History of cirrhosis: Due to history of hepatitis B and C. continue lactulose. Otherwise does not appear to be decompensated at this time    5. Acute Post Op Blood Loss Anemia: Hb down to 7.9. likely post op related complicated with liver disease. Will cont to trend. 6. 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    7. History of ulcerative colitis: Continue mesalamine    8. Thrombocytopenia: Due to liver disease. Continue to monitor    9. Bilateral lower extremity edema: Ultrasound obtained today. No evidence of DVT. Suspect could be related to urinary retention. We will continue to monitor      CC:  Unable to urinate  Hospital course: Patient presents to the ED stating that since his recent back surgery he has had a hard time passing his urine. His abdomen feels distended. The patient used lactulose and over a gallon of free water to attempt to urinate and have a bowel movement.   The patient was found to have >2.5L in his bladder on presentation to the ED. The patient was also found to have a marked hyponatremia most likely due free water dilution.     Subjective: (12 point review of systems completed. Pertinent positives noted. Otherwise ROS is negative) : symptoms drastically improved, not having any saddle anesthesia or LE numbness, improvement in weakness. Cullen cathter still in, plan for void trial today. PMH:  Per HPI  SHX:    Social History     Tobacco Use    Smoking status: Former Smoker     Quit date: 2020     Years since quittin.7    Smokeless tobacco: Current User     Types: Snuff   Substance Use Topics    Alcohol use: No     Comment: Several months since last used    Drug use: No     Comment: 160 days since last use     FHX:   Family History   Problem Relation Age of Onset    High Blood Pressure Mother     High Blood Pressure Father      Allergies:    Allergies   Allergen Reactions    Penicillins Anaphylaxis    Trazodone And Nefazodone      RESTLESS LEGS     Medications:     sodium chloride 125 mL/hr at 21 1007      dexamethasone  10 mg Intravenous Q8H    sodium chloride flush  10 mL Intravenous 2 times per day    sennosides-docusate sodium  1 tablet Oral BID    polyethylene glycol  17 g Oral Daily    bisacodyl  5 mg Oral Daily    nicotine  1 patch Transdermal Daily    famotidine  40 mg Oral Daily    ferrous sulfate  325 mg Oral Daily with breakfast    folic acid  2 mg Oral Daily    lactulose  20 g Oral TID    mesalamine  800 mg Oral TID    pantoprazole  40 mg Oral BID AC    PARoxetine  40 mg Oral QAM    lisinopril  20 mg Oral Daily    And    [Held by provider] hydroCHLOROthiazide  25 mg Oral Daily    pregabalin  150 mg Oral BID       Vital Signs:   /77   Pulse 89   Temp 98.1 °F (36.7 °C) (Oral)   Resp 18   Ht 6' (1.829 m)   Wt 250 lb (113.4 kg)   SpO2 99%   BMI 33.91 kg/m²      Intake/Output Summary (Last 24 hours) at 2021 1029  Last data filed at 1/7/2021 0345  Gross per 24 hour   Intake 2970.95 ml   Output 3450 ml   Net -479.05 ml        General:   Resting in bed, in NAD  HEENT:  normocephalic and atraumatic. No scleral icterus. PERR. Neck: supple. No JVD. No thyromegaly. Lungs: clear to auscultation. No retractions  Cardiac: Mild tachycardia without murmur. Abdomen: soft. Nontender. Bowel sounds positive. Extremities:  No clubbing, cyanosis, 2+ LE edema, bilaterally. S/P Rt arm amputation  Vasculature: capillary refill < 3 seconds. Palpable LE pulses bilaterally. Skin:  warm and dry. Psych:  Alert and oriented x3. Somewhat slow to respond but has appropriate responses. Affect appropriate  Lymph:  No supraclavicular adenopathy. Neurologic:  No focal deficit. No seizures. Data: (All radiographs, tracings, PFTs, and imaging are personally viewed and interpreted unless otherwise noted).    Recent Results (from the past 24 hour(s))   COVID-19    Collection Time: 01/06/21 11:02 AM   Result Value Ref Range    SARS-CoV-2, NAAT NOT DETECTED NOT DETECTED   Magnesium    Collection Time: 01/07/21  5:15 AM   Result Value Ref Range    Magnesium 1.6 1.6 - 2.4 mg/dL   CBC auto differential    Collection Time: 01/07/21  5:15 AM   Result Value Ref Range    WBC 4.5 (L) 4.8 - 10.8 thou/mm3    RBC 2.31 (L) 4.70 - 6.10 mill/mm3    Hemoglobin 7.9 (L) 14.0 - 18.0 gm/dl    Hematocrit 22.5 (L) 42.0 - 52.0 %    MCV 97.4 (H) 80.0 - 94.0 fL    MCH 34.2 (H) 26.0 - 33.0 pg    MCHC 35.1 32.2 - 35.5 gm/dl    RDW-CV 13.2 11.5 - 14.5 %    RDW-SD 46.1 (H) 35.0 - 45.0 fL    Platelets 333 (L) 182 - 400 thou/mm3    MPV 9.5 9.4 - 12.4 fL    Seg Neutrophils 65.5 %    Lymphocytes 27.0 %    Monocytes 2.4 %    Eosinophils 0.2 %    Basophils 0.0 %    Immature Granulocytes 4.9 %    Segs Absolute 2.9 1.8 - 7.7 thou/mm3    Lymphocytes Absolute 1.2 1.0 - 4.8 thou/mm3    Monocytes Absolute 0.1 (L) 0.4 - 1.3 thou/mm3    Eosinophils Absolute 0.0 0.0 - 0.4 thou/mm3    Basophils Absolute 0.0 0.0 - 0.1 thou/mm3    Immature Grans (Abs) 0.22 (H) 0.00 - 0.07 thou/mm3    nRBC 0 /100 wbc   Basic Metabolic Panel    Collection Time: 01/07/21  5:15 AM   Result Value Ref Range    Sodium 133 (L) 135 - 145 meq/L    Potassium 4.8 3.5 - 5.2 meq/L    Chloride 98 98 - 111 meq/L    CO2 24 23 - 33 meq/L    Glucose 295 (H) 70 - 108 mg/dL    BUN 15 7 - 22 mg/dL    CREATININE 0.6 0.4 - 1.2 mg/dL    Calcium 8.1 (L) 8.5 - 10.5 mg/dL   Anion Gap    Collection Time: 01/07/21  5:15 AM   Result Value Ref Range    Anion Gap 11.0 8.0 - 16.0 meq/L   Glomerular Filtration Rate, Estimated    Collection Time: 01/07/21  5:15 AM   Result Value Ref Range    Est, Glom Filt Rate >90 ml/min/1.73m2        MRI LUMBAR SPINE W WO CONTRAST   Final Result       1. Severe stenosis of the thecal sac and compression of the nerve roots of the cauda equina at the L3-4 and L4-5 levels due to a fluid collection in the laminectomy bed exhibiting mass effect upon the thecal sac. 2. Enhancement of the nerve roots of the cauda equina posterior to L3. This suggests inflammation versus ischemia of those nerve roots. 3. 10.3 cm fluid collection in the laminectomy bed. The differential diagnosis includes infection, seroma and hematoma. This does surround the bases of the pedicle screws. 4. Moderate stenosis of the thecal sac at the L2-3 level. These findings were telephoned to Dr. Valeria Ferguson, at 11:20 AM on 1/6/2021. **This report has been created using voice recognition software. It may contain minor errors which are inherent in voice recognition technology. **      Final report electronically signed by Dr. Johnny Field on 1/6/2021 11:24 AM      MRI THORACIC SPINE W WO CONTRAST   Final Result       1. Normal thoracic spinal cord. 2. No evidence of spinal canal stenosis. **This report has been created using voice recognition software.  It may contain minor errors which are inherent in voice recognition technology. **      Final report electronically signed by Dr. Severo Reilly on 1/6/2021 11:01 AM      VL DUP LOWER EXTREMITY VENOUS BILATERAL   Final Result   No evidence of a DVT. **This report has been created using voice recognition software. It may contain minor errors which are inherent in voice recognition technology. **      Final report electronically signed by Dr Nya Head on 1/4/2021 2:41 PM      CT ABDOMEN PELVIS W IV CONTRAST Additional Contrast? None   Final Result   Mild bilateral hydronephrosis and ureterectasis without visible    obstruction. Distended bladder. Small gas in the bladder could be due to recent    instrumentation or gas-forming infection. Findings consistent with diarrheal illness/colitis. Mild anasarca. Possible reactive lymph nodes. Nonemergent/incidental findings in the report. This document has been electronically signed by: Megan Matt MD on    01/03/2021 10:31 PM      All CT scans at this facility use dose modulation, iterative    reconstruction, and/or weight-based   dosing when appropriate to reduce radiation dose to as low as reasonably    achievable. CT LUMBAR RECONSTRUCTION WO POST PROCESS   Final Result   Possible postsurgical seroma in the surgical bed. Nonemergent/incidental findings in the report. See separate abdomen/pelvis CT report. This document has been electronically signed by: Megan Matt MD on    01/03/2021 09:47 PM      All CT scans at this facility use dose modulation, iterative    reconstruction, and/or weight-based   dosing when appropriate to reduce radiation dose to as low as reasonably    achievable.                Electronically signed by Pat Sanabria MD on 1/7/2021 at 10:29 AM

## 2021-01-07 NOTE — OP NOTE
Operative Note      Patient: John Ernst  YOB: 1975  MRN: [id number]                       ACCOUNT NO: [de-identified]                              ADMISSION DATE: 01/04/2021  PROVIDER:  Denisse Underwood. Andie Henry M.D.    DATE OF PROCEDURE: 01/06/2021     PREOPERATIVE DIAGNOSES:  1.  1 week status post L3-5 decompression and fusion   2. Postoperative lumbar fluid collection with severe stenosis and radiculopathy  3. Urinary retention  4. Left leg weakness  5. Obesity, BMI of 33.91    POSTOPERATIVE DIAGNOSES:  1.  1 week status post L3-5 decompression and fusion   2. Postoperative lumbar hematoma with severe stenosis and radiculopathy  3. Urinary retention  4. Left leg weakness  5. Obesity, BMI of 33.91     OPERATION PERFORMED:   1. Lumbar hematoma evacuation  2. L3-4 and L4-5 revision facetectomies and foraminotomies of the L3, L4 and L5 nerve roots along with total facetectomy at L3-4 and L4-5 on the left for complete decompression of the left L3 and L4 nerve roots  3. L3-4 left discectomy     SURGEON:  Karina Henry M.D.     ASSISTANT:  Jared Coto PA-C     ANESTHESIA:  General.     INDICATIONS:  This is a 39 y.o. male who underwent a L3-5 decompression and fusion on 12/29/2020 at [address] He developed progressively worsening low back pain, leg pain, left hip weakness and urinary retention. He presented to the ED on 01/04/2021. MRI completed today showed a large fluid collection causing severe compression. He was taken to the OR today for urgent decompression. Patient, therefore, understood the indication for the surgery as well as its risks, benefits, and alternatives. These risks include, but are not limited to paralysis, infection, hematoma, dural tear, nerve root injury, DVT/PE, stroke, MI, death etc.  All questions were answered and informed consent was obtained.      DESCRIPTION OF PROCEDURE:  The patient was taken to the operating room by Anesthesiology Service and had satisfactory general anesthesia. A first-generation cephalosporin was given within one hour of surgical incision, 2 gm of cefazolin were given IV. Venous thromboembolic prophylaxis was performed with sequential devices. The patient was then positioned prone on a standard OSI frame with the abdomen hanging free and all bony prominences well padded. The low back was then prepped and draped in its entirety in the usual sterile fashion. Before incision, a formal timeout was taken per protocol. We then opened up the incision proximal to distal.  We gained access into the epidural space. There was a large fluid collection which was evacuated. Fluid was more consistent with a hematoma. The wound was then irrigated copiously with large amount of normal saline. We then turned our attention to explore his prior decompression. Patient had a durotomy from his initial surgery which was tightly repaired with no obvious leak. Kerrison's and osteotomes were then used to perform revision foraminotomies of the L3, L4 and L5 nerve roots bilaterally. In order to get further lateral to the left L3 and L4 nerve root, total facetectomy was performed at L3-4 and L4-5 on the left. In this way, the left L3 and L4 nerve root was completely visualized and foraminized. In this way, we completed decompression at L3-4 and L4-5 with foraminotomies of the L3, L4 and L5 nerve roots bilaterally. We then performed discectomy at L3-4. We then mobilized the left L4 nerve root over a large contained disc herniation. Hemostasis was achieved over the disc space. Variety of curettes and pituitary was used to remove the large contained disc herniation. Once this was done, we palpated medially and there were no other loose fragments to be removed. There was excellent excursion of the left L4 nerve root both medially and laterally without tension. This thereby completed our discectomy at L4-5 on the left.       Satisfied with this, we then achieved hemostasis. We then copiously irrigated the wound. We then inserted 2 Hemovac drains through a separate stab incision. The wound was then closed in layer with interrupted 1 and 2-0 Vicryl sutures and dusted with vancomycin powder. A 3-0 Monocryl was used for the skin. The skin edges were sealed with Dermabond. A dry sterile dressing was applied. The patient was then returned to the hospital bed, extubated, and taken to recovery room in stable condition. Drains:   Closed/Suction Drain Inferior;Left;Medial Back Accordion (Active)   Site Description Unable to view 01/06/21 1802   Dressing Status Clean;Dry; Intact 01/06/21 2056   Drainage Appearance Bloody 01/06/21 2056   Status Compressed 01/06/21 2056   Output (ml) 50 ml 01/06/21 2056       Closed/Suction Drain Inferior;Right Back Accordion (Active)   Site Description Unable to view 01/06/21 1802   Dressing Status Clean;Dry; Intact 01/06/21 2056   Drainage Appearance Bloody 01/06/21 2056   Status Compressed 01/06/21 2056   Output (ml) 50 ml 01/06/21 2056       Ureteral Catheter 16 fr (Active)   Urine Color Yellow 01/07/21 0345   Urine Appearance Clear 01/07/21 0345   Output (ml) 1250 ml 01/07/21 0345       [REMOVED] Urethral Catheter Straight-tip (Removed)   Urine Color Yellow 01/05/21 0902   Urine Appearance Clear 01/05/21 0817   Output (mL) 300 mL 33/16/81 3153       COMPLICATIONS:  None. SPECIMENS:  None. ESTIMATED BLOOD LOSS:  100 mL. POSTOPERATIVE CARE:  The patient will be recovered in PACU and then a regular nursing floor. Once the drainage is low and pain is under control, patient will be discharged home vs rehab per clinical indication. Patient will follow up in the office in six weeks.   At that time, AP and lateral x-rays of the lumbar spine will be obtained to assess spinal stability    MODIFIER 22:  Due to the patient's BMI greater than 30, modifier 22 will be applied due to the patient's case taking 50% longer due to

## 2021-01-07 NOTE — CARE COORDINATION
DISASTER CHARTING    1/7/21, 10:48 AM EST    DISCHARGE ONGOING EVALUATION:     Virgilio Hsieh day: 4  Location: [address] [place] [address]-A Reason for admit: Hyponatremia [E87.1]   Barriers to Discharge: POD #1 emergent lumbar hematoma evacuation with L3-L4 discectomy by orthopedics, up to chair, PT/OT to see for recomm. , stool softeners and Dulcolax supp, Lactulose, Hgb 7.9, hct 22.5, Na 133, on Decadron, accu checks, IVF stopped, po iron, Pepcid, post op pain control, remove horn, I/O.  PCP: CRISTIAN Baker CNP  Patient Goals/Plan/Treatment Preferences: from [facility] support living; current with [facility] for RN/PT/OT. SW following.

## 2021-01-07 NOTE — PROGRESS NOTES
[facility]         Renal Inpatient Follow-Up note         1/7/2021 1:00 PM    Pt Name:   Loretha Cheadle  YOB: 1975  Attending:   Lawrence Alexander MD    Chief Complaint : Loretha Cheadle is a 39 y.o. male being followed by nephrology for hyponatremia    Interval History :   Patient seen and examined by me. No distress  Feels well denies any complaints no chest pain or shortness of breath  Subhash is out making more urine . Terri Neri Having foot sensation as well. Scheduled Medications :    dexamethasone  10 mg Intravenous Q8H    sodium chloride flush  10 mL Intravenous 2 times per day    sennosides-docusate sodium  1 tablet Oral BID    polyethylene glycol  17 g Oral Daily    bisacodyl  5 mg Oral Daily    nicotine  1 patch Transdermal Daily    famotidine  40 mg Oral Daily    ferrous sulfate  325 mg Oral Daily with breakfast    folic acid  2 mg Oral Daily    lactulose  20 g Oral TID    mesalamine  800 mg Oral TID    pantoprazole  40 mg Oral BID AC    PARoxetine  40 mg Oral QAM    lisinopril  20 mg Oral Daily    And    [Held by provider] hydroCHLOROthiazide  25 mg Oral Daily    pregabalin  150 mg Oral BID         Vitals :  /74   Pulse 81   Temp 98.2 °F (36.8 °C) (Oral)   Resp 20   Ht 6' (1.829 m)   Wt 250 lb (113.4 kg)   SpO2 97%   BMI 33.91 kg/m²     24HR INTAKE/OUTPUT:      Intake/Output Summary (Last 24 hours) at 1/7/2021 1300  Last data filed at 1/7/2021 1036  Gross per 24 hour   Intake 2970.95 ml   Output 4275 ml   Net -1304.05 ml     Last 3 weights  Wt Readings from Last 3 Encounters:   01/03/21 250 lb (113.4 kg)   11/25/20 247 lb (112 kg)   10/30/20 240 lb (108.9 kg)           Physical Exam :  General Appearance:  Well developed.  No distress  Mouth/Throat:  Oral mucosa moist  Neck:  Supple, no JVD  Lungs:  Breath sounds: clear  Heart[de-identified]  S1,S2 heard  Abdomen:  Soft, non - tender  Musculoskeletal:  Edema -noted but improving Last 3 CBC   Recent Labs     01/07/21  0515   WBC 4.5*   RBC 2.31*   HGB 7.9*   HCT 22.5*   *     Last 3 CMP  Recent Labs     01/05/21  0950 01/06/21  0655 01/07/21  0515   * 135 133*   K 3.9 4.1 4.8   CL 95* 99 98   CO2 26 28 24   BUN 11 12 15   CREATININE 0.5 0.5 0.6   CALCIUM 8.7 8.6 8.1*             ASSESSMENT / Plan   1. Renal -renal function appears to be stable at baseline . 2. Hyponatremia most likely secondary to increased oral intake of liquids and unable to urinate. ? Overall improved , 133 today  ? Still polyuria. ? Will start diuretic in am     3. Mild metabolic acidosis improved  4. Essential hypertension  5. Fluid overload-having excellent urine output will monitor once the urine output tapers we will start him on a diuretic mostly in AM. Having post atn diuresis. Decrease fluid intake to 2 l  6. Recent back surgery  7. Urinary retention in retention again concern for any cord compression MRI has been done s/p surgery 1/6/21  8. Meds reviewed and discussed with patient in detail    CARLOS Anderson D.  [facility].

## 2021-01-08 VITALS
RESPIRATION RATE: 18 BRPM | OXYGEN SATURATION: 97 % | HEART RATE: 91 BPM | SYSTOLIC BLOOD PRESSURE: 130 MMHG | DIASTOLIC BLOOD PRESSURE: 71 MMHG | TEMPERATURE: 97.6 F | WEIGHT: 250 LBS | BODY MASS INDEX: 33.86 KG/M2 | HEIGHT: 72 IN

## 2021-01-08 LAB
ANION GAP SERPL CALCULATED.3IONS-SCNC: 12 MEQ/L (ref 8–16)
BASOPHILS # BLD: 0.2 %
BASOPHILS ABSOLUTE: 0 THOU/MM3 (ref 0–0.1)
BUN BLDV-MCNC: 16 MG/DL (ref 7–22)
CALCIUM SERPL-MCNC: 8 MG/DL (ref 8.5–10.5)
CHLORIDE BLD-SCNC: 98 MEQ/L (ref 98–111)
CO2: 22 MEQ/L (ref 23–33)
CREAT SERPL-MCNC: 0.6 MG/DL (ref 0.4–1.2)
EOSINOPHIL # BLD: 0 %
EOSINOPHILS ABSOLUTE: 0 THOU/MM3 (ref 0–0.4)
ERYTHROCYTE [DISTWIDTH] IN BLOOD BY AUTOMATED COUNT: 14.4 % (ref 11.5–14.5)
ERYTHROCYTE [DISTWIDTH] IN BLOOD BY AUTOMATED COUNT: 51.2 FL (ref 35–45)
GFR SERPL CREATININE-BSD FRML MDRD: > 90 ML/MIN/1.73M2
GLUCOSE BLD-MCNC: 335 MG/DL (ref 70–108)
HCT VFR BLD CALC: 23.6 % (ref 42–52)
HEMOGLOBIN: 8 GM/DL (ref 14–18)
IMMATURE GRANS (ABS): 0.38 THOU/MM3 (ref 0–0.07)
IMMATURE GRANULOCYTES: 5.8 %
LYMPHOCYTES # BLD: 20.8 %
LYMPHOCYTES ABSOLUTE: 1.4 THOU/MM3 (ref 1–4.8)
MAGNESIUM: 1.9 MG/DL (ref 1.6–2.4)
MCH RBC QN AUTO: 34.3 PG (ref 26–33)
MCHC RBC AUTO-ENTMCNC: 33.9 GM/DL (ref 32.2–35.5)
MCV RBC AUTO: 101.3 FL (ref 80–94)
MONOCYTES # BLD: 5.5 %
MONOCYTES ABSOLUTE: 0.4 THOU/MM3 (ref 0.4–1.3)
NUCLEATED RED BLOOD CELLS: 0 /100 WBC
PLATELET # BLD: 130 THOU/MM3 (ref 130–400)
PMV BLD AUTO: 9.6 FL (ref 9.4–12.4)
POTASSIUM SERPL-SCNC: 4.6 MEQ/L (ref 3.5–5.2)
RBC # BLD: 2.33 MILL/MM3 (ref 4.7–6.1)
SEG NEUTROPHILS: 67.7 %
SEGMENTED NEUTROPHILS ABSOLUTE COUNT: 4.5 THOU/MM3 (ref 1.8–7.7)
SODIUM BLD-SCNC: 132 MEQ/L (ref 135–145)
WBC # BLD: 6.6 THOU/MM3 (ref 4.8–10.8)

## 2021-01-08 PROCEDURE — 2500000003 HC RX 250 WO HCPCS: Performed by: INTERNAL MEDICINE

## 2021-01-08 PROCEDURE — 36415 COLL VENOUS BLD VENIPUNCTURE: CPT

## 2021-01-08 PROCEDURE — 85025 COMPLETE CBC W/AUTO DIFF WBC: CPT

## 2021-01-08 PROCEDURE — 97110 THERAPEUTIC EXERCISES: CPT

## 2021-01-08 PROCEDURE — 80048 BASIC METABOLIC PNL TOTAL CA: CPT

## 2021-01-08 PROCEDURE — 94760 N-INVAS EAR/PLS OXIMETRY 1: CPT

## 2021-01-08 PROCEDURE — 6360000002 HC RX W HCPCS: Performed by: PHYSICIAN ASSISTANT

## 2021-01-08 PROCEDURE — 6370000000 HC RX 637 (ALT 250 FOR IP): Performed by: PHYSICIAN ASSISTANT

## 2021-01-08 PROCEDURE — 97116 GAIT TRAINING THERAPY: CPT

## 2021-01-08 PROCEDURE — 83735 ASSAY OF MAGNESIUM: CPT

## 2021-01-08 PROCEDURE — 99232 SBSQ HOSP IP/OBS MODERATE 35: CPT | Performed by: INTERNAL MEDICINE

## 2021-01-08 RX ORDER — BUMETANIDE 1 MG/1
1 TABLET ORAL DAILY
Qty: 31 TABLET | Refills: 0 | Status: SHIPPED | OUTPATIENT
Start: 2021-01-08 | End: 2021-02-23

## 2021-01-08 RX ORDER — HEPARIN SODIUM (PORCINE) LOCK FLUSH IV SOLN 100 UNIT/ML 100 UNIT/ML
500 SOLUTION INTRAVENOUS PRN
Status: DISCONTINUED | OUTPATIENT
Start: 2021-01-08 | End: 2021-01-08 | Stop reason: HOSPADM

## 2021-01-08 RX ORDER — BUMETANIDE 0.25 MG/ML
1 INJECTION, SOLUTION INTRAMUSCULAR; INTRAVENOUS DAILY
Status: DISCONTINUED | OUTPATIENT
Start: 2021-01-08 | End: 2021-01-08 | Stop reason: HOSPADM

## 2021-01-08 RX ORDER — HEPARIN SODIUM,PORCINE/PF 1 UNIT/ML
500 SYRINGE (ML) INTRAVENOUS PRN
Status: DISCONTINUED | OUTPATIENT
Start: 2021-01-08 | End: 2021-01-08 | Stop reason: CLARIF

## 2021-01-08 RX ADMIN — DEXAMETHASONE SODIUM PHOSPHATE 10 MG: 10 INJECTION, EMULSION INTRAMUSCULAR; INTRAVENOUS at 04:09

## 2021-01-08 RX ADMIN — HEPARIN 500 UNITS: 100 SYRINGE at 14:11

## 2021-01-08 RX ADMIN — OXYCODONE AND ACETAMINOPHEN 2 TABLET: 5; 325 TABLET ORAL at 14:37

## 2021-01-08 RX ADMIN — TIZANIDINE 4 MG: 4 TABLET ORAL at 13:24

## 2021-01-08 RX ADMIN — FOLIC ACID 2 MG: 1 TABLET ORAL at 10:28

## 2021-01-08 RX ADMIN — PREGABALIN 150 MG: 75 CAPSULE ORAL at 10:27

## 2021-01-08 RX ADMIN — PANTOPRAZOLE SODIUM 40 MG: 40 TABLET, DELAYED RELEASE ORAL at 10:27

## 2021-01-08 RX ADMIN — OXYCODONE AND ACETAMINOPHEN 2 TABLET: 5; 325 TABLET ORAL at 08:33

## 2021-01-08 RX ADMIN — BUMETANIDE 1 MG: 0.25 INJECTION, SOLUTION INTRAMUSCULAR; INTRAVENOUS at 10:28

## 2021-01-08 RX ADMIN — BISACODYL 10 MG: 10 SUPPOSITORY RECTAL at 05:36

## 2021-01-08 RX ADMIN — PAROXETINE HYDROCHLORIDE 40 MG: 20 TABLET, FILM COATED ORAL at 10:27

## 2021-01-08 RX ADMIN — LISINOPRIL 20 MG: 20 TABLET ORAL at 10:28

## 2021-01-08 RX ADMIN — FERROUS SULFATE TAB 325 MG (65 MG ELEMENTAL FE) 325 MG: 325 (65 FE) TAB at 10:28

## 2021-01-08 RX ADMIN — FAMOTIDINE 40 MG: 20 TABLET, FILM COATED ORAL at 10:27

## 2021-01-08 ASSESSMENT — PAIN SCALES - GENERAL
PAINLEVEL_OUTOF10: 8
PAINLEVEL_OUTOF10: 4
PAINLEVEL_OUTOF10: 8

## 2021-01-08 NOTE — PROGRESS NOTES
Last 3 CBC   Recent Labs     01/07/21  0515 01/08/21  0415   WBC 4.5* 6.6   RBC 2.31* 2.33*   HGB 7.9* 8.0*   HCT 22.5* 23.6*   * 130     Last 3 CMP  Recent Labs     01/06/21  0655 01/07/21  0515 01/08/21  0415    133* 132*   K 4.1 4.8 4.6   CL 99 98 98   CO2 28 24 22*   BUN 12 15 16   CREATININE 0.5 0.6 0.6   CALCIUM 8.6 8.1* 8.0*             ASSESSMENT / Plan   1. Renal -renal function appears to be stable at baseline . 2. Hyponatremia most likely secondary to increased oral intake of liquids and unable to urinate. Now also due to hypervolemichyponatremia  ? Overall improved , 132 today  ? start bumex 1mg IV daily . Continue fluid restriction as well. ? If sodium worsens need salt tabs     3. Mild metabolic acidosis improved  4. Essential hypertension  5. Fluid overload- plan as above  6. Recent back surgery  7. Urinary retention in retention again concern for any cord compression MRI has been done s/p surgery 1/6/21  8. Meds reviewed and discussed with patient in detail    CARLOS James D.  [facility].

## 2021-01-08 NOTE — CARE COORDINATION
1/8/21, 12:13 PM EST    DISCHARGE PLANNING EVALUATION      Notified [facility] of discharge today. Elizabeth Pa will resume services at discharge. Notified Shantell at [facility] of discharge. Confirmed Chance can return today, and that we will set up ambulette transport. Discussed with patient. [facility], and requests ambulette transport. Scheduled with [facility].         1/8/21, 4:17 PM EST    Patient goals/plan/ treatment preferences discussed by  and . Patient goals/plan/ treatment preferences reviewed with patient/ family. Patient/ family verbalize understanding of discharge plan and are in agreement with goal/plan/treatment preferences. Understanding was demonstrated using the teach back method. AVS provided by RN at time of discharge, which includes all necessary medical information pertaining to the patients current course of illness, treatment, post-discharge goals of care, and treatment preferences.     Services After Discharge  Services At/After Discharge: Nursing Services, Aide Services, PT, OT([facility])   IMM Letter  IMM Letter given to Patient/Family/Significant other/Guardian/POA/by[de-identified] cm  IMM Letter date given[de-identified] 01/08/21  IMM Letter time given[de-identified] 0900

## 2021-01-08 NOTE — PROGRESS NOTES
[address] [place] [address]  INPATIENT PHYSICAL THERAPY  EVALUATION  [facility] PEDIATRICS 6E - 6E-55/055-A    Time In: 4984  Time Out: 1439  Timed Code Treatment Minutes: 30 Minutes  Minutes: 41          Date: 2021  Patient Name: Eber Gomez,  Gender:  male        MRN: [id number]  : 1975  (39 y.o.)      Referring Practitioner: DARIUSZ Lennon  Diagnosis: Hyponatremia  Additional Pertinent Hx: Per ED note, pt \"is a 39 y.o. male who presents with recent lumbar fusion done on the  and drains removed yesterday. Coming in with abdominal distention. Patient has a history of hepatitis C and ulcerative colitis. History of having to have paracentesis done after he had had his amputation surgery . He was incontinent of stool and urine today, however he states that he been taking his lactulose for his constipation, and he knew he was going to have a bowel movement was unable to get up and move fast enough. Therefore he had sensation that he needed defecate. States he had some residual numbness around the rectum since his surgery. Also had urination just prior to arrival himself, states that it made his abdominal pain feel better. Has been having a large amount of bowel movements. Is on lactulose. \"  Pt is s/p Lumbar I&D on 2021 by Dr Koko Aguero. Restrictions/Precautions:  Restrictions/Precautions: Fall Risk, General Precautions  Required Braces or Orthoses  Spinal: Lumbar Corset  Position Activity Restriction  Other position/activity restrictions: R UE amputation upto shoulder    Subjective:  Chart Reviewed: Yes  Patient assessed for rehabilitation services?: Yes  Subjective: Pt resting in room chair and agrees to therapy. Pt found that he likes use of hemiwalker vs RW for stability with mobility.     General:  Overall Orientation Status: Within Normal Limits    Vision: Within Functional Limits    Hearing: Within functional limits         Pain: 6/10: back    Social/Functional History: Lives With: Friend(s)  Type of Home: House  Home Layout: One level  Home Access: Level entry  Home Equipment: Rolling walker          Receives Help From: Friend(s)        Ambulation Assistance: Independent  Transfer Assistance: Independent    Active : No     Additional Comments: Pt interested in getting a hemiwalker and was given information to be able to order one due to his insurance having already purchased the RW within the last 2 weeks. OBJECTIVE:  Range of Motion:  Bilateral Lower Extremity: WFL    Strength:  Bilateral Lower Extremity: grossly 4/5    Balance:  Static Sitting Balance:  Modified Independent  Dynamic Sitting Balance: Supervision  Static Standing Balance: Stand By Assistance  Dynamic Standing Balance: Contact Guard Assistance    Bed Mobility:  Not Tested    Transfers:  Sit to Stand: Minimal Assistance  Stand to Sit:Contact Guard Assistance    Ambulation:  Contact Guard Assistance  Distance: 20 ft x 2 (1x with RW and 1x with hemiwalker)  Surface: Level Tile  Device:see distances  Gait Deviations: Forward Flexed Posture, Decreased Step Length Bilaterally, Decreased Gait Speed, Decreased Heel Strike Bilaterally and was steadier with use of hemiwalker and pt reported feeling safer with it as well. Exercise:  Patient was guided in 1 set(s) 10 reps of exercise to both lower extremities. Ankle pumps, Seated marches, Seated heel/toe raises and Long arc quads. Exercises were completed for increased independence with functional mobility. Functional Outcome Measures: Completed  AM-PAC Inpatient Mobility Raw Score : 18  AM-PAC Inpatient T-Scale Score : 43.63    ASSESSMENT:  Activity Tolerance:  Patient tolerance of  treatment: good. Treatment Initiated: Treatment and education initiated within context of evaluation.   Evaluation time included review of current medical information, gathering information related to past medical, social and functional history, completion of standardized testing, formal and informal observation of tasks, assessment of data and development of plan of care and goals. Treatment time included skilled education and facilitation of tasks to increase safety and independence with functional mobility for improved independence and quality of life. Assessment: Body structures, Functions, Activity limitations: Decreased functional mobility , Decreased endurance, Decreased strength, Decreased balance, Increased pain  Assessment: Pt is a 39 y.o. male that is s/p lumbar surgery a week ago and now I&D for evacuation of a hematoma in the same site. Pt participated well with mobility and is at [facility] 62 to light Min A for sit <> stand and [facility] for ambulation. Pt found that use of hemiwalker is more stable than the RW and plans to get one. Pt would benefit from continued skilled PT to address strengthening, gait trng, and transfers to improve functional mobility  Prognosis: Good    REQUIRES PT FOLLOW UP: Yes    Discharge Recommendations:  Discharge Recommendations: Continue to assess pending progress    Patient Education:  PT Education: Goals, PT Role, Plan of Care, Home Exercise Program, Adaptive Device Training, Gait Training, Equipment    Equipment Recommendations:  Equipment Needed: Yes  Other: hemiwalker - pt planning to order one and info provided to pt about them.     Plan:  Times per week: 5x Back  Current Treatment Recommendations: Strengthening, Gait Training, Balance Training, Functional Mobility Training, Endurance Training, Transfer Training, Safety Education & Training, Home Exercise Program, Patient/Caregiver Education & Training    Goals:  Patient goals : go home  Short term goals  Time Frame for Short term goals: at discharge  Short term goal 1: Pt to be Mod I for supine <> sit to get in/out of bed  Short term goal 2: Pt to be Mod I for sit <> stand to get up to ambulate  Short term goal 3: Pt to ambulate > 60 ft with hemiwalker with Supervision for household

## 2021-01-08 NOTE — PROGRESS NOTES
Department of Orthopedic Surgery  Spine Service  Attending Progress Note        Subjective:  Patient reports doing really well. Leg pain improved.  +BM    Vitals  VITALS:  /71   Pulse 91   Temp 97.6 °F (36.4 °C) (Oral)   Resp 18   Ht 6' (1.829 m)   Wt 250 lb (113.4 kg)   SpO2 97%   BMI 33.91 kg/m²   24HR INTAKE/OUTPUT:      Intake/Output Summary (Last 24 hours) at 1/8/2021 0942  Last data filed at 1/8/2021 0543  Gross per 24 hour   Intake 2400.08 ml   Output 2672.5 ml   Net -272.42 ml     URINARY CATHETER OUTPUT ([place]):  [REMOVED] Urethral Catheter Straight-tip-Output (mL): 300 mL  DRAIN/TUBE OUTPUT:  Closed/Suction Drain Inferior;Left;Medial Back Accordion-Output (ml): 2.5 ml  Closed/Suction Drain Inferior;Right Back Accordion-Output (ml): 130 ml  [REMOVED] Ureteral Catheter 16 fr-Output (ml): 1400 ml    PHYSICAL EXAM:    Orientation:  alert and oriented to person, place and time    Incision:  dressing in place, clean, dry, intact    Upper Extremity Motor :   Deltoid:  5/5  Biceps:  5/5  Triceps:  5/5  Wrist Flexion:  5/5  Wrist Extension:  5/5  Finger Flexion:  5/5  Finger Extension:  5/5    Upper Motor [place]uron Signs:  None  Upper Extremity Sensory:  Intact C3-T1 distribution    Lower Extremity Motor :  quadriceps, extensor hallucis longus, dorsiflexion, plantarflexion 5/5 bilaterally  Lower Extremity Sensory:  Intact L1-S1    Flatus:  positive    ABNORMAL EXAM FINDINGS:  none    LABS:    HgB:    Lab Results   Component Value Date    HGB 8.0 01/08/2021     CBC:   Lab Results   Component Value Date    WBC 6.6 01/08/2021    RBC 2.33 01/08/2021    HGB 8.0 01/08/2021    HCT 23.6 01/08/2021    .3 01/08/2021    MCH 34.3 01/08/2021    MCHC 33.9 01/08/2021    RDW 13.2 12/16/2019     01/08/2021    MPV 9.6 01/08/2021     BMP:    Lab Results   Component Value Date     01/08/2021    K 4.6 01/08/2021    K 3.9 01/05/2021    CL 98 01/08/2021    CO2 22 01/08/2021    BUN 16 01/08/2021    LABALBU 3.6

## 2021-01-08 NOTE — PROGRESS NOTES
[facility]  INPATIENT PHYSICAL THERAPY  DAILY NOTE  [facility] ORTHOPEDICS 7K - 7K-16/016-A     Time In: 1097  Time Out: 1130  Timed Code Treatment Minutes: 25 Minutes  Minutes: 25          Date: 2021  Patient Name: Jj Good,  Gender:  male        MRN: [id number]  : 1975  (39 y.o.)     Referring Practitioner: DARIUSZ Lane  Diagnosis: Hyponatremia  Additional Pertinent Hx: Per ED note, pt \"is a 39 y.o. male who presents with recent lumbar fusion done on the  and drains removed yesterday. Coming in with abdominal distention. Patient has a history of hepatitis C and ulcerative colitis. History of having to have paracentesis done after he had had his amputation surgery . He was incontinent of stool and urine today, however he states that he been taking his lactulose for his constipation, and he knew he was going to have a bowel movement was unable to get up and move fast enough. Therefore he had sensation that he needed defecate. States he had some residual numbness around the rectum since his surgery. Also had urination just prior to arrival himself, states that it made his abdominal pain feel better. Has been having a large amount of bowel movements. Is on lactulose. \"  Pt is s/p Lumbar I&D on 2021 by Dr Lesia Hurst. Prior Level of Function:  Lives With: Friend(s)  Type of Home: House  Home Layout: One level  Home Access: Level entry  Home Equipment: Rolling walker        Receives Help From: Friend(s)  Ambulation Assistance: Independent  Transfer Assistance: Independent  Active : No  Additional Comments: Pt interested in getting a hemiwalker and was given information to be able to order one due to his insurance having already purchased the RW within the last 2 weeks.     Restrictions/Precautions:  Restrictions/Precautions: Fall Risk, General Precautions  Required Braces or Orthoses  Spinal: Lumbar Corset  Position Activity Restriction  Other position/activity restrictions: R UE amputation upto shoulder      SUBJECTIVE: Pt sitting up in room chair and agrees to therapy. Pt wanting to get his pants on in prep for going home soon. PAIN: no # given      OBJECTIVE:  Bed Mobility:  Not Tested    Transfers:  Sit to Stand: Contact Guard Assistance  Stand to Sit:Stand By Assistance, cues for hand placement    Ambulation:  Stand By Assistance, [employer] Assistance  Distance: 80 ft  Surface: Level Tile  Device:Félix-Walker  Gait Deviations: Forward Flexed Posture, Decreased Step Length Bilaterally and Decreased Gait Speed    Balance:  Static Sitting Balance:  Modified Independent  Dynamic Sitting Balance: Modified Independent  Static Standing Balance: Stand By Assistance  Dynamic Standing Balance: Stand By Assistance, Contact Guard Assistance    Exercise:  Patient was guided in 1 set(s) 10-12 reps of exercise to both lower extremities. Seated marches, Seated heel/toe raises and Long arc quads. Exercises were completed for increased independence with functional mobility. Functional Outcome Measures: Completed  AM-PAC Inpatient Mobility Raw Score : 18  AM-PAC Inpatient T-Scale Score : 43.63    ASSESSMENT:  Assessment: Patient progressing toward established goals. and is motivated to keep moving. Pt reports that he will be ordering a félix-walker when he gets home to use his computer for it. Activity Tolerance:  Patient tolerance of  treatment: good. Equipment Recommendations:Equipment Needed: Yes  Other: hemiwalker - pt planning to order one and info provided to pt about them.   Discharge Recommendations: Home with Home Health PT Continue to assess pending progress    Plan: Times per week: 5x Back  Current Treatment Recommendations: Strengthening, Gait Training, Balance Training, Functional Mobility Training, Endurance Training, Transfer Training, Safety Education & Training, Home Exercise Program, Patient/Caregiver Education & Training    Patient Education  Patient Education: Plan of Care, Home Exercise Program    Goals:  Patient goals : go home  Short term goals  Time Frame for Short term goals: at discharge  Short term goal 1: Pt to be Mod I for supine <> sit to get in/out of bed  Short term goal 2: Pt to be Mod I for sit <> stand to get up to ambulate  Short term goal 3: Pt to ambulate > 60 ft with hemiwalker with Supervision for household distances  Long term goals  Time Frame for Long term goals : not set due to short ELOS    Following session, patient left in safe position with all fall risk precautions in place. Yaima Landaverde.  [place] [address]

## 2021-01-10 ENCOUNTER — APPOINTMENT (OUTPATIENT)
Dept: INTERVENTIONAL RADIOLOGY/VASCULAR | Age: 46
DRG: 291 | End: 2021-01-10
Payer: MEDICARE

## 2021-01-10 ENCOUNTER — HOSPITAL ENCOUNTER (EMERGENCY)
Age: 46
Discharge: HOME | DRG: 291 | End: 2021-01-10
Attending: EMERGENCY MEDICINE
Payer: MEDICARE

## 2021-01-10 ENCOUNTER — APPOINTMENT (OUTPATIENT)
Dept: CT IMAGING | Age: 46
DRG: 291 | End: 2021-01-10
Payer: MEDICARE

## 2021-01-10 ENCOUNTER — APPOINTMENT (OUTPATIENT)
Dept: GENERAL RADIOLOGY | Age: 46
DRG: 291 | End: 2021-01-10
Payer: MEDICARE

## 2021-01-10 VITALS
BODY MASS INDEX: 33.86 KG/M2 | TEMPERATURE: 99 F | HEART RATE: 99 BPM | DIASTOLIC BLOOD PRESSURE: 92 MMHG | HEIGHT: 72 IN | OXYGEN SATURATION: 97 % | WEIGHT: 250 LBS | SYSTOLIC BLOOD PRESSURE: 134 MMHG | RESPIRATION RATE: 17 BRPM

## 2021-01-10 DIAGNOSIS — R00.0 TACHYCARDIA: ICD-10-CM

## 2021-01-10 DIAGNOSIS — I82.441 DEEP VEIN THROMBOSIS (DVT) OF TIBIAL VEIN OF RIGHT LOWER EXTREMITY, UNSPECIFIED CHRONICITY (HCC): ICD-10-CM

## 2021-01-10 DIAGNOSIS — R60.0 BILATERAL LOWER EXTREMITY EDEMA: Primary | ICD-10-CM

## 2021-01-10 LAB
ALBUMIN SERPL-MCNC: 3.5 G/DL (ref 3.5–5.1)
ALP BLD-CCNC: 72 U/L (ref 38–126)
ALT SERPL-CCNC: 22 U/L (ref 11–66)
ANION GAP SERPL CALCULATED.3IONS-SCNC: 9 MEQ/L (ref 8–16)
AST SERPL-CCNC: 20 U/L (ref 5–40)
BASOPHILS # BLD: 0.1 %
BASOPHILS ABSOLUTE: 0 THOU/MM3 (ref 0–0.1)
BILIRUB SERPL-MCNC: 0.5 MG/DL (ref 0.3–1.2)
BILIRUBIN URINE: NEGATIVE
BLOOD, URINE: NEGATIVE
BUN BLDV-MCNC: 29 MG/DL (ref 7–22)
CALCIUM SERPL-MCNC: 9 MG/DL (ref 8.5–10.5)
CHARACTER, URINE: CLEAR
CHLORIDE BLD-SCNC: 102 MEQ/L (ref 98–111)
CO2: 27 MEQ/L (ref 23–33)
COLOR: YELLOW
CREAT SERPL-MCNC: 1.1 MG/DL (ref 0.4–1.2)
EKG ATRIAL RATE: 113 BPM
EKG P AXIS: 62 DEGREES
EKG P-R INTERVAL: 134 MS
EKG Q-T INTERVAL: 326 MS
EKG QRS DURATION: 76 MS
EKG QTC CALCULATION (BAZETT): 447 MS
EKG R AXIS: 31 DEGREES
EKG T AXIS: 57 DEGREES
EKG VENTRICULAR RATE: 113 BPM
EOSINOPHIL # BLD: 1.3 %
EOSINOPHILS ABSOLUTE: 0.1 THOU/MM3 (ref 0–0.4)
ERYTHROCYTE [DISTWIDTH] IN BLOOD BY AUTOMATED COUNT: 14.7 % (ref 11.5–14.5)
ERYTHROCYTE [DISTWIDTH] IN BLOOD BY AUTOMATED COUNT: 54.4 FL (ref 35–45)
GFR SERPL CREATININE-BSD FRML MDRD: 72 ML/MIN/1.73M2
GLUCOSE BLD-MCNC: 147 MG/DL (ref 70–108)
GLUCOSE URINE: 100 MG/DL
HCT VFR BLD CALC: 26.5 % (ref 42–52)
HEMOGLOBIN: 8.8 GM/DL (ref 14–18)
IMMATURE GRANS (ABS): 0.15 THOU/MM3 (ref 0–0.07)
IMMATURE GRANULOCYTES: 1.6 %
KETONES, URINE: NEGATIVE
LEUKOCYTE ESTERASE, URINE: NEGATIVE
LIPASE: 17.3 U/L (ref 5.6–51.3)
LYMPHOCYTES # BLD: 27.9 %
LYMPHOCYTES ABSOLUTE: 2.7 THOU/MM3 (ref 1–4.8)
MCH RBC QN AUTO: 34.5 PG (ref 26–33)
MCHC RBC AUTO-ENTMCNC: 33.2 GM/DL (ref 32.2–35.5)
MCV RBC AUTO: 103.9 FL (ref 80–94)
MONOCYTES # BLD: 8.8 %
MONOCYTES ABSOLUTE: 0.8 THOU/MM3 (ref 0.4–1.3)
NITRITE, URINE: NEGATIVE
NUCLEATED RED BLOOD CELLS: 0 /100 WBC
OSMOLALITY CALCULATION: 284.2 MOSMOL/KG (ref 275–300)
PH UA: 5 (ref 5–9)
PLATELET # BLD: 127 THOU/MM3 (ref 130–400)
PMV BLD AUTO: 9.1 FL (ref 9.4–12.4)
POTASSIUM REFLEX MAGNESIUM: 3.8 MEQ/L (ref 3.5–5.2)
PRO-BNP: 133.5 PG/ML (ref 0–450)
PROTEIN UA: NEGATIVE
RBC # BLD: 2.55 MILL/MM3 (ref 4.7–6.1)
SEG NEUTROPHILS: 60.3 %
SEGMENTED NEUTROPHILS ABSOLUTE COUNT: 5.8 THOU/MM3 (ref 1.8–7.7)
SODIUM BLD-SCNC: 138 MEQ/L (ref 135–145)
SPECIFIC GRAVITY, URINE: 1.01 (ref 1–1.03)
TOTAL PROTEIN: 6.1 G/DL (ref 6.1–8)
TROPONIN T: < 0.01 NG/ML
UROBILINOGEN, URINE: 0.2 EU/DL (ref 0–1)
WBC # BLD: 9.6 THOU/MM3 (ref 4.8–10.8)

## 2021-01-10 PROCEDURE — 2500000003 HC RX 250 WO HCPCS: Performed by: EMERGENCY MEDICINE

## 2021-01-10 PROCEDURE — 96374 THER/PROPH/DIAG INJ IV PUSH: CPT

## 2021-01-10 PROCEDURE — 36415 COLL VENOUS BLD VENIPUNCTURE: CPT

## 2021-01-10 PROCEDURE — 6360000002 HC RX W HCPCS: Performed by: NURSE PRACTITIONER

## 2021-01-10 PROCEDURE — 6360000002 HC RX W HCPCS: Performed by: EMERGENCY MEDICINE

## 2021-01-10 PROCEDURE — 93970 EXTREMITY STUDY: CPT

## 2021-01-10 PROCEDURE — 80053 COMPREHEN METABOLIC PANEL: CPT

## 2021-01-10 PROCEDURE — 93005 ELECTROCARDIOGRAM TRACING: CPT | Performed by: EMERGENCY MEDICINE

## 2021-01-10 PROCEDURE — 96375 TX/PRO/DX INJ NEW DRUG ADDON: CPT

## 2021-01-10 PROCEDURE — 99284 EMERGENCY DEPT VISIT MOD MDM: CPT

## 2021-01-10 PROCEDURE — 71045 X-RAY EXAM CHEST 1 VIEW: CPT

## 2021-01-10 PROCEDURE — 81003 URINALYSIS AUTO W/O SCOPE: CPT

## 2021-01-10 PROCEDURE — 85025 COMPLETE CBC W/AUTO DIFF WBC: CPT

## 2021-01-10 PROCEDURE — 83880 ASSAY OF NATRIURETIC PEPTIDE: CPT

## 2021-01-10 PROCEDURE — 84484 ASSAY OF TROPONIN QUANT: CPT

## 2021-01-10 PROCEDURE — 6360000004 HC RX CONTRAST MEDICATION: Performed by: NURSE PRACTITIONER

## 2021-01-10 PROCEDURE — 83690 ASSAY OF LIPASE: CPT

## 2021-01-10 PROCEDURE — 71275 CT ANGIOGRAPHY CHEST: CPT

## 2021-01-10 RX ORDER — MORPHINE SULFATE 4 MG/ML
4 INJECTION, SOLUTION INTRAMUSCULAR; INTRAVENOUS ONCE
Status: COMPLETED | OUTPATIENT
Start: 2021-01-10 | End: 2021-01-10

## 2021-01-10 RX ORDER — HEPARIN SODIUM (PORCINE) LOCK FLUSH IV SOLN 100 UNIT/ML 100 UNIT/ML
300 SOLUTION INTRAVENOUS ONCE
Status: COMPLETED | OUTPATIENT
Start: 2021-01-10 | End: 2021-01-10

## 2021-01-10 RX ORDER — BUMETANIDE 0.25 MG/ML
1 INJECTION, SOLUTION INTRAMUSCULAR; INTRAVENOUS ONCE
Status: COMPLETED | OUTPATIENT
Start: 2021-01-10 | End: 2021-01-10

## 2021-01-10 RX ADMIN — Medication 300 UNITS: at 19:45

## 2021-01-10 RX ADMIN — MORPHINE SULFATE 4 MG: 4 INJECTION, SOLUTION INTRAMUSCULAR; INTRAVENOUS at 17:36

## 2021-01-10 RX ADMIN — IOPAMIDOL 80 ML: 755 INJECTION, SOLUTION INTRAVENOUS at 18:26

## 2021-01-10 RX ADMIN — BUMETANIDE 1 MG: 0.25 INJECTION INTRAMUSCULAR; INTRAVENOUS at 15:22

## 2021-01-10 ASSESSMENT — ENCOUNTER SYMPTOMS
COUGH: 0
NAUSEA: 0
VOMITING: 0
CONSTIPATION: 1
ABDOMINAL DISTENTION: 0
COLOR CHANGE: 0
ABDOMINAL PAIN: 0
SHORTNESS OF BREATH: 0

## 2021-01-10 ASSESSMENT — PAIN DESCRIPTION - FREQUENCY: FREQUENCY: CONTINUOUS

## 2021-01-10 ASSESSMENT — PAIN DESCRIPTION - LOCATION: LOCATION: BACK;LEG;GROIN

## 2021-01-10 ASSESSMENT — PAIN DESCRIPTION - PAIN TYPE: TYPE: ACUTE PAIN

## 2021-01-10 ASSESSMENT — PAIN DESCRIPTION - ORIENTATION: ORIENTATION: LEFT;RIGHT

## 2021-01-10 ASSESSMENT — PAIN SCALES - GENERAL: PAINLEVEL_OUTOF10: 10

## 2021-01-10 NOTE — ED NOTES
Patient lying in bed with blankets watching tv at this time. Patient respirations are regular and unlabored. Patient appears to be in no current distress. Patient VSS. Call light is within reach. Patient expresses no needs at this time.        Saravanan Martinez RN  01/10/21 401 Dion Milligan RN  01/10/21 1349

## 2021-01-10 NOTE — ED NOTES
Patient lying in bed with blankets watching tv at this time. Patient respirations are regular and unlabored. Patient appears to be in no current distress. Patient VSS. Call light is within reach. Patient expresses no needs at this time.        Ashley Norris RN  01/10/21 1800

## 2021-01-10 NOTE — ED NOTES
Patient lying in bed with blankets watching tv at this time. Patient respirations are regular and unlabored. Patient appears to be in no current distress. Patient VSS. Call light is within reach. Patient expresses no needs at this time.        Alejandro Knight RN  01/10/21 [id number]

## 2021-01-10 NOTE — ED NOTES
Pt to the ED via self. Patient presents with complaints of ascites and bilateral leg swelling. Patient states that he noticed yesterday that it was getting worse. Patient recently had back surgery on December 29th. Patient is alert and oriented x 4. Respirations are regular and unlabored. Patient provided blanket. Call light within reach.      Aniceto Hernandez RN  01/10/21 2095

## 2021-01-10 NOTE — ED PROVIDER NOTES
[address]     Pt Name: Darylene Sport  MRN: [id number]  Lore 1975  Date of evaluation: 1/10/21        Mid-level provider Note:    I have personally performed and/or participated in the history, exam and medical decision making and agree with all pertinent clinical information as noted by the previous provider. I have also reviewed and agree with the past medical, family and social history unless otherwise noted. I have personally performed a face to face diagnostic evaluation on this patient. I have reviewed the previous provider's findings and agree. Evaluation: Patient was signed out to me by Gulshan Figueroa CNP pending CT of the chest.  Review of previous providers work-up indicate patient had a recent evacuation of spinal hematoma, comes in today for increased leg swelling. 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 On reassessment of the patient's condition he reports significant improvement, he is filled three urinals and he states that leg swelling has gone down significantly. His tachycardia has also improved. Discussed admission versus close outpatient follow-up, patient reports that he does not wish to be admitted today. Encourage patient to return if his symptoms become more severe. Recommended close follow-up with his surgeon as well as his PCP. Patient had previous admission for hyponatremia, sodium today was 138. Patient was seen by nephrology at that time. No acute urinary retention today. Due to patient's history of UC as well as recent surgery with hematoma, patient will not be anticoagulated at this time for what appears to be chronic DVT is incidental finding today. 1. Bilateral lower extremity edema    2. Deep vein thrombosis (DVT) of tibial vein of right lower extremity, unspecified chronicity ([facility] [address]    3. Tachycardia          DISPOSITION/PLAN  PATIENT REFERRED TO:  [facility] EMERGENCY DEPT  [address] [facility] [address] [facility]  Go to   If symptoms worsen    CRISTIAN Valentine CNP  [address] [facility]  [phone]    Schedule an appointment as soon as possible for a visit in 2 days  For re-evaluation    Yousif Emery, [address]  [phone]    Call in 1 day      DISCHARGE MEDICATIONS:  Discharge Medication List as of 1/10/2021  7:20 PM            CRISTIAN Browning CNP, APRN - CNP  01/10/21 2006

## 2021-01-10 NOTE — ED PROVIDER NOTES
Dedra Corbett 13 COMPLAINT       Chief Complaint   Patient presents with    Other     Ascites    Leg Swelling       Nurses Notes reviewed and I agree except as noted in the HPI. HISTORY OF PRESENT ILLNESS    Paul Christianson is a 39 y.o. male who presents to the Emergency Department for the evaluation of bilateral lower leg swelling, pain. He states the swelling has been worsening since discharge from the hospital 2 days ago. Patient was admitted to the hospital for urinary retention, hyponatremia, bilateral lower extremity edema. Patient is status post lumbar back surgery. The patient developed postoperative lumbar fluid collection with severe stenosis and radiculopathy. The patient required a second surgery on January 6 to evacuate the lumbar hematoma and revision of previous surgery and perform discectomy. The patient reports that he had consults with nephrologist due to his leg swelling while in the hospital.  Patient was given Bumex and seemed to be doing better. Patient reports he was discharged home with Bumex and he has been taking as prescribed but the leg swelling has been increasing and is worse than his initial presentation to the hospital.    Patient states that he is making urine. He states that it is difficult to void due to all of the edema as it is going up into his groin. He is able to void is not having urinary retention. The HPI was provided by the patient. REVIEW OF SYSTEMS     Review of Systems   Constitutional: Positive for fatigue. Negative for fever. Respiratory: Negative for cough and shortness of breath. Cardiovascular: Positive for leg swelling. Negative for chest pain and palpitations. Gastrointestinal: Positive for constipation. Negative for abdominal distention, abdominal pain, nausea and vomiting. Genitourinary: Positive for frequency.    Musculoskeletal: Positive for myalgias (bilateral lower leg pain). Skin: Negative for color change. Neurological: Negative for light-headedness and numbness. Psychiatric/Behavioral: Negative for behavioral problems. PAST MEDICAL HISTORY    has a past medical history of Alcohol abuse, Amputation of right arm ([facility] [address] Ascites, GERD (gastroesophageal reflux disease), Hepatic cirrhosis ([facility] [address] Hepatitis B, Hepatitis C, Hypertension, Intravenous drug user, Osteomyelitis ([facility] [address] Psychiatric problem, PTSD (post-traumatic stress disorder), Sciatica, Splenomegaly, Thrombocytopenia ([facility] [address] and Ulcerative colitis ([facility] [address] SURGICAL HISTORY      has a past surgical history that includes Arm amputation at shoulder (Right, 2017); pr colonoscopy w/biopsy single/multiple (9/26/2017); Appendectomy; Bladder surgery (2005); Elbow fracture surgery (2020); Port Surgery (N/A, 8/17/2020); and back surgery (N/A, 1/6/2021). CURRENT MEDICATIONS       Discharge Medication List as of 1/10/2021  7:20 PM      CONTINUE these medications which have NOT CHANGED    Details   bumetanide (BUMEX) 1 MG tablet Take 1 tablet by mouth daily, Disp-31 tablet, R-0Normal      Multiple Vitamins-Minerals (MULTIVITAMIN ADULT PO) Take 1 tablet by mouthHistorical Med      pregabalin (LYRICA) 200 MG capsule Take 200 mg by mouth 3 times daily. Historical Med      PARoxetine (PAXIL) 40 MG tablet Take 40 mg by mouth every morningHistorical Med      potassium chloride (MICRO-K) 10 MEQ extended release capsule Take 10 mEq by mouth dailyHistorical Med      famotidine (PEPCID) 40 MG tablet Take 40 mg by mouth dailyHistorical Med      docusate sodium (COLACE) 100 MG capsule Take 100 mg by mouth dailyHistorical Med      ferrous sulfate 325 (65 Fe) MG tablet Take 325 mg by mouth daily (with breakfast)Historical Med      lisinopril (PRINIVIL;ZESTRIL) 20 MG tablet Take 20 mg by mouth daily Indications: with hydrochlorothiazide 25 mg Historical Med      mesalamine (DELZICOL) 400 MG CPDR delayed release capsule Take 800 right arm amputation   Skin:     General: Skin is warm and dry. Capillary Refill: Capillary refill takes less than 2 seconds. Neurological:      General: No focal deficit present. Mental Status: He is alert. Psychiatric:         Mood and Affect: Mood normal.         Behavior: Behavior normal.          DIFFERENTIAL DIAGNOSIS:   Bilateral pitting edema, CHF, acute kidney injury, electrolyte abnormality. Anasarca, DVT    DIAGNOSTIC RESULTS     EKG: All EKG's are interpreted by the Emergency Department Physician who either signs or Co-signs this chart in the absence of a cardiologist.    Sinus tachycardia 113, NJ interval 134, QRS duration 76, corrected QT 4 to 47 ms    RADIOLOGY: non-plainfilm images(s) such as CT, Ultrasound and MRI are read by the radiologist.    CTA [address]   Final Result   1. No pulmonary emboli are seen. No infiltrates or effusions are seen. 2. Small hiatus hernia. Somewhat thickened curvilinear soft tissue band in the posterior/inferior mediastinum adjacent to the herniated portion of the stomach, also uncertain significance, possibly representing adenopathy. A similar appearance was seen    on prior CT abdomen and pelvis from August 2018. **This report has been created using voice recognition software. It may contain minor errors which are inherent in voice recognition technology. **          Final report electronically signed by Dr. Chika Stubbs on 1/10/2021 6:46 PM      VL DUP LOWER EXTREMITY VENOUS BILATERAL   Final Result   1. Findings of chronic DVT involving one of the paired posterior tibial veins right calf. Otherwise normal venous ultrasound right leg. 2. Normal venous ultrasound left leg. 3. Extensive lymphedema in both legs, consistent with cellulitis or possibly venous insufficiency. **This report has been created using voice recognition software.   It may contain minor errors which are inherent in voice recognition technology. **      Final report electronically signed by Dr. Argelia Sen on 1/10/2021 5:23 PM      XR CHEST PORTABLE   Final Result   1. Suboptimal inflation of lungs. Borderline heart size. Mediport left side, catheter tip the cavoatrial junction. 2. No definite acute findings. No infiltrates or effusions are seen. **This report has been created using voice recognition software. It may contain minor errors which are inherent in voice recognition technology. **      Final report electronically signed by Dr. Argelia Sen on 1/10/2021 3:11 PM          LABS:     Labs Reviewed   CBC WITH AUTO DIFFERENTIAL - Abnormal; Notable for the following components:       Result Value    RBC 2.55 (*)     Hemoglobin 8.8 (*)     Hematocrit 26.5 (*)     .9 (*)     MCH 34.5 (*)     RDW-CV 14.7 (*)     RDW-SD 54.4 (*)     Platelets 649 (*)     MPV 9.1 (*)     Immature Grans (Abs) 0.15 (*)     All other components within normal limits   COMPREHENSIVE METABOLIC PANEL W/ REFLEX TO MG FOR LOW K - Abnormal; Notable for the following components:    Glucose 147 (*)     BUN 29 (*)     All other components within normal limits   URINE RT REFLEX TO CULTURE - Abnormal; Notable for the following components:    Glucose, Ur 100 (*)     All other components within normal limits   GLOMERULAR FILTRATION RATE, ESTIMATED - Abnormal; Notable for the following components:    Est, Glom Filt Rate 72 (*)     All other components within normal limits   BRAIN NATRIURETIC PEPTIDE   TROPONIN   LIPASE   ANION GAP   OSMOLALITY       EMERGENCY DEPARTMENT COURSE:   Vitals:    Vitals:    01/10/21 1552 01/10/21 1640 01/10/21 1759 01/10/21 1903   BP: (!) 144/83 (!) 146/95 (!) 134/92    Pulse: 103 115 114 99   Resp: 15 15 14 17   Temp:       TempSrc:       SpO2: 98% 96% 96% 97%   Weight:       Height:           2:51 PM EST: The patient was seen and evaluated. Appropriate labs and imaging ordered.     Chest x-ray shows suboptimal inflation of the karthikeyan Bird, APRN - CNP  [address] [facility]  [phone]    Schedule an appointment as soon as possible for a visit in 2 days  For re-evaluation    Abdullahi Duenas [address]  [phone]    Call in 1 day        DISCHARGE MEDICATIONS:  Discharge Medication List as of 1/10/2021  7:20 PM          (Please note that portions of this note were completed with a voice recognition program.  Efforts were made to edit the dictations but occasionally words are mis-transcribed.)    The patient was given an opportunity to see the Emergency Attending. The patient voiced understanding that I was a Mid-LevelProvider and was in agreement with being seen independently by myself.           Yesenia Mcdonald, APRN - CNP  01/10/21 [address] [place] [address], APRN - CNP  01/12/21 [id number]

## 2021-01-12 ENCOUNTER — HOSPITAL ENCOUNTER (INPATIENT)
Age: 46
LOS: 3 days | Discharge: NURSING, REHAB OR LONG-TERM CARE | DRG: 291 | End: 2021-01-15
Attending: STUDENT IN AN ORGANIZED HEALTH CARE EDUCATION/TRAINING PROGRAM | Admitting: STUDENT IN AN ORGANIZED HEALTH CARE EDUCATION/TRAINING PROGRAM
Payer: MEDICARE

## 2021-01-12 ENCOUNTER — APPOINTMENT (OUTPATIENT)
Dept: GENERAL RADIOLOGY | Age: 46
DRG: 291 | End: 2021-01-12
Payer: MEDICARE

## 2021-01-12 DIAGNOSIS — N50.89 EDEMA OF SCROTUM: ICD-10-CM

## 2021-01-12 DIAGNOSIS — R60.0 BILATERAL LOWER EXTREMITY EDEMA: Primary | ICD-10-CM

## 2021-01-12 PROBLEM — N17.9 AKI (ACUTE KIDNEY INJURY) (HCC): Status: ACTIVE | Noted: 2021-01-12

## 2021-01-12 PROBLEM — M79.89 LEG SWELLING: Status: ACTIVE | Noted: 2021-01-12

## 2021-01-12 LAB
ALBUMIN SERPL-MCNC: 3.5 G/DL (ref 3.5–5.1)
ALP BLD-CCNC: 77 U/L (ref 38–126)
ALT SERPL-CCNC: 18 U/L (ref 11–66)
ANION GAP SERPL CALCULATED.3IONS-SCNC: 13 MEQ/L (ref 8–16)
AST SERPL-CCNC: 20 U/L (ref 5–40)
BASOPHILS # BLD: 0.1 %
BASOPHILS ABSOLUTE: 0 THOU/MM3 (ref 0–0.1)
BILIRUB SERPL-MCNC: 0.7 MG/DL (ref 0.3–1.2)
BUN BLDV-MCNC: 51 MG/DL (ref 7–22)
CALCIUM SERPL-MCNC: 8.4 MG/DL (ref 8.5–10.5)
CHLORIDE BLD-SCNC: 99 MEQ/L (ref 98–111)
CO2: 23 MEQ/L (ref 23–33)
CREAT SERPL-MCNC: 1.5 MG/DL (ref 0.4–1.2)
EKG ATRIAL RATE: 112 BPM
EKG P AXIS: 55 DEGREES
EKG P-R INTERVAL: 138 MS
EKG Q-T INTERVAL: 332 MS
EKG QRS DURATION: 74 MS
EKG QTC CALCULATION (BAZETT): 453 MS
EKG R AXIS: 41 DEGREES
EKG T AXIS: 51 DEGREES
EKG VENTRICULAR RATE: 112 BPM
EOSINOPHIL # BLD: 2.1 %
EOSINOPHILS ABSOLUTE: 0.2 THOU/MM3 (ref 0–0.4)
ERYTHROCYTE [DISTWIDTH] IN BLOOD BY AUTOMATED COUNT: 14.9 % (ref 11.5–14.5)
ERYTHROCYTE [DISTWIDTH] IN BLOOD BY AUTOMATED COUNT: 56.7 FL (ref 35–45)
GFR SERPL CREATININE-BSD FRML MDRD: 50 ML/MIN/1.73M2
GLUCOSE BLD-MCNC: 186 MG/DL (ref 70–108)
HCT VFR BLD CALC: 25.8 % (ref 42–52)
HEMOGLOBIN: 8.4 GM/DL (ref 14–18)
IMMATURE GRANS (ABS): 0.07 THOU/MM3 (ref 0–0.07)
IMMATURE GRANULOCYTES: 0.8 %
LYMPHOCYTES # BLD: 30.3 %
LYMPHOCYTES ABSOLUTE: 2.6 THOU/MM3 (ref 1–4.8)
MCH RBC QN AUTO: 34 PG (ref 26–33)
MCHC RBC AUTO-ENTMCNC: 32.6 GM/DL (ref 32.2–35.5)
MCV RBC AUTO: 104.5 FL (ref 80–94)
MONOCYTES # BLD: 7.7 %
MONOCYTES ABSOLUTE: 0.7 THOU/MM3 (ref 0.4–1.3)
NUCLEATED RED BLOOD CELLS: 0 /100 WBC
OSMOLALITY CALCULATION: 288.6 MOSMOL/KG (ref 275–300)
PLATELET # BLD: 125 THOU/MM3 (ref 130–400)
PMV BLD AUTO: 9.9 FL (ref 9.4–12.4)
POTASSIUM REFLEX MAGNESIUM: 4.5 MEQ/L (ref 3.5–5.2)
PRO-BNP: 55.5 PG/ML (ref 0–450)
RBC # BLD: 2.47 MILL/MM3 (ref 4.7–6.1)
SEG NEUTROPHILS: 59 %
SEGMENTED NEUTROPHILS ABSOLUTE COUNT: 5.1 THOU/MM3 (ref 1.8–7.7)
SODIUM BLD-SCNC: 135 MEQ/L (ref 135–145)
TOTAL PROTEIN: 6.1 G/DL (ref 6.1–8)
TROPONIN T: < 0.01 NG/ML
WBC # BLD: 8.7 THOU/MM3 (ref 4.8–10.8)

## 2021-01-12 PROCEDURE — 36415 COLL VENOUS BLD VENIPUNCTURE: CPT

## 2021-01-12 PROCEDURE — 93005 ELECTROCARDIOGRAM TRACING: CPT | Performed by: EMERGENCY MEDICINE

## 2021-01-12 PROCEDURE — 84484 ASSAY OF TROPONIN QUANT: CPT

## 2021-01-12 PROCEDURE — 99221 1ST HOSP IP/OBS SF/LOW 40: CPT | Performed by: STUDENT IN AN ORGANIZED HEALTH CARE EDUCATION/TRAINING PROGRAM

## 2021-01-12 PROCEDURE — 96374 THER/PROPH/DIAG INJ IV PUSH: CPT

## 2021-01-12 PROCEDURE — 99284 EMERGENCY DEPT VISIT MOD MDM: CPT

## 2021-01-12 PROCEDURE — 85025 COMPLETE CBC W/AUTO DIFF WBC: CPT

## 2021-01-12 PROCEDURE — 1200000000 HC SEMI PRIVATE

## 2021-01-12 PROCEDURE — 83550 IRON BINDING TEST: CPT

## 2021-01-12 PROCEDURE — 80053 COMPREHEN METABOLIC PANEL: CPT

## 2021-01-12 PROCEDURE — 83540 ASSAY OF IRON: CPT

## 2021-01-12 PROCEDURE — 71045 X-RAY EXAM CHEST 1 VIEW: CPT

## 2021-01-12 PROCEDURE — 2500000003 HC RX 250 WO HCPCS: Performed by: EMERGENCY MEDICINE

## 2021-01-12 PROCEDURE — 83880 ASSAY OF NATRIURETIC PEPTIDE: CPT

## 2021-01-12 PROCEDURE — 84443 ASSAY THYROID STIM HORMONE: CPT

## 2021-01-12 PROCEDURE — 2580000003 HC RX 258: Performed by: STUDENT IN AN ORGANIZED HEALTH CARE EDUCATION/TRAINING PROGRAM

## 2021-01-12 RX ORDER — ACETAMINOPHEN 325 MG/1
650 TABLET ORAL EVERY 6 HOURS PRN
Status: DISCONTINUED | OUTPATIENT
Start: 2021-01-12 | End: 2021-01-15 | Stop reason: HOSPADM

## 2021-01-12 RX ORDER — PAROXETINE HYDROCHLORIDE 20 MG/1
40 TABLET, FILM COATED ORAL EVERY MORNING
Status: DISCONTINUED | OUTPATIENT
Start: 2021-01-13 | End: 2021-01-15 | Stop reason: HOSPADM

## 2021-01-12 RX ORDER — ONDANSETRON 2 MG/ML
4 INJECTION INTRAMUSCULAR; INTRAVENOUS EVERY 6 HOURS PRN
Status: DISCONTINUED | OUTPATIENT
Start: 2021-01-12 | End: 2021-01-15 | Stop reason: HOSPADM

## 2021-01-12 RX ORDER — MESALAMINE 400 MG/1
800 CAPSULE, DELAYED RELEASE ORAL 3 TIMES DAILY
Status: DISCONTINUED | OUTPATIENT
Start: 2021-01-12 | End: 2021-01-15 | Stop reason: HOSPADM

## 2021-01-12 RX ORDER — OXYCODONE HYDROCHLORIDE AND ACETAMINOPHEN 5; 325 MG/1; MG/1
1 TABLET ORAL EVERY 4 HOURS PRN
Status: ON HOLD | COMMUNITY
End: 2021-01-26 | Stop reason: SDUPTHER

## 2021-01-12 RX ORDER — SODIUM CHLORIDE 0.9 % (FLUSH) 0.9 %
10 SYRINGE (ML) INJECTION EVERY 12 HOURS SCHEDULED
Status: DISCONTINUED | OUTPATIENT
Start: 2021-01-12 | End: 2021-01-15 | Stop reason: HOSPADM

## 2021-01-12 RX ORDER — FOLIC ACID 1 MG/1
2 TABLET ORAL DAILY
Status: DISCONTINUED | OUTPATIENT
Start: 2021-01-13 | End: 2021-01-15 | Stop reason: HOSPADM

## 2021-01-12 RX ORDER — POLYETHYLENE GLYCOL 3350 17 G/17G
17 POWDER, FOR SOLUTION ORAL DAILY PRN
Status: DISCONTINUED | OUTPATIENT
Start: 2021-01-12 | End: 2021-01-15 | Stop reason: HOSPADM

## 2021-01-12 RX ORDER — SODIUM CHLORIDE 0.9 % (FLUSH) 0.9 %
10 SYRINGE (ML) INJECTION PRN
Status: DISCONTINUED | OUTPATIENT
Start: 2021-01-12 | End: 2021-01-15 | Stop reason: HOSPADM

## 2021-01-12 RX ORDER — LISINOPRIL 20 MG/1
20 TABLET ORAL DAILY
Status: DISCONTINUED | OUTPATIENT
Start: 2021-01-13 | End: 2021-01-15

## 2021-01-12 RX ORDER — FERROUS SULFATE 325(65) MG
325 TABLET ORAL
Status: DISCONTINUED | OUTPATIENT
Start: 2021-01-13 | End: 2021-01-15 | Stop reason: HOSPADM

## 2021-01-12 RX ORDER — FAMOTIDINE 20 MG/1
40 TABLET, FILM COATED ORAL DAILY
Status: DISCONTINUED | OUTPATIENT
Start: 2021-01-13 | End: 2021-01-15 | Stop reason: HOSPADM

## 2021-01-12 RX ORDER — BUMETANIDE 0.25 MG/ML
1 INJECTION, SOLUTION INTRAMUSCULAR; INTRAVENOUS ONCE
Status: COMPLETED | OUTPATIENT
Start: 2021-01-12 | End: 2021-01-13

## 2021-01-12 RX ORDER — BUMETANIDE 0.25 MG/ML
1 INJECTION, SOLUTION INTRAMUSCULAR; INTRAVENOUS ONCE
Status: COMPLETED | OUTPATIENT
Start: 2021-01-12 | End: 2021-01-12

## 2021-01-12 RX ORDER — ACETAMINOPHEN 650 MG/1
650 SUPPOSITORY RECTAL EVERY 6 HOURS PRN
Status: DISCONTINUED | OUTPATIENT
Start: 2021-01-12 | End: 2021-01-15 | Stop reason: HOSPADM

## 2021-01-12 RX ORDER — PANTOPRAZOLE SODIUM 40 MG/1
40 TABLET, DELAYED RELEASE ORAL
Status: DISCONTINUED | OUTPATIENT
Start: 2021-01-12 | End: 2021-01-15 | Stop reason: HOSPADM

## 2021-01-12 RX ORDER — PREGABALIN 50 MG/1
200 CAPSULE ORAL 3 TIMES DAILY
Status: DISCONTINUED | OUTPATIENT
Start: 2021-01-12 | End: 2021-01-15 | Stop reason: HOSPADM

## 2021-01-12 RX ORDER — PROMETHAZINE HYDROCHLORIDE 25 MG/1
12.5 TABLET ORAL EVERY 6 HOURS PRN
Status: DISCONTINUED | OUTPATIENT
Start: 2021-01-12 | End: 2021-01-15 | Stop reason: HOSPADM

## 2021-01-12 RX ORDER — OXYCODONE HYDROCHLORIDE AND ACETAMINOPHEN 5; 325 MG/1; MG/1
1 TABLET ORAL EVERY 4 HOURS PRN
Status: DISCONTINUED | OUTPATIENT
Start: 2021-01-12 | End: 2021-01-15 | Stop reason: HOSPADM

## 2021-01-12 RX ADMIN — SODIUM CHLORIDE, PRESERVATIVE FREE 10 ML: 5 INJECTION INTRAVENOUS at 21:46

## 2021-01-12 RX ADMIN — BUMETANIDE 1 MG: 0.25 INJECTION INTRAMUSCULAR; INTRAVENOUS at 19:56

## 2021-01-12 ASSESSMENT — PAIN DESCRIPTION - ORIENTATION: ORIENTATION: LEFT;RIGHT

## 2021-01-12 ASSESSMENT — ENCOUNTER SYMPTOMS
VOMITING: 0
SHORTNESS OF BREATH: 1
NAUSEA: 0
DIARRHEA: 0
COUGH: 0

## 2021-01-12 ASSESSMENT — PAIN DESCRIPTION - PAIN TYPE: TYPE: CHRONIC PAIN

## 2021-01-12 ASSESSMENT — PAIN SCALES - GENERAL
PAINLEVEL_OUTOF10: 8
PAINLEVEL_OUTOF10: 6

## 2021-01-12 ASSESSMENT — PAIN DESCRIPTION - ONSET: ONSET: ON-GOING

## 2021-01-12 ASSESSMENT — PAIN DESCRIPTION - DESCRIPTORS
DESCRIPTORS: ACHING
DESCRIPTORS: ACHING

## 2021-01-12 NOTE — ED PROVIDER NOTES
Dedra Corbett 13 COMPLAINT       Chief Complaint   Patient presents with    Leg Swelling     Nurses Notes reviewed and I agree except as noted in the HPI. HISTORY OF PRESENT ILLNESS    Kylah Bolton is a 39 y.o. male who presents to the Emergency Department for the evaluation of bilateral leg swelling. He is a fair historian. Pt relates leg swelling s/p lumbar fusion L3-L5 on 12/29/20 and a surgery to remove fluid on Jan 2021. He note leg, groin, and back pain rated 8-9/10. He also notes L anterior thigh burning sensation 6/10 in pain and has a \"sking tearing\" quality. He states that Percocet 5 mgs were not helpful at pain relief, but Percocets 10 mgs helped more. Associated S/S include chest pain under \"both ribs\", scrotal swelling, decreased sensation of R foot. Pt endorses some SOB related to anxiety and chills. Pt also has some tremulousness and jerkiness in L upper extremity progressing to lower extremities. Pt relates that this shakiness started after Dec 2020 surgery  Pt denies hx of DM, recent leg trauma, fever or cough. The HPI was provided by the patient. REVIEW OF SYSTEMS     Review of Systems   Constitutional: Positive for chills (Pt notes temperature changes). Negative for fever. Respiratory: Positive for shortness of breath (Related to anxiety, per pt). Negative for cough. Cardiovascular: Positive for chest pain (See HPI) and leg swelling. Gastrointestinal: Negative for diarrhea, nausea and vomiting. Neurological: Positive for tremors (See HPI).        PAST MEDICAL HISTORY    has a past medical history of Alcohol abuse, Amputation of right arm ([address] Ascites, GERD (gastroesophageal reflux disease), Hepatic cirrhosis ([address] Hepatitis B, Hepatitis C, Hypertension, Intravenous drug user, Osteomyelitis ([address] Psychiatric problem, PTSD (post-traumatic stress disorder), Sciatica, Splenomegaly, Thrombocytopenia ([facility] [address] and Ulcerative colitis ([facility] [address] SURGICAL HISTORY      has a past surgical history that includes Arm amputation at shoulder (Right, ); pr colonoscopy w/biopsy single/multiple (2017); Appendectomy; Bladder surgery (); Elbow fracture surgery (); Port Surgery (N/A, 2020); and back surgery (N/A, 2021). CURRENT MEDICATIONS       Previous Medications    BUMETANIDE (BUMEX) 1 MG TABLET    Take 1 tablet by mouth daily    DOCUSATE SODIUM (COLACE) 100 MG CAPSULE    Take 100 mg by mouth daily    FAMOTIDINE (PEPCID) 40 MG TABLET    Take 40 mg by mouth daily    FERROUS SULFATE 325 (65 FE) MG TABLET    Take 325 mg by mouth daily (with breakfast)    FOLIC ACID (FOLVITE) 1 MG TABLET    Take 2 tablets by mouth daily    LACTULOSE PO    Take 20 g by mouth 3 times daily     LISINOPRIL (PRINIVIL;ZESTRIL) 20 MG TABLET    Take 20 mg by mouth daily Indications: with hydrochlorothiazide 25 mg     MESALAMINE (DELZICOL) 400 MG CPDR DELAYED RELEASE CAPSULE    Take 800 mg by mouth 3 times daily    MULTIPLE VITAMIN (MULTI VITAMIN MENS) TABS    Take by mouth    MULTIPLE VITAMINS-MINERALS (MULTIVITAMIN ADULT PO)    Take 1 tablet by mouth    OXYCODONE-ACETAMINOPHEN (PERCOCET) 5-325 MG PER TABLET    Take 1 tablet by mouth every 4 hours as needed for Pain. PANTOPRAZOLE (PROTONIX) 40 MG TABLET    Take 40 mg by mouth 2 times daily     PAROXETINE (PAXIL) 40 MG TABLET    Take 40 mg by mouth every morning    POTASSIUM CHLORIDE (MICRO-K) 10 MEQ EXTENDED RELEASE CAPSULE    Take 10 mEq by mouth daily    PREGABALIN (LYRICA) 200 MG CAPSULE    Take 200 mg by mouth 3 times daily. ALLERGIES     is allergic to penicillins and trazodone and nefazodone. FAMILY HISTORY     He indicated that his mother is alive. He indicated that his father is . family history includes High Blood Pressure in his father and mother. SOCIAL HISTORY      reports that he quit smoking about 9 months ago.  His smokeless tobacco use includes snuff. He reports that he does not drink alcohol or use drugs. PHYSICAL EXAM     INITIAL VITALS:  weight is 250 lb (113.4 kg). His oral temperature is 98 °F (36.7 °C). His blood pressure is 110/86 and his pulse is 105. His respiration is 18 and oxygen saturation is 97%. Physical Exam  Exam conducted with a chaperone present Merlinda Cumming, PA-S present as chaperone for  exam). Constitutional:       General: He is not in acute distress. Appearance: He is not toxic-appearing or diaphoretic. Comments: Pt recumbent in bed; R arm absent s/p amputation. Pt has tremulousness and occasionally has what appears to be very brief (less than 1-2 seconds)  lapses of consciousness when conversing)   HENT:      Head: Normocephalic and atraumatic. Cardiovascular:      Rate and Rhythm: Regular rhythm. Tachycardia present. Pulses:           Dorsalis pedis pulses are 1+ on the right side and 1+ on the left side. Heart sounds: Normal heart sounds. Comments: DP pulses are slightly decreased, approximately 3+ edema from feet to approximately mid tibial aspect of legs; from mid tibial aspect of leg to thigh is approximately 1+ edema  Pulmonary:      Comments: Pt was amenable to have only anterior RUL and YAO auscultated; those areas were CTA. Abdominal:      General: There is distension. Palpations: Abdomen is soft. Tenderness: There is no abdominal tenderness. There is guarding. Comments: Some possible involuntary guarding in LLQ   Genitourinary:     Comments: Testes appeared swollen  Feet:      Comments: No open lesions appreciated on visual inspection of plantar, ventral, and interdigital spaces of both feet excepting: small area of what appears to be dried blood in R 4-5th interdigital space. Both feet and ankles are quite edematous, with 3+ pitting edema. Skin:     General: Skin is warm and dry. Neurological:      Sensory: No sensory deficit.       Comments: Sensory deficit noted on ventral and plantar surfaces of R foot. DIFFERENTIAL DIAGNOSIS:   Worsening leg edema, acute kidney injury, congestive heart failure    DIAGNOSTIC RESULTS     EKG: All EKG's are interpreted by the Emergency Department Physician who either signs or Co-signs this chart in the absence of a cardiologist.    Sinus tachycardia rate 112 bpm.  , QRS 74, corrected  ms    RADIOLOGY: non-plainfilm images(s) such as CT, Ultrasound and MRI are read by the radiologist.    No orders to display       LABS:     Labs Reviewed   CBC WITH AUTO DIFFERENTIAL - Abnormal; Notable for the following components:       Result Value    RBC 2.47 (*)     Hemoglobin 8.4 (*)     Hematocrit 25.8 (*)     .5 (*)     MCH 34.0 (*)     RDW-CV 14.9 (*)     RDW-SD 56.7 (*)     Platelets 223 (*)     All other components within normal limits   COMPREHENSIVE METABOLIC PANEL W/ REFLEX TO MG FOR LOW K - Abnormal; Notable for the following components:    Glucose 186 (*)     CREATININE 1.5 (*)     BUN 51 (*)     Calcium 8.4 (*)     All other components within normal limits   GLOMERULAR FILTRATION RATE, ESTIMATED - Abnormal; Notable for the following components:    Est, Glom Filt Rate 50 (*)     All other components within normal limits   BRAIN NATRIURETIC PEPTIDE   TROPONIN   ANION GAP   OSMOLALITY       EMERGENCY DEPARTMENT COURSE:   Vitals:    Vitals:    01/12/21 1658 01/12/21 1752 01/12/21 1946   BP: 107/68 130/85 110/86   Pulse: 112 107 105   Resp: 20 19 18   Temp: 98 °F (36.7 °C)     TempSrc: Oral     SpO2: 98% 98% 97%   Weight: 250 lb (113.4 kg)         5:34 PM EST: The patient was seen and evaluated. Appropriate labs and imaging are ordered. Patient has noted tachycardia. This was present 2 days ago during ED visit. CTA of the chest was performed at that time showing no pulmonary emboli, no infiltrates or effusions. Patient has a decrease in his GFR from 72-50.   His creatinine has increased to 1.5 from 1.1.  His electrolytes remain within normal limits. His proBNP 55.5. His troponin less than 0.01. I contacted Dr. Ashok Vázquez, nephrologist and advised of the patient. He advised the patient would likely benefit from IV diuretics as he seems to be not improving with outpatient Bumex. MDM:  Patient has increasing leg edema, it is becoming very painful. It is increasing into the level of the scrotum and has mild scrotal edema. I contacted Mat Leggett, hospitalist who graciously accepted the patient for inpatient admission. CRITICAL CARE:   None    CONSULTS:  Dr Donald Bond, nephrology  Dr Inez Lomas, nephrology    PROCEDURES:  None    FINAL IMPRESSION      1. Bilateral lower extremity edema    2. Edema of scrotum          DISPOSITION/PLAN   Admit    PATIENT REFERRED TO:  No follow-up provider specified. DISCHARGE MEDICATIONS:  New Prescriptions    No medications on file       (Please note that portions of this note were completed with a voice recognition program.  Efforts were made to edit the dictations but occasionally words are mis-transcribed.)    The patient was given an opportunity to see the Emergency Attending. The patient voiced understanding that I was a Mid-LevelProvider and was in agreement with being seen independently by myself.           Dalila Brian, CRISTIAN - JULIETTE  01/12/21 2025

## 2021-01-12 NOTE — ED NOTES
ED nurse-to-nurse bedside report    Chief Complaint   Patient presents with    Leg Swelling      LOC: alert and orientated to name, place, date  Vital signs   Vitals:    01/12/21 1658 01/12/21 1752   BP: 107/68 130/85   Pulse: 112 107   Resp: 20 19   Temp: 98 °F (36.7 °C)    TempSrc: Oral    SpO2: 98% 98%   Weight: 250 lb (113.4 kg)       Pain:    Pain Interventions: none  Pain Goal: 2  Oxygen: No    Current needs required room air   Telemetry: No  LDAs:    Continuous Infusions:   Mobility: Requires assistance * 1  Pack Fall Risk Score: No flowsheet data found. Fall Interventions: none  Report given to:  PAPA Parson D PAPA Vergara  01/12/21 4372

## 2021-01-12 NOTE — ED NOTES
Presents with complaints of bilateral leg edema for two weeks. States tat he has been seen for the same a couple of days ago and does not feel he is any better.      Shaina Figueroa RN  01/12/21 9046

## 2021-01-13 ENCOUNTER — APPOINTMENT (OUTPATIENT)
Dept: ULTRASOUND IMAGING | Age: 46
DRG: 291 | End: 2021-01-13
Payer: MEDICARE

## 2021-01-13 LAB
ANION GAP SERPL CALCULATED.3IONS-SCNC: 13 MEQ/L (ref 8–16)
BASOPHILS # BLD: 0.1 %
BASOPHILS ABSOLUTE: 0 THOU/MM3 (ref 0–0.1)
BUN BLDV-MCNC: 55 MG/DL (ref 7–22)
CALCIUM SERPL-MCNC: 8.4 MG/DL (ref 8.5–10.5)
CHLORIDE BLD-SCNC: 99 MEQ/L (ref 98–111)
CHOLESTEROL, TOTAL: 121 MG/DL (ref 100–199)
CO2: 25 MEQ/L (ref 23–33)
CREAT SERPL-MCNC: 1.6 MG/DL (ref 0.4–1.2)
CREATININE URINE: 103.9 MG/DL
EOSINOPHIL # BLD: 3 %
EOSINOPHILS ABSOLUTE: 0.3 THOU/MM3 (ref 0–0.4)
ERYTHROCYTE [DISTWIDTH] IN BLOOD BY AUTOMATED COUNT: 14.7 % (ref 11.5–14.5)
ERYTHROCYTE [DISTWIDTH] IN BLOOD BY AUTOMATED COUNT: 55.7 FL (ref 35–45)
GFR SERPL CREATININE-BSD FRML MDRD: 47 ML/MIN/1.73M2
GLUCOSE BLD-MCNC: 140 MG/DL (ref 70–108)
HCT VFR BLD CALC: 26.7 % (ref 42–52)
HDLC SERPL-MCNC: 41 MG/DL
HEMOGLOBIN: 8.8 GM/DL (ref 14–18)
IMMATURE GRANS (ABS): 0.11 THOU/MM3 (ref 0–0.07)
IMMATURE GRANULOCYTES: 1.2 %
IRON: 64 UG/DL (ref 65–195)
LDL CHOLESTEROL CALCULATED: 62 MG/DL
LYMPHOCYTES # BLD: 30 %
LYMPHOCYTES ABSOLUTE: 2.9 THOU/MM3 (ref 1–4.8)
MAGNESIUM: 1.9 MG/DL (ref 1.6–2.4)
MCH RBC QN AUTO: 34.4 PG (ref 26–33)
MCHC RBC AUTO-ENTMCNC: 33 GM/DL (ref 32.2–35.5)
MCV RBC AUTO: 104.3 FL (ref 80–94)
MONOCYTES # BLD: 10.3 %
MONOCYTES ABSOLUTE: 1 THOU/MM3 (ref 0.4–1.3)
NUCLEATED RED BLOOD CELLS: 0 /100 WBC
PLATELET # BLD: 142 THOU/MM3 (ref 130–400)
PMV BLD AUTO: 9.3 FL (ref 9.4–12.4)
POTASSIUM SERPL-SCNC: 4.5 MEQ/L (ref 3.5–5.2)
PROT/CREAT RATIO, UR: 0.07
PROTEIN, URINE: 7.2 MG/DL
RBC # BLD: 2.56 MILL/MM3 (ref 4.7–6.1)
SEG NEUTROPHILS: 55.4 %
SEGMENTED NEUTROPHILS ABSOLUTE COUNT: 5.3 THOU/MM3 (ref 1.8–7.7)
SODIUM BLD-SCNC: 137 MEQ/L (ref 135–145)
SODIUM URINE: 33 MEQ/L
TOTAL IRON BINDING CAPACITY: 236 UG/DL (ref 171–450)
TRIGL SERPL-MCNC: 89 MG/DL (ref 0–199)
TSH SERPL DL<=0.05 MIU/L-ACNC: 0.49 UIU/ML (ref 0.4–4.2)
WBC # BLD: 9.5 THOU/MM3 (ref 4.8–10.8)

## 2021-01-13 PROCEDURE — 99222 1ST HOSP IP/OBS MODERATE 55: CPT | Performed by: INTERNAL MEDICINE

## 2021-01-13 PROCEDURE — 2500000003 HC RX 250 WO HCPCS: Performed by: STUDENT IN AN ORGANIZED HEALTH CARE EDUCATION/TRAINING PROGRAM

## 2021-01-13 PROCEDURE — 99232 SBSQ HOSP IP/OBS MODERATE 35: CPT | Performed by: INTERNAL MEDICINE

## 2021-01-13 PROCEDURE — 82570 ASSAY OF URINE CREATININE: CPT

## 2021-01-13 PROCEDURE — 80061 LIPID PANEL: CPT

## 2021-01-13 PROCEDURE — 83735 ASSAY OF MAGNESIUM: CPT

## 2021-01-13 PROCEDURE — 6370000000 HC RX 637 (ALT 250 FOR IP): Performed by: STUDENT IN AN ORGANIZED HEALTH CARE EDUCATION/TRAINING PROGRAM

## 2021-01-13 PROCEDURE — 2500000003 HC RX 250 WO HCPCS: Performed by: INTERNAL MEDICINE

## 2021-01-13 PROCEDURE — 84156 ASSAY OF PROTEIN URINE: CPT

## 2021-01-13 PROCEDURE — 1200000000 HC SEMI PRIVATE

## 2021-01-13 PROCEDURE — 85025 COMPLETE CBC W/AUTO DIFF WBC: CPT

## 2021-01-13 PROCEDURE — 36415 COLL VENOUS BLD VENIPUNCTURE: CPT

## 2021-01-13 PROCEDURE — 80048 BASIC METABOLIC PNL TOTAL CA: CPT

## 2021-01-13 PROCEDURE — 76770 US EXAM ABDO BACK WALL COMP: CPT

## 2021-01-13 PROCEDURE — 6360000002 HC RX W HCPCS: Performed by: STUDENT IN AN ORGANIZED HEALTH CARE EDUCATION/TRAINING PROGRAM

## 2021-01-13 PROCEDURE — 84300 ASSAY OF URINE SODIUM: CPT

## 2021-01-13 PROCEDURE — 2580000003 HC RX 258: Performed by: STUDENT IN AN ORGANIZED HEALTH CARE EDUCATION/TRAINING PROGRAM

## 2021-01-13 RX ORDER — BUMETANIDE 0.25 MG/ML
1 INJECTION, SOLUTION INTRAMUSCULAR; INTRAVENOUS DAILY
Status: DISCONTINUED | OUTPATIENT
Start: 2021-01-13 | End: 2021-01-15

## 2021-01-13 RX ADMIN — BUMETANIDE 1 MG: 0.25 INJECTION, SOLUTION INTRAMUSCULAR; INTRAVENOUS at 00:14

## 2021-01-13 RX ADMIN — FAMOTIDINE 40 MG: 20 TABLET, FILM COATED ORAL at 08:11

## 2021-01-13 RX ADMIN — BUMETANIDE 1 MG: 0.25 INJECTION INTRAMUSCULAR; INTRAVENOUS at 22:59

## 2021-01-13 RX ADMIN — PAROXETINE HYDROCHLORIDE 40 MG: 20 TABLET, FILM COATED ORAL at 08:11

## 2021-01-13 RX ADMIN — PANTOPRAZOLE SODIUM 40 MG: 40 TABLET, DELAYED RELEASE ORAL at 05:05

## 2021-01-13 RX ADMIN — MESALAMINE 800 MG: 400 CAPSULE, DELAYED RELEASE ORAL at 08:11

## 2021-01-13 RX ADMIN — MESALAMINE 800 MG: 400 CAPSULE, DELAYED RELEASE ORAL at 15:01

## 2021-01-13 RX ADMIN — OXYCODONE AND ACETAMINOPHEN 1 TABLET: 5; 325 TABLET ORAL at 23:00

## 2021-01-13 RX ADMIN — PANTOPRAZOLE SODIUM 40 MG: 40 TABLET, DELAYED RELEASE ORAL at 14:40

## 2021-01-13 RX ADMIN — LISINOPRIL 20 MG: 20 TABLET ORAL at 09:51

## 2021-01-13 RX ADMIN — PREGABALIN 200 MG: 50 CAPSULE ORAL at 00:14

## 2021-01-13 RX ADMIN — MESALAMINE 800 MG: 400 CAPSULE, DELAYED RELEASE ORAL at 22:50

## 2021-01-13 RX ADMIN — SODIUM CHLORIDE, PRESERVATIVE FREE 10 ML: 5 INJECTION INTRAVENOUS at 23:04

## 2021-01-13 RX ADMIN — FOLIC ACID 2 MG: 1 TABLET ORAL at 08:10

## 2021-01-13 RX ADMIN — ENOXAPARIN SODIUM 40 MG: 40 INJECTION SUBCUTANEOUS at 08:11

## 2021-01-13 RX ADMIN — PREGABALIN 200 MG: 50 CAPSULE ORAL at 08:11

## 2021-01-13 RX ADMIN — PREGABALIN 200 MG: 50 CAPSULE ORAL at 22:50

## 2021-01-13 RX ADMIN — MESALAMINE 800 MG: 400 CAPSULE, DELAYED RELEASE ORAL at 00:15

## 2021-01-13 RX ADMIN — FERROUS SULFATE TAB 325 MG (65 MG ELEMENTAL FE) 325 MG: 325 (65 FE) TAB at 08:10

## 2021-01-13 RX ADMIN — OXYCODONE AND ACETAMINOPHEN 1 TABLET: 5; 325 TABLET ORAL at 00:14

## 2021-01-13 RX ADMIN — PANTOPRAZOLE SODIUM 40 MG: 40 TABLET, DELAYED RELEASE ORAL at 00:14

## 2021-01-13 RX ADMIN — PREGABALIN 200 MG: 50 CAPSULE ORAL at 14:40

## 2021-01-13 RX ADMIN — OXYCODONE AND ACETAMINOPHEN 1 TABLET: 5; 325 TABLET ORAL at 05:05

## 2021-01-13 RX ADMIN — OXYCODONE AND ACETAMINOPHEN 1 TABLET: 5; 325 TABLET ORAL at 10:48

## 2021-01-13 ASSESSMENT — PAIN SCALES - GENERAL
PAINLEVEL_OUTOF10: 7
PAINLEVEL_OUTOF10: 4
PAINLEVEL_OUTOF10: 9

## 2021-01-13 ASSESSMENT — PAIN DESCRIPTION - LOCATION: LOCATION: BACK;LEG

## 2021-01-13 ASSESSMENT — PAIN DESCRIPTION - PAIN TYPE: TYPE: CHRONIC PAIN

## 2021-01-13 NOTE — PROGRESS NOTES
Spoke with Dr Trenton Rodgers about consult for CHF clinic. No suggestive signs or testing that signifies CHF/ no documentation of. Order to d/c CHF consult. Will call us back in office if he feels he does need to follow in CHF clinic.

## 2021-01-13 NOTE — H&P
Hospitalist - History & Physical      Patient: Darylene Sport    Unit/Bed:31/031A  YOB: 1975  MRN: [id number]   Acct: [de-identified]   PCP: CRISTIAN Funes CNP    Date of Service: Pt seen/examined on 01/12/21  and Admitted to Inpatient with expected LOS greater than two midnights due to medical therapy. Chief Complaint:  Leg swelling    Assessment and Plan:-  1. Anasarca (predominantly leg and scrotal swelling)  2. ONIEL  3. Thrombocytopenia (chronic)  Plan:  Bumex 2mg IV; goal net negative 1-2L  Strict I/Os  Keep Mg>2, K>4  2g Na diet  Fluid restriction  TTE  Trend Cr    History Of Present Illness:    40 yo M with PMH of recent lumbar hematoma evacuation with L3-L4 discectomy on 1/7, uclerative colitis, hx of right arm amputation, GERD, Hepatiis B, C, cirrhosis who presents with bilateral leg swelling. Patient has been to ED multiple times for leg swelling with titration of oral diuretics with no significant improvement. Patient endorses low Na intake. He reports associated occasional SOB and orthopnea. No PND. Denies fever, chills, chest pain, vision changes, headaches, abdominal pain, nausea, vomiting, diarrhea, constipation. His swelling has worsened since his hematoma evacuation on 1/7.     Past Medical History:        Diagnosis Date    Alcohol abuse     in remission    Amputation of right arm (HCC)     Ascites     GERD (gastroesophageal reflux disease)     Hepatic cirrhosis (HCC)     Hepatitis B     Hepatitis C     Hypertension     Intravenous drug user     in remission    Osteomyelitis (HCC)     right arm/shoulder amputation    Psychiatric problem     PTSD (post-traumatic stress disorder)     Sciatica     Splenomegaly     Thrombocytopenia (HCC)     Ulcerative colitis ([facility] [address]        Past Surgical History:        Procedure Laterality Date    APPENDECTOMY      ARM AMPUTATION AT SHOULDER Right 2017    [facility]    BACK SURGERY N/A 1/6/2021    LUMBAR I&D performed snuff. He reports that he does not drink alcohol or use drugs. Family History:       Problem Relation Age of Onset    High Blood Pressure Mother     High Blood Pressure Father        Diet:  No diet orders on file    Review of systems:   Pertinent positives as noted in the HPI. All other systems reviewed and negative. PHYSICAL EXAM:  /86   Pulse 105   Temp 98 °F (36.7 °C) (Oral)   Resp 18   Wt 250 lb (113.4 kg)   SpO2 97%   BMI 33.91 kg/m²   General appearance: No apparent distress, appears stated age and cooperative. HEENT: Normal cephalic, atraumatic without obvious deformity. Pupils equal, round, and reactive to light. Extra ocular muscles intact. Conjunctivae/corneas clear. Neck: Supple, with full range of motion. No jugular venous distention. Trachea midline. Respiratory:  Normal respiratory effort. Clear to auscultation, bilaterally without Rales/Wheezes/Rhonchi. Cardiovascular: Regular rate and rhythm with normal S1/S2 without murmurs, rubs or gallops. Abdomen: Soft, non-tender, non-distended with normal bowel sounds. Musculoskeletal:  No clubbing, cyanosis or edema bilaterally. 2+ pitting edema. Skin: Skin color, texture, turgor normal.  No rashes or lesions. Neurologic:  Neurovascularly intact without any focal sensory/motor deficits.  Cranial nerves: II-XII intact, grossly non-focal.  Psychiatric: Alert and oriented, thought content appropriate, normal insight  Capillary Refill: Brisk,< 3 seconds   Peripheral Pulses: +2 palpable, equal bilaterally     Labs:   Recent Labs     01/10/21  1540 01/12/21  1654   WBC 9.6 8.7   HGB 8.8* 8.4*   HCT 26.5* 25.8*   * 125*     Recent Labs     01/10/21  1540 01/12/21  1654    135   K 3.8 4.5    99   CO2 27 23   BUN 29* 51*   CREATININE 1.1 1.5*   CALCIUM 9.0 8.4*     Recent Labs     01/10/21  1540 01/12/21  1654   AST 20 20   ALT 22 18   BILITOT 0.5 0.7   ALKPHOS 72 77     No results for input(s): INR in the last 72 hours. No results for input(s): Bette Tyler in the last 72 hours. Urinalysis:    Lab Results   Component Value Date    NITRU NEGATIVE 01/10/2021    BLOODU NEGATIVE 01/10/2021    GLUCOSEU 100 01/10/2021       Radiology:   No orders to display     No results found.       EKG: reviewed    Electronically signed by Shaq Veloz MD on 1/12/2021 at 8:30 PM

## 2021-01-13 NOTE — PROGRESS NOTES
Hospitalist H+P      Patient:  Maria Teresa Tovar    Unit/Bed:5K-09/009-A  YOB: 1975  MRN: [id number]   Acct: [de-identified]     PCP: CRISTIAN Medellin CNP  Date of Admission: 1/12/2021        Assessment and Plan:        1. Anasarca: Predominantly lower extremities and scrotal swelling, as normal albumin, nephrology has been consulted  2. Acute kidney injury will trend creatinine and nephrology to manage diuresing  3. Chronic thrombocytopenia  4. Monitor electrolytes closely keep mag greater than 2 and potassium greater than 4, fluid restriction 2 g sodium diet  5. Will empirically obtain blood cultures with additional history of fever and chills    CC: Leg swelling    HPI: 75-year-old obese  male presents to emergency department with evaluation of bilateral leg edema. Patient relates his leg swelling to status post lumbar fusion L3-L5 on 12/29/2020 had surgery remove fluid on January 2021. Michael Nieves Patient has gradually gotten worse since that time. ROS (14 point review of systems completed. Pertinent positives noted.  Otherwise ROS is negative) : Patient gives a history of chills shortness of breath, chest pain, leg swelling,    PMH:  Per HPI and       Diagnosis Date    Alcohol abuse     in remission    Amputation of right arm (HCC)     Ascites     GERD (gastroesophageal reflux disease)     Hepatic cirrhosis ([address]     Hepatitis B     Hepatitis C     Hypertension     Intravenous drug user     in remission    Osteomyelitis ([address]     right arm/shoulder amputation    Psychiatric problem     PTSD (post-traumatic stress disorder)     Sciatica     Splenomegaly     Thrombocytopenia ([address]     Ulcerative colitis ([address]      SHX:        Procedure Laterality Date    APPENDECTOMY      ARM AMPUTATION AT SHOULDER Right 2017    [facility]    BACK SURGERY N/A 1/6/2021    LUMBAR I&D performed by Bill Lozano MD at [address]  PORT SURGERY N/A 2020    SUBCLAVIAN SINGLE LUMEN MEDIPORT INSERTION performed by David Rodriguez MD at [address] W/BIOPSY SINGLE/MULTIPLE  2017    COLONOSCOPY WITH BIOPSY performed by Kaylie Richard MD at [facility]     [id number]:       Problem Relation Age of Onset    High Blood Pressure Mother     High Blood Pressure Father      SOCHX:   Social History     Socioeconomic History    Marital status:      Spouse name: None    Number of children: 1    Years of education: None    Highest education level: None   Occupational History    None   Social Needs    Financial resource strain: None    Food insecurity     Worry: None     Inability: None    Transportation needs     Medical: None     Non-medical: None   Tobacco Use    Smoking status: Former Smoker     Quit date: 2020     Years since quittin.7    Smokeless tobacco: Current User     Types: Snuff   Substance and Sexual Activity    Alcohol use: No     Comment: Several months since last used    Drug use: No     Comment: 160 days since last use    Sexual activity: None   Lifestyle    Physical activity     Days per week: None     Minutes per session: None    Stress: None   Relationships    Social connections     Talks on phone: None     Gets together: None     Attends Congregational service: None     Active member of club or organization: None     Attends meetings of clubs or organizations: None     Relationship status: None    Intimate partner violence     Fear of current or ex partner: None     Emotionally abused: None     Physically abused: None     Forced sexual activity: None   Other Topics Concern    None   Social History Narrative    None      Allergies: Penicillins and Trazodone and nefazodone  Medications:       sodium chloride flush  10 mL Intravenous 2 times per day    enoxaparin  40 mg Subcutaneous Daily    lisinopril  20 mg Oral Daily    famotidine  40 mg Oral Daily    ferrous sulfate  325 mg Oral Daily with breakfast    folic acid  2 mg Oral Daily    mesalamine  800 mg Oral TID    pantoprazole  40 mg Oral BID AC    PARoxetine  40 mg Oral QAM    pregabalin  200 mg Oral TID     Prior to Admission medications    Medication Sig Start Date End Date Taking? Authorizing Provider   oxyCODONE-acetaminophen (PERCOCET) 5-325 MG per tablet Take 1 tablet by mouth every 4 hours as needed for Pain. Yes Historical Provider, MD   bumetanide (BUMEX) 1 MG tablet Take 1 tablet by mouth daily 1/8/21 2/8/21 Yes DARIUSZ Gtz   Multiple Vitamins-Minerals (MULTIVITAMIN ADULT PO) Take 1 tablet by mouth   Yes Historical Provider, MD   pregabalin (LYRICA) 200 MG capsule Take 200 mg by mouth 3 times daily.    Yes Historical Provider, MD   PARoxetine (PAXIL) 40 MG tablet Take 40 mg by mouth every morning   Yes Historical Provider, MD   potassium chloride (MICRO-K) 10 MEQ extended release capsule Take 10 mEq by mouth daily   Yes Historical Provider, MD   famotidine (PEPCID) 40 MG tablet Take 40 mg by mouth daily   Yes Historical Provider, MD   docusate sodium (COLACE) 100 MG capsule Take 100 mg by mouth daily   Yes Historical Provider, MD   ferrous sulfate 325 (65 Fe) MG tablet Take 325 mg by mouth daily (with breakfast)   Yes Historical Provider, MD   lisinopril (PRINIVIL;ZESTRIL) 20 MG tablet Take 20 mg by mouth daily Indications: with hydrochlorothiazide 25 mg    Yes Historical Provider, MD   mesalamine (DELZICOL) 400 MG CPDR delayed release capsule Take 800 mg by mouth 3 times daily   Yes Historical Provider, MD   folic acid (FOLVITE) 1 MG tablet Take 2 tablets by mouth daily 9/26/17  Yes Rachel Plascencia MD   pantoprazole (PROTONIX) 40 MG tablet Take 40 mg by mouth 2 times daily    Yes Historical Provider, MD   Multiple Vitamin (MULTI VITAMIN MENS) TABS Take by mouth   Yes Historical Provider, MD   LACTULOSE PO Take 20 g by mouth 3 times daily     Historical Provider, MD      PHYSICAL EXAM:    /73   Pulse     No results for input(s): INR in the last 72 hours.  No results for input(s): Banda Kalina in the last 72 hours. Radiology reports-images reviewed in PACS  Cta Chest W Wo Contrast    Result Date: 1/10/2021  CTA THORAX / PULMONARY EMBOLUS STUDY: CLINICAL INFORMATION: right lower leg DVT, persistent tachycardia COMPARISON; No prior study. TECHNIQUE: 1.5 mm axial images were obtained through the chest following the administration of IV contrast (ISOVUE). A non-contrast localizer was obtained. 3D reconstructions were performed on the scanner to include sagittal and coronal MIP images through both the right and left pulmonary arteries. ALL CT SCANS AT THIS FACILITY use dose modulation, iterative reconstruction, and/or weight-based dosing when appropriate to reduce radiation dose to as low as reasonably achievable. FINDINGS: Upper abdomen: Unremarkable. No gross abnormality is seen. Mediastinum and sharif: There is a small hiatus hernia. Addition, there is a prominent somewhat curvilinear soft tissue band along the right side of the herniated portion of the stomach, lungs or significance. This could related to chronic adenopathy. A similar appearance was seen on a prior CT abdomen and pelvis May 23, 2018. A few small postinflammatory lymph nodes are seen in the upper mediastinum. No hilar adenopathy is seen. A MediPort is present left side with catheter tip the cavoatrial junction. Bones: No evidence for acute fracture or bone destruction. Lungs: Minimal atelectatic/fibrotic stranding in the lingula. No acute infiltrates or effusions are seen. No lung nodules are seen. Vascular: No pulmonary emboli are seen. There is no large vessel aneurysm or dissection. 1.  No pulmonary emboli are seen. No infiltrates or effusions are seen. 2. Small hiatus hernia.  Somewhat thickened curvilinear soft tissue band in the posterior/inferior mediastinum adjacent to the herniated portion of the stomach, also uncertain significance, possibly representing adenopathy. A similar appearance was seen on prior CT abdomen and pelvis from August 2018. **This report has been created using voice recognition software. It may contain minor errors which are inherent in voice recognition technology. **  Final report electronically signed by Dr. Jaquan Medrano on 1/10/2021 6:46 PM    Mri Thoracic Spine W Wo Contrast    Result Date: 1/6/2021  PROCEDURE: MRI THORACIC SPINE W WO CONTRAST CLINICAL INFORMATION: bilateral leg weakness/numbness. Lumbar surgery in December. Left leg weakness and numbness since before surgery. Worsening pain and numbness since surgery. COMPARISON: Lumbar spine MRI 1/6/2021. TECHNIQUE: Sagittal T1, T2 and STIR sequences were obtained through the thoracic spine. Axial T2-weighted images were obtained through the discs. Postcontrast axial and sagittal T1-weighted images were obtained. FINDINGS: There is a slight S-shaped scoliosis. There are no compression fractures. There is no suspicious marrow signal abnormality. There is no bone marrow edema. The discs have normal signal. There is some motion artifact on this exam. The thoracic spinal cord is of normal caliber and signal intensity. There are no abnormalities within the spinal canal. There is no abnormal enhancement. On the axial images, there are no abnormalities within the spinal canal. The thoracic spinal cord is of normal signal. There is no spinal canal stenosis. No foraminal stenosis is noted. There are no gross abnormalities on the localizer images. 1. Normal thoracic spinal cord. 2. No evidence of spinal canal stenosis. **This report has been created using voice recognition software. It may contain minor errors which are inherent in voice recognition technology. ** Final report electronically signed by Dr. Keyanna Guevara on 1/6/2021 11:01 AM    Mri Lumbar Spine W Wo Contrast    Result Date: 1/6/2021  PROCEDURE: MRI LUMBAR [facility] INFORMATION: Recent Lumbar surgery, now has bladder retention, saddle anesthesia, defecation difficulties. Eval for cord compression. Lumbar surgery in December. Left leg weakness and numbness since before surgery. Pain and numbness has worsened  after surgery. COMPARISON: CT lumbar spine 1/3/2021. TECHNIQUE: Sagittal and axial T1 and T2-weighted images were obtained to the lumbar spine. Postcontrast axial and sagittal T1-weighted images were also obtained. FINDINGS: There are bilateral pedicle screws in L3, L4 and L5. The patient has had laminectomies at the L2-3, L3-4 and L4-5 levels. In the laminectomy bed, there is a heterogeneous collection measuring 10.3 x 5.6 x 4.5 cm. There is surrounding enhancement. This collection distends the posterior paraspinal musculature. This extends into the laminectomy bed and exhibits mass effect upon the thecal sac. This is causing severe stenosis of the thecal sac at the L3-4 and L4-5 levels. The lumbar vertebral bodies are normally aligned. There is normal marrow signal in the vertebral bodies. There are no compression fractures. There is disc desiccation at the L2-3, L3-4 and L4-5 levels. The distal thoracic cord is normal. The tip of the conus is normal. There is compression of the nerve roots of the cauda equina at the L3-4 and L4-5 levels due to mass effect from the collection in the laminectomy bed. There are no gross abnormalities in the distal thoracic spine. On the axial images, at T12-L1 and L1-L2, there is no spinal canal stenosis. There is no foraminal stenosis. At L2-3, there is moderate stenosis of the thecal sac. This appears related to the process in the laminectomy bed. There is mild bilateral foraminal stenosis. At L3-4, there is severe stenosis of the thecal sac. The thecal sac is flattened along the posterior margin of the vertebral bodies. There is compression of the nerve roots of the cauda equina. There is mild bilateral foraminal stenosis.  There is an abnormality at the disc level ventrally. This is only a shallow disc protrusion. This could also represent some prominence of the ventral epidural plexus. At L4-5, there is severe stenosis of the thecal sac due to mass effect from the collection in the laminectomy bed. The thecal sac is flattened. There is compression of the nerve roots of the cauda equina. There is moderate severity bilateral foraminal stenosis. At L5-S1, there is no spinal canal or foraminal stenosis. On the postcontrast images, there is mild enhancement of the nerve roots of the cauda equina just above the severe stenosis at the L3 level. . This can represent inflammation or ischemia of the nerve roots. . This is seen on the axial and sagittal images. There is no retroperitoneal abnormality. 1. Severe stenosis of the thecal sac and compression of the nerve roots of the cauda equina at the L3-4 and L4-5 levels due to a fluid collection in the laminectomy bed exhibiting mass effect upon the thecal sac. 2. Enhancement of the nerve roots of the cauda equina posterior to L3. This suggests inflammation versus ischemia of those nerve roots. 3. 10.3 cm fluid collection in the laminectomy bed. The differential diagnosis includes infection, seroma and hematoma. This does surround the bases of the pedicle screws. 4. Moderate stenosis of the thecal sac at the L2-3 level. These findings were telephoned to Dr. Rome Saba at 11:20 AM on 1/6/2021. **This report has been created using voice recognition software. It may contain minor errors which are inherent in voice recognition technology. ** Final report electronically signed by Dr. Rena Garcia on 1/6/2021 11:24 AM    Ct Abdomen Pelvis W Iv Contrast Additional Contrast? None    Result Date: 1/3/2021  CT Abdomen and Pelvis W Contrast COMPARISON:  CT,[facility]  - CT ABDOMEN PELVIS W IV CONTRAST  - 08/23/2018 08:18 PM EDT FINDINGS: Small hiatal hernia containing fat and small fluid.  Nodular liver contours consistent with hepatic cirrhosis. Normal spleen. Mild bilateral hydronephrosis and ureterectasis. No visible urolithiasis. Normal adrenal glands. Normal pancreas. No calcified gallstones. No biliary dilatation. Liquid stool with air-fluid levels in the colon. No evidence of bowel obstruction. Patient appears to be status post appendectomy. Distended bladder. Small gas in the bladder. No ascites. No pneumoperitoneum. Prominent retroperitoneal lymph nodes. No acute fracture. Postsurgical changes in the lumbar spine. See separate lumbar spine CT report. No abdominal aortic aneurysm. Mild anasarca. Mild bilateral hydronephrosis and ureterectasis without visible obstruction. Distended bladder. Small gas in the bladder could be due to recent instrumentation or gas-forming infection. Findings consistent with diarrheal illness/colitis. Mild anasarca. Possible reactive lymph nodes. Nonemergent/incidental findings in the report. This document has been electronically signed by: Mario Worthington MD on 01/03/2021 10:31 PM All CT scans at this facility use dose modulation, iterative reconstruction, and/or weight-based dosing when appropriate to reduce radiation dose to as low as reasonably achievable. Xr Chest Portable    Result Date: 1/12/2021  1 view chest x-ray Comparison:  CR,SR  - XR CHEST PORTABLE  - 01/10/2021 02:58 PM EST Findings: The lungs are clear. Normal size heart. No pulmonary vascular congestion. No acute fracture. No acute findings. This document has been electronically signed by: Anne Marie Grimes MD on 01/12/2021 09:56 PM     Xr Chest Portable    Result Date: 1/10/2021  PROCEDURE: XR CHEST PORTABLE CLINICAL INFORMATION: leg edema, tachycardia, weakness COMPARISON: 8/17/2020 TECHNIQUE: A single mobile view of the chest was obtained. 1. Suboptimal inflation of lungs. Borderline heart size. Mediport left side, catheter tip the cavoatrial junction. 2. No definite acute findings.  No infiltrates or effusions are seen. **This report has been created using voice recognition software. It may contain minor errors which are inherent in voice recognition technology. ** Final report electronically signed by Dr. Chika Stubbs on 1/10/2021 3:11 PM    Vl Dup Lower Extremity Venous Bilateral    Result Date: 1/10/2021  PROCEDURE: VL DUP LOWER EXTREMITY VENOUS BILATERAL CLINICAL INFORMATION: bilateral lower edema, recent back surgery COMPARISON: No prior study. TECHNIQUE: Multiple grayscale and color flow images of the major veins of both lower extremities were obtained from the level of the groin to the level of the ankle. Multiple compression and augmentation maneuvers were performed. FINDINGS: RIGHT LOWER EXTREMITY: There is no flow and noncompressibility involving one of the paired posterior tibial veins which appears small in size and filled with chronic appearing mildly echogenic thrombus. All of the other deep veins of the right lower extremity are widely patent with normal flow and normal compressibility. Note that there is extensive lymphedema in the right calf and lower thigh. LEFT LOWER EXTREMITY:All the  deep veins of the left lower extremity are widely patent with normal flow and normal compressibility. Extensive lymphedema is seen in the left calf and distal thigh. 1. Findings of chronic DVT involving one of the paired posterior tibial veins right calf. Otherwise normal venous ultrasound right leg. 2. Normal venous ultrasound left leg. 3. Extensive lymphedema in both legs, consistent with cellulitis or possibly venous insufficiency. **This report has been created using voice recognition software. It may contain minor errors which are inherent in voice recognition technology. ** Final report electronically signed by Dr. Chika Stubbs on 1/10/2021 5:23 PM    Vl Dup Lower Extremity Venous Bilateral    Result Date: 1/4/2021  PROCEDURE: VL DUP LOWER EXTREMITY VENOUS BILATERAL CLINICAL INFORMATION: 80-year-old male with leg edema and patient underwent back surgery 6 days ago. COMPARISON: No prior study. TECHNIQUE: Venous doppler ultrasound was performed of the bilateral lower extremities using gray scale, color flow and spectral doppler imaging. FINDINGS: There is normal color flow, spectral analysis and compressibility of the common femoral vein, superficial femoral vein and popliteal vein bilaterally . There is normal color flow and compressibility in the posterior tibial veins, anterior tibial veins and peroneal veins. No evidence of a DVT. **This report has been created using voice recognition software. It may contain minor errors which are inherent in voice recognition technology. ** Final report electronically signed by Dr Aliya Concepcion on 1/4/2021 2:41 PM    Ct Lumbar Reconstruction Wo Post Process    Result Date: 1/3/2021  CT Lumbar Spine WO Contrast COMPARISON: None FINDINGS: No acute fracture or malalignment. Status post posterior L4-S1 fusion and bilateral L4 and L5 laminectomies. Hardware appears intact. There is scattered soft tissue emphysema in the surgical bed, likely related to recent surgery. There is a possible seroma in the surgical bed. Degenerative changes in the spine. Possible postsurgical seroma in the surgical bed. Nonemergent/incidental findings in the report. See separate abdomen/pelvis CT report. This document has been electronically signed by: Sanjana Elena MD on 01/03/2021 09:47 PM All CT scans at this facility use dose modulation, iterative reconstruction, and/or weight-based dosing when appropriate to reduce radiation dose to as low as reasonably achievable.        Electronically signed by Adrien Payton DO on 1/13/2021 at 10:04 AM

## 2021-01-13 NOTE — PROGRESS NOTES
Nutrition Education  Received c/s - diet education - Heart Failure diet guidelines  Pt. Seen - reports limits Na+ and fluids pta. States reads labels but is u/a to state amount of sodium limited to at meal times. Reports fairly good appetite. · Verbally reviewed information with Patient  · Educated on low sodium diet, label reading and FR. · Written educational materials provided. · Contact name and number provided.     Electronically signed by Sheyla Lopez RD, LD on 1/13/21 at 1:46 PM EST    Contact: [phone]

## 2021-01-13 NOTE — CARE COORDINATION
DISASTER CHARTING    1/13/21, 7:33 AM EST    DISCHARGE ONGOING EVALUATION:     Tiera Kirby day: 1  Location: [facility]09/009-A Reason for admit: Leg swelling [M79.89]   Barriers to Discharge: Patient is a re-admit. Here for bilateral lower extremity swelling and shortness of breath. Creatinine 1.6. Nephrology consult, IV Bumex x 1 dose, Lovenox, daily BMP and Magnesium levels, strict I & O, up with assistance, consults to Dietician and CHF RN. PCP: CRISTIAN Lopez CNP  Readmission Risk Score: 21  Patient Goals/Plan/Treatment Preferences: Met with Chance. He is from [facility]. He verifies his insurance and PCP. He is current with [facility] for SN, PT/OT. Dorina Covington states his discharge plan is to return to [facility] and resume [facility] services through [facility] for skilled nursing, PT/OT. He denies any other needs at this time.

## 2021-01-13 NOTE — CONSULTS
[facility]    [address], [address]  [facility]  1/13/2021 8:22 AM    Pt Name:    Memo Bernal  MRN:     [id number]   [id number]  YOB: 1975  Admit Date:    1/12/2021  4:36 PM  Primary Care Physician:  CRISTIAN Malik CNP    CSN Number:   [id number]    Reason for Consult:  Lower extremity swelling, volume overload  Requesting provider:  Dr. Dolores Good (hospitalist) and  ER provider MAURY Acevedo    History:   The patient is a 39 y.o. with multiple medical conditions including hepatitis, ??cirrhosis, right arm gastroesophageal disease and lumbar hematoma with L4 discectomy who has been to the emergency room multiple times with lower extremity swelling. Patient presented to the emergency room once again last night with complaints of worsening lower extremity swelling, back pain, leg pain, thigh burning sensation, left hand tremors as well as anxiety and chills. Patient has multiple complaints. ER did not feel comfortable discharging the patient due to recurrent visits. Patient was admitted for further work-up and evaluation. In addition patient's serum creatinine has recently gone up from 1.1-1.5. Also reports some scrotal swelling. He reports some pain in his legs. Denies any alleviating or any aggravating factors. Nephrology has been consulted for further evaluation and management by the hospitalist and ER services.   Patient was recently seen for hyponatremia    Past Medical History:  Past Medical History:   Diagnosis Date    Alcohol abuse     in remission    Amputation of right arm ([address]     Ascites     GERD (gastroesophageal reflux disease)     Hepatic cirrhosis (HCC)     Hepatitis B     Hepatitis C     Hypertension     Intravenous drug user     in remission    Osteomyelitis (HCC)     right arm/shoulder amputation    Psychiatric problem     PTSD (post-traumatic stress disorder)     Sciatica     Splenomegaly     Thrombocytopenia (HCC)     Ulcerative colitis ([facility] [facility]        Past Surgical History:  Past Surgical History:   Procedure Laterality Date    APPENDECTOMY      [place]    BACK SURGERY N/A 2021    LUMBAR I&D performed by Ree Bello MD at [place] [address]    ELBOW FRACTURE SURGERY      PORT SURGERY N/A 2020    SUBCLAVIAN SINGLE LUMEN MEDIPORT INSERTION performed by Mally Ford MD at [address] W/BIOPSY SINGLE/MULTIPLE  2017    COLONOSCOPY WITH BIOPSY performed by Felicia Jose MD at [facility]       Family History:  Family History   Problem Relation Age of Onset    High Blood Pressure Mother     High Blood Pressure Father        Social History:  Social History     Socioeconomic History    Marital status:      Spouse name: Not on file    Number of children: 1    Years of education: Not on file    Highest education level: Not on file   Occupational History    Not on file   Social Needs    Financial resource strain: Not on file    Food insecurity     Worry: Not on file     Inability: Not on file    Transportation needs     Medical: Not on file     Non-medical: Not on file   Tobacco Use    Smoking status: Former Smoker     Quit date: 2020     Years since quittin.7    Smokeless tobacco: Current User     Types: Snuff   Substance and Sexual Activity    Alcohol use: No     Comment: Several months since last used    Drug use: No     Comment: 160 days since last use    Sexual activity: Not on file   Lifestyle    Physical activity     Days per week: Not on file     Minutes per session: Not on file    Stress: Not on file   Relationships    Social connections     Talks on phone: Not on file     Gets together: Not on file     Attends Rastafarian service: Not on file     Active member of club or organization: Not on file     Attends meetings of clubs or organizations: Not on file     Relationship status: Not on file    Intimate partner violence     Fear of current or ex partner: Not on file     Emotionally abused: Not on file     Physically abused: Not on file     Forced sexual activity: Not on file   Other Topics Concern    Not on file   Social History Narrative    Not on file       Home Meds:  Prior to Admission medications    Medication Sig Start Date End Date Taking? Authorizing Provider   oxyCODONE-acetaminophen (PERCOCET) 5-325 MG per tablet Take 1 tablet by mouth every 4 hours as needed for Pain. Yes Historical Provider, MD   bumetanide (BUMEX) 1 MG tablet Take 1 tablet by mouth daily 1/8/21 2/8/21 Yes DARIUSZ Gtz   Multiple Vitamins-Minerals (MULTIVITAMIN ADULT PO) Take 1 tablet by mouth   Yes Historical Provider, MD   pregabalin (LYRICA) 200 MG capsule Take 200 mg by mouth 3 times daily.    Yes Historical Provider, MD   PARoxetine (PAXIL) 40 MG tablet Take 40 mg by mouth every morning   Yes Historical Provider, MD   potassium chloride (MICRO-K) 10 MEQ extended release capsule Take 10 mEq by mouth daily   Yes Historical Provider, MD   famotidine (PEPCID) 40 MG tablet Take 40 mg by mouth daily   Yes Historical Provider, MD   docusate sodium (COLACE) 100 MG capsule Take 100 mg by mouth daily   Yes Historical Provider, MD   ferrous sulfate 325 (65 Fe) MG tablet Take 325 mg by mouth daily (with breakfast)   Yes Historical Provider, MD   lisinopril (PRINIVIL;ZESTRIL) 20 MG tablet Take 20 mg by mouth daily Indications: with hydrochlorothiazide 25 mg    Yes Historical Provider, MD   mesalamine (DELZICOL) 400 MG CPDR delayed release capsule Take 800 mg by mouth 3 times daily   Yes Historical Provider, MD   folic acid (FOLVITE) 1 MG tablet Take 2 tablets by mouth daily 9/26/17  Yes Rell Starr MD   pantoprazole (PROTONIX) 40 MG tablet Take 40 mg by mouth 2 times daily    Yes Historical Provider, MD   Multiple Vitamin (MULTI VITAMIN MENS) TABS Take by mouth   Yes Historical Provider, MD   LACTULOSE PO Take 20 g by mouth 3 times daily     Historical Provider, MD       Review of Systems:  Constitutional: Positive for anxiety,  leg pain, back pain, (tremors  Head: Negative for headaches  Eyes: Negative for blurry vision or discharge  Ears: Negative for ear pain or hearing changes  Nose: Negative for runny nose or epistaxis  Respiratory: Negative for shortness of breath. Negative for cough or sputum production. Negative for hemoptysis  Cardiovascular: Negative for chest pain  GI: Negative for nausea, vomiting and diarrhea. Negative for hematochezia and melena  : Negative for discharge, dysuria, or hematuria  Skin: Negative for rash  Neuro: Negative for numbness or tingling, negative for slurred speech  Psychiatric: Anxious at times    All other review of systems were reviewed and negative    Current Meds:  Infusion:   Meds:    sodium chloride flush  10 mL Intravenous 2 times per day    enoxaparin  40 mg Subcutaneous Daily    lisinopril  20 mg Oral Daily    famotidine  40 mg Oral Daily    ferrous sulfate  325 mg Oral Daily with breakfast    folic acid  2 mg Oral Daily    mesalamine  800 mg Oral TID    pantoprazole  40 mg Oral BID AC    PARoxetine  40 mg Oral QAM    pregabalin  200 mg Oral TID     Meds prn: sodium chloride flush, promethazine **OR** ondansetron, polyethylene glycol, acetaminophen **OR** acetaminophen, oxyCODONE-acetaminophen     Allergies/Intolerances: ALLERGIES: Penicillins and Trazodone and nefazodone    24HR INTAKE/OUTPUT:  No intake or output data in the 24 hours ending 01/13/21 0822  No intake/output data recorded. No intake/output data recorded. Admission weight: 250 lb (113.4 kg)  Wt Readings from Last 3 Encounters:   01/12/21 250 lb (113.4 kg)   01/10/21 250 lb (113.4 kg)   01/03/21 250 lb (113.4 kg)     Body mass index is 33.91 kg/m².     Physical Examination:  VITALS:   Vitals:    01/12/21 2115 01/13/21 0014 01/13/21 0400 01/13/21 0803   BP: (!) 165/79 (!) 117/58 108/85 124/73   Pulse: 110 110 111 104   Resp: 20 20 20 20   Temp: 98.4 °F (36.9 °C) 98.6 °F (37 °C) 98.6 °F (37 °C) 98.4 °F (36.9 °C)   TempSrc: Oral Oral Oral Oral   SpO2: 96% 97% 97% 97%   Weight:         Weight:   Wt Readings from Last 3 Encounters:   01/12/21 250 lb (113.4 kg)   01/10/21 250 lb (113.4 kg)   01/03/21 250 lb (113.4 kg)     Constitutional and General Appearance: alert and cooperative with exam, appears comfortable, no distress, not diaphoretic, anxious at times  Eyes: no icteric sclera in left eye, or right eye, pallor conjunctival both eyes  Ears and Nose: normal external appearance of left and right ear. No active drainage from nose. Oral: moist oral mucus membranes  Neck: No jugular venous distention  Lungs: Air entry diminished, poor effort, no use of accessory muscles or labored breathing  Heart:  S1, S2  Extremities: Bilateral 2+ LE edema, no tenderness  Right arm amputation    GI: soft, non-tender, no guarding, no distention  Skin:  warm to touch  Neuro: no slurred speech, no facial drooping  Psychiatric: Anxious    Lab Data  CBC:   Recent Labs     01/10/21  1540 01/12/21  1654 01/13/21  0411   WBC 9.6 8.7 9.5   HGB 8.8* 8.4* 8.8*   HCT 26.5* 25.8* 26.7*   * 125* 142     BMP:  Recent Labs     01/10/21  1540 01/12/21  1654 01/13/21  0411    135 137   K 3.8 4.5 4.5    99 99   CO2 27 23 25   BUN 29* 51* 55*   CREATININE 1.1 1.5* 1.6*   GLUCOSE 147* 186* 140*   CALCIUM 9.0 8.4* 8.4*   MG  --   --  1.9     PTH: @PTH@  TSH:   Recent Labs     01/12/21  1654   TSH 0.487     HgBa1c: No results for input(s): LABA1C in the last 72 hours. Hepatic:   Recent Labs     01/10/21  1540 01/12/21  1654   LABALBU 3.5 3.5   AST 20 20   ALT 22 18   BILITOT 0.5 0.7   ALKPHOS 72 77       Additional Labs: UA negative for blood or protein  Albumin 3.5    Old labs and diagnostics reviewed.    Recent CT chest negative for PE  Labs:  Baseline creatinine 0.6    Impression and Plan:  1. Peripheral edema-hypervolemia of likely non-renal origin. UA is benign without any proteinuria. No evidence of nephrotic syndrome. Consider cardiac work-up. Will obtain echocardiogram.  We will also add beta natruretic peptide. Chest x-ray was negative for any pulmonary vascular congestion or acute findings. In interim, okay to continue Bumex but will hold lisinopril due to ONIEL. Hyperkalemia also likely contributed by liver dysfunction. There is some concern for possible underlying cirrhosis based on prior imaging studies. In addition we will obtain ultrasound of the bladder to ensure no underlying urinary retention or obstruction. 2. ONIEL. Worsened after CT contrast.  Discontinue lisinopril. Recheck labs in the morning. If creatinine continues to rise then may need to consider IV fluid hydration. Avoid further nephrotoxins. Check urine electrolytes. Obtain ultrasound of the kidneys  3. Azotemia without uremia. Will monitor  4. History of hepatitis C  5. Recent back surgery  6. History of right arm amputation    Thank you for the consult. Please feel free to call me if you have any questions.      Elva Madera MD  [facility]

## 2021-01-13 NOTE — CARE COORDINATION
01/13/21 1241   Readmission Assessment   Number of Days since last admission? 1-7 days   Previous Disposition Home with Home Health   Who is being Interviewed Patient   What was the patient's/caregiver's perception as to why they think they needed to return back to the hospital? Other (Comment)  (Increased bilateral lower extremity swelling.)   Did you visit your Primary Care Physician after you left the hospital, before you returned this time? No  (went to the ER.)   Why weren't you able to visit your PCP? Did not have an appointment   Did you see a specialist, such as Cardiac, Pulmonary, Orthopedic Physician, etc. after you left the hospital? No   Who advised the patient to return to the hospital? Self-referral   Does the patient report anything that got in the way of taking their medications? No   In our efforts to provide the best possible care to you and others like you, can you think of anything that we could have done to help you after you left the hospital the first time, so that you might not have needed to return so soon?  Other (Comment)  (Patient felt he was dishcarged to soon.)

## 2021-01-13 NOTE — CONSULTS
Consulted by ED provider last night  Spoke with ED  Pt seen and examined  Full consult note to follow

## 2021-01-13 NOTE — ED NOTES
Pt laying in bed. He was repositioned per request. Bumex given per order.       Sue Eldridge RN  01/12/21 2000

## 2021-01-13 NOTE — ED NOTES
[facility]. Floor nurse was contacted prior to departure.

## 2021-01-14 ENCOUNTER — TELEPHONE (OUTPATIENT)
Dept: UROLOGY | Age: 46
End: 2021-01-14

## 2021-01-14 LAB
ANION GAP SERPL CALCULATED.3IONS-SCNC: 11 MEQ/L (ref 8–16)
BUN BLDV-MCNC: 50 MG/DL (ref 7–22)
CALCIUM SERPL-MCNC: 8.7 MG/DL (ref 8.5–10.5)
CHLORIDE BLD-SCNC: 98 MEQ/L (ref 98–111)
CO2: 26 MEQ/L (ref 23–33)
CREAT SERPL-MCNC: 1.1 MG/DL (ref 0.4–1.2)
GFR SERPL CREATININE-BSD FRML MDRD: 72 ML/MIN/1.73M2
GLUCOSE BLD-MCNC: 143 MG/DL (ref 70–108)
LV EF: 58 %
LVEF MODALITY: NORMAL
MAGNESIUM: 1.9 MG/DL (ref 1.6–2.4)
POTASSIUM SERPL-SCNC: 3.9 MEQ/L (ref 3.5–5.2)
PRO-BNP: 11.4 PG/ML (ref 0–450)
SODIUM BLD-SCNC: 135 MEQ/L (ref 135–145)

## 2021-01-14 PROCEDURE — 36415 COLL VENOUS BLD VENIPUNCTURE: CPT

## 2021-01-14 PROCEDURE — 80048 BASIC METABOLIC PNL TOTAL CA: CPT

## 2021-01-14 PROCEDURE — 6370000000 HC RX 637 (ALT 250 FOR IP): Performed by: UROLOGY

## 2021-01-14 PROCEDURE — 99221 1ST HOSP IP/OBS SF/LOW 40: CPT | Performed by: UROLOGY

## 2021-01-14 PROCEDURE — 97162 PT EVAL MOD COMPLEX 30 MIN: CPT

## 2021-01-14 PROCEDURE — 6370000000 HC RX 637 (ALT 250 FOR IP): Performed by: STUDENT IN AN ORGANIZED HEALTH CARE EDUCATION/TRAINING PROGRAM

## 2021-01-14 PROCEDURE — 93306 TTE W/DOPPLER COMPLETE: CPT

## 2021-01-14 PROCEDURE — 97530 THERAPEUTIC ACTIVITIES: CPT

## 2021-01-14 PROCEDURE — 1200000000 HC SEMI PRIVATE

## 2021-01-14 PROCEDURE — 99223 1ST HOSP IP/OBS HIGH 75: CPT | Performed by: INTERNAL MEDICINE

## 2021-01-14 PROCEDURE — 99232 SBSQ HOSP IP/OBS MODERATE 35: CPT | Performed by: INTERNAL MEDICINE

## 2021-01-14 PROCEDURE — 87801 DETECT AGNT MULT DNA AMPLI: CPT

## 2021-01-14 PROCEDURE — 87040 BLOOD CULTURE FOR BACTERIA: CPT

## 2021-01-14 PROCEDURE — 2580000003 HC RX 258: Performed by: STUDENT IN AN ORGANIZED HEALTH CARE EDUCATION/TRAINING PROGRAM

## 2021-01-14 PROCEDURE — 2500000003 HC RX 250 WO HCPCS: Performed by: INTERNAL MEDICINE

## 2021-01-14 PROCEDURE — 6360000002 HC RX W HCPCS: Performed by: STUDENT IN AN ORGANIZED HEALTH CARE EDUCATION/TRAINING PROGRAM

## 2021-01-14 PROCEDURE — 94761 N-INVAS EAR/PLS OXIMETRY MLT: CPT

## 2021-01-14 PROCEDURE — 6370000000 HC RX 637 (ALT 250 FOR IP): Performed by: INTERNAL MEDICINE

## 2021-01-14 PROCEDURE — 97166 OT EVAL MOD COMPLEX 45 MIN: CPT

## 2021-01-14 PROCEDURE — 83880 ASSAY OF NATRIURETIC PEPTIDE: CPT

## 2021-01-14 PROCEDURE — 51702 INSERT TEMP BLADDER CATH: CPT

## 2021-01-14 PROCEDURE — 87147 CULTURE TYPE IMMUNOLOGIC: CPT

## 2021-01-14 PROCEDURE — 83735 ASSAY OF MAGNESIUM: CPT

## 2021-01-14 RX ORDER — TAMSULOSIN HYDROCHLORIDE 0.4 MG/1
0.4 CAPSULE ORAL DAILY
Status: DISCONTINUED | OUTPATIENT
Start: 2021-01-14 | End: 2021-01-15 | Stop reason: HOSPADM

## 2021-01-14 RX ORDER — SPIRONOLACTONE 25 MG/1
25 TABLET ORAL 2 TIMES DAILY
Status: DISCONTINUED | OUTPATIENT
Start: 2021-01-14 | End: 2021-01-15 | Stop reason: HOSPADM

## 2021-01-14 RX ORDER — POTASSIUM CHLORIDE 20 MEQ/1
20 TABLET, EXTENDED RELEASE ORAL
Status: DISCONTINUED | OUTPATIENT
Start: 2021-01-14 | End: 2021-01-15 | Stop reason: HOSPADM

## 2021-01-14 RX ADMIN — OXYCODONE AND ACETAMINOPHEN 1 TABLET: 5; 325 TABLET ORAL at 17:32

## 2021-01-14 RX ADMIN — PREGABALIN 200 MG: 50 CAPSULE ORAL at 15:06

## 2021-01-14 RX ADMIN — SPIRONOLACTONE 25 MG: 25 TABLET ORAL at 15:06

## 2021-01-14 RX ADMIN — PREGABALIN 200 MG: 50 CAPSULE ORAL at 20:59

## 2021-01-14 RX ADMIN — PAROXETINE HYDROCHLORIDE 40 MG: 20 TABLET, FILM COATED ORAL at 10:04

## 2021-01-14 RX ADMIN — MESALAMINE 800 MG: 400 CAPSULE, DELAYED RELEASE ORAL at 20:59

## 2021-01-14 RX ADMIN — FAMOTIDINE 40 MG: 20 TABLET, FILM COATED ORAL at 10:04

## 2021-01-14 RX ADMIN — ENOXAPARIN SODIUM 40 MG: 40 INJECTION SUBCUTANEOUS at 10:04

## 2021-01-14 RX ADMIN — PREGABALIN 200 MG: 50 CAPSULE ORAL at 10:04

## 2021-01-14 RX ADMIN — BUMETANIDE 1 MG: 0.25 INJECTION INTRAMUSCULAR; INTRAVENOUS at 10:04

## 2021-01-14 RX ADMIN — MESALAMINE 800 MG: 400 CAPSULE, DELAYED RELEASE ORAL at 15:05

## 2021-01-14 RX ADMIN — OXYCODONE AND ACETAMINOPHEN 1 TABLET: 5; 325 TABLET ORAL at 21:44

## 2021-01-14 RX ADMIN — OXYCODONE AND ACETAMINOPHEN 1 TABLET: 5; 325 TABLET ORAL at 12:22

## 2021-01-14 RX ADMIN — MESALAMINE 800 MG: 400 CAPSULE, DELAYED RELEASE ORAL at 10:04

## 2021-01-14 RX ADMIN — PANTOPRAZOLE SODIUM 40 MG: 40 TABLET, DELAYED RELEASE ORAL at 05:44

## 2021-01-14 RX ADMIN — FERROUS SULFATE TAB 325 MG (65 MG ELEMENTAL FE) 325 MG: 325 (65 FE) TAB at 10:04

## 2021-01-14 RX ADMIN — SPIRONOLACTONE 25 MG: 25 TABLET ORAL at 17:32

## 2021-01-14 RX ADMIN — POTASSIUM CHLORIDE 20 MEQ: 1500 TABLET, EXTENDED RELEASE ORAL at 15:06

## 2021-01-14 RX ADMIN — SODIUM CHLORIDE, PRESERVATIVE FREE 10 ML: 5 INJECTION INTRAVENOUS at 20:59

## 2021-01-14 RX ADMIN — FOLIC ACID 2 MG: 1 TABLET ORAL at 10:04

## 2021-01-14 RX ADMIN — PANTOPRAZOLE SODIUM 40 MG: 40 TABLET, DELAYED RELEASE ORAL at 15:06

## 2021-01-14 RX ADMIN — TAMSULOSIN HYDROCHLORIDE 0.4 MG: 0.4 CAPSULE ORAL at 12:21

## 2021-01-14 RX ADMIN — SODIUM CHLORIDE, PRESERVATIVE FREE 10 ML: 5 INJECTION INTRAVENOUS at 10:04

## 2021-01-14 ASSESSMENT — PAIN SCALES - GENERAL
PAINLEVEL_OUTOF10: 4
PAINLEVEL_OUTOF10: 7
PAINLEVEL_OUTOF10: 4
PAINLEVEL_OUTOF10: 3

## 2021-01-14 ASSESSMENT — PAIN DESCRIPTION - PAIN TYPE: TYPE: CHRONIC PAIN

## 2021-01-14 ASSESSMENT — PAIN - FUNCTIONAL ASSESSMENT: PAIN_FUNCTIONAL_ASSESSMENT: ACTIVITIES ARE NOT PREVENTED

## 2021-01-14 ASSESSMENT — PAIN DESCRIPTION - PROGRESSION: CLINICAL_PROGRESSION: NOT CHANGED

## 2021-01-14 NOTE — CONSULTS
[address] [place] [address]                                  CONSULTATION    PATIENT NAME: Rashida Poole              :        1975  MED REC NO:   [id number]                           ROOM:       [id number]  ACCOUNT NO:   [de-identified]                           ADMIT DATE: 2021  PROVIDER:     Larry Carbajal. MALICK Quiroga Harpin:  2021    REASON FOR CONSULTATION:  CHF exacerbation. HISTORY OF PRESENT ILLNESS:  This is a 69-year-old  gentleman  who was in usual state of health until a week ago. The patient had  recent back surgery for lumbar hematoma evacuation in L3-L4 with  diskectomy done on 2021. Following that, the patient noticed  increasing weight and worsening of his leg swelling. The leg swelling  is bilateral, including thigh and leg, gotten progressively worse and  mild worsening of shortness of breath with no chest pain. So finally,  the patient decided to come to the emergency room for further evaluation  and care and found to be in fluid overload and anasarca with congestion  in the lung as well, and so the patient was admitted for further  management. The patient has been started on diuretics and has very good  urine output and feeling better. Cardiology evaluation was sought in  further assistance in the management of this fluid overload. The  patient denied having any chest pain. No history of coronary artery  disease. No history of chest pain. He has shortness of breath and  orthopnea on two to three pillows. No paroxysmal nocturnal dyspnea. The patient had not been on diuretics before and has not had any of  swelling before the back surgery he had. He has two back surgeries back  to back, basically one in late 2020 and the other is early 2021.    So, following the second surgery, the patient has noted to have this  worsening of leg swelling and moreover the patient is known to have a  liver cirrhosis and it seems to be contributed or so by the cirrhotic  liver. REVIEW OF SYSTEMS:  Negative except the above-mentioned ones. PAST MEDICAL HISTORY:  Includes the following:  History of liver  cirrhosis, hepatitis C, hepatitis B, hypertension, post traumatic stress  disorder, sciatica, thrombocytopenia, ulcerative colitis,  gastroesophageal reflux disease, alcohol abuse, and history of  arthritis. PAST SURGICAL HISTORY:  Amputation of the left shoulder in 2017,  colonoscopy, appendectomy, bladder surgery, elbow fracture surgery, and  back surgery. FAMILY HISTORY:  No family history of premature coronary artery disease,  but positive family history of hypertension. SOCIAL HISTORY:  He is a former smoker, quit in 04/2020. No alcohol. No drug use. ALLERGIES:  He is allergic to NEFAZODONE and TRAZODONE.    HOME MEDICATIONS:  Prior to admission include Bumex 1 mg. He has been  started on Bumex on 01/08/2021 at 1 mg daily. Famotidine, ferrous  sulfate, folic acid, lisinopril 20 daily, mesalamine, multivitamin,  oxycodone, Percocet basically, Protonix, Paxil, potassium 10 mEq,  potassium chloride, and Lyrica. PHYSICAL EXAMINATION:  GENERAL:  Looks sick, in mild respiratory distress. VITAL SIGNS:  Include the following:  Blood pressure 120/72, pulse rate  102, respiratory rate 18, and temperature 98. 1. HEENT:  Pale conjunctivae. Anicteric sclerae. Pupils are equal and  reactive bilaterally to light. NECK:  No lymphadenopathy. No goiter. No JVD. CHEST:  Bilateral decreased air entry basally. CARDIOVASCULAR:  Arteries are felt; carotid, femoral, and pedal.  No  bruits. S1 and S2 well heard. No murmur or galloped rhythm. ABDOMEN:  Obese and nontender. Bowel sounds are positive. GENITOURINARY:  No CVA, flank or suprapubic tenderness. LOCOMOTOR:  No cyanosis, clubbing, muscle or joint tenderness.   Has  bilateral pedal and pretibial edema of +3 and bilateral thigh edema,  significantly edematous. SKIN:  No rashes, ulcers, or nodules. CNS:  Alert, awake, and oriented to time, person, and place. Cranial  nerves are intact. Sensation intact to light touch and pain. Motor:   Normal muscle strength and tone and appropriate mood and affect. WORKUP:  The patient had an EKG that showed sinus tachycardia at the  rate of 112 beats per minute. No acute EKG abnormalities otherwise,  except for the rate, normal EKG. Cardiac enzymes:  Troponin less than  0.01 x2. . He is obese, so BNP can be normal.  Sodium 135,  potassium 3.9, BUN 50, creatinine 1.1. On admission, BUN 55 and  creatinine 1.6 earlier, so the BUN and creatinine are getting better,  the patient has been admitted yesterday. Magnesium 1.9. Hematology:   White blood cell 9.5, hemoglobin 8.8, and platelets 417. Chest x-ray, portable. Bilateral increased bronchovascular marking,  suggestive for mild congestion, though is not report as such. Ultrasound of the liver was done in 12/2019 and suggestive for liver  cirrhosis. ASSESSMENT:  1. Acute congestive heart failure exacerbation, diastolic versus  systolic, needs to be specified, needs to get an echo. 2.  Marked bilateral lower limb swelling, probably related to the  congestive heart failure plus liver cirrhosis related. 3.  Liver cirrhosis with marked bilateral lower leg swelling. 4.  Thrombocytopenia, platelets 162.  5.  Acute kidney injury on chronic kidney disease, improving with  diuresis. 6.  History of hepatitis C and hepatitis B.  7.  History of hypertension. 8.  Status post recent back surgery. 9.  History of alcohol abuse. 10.  History of ulcerative colitis. 11.  Previous history of arthritis documented. PLAN AND RECOMMENDATIONS:  At this level, we will get an echocardiogram.  Continue with gentle IV diuresis as the patient is responding very well. Having good urine output. Renal function is getting better. So,  continue with gentle IV diuresis. In view of his underlying liver  cirrhosis, it is prudent to add Aldactone 25 twice a day and adjust  accordingly. I will get an echocardiogram as I stated. Troponins are  negative. No chest pain. No angina or anginal equivalent. So, at this  level, no ischemia workup is recommended. However, based on the  echocardiogram, we may come up with any further recommendation based on  the echocardiographic finding. Follow up with CHF clinic as outpatient. Follow up with Cardiology as  outpatient. I discussed with the patient the plan of care. Thank you for allowing us to participate in the care of this patient. Oumar Fatima.  Juan Shah M.D.    D: 01/14/2021 12:16:11       T: 01/14/2021 15:29:18     [email or web address]_JESS  Job#: [id number]     Doc#: [id number]    CC:

## 2021-01-14 NOTE — FLOWSHEET NOTE
Assessment-Patient is a 39year old male who was lying in his bed with his eyes open and then closing for a few seconds. He was watching cartoons on television. He stated he was here to get the swelling down in his legs. He stated that at one time he was a [address] but now belongs to [employer] Intervention- was rounding the unit to provide spiritual care, as well as assess any patient needs present. Prayer was offered and accepted. A prayer of hope, recovery, calmness and gratitude was shared. Outcome-He was appreciative of a short encounter and offer of support and prayer. He was grateful for the miinistry shared. Plan-Chaplains continue to assess and provide spiritual care and emotional support to patient.

## 2021-01-14 NOTE — PROGRESS NOTES
[facility]         Renal Inpatient Follow-Up note         1/14/2021 11:03 AM    Pt Name:   Lucrecia Hardin  YOB: 1975  Attending:   Kaye Henry DO    Chief Complaint : Lucrecia Hardin is a 39 y.o. male being followed by nephrology for fluid overload and ONIEL    Interval History :   Patient seen and examined by me. No distress  Feels ok. No cp or SOB. Making urine output ok. Excellent UO     Scheduled Medications :    tamsulosin  0.4 mg Oral Daily    bumetanide  1 mg Intravenous Daily    sodium chloride flush  10 mL Intravenous 2 times per day    enoxaparin  40 mg Subcutaneous Daily    [Held by provider] lisinopril  20 mg Oral Daily    famotidine  40 mg Oral Daily    ferrous sulfate  325 mg Oral Daily with breakfast    folic acid  2 mg Oral Daily    mesalamine  800 mg Oral TID    pantoprazole  40 mg Oral BID AC    PARoxetine  40 mg Oral QAM    pregabalin  200 mg Oral TID         Vitals :  /76   Pulse 102   Temp 98.1 °F (36.7 °C) (Oral)   Resp 18   Wt 250 lb 9.6 oz (113.7 kg)   SpO2 96%   BMI 33.99 kg/m²     24HR INTAKE/OUTPUT:      Intake/Output Summary (Last 24 hours) at 1/14/2021 1103  Last data filed at 1/14/2021 1015  Gross per 24 hour   Intake 1640 ml   Output 6050 ml   Net -4410 ml     Last 3 weights  Wt Readings from Last 3 Encounters:   01/14/21 250 lb 9.6 oz (113.7 kg)   01/10/21 250 lb (113.4 kg)   01/03/21 250 lb (113.4 kg)           Physical Exam :  General Appearance:  Well developed.  No distress  Mouth/Throat:  Oral mucosa moist  Neck:  Supple, no JVD  Lungs:  Breath sounds: clear  Heart[de-identified]  S1,S2 heard  Abdomen:  Soft, non - tender  Musculoskeletal:  Edema - noted B/L         Last 3 CBC   Recent Labs     01/12/21  1654 01/13/21  0411   WBC 8.7 9.5   RBC 2.47* 2.56*   HGB 8.4* 8.8*   HCT 25.8* 26.7*   * 142     Last 3 CMP  Recent Labs     01/12/21  1654 01/13/21  0411 01/14/21  0353    137 135   K 4.5 4.5 3.9   CL 99 99 98   CO2 23 25 26   BUN 51* 55* 50*   CREATININE 1.5* 1.6* 1.1   CALCIUM 8.4* 8.4* 8.7   LABALBU 3.5  --   --    BILITOT 0.7  --   --              ASSESSMENT / Plan   1 Renal -mild acute kidney injury, mostly due to contrast-induced nephropathy currently appears to be better  ? Not at baseline yet but definitely improving elevated BUN to creatinine ratio due to diuretics    2 Electrolytes -within normal limits start 20 KCl p.o. daily while on diuretics  3 Essential hypertension running well continue current medications  4 Fluid overload-currently on diuretics making excellent urine output may be having some post ATN diuresis as well from obstruction. Urology on board catheter in place cardiology has been consulted strict intake and output documentation and daily weights of possible  5 Hx of hepatitis C  6 Recent back surgery with spinal cord compression status post surgery for abscess  7 Meds reviewed and discussed with patient    CARLOS Minor D.  [facility].

## 2021-01-14 NOTE — FLOWSHEET NOTE
01/14/21 0150   Provider Notification   Reason for Communication Evaluate;New orders   Provider Name Sumeet Bae   Provider Notification Advance Practice Clinician (CNS, NP, CNM, CRNA, PA)   Method of Communication Secure Message   Response See orders   Notification Time [id number]     Upon reading pt's renal US provider notified of bladder fullness. US read \"urinary bladder is unremarkable. Prevoid volume 2459 mL. Postvoid volume 1853 mL. Bilateral uretral jets are visualized. \"  [id number] Secure message sent to DARIUSZ Serrato about results. Informed Layne that pt does not have urology on treatment team. New orders placed for horn with instructions to clamp horn for an hour after 1000 mL was removed. Orders placed for consult to urology. 0200 horn catheter placed with bag filling up quickly. Clamp provided in catheter kit unable to clamp horn completely, allowing fluid to still fill bag. This RN manually held bag clamped while another RN went to get a new clamp. Horn emptied 1950 mL from patient.    [address] notified of horn fluid removal. Electronically signed by Nino Stearns RN on 1/14/2021 at 2:34 AM

## 2021-01-14 NOTE — PROGRESS NOTES
[address] [place] [address]  INPATIENT PHYSICAL THERAPY  EVALUATION  [facility] ONC MED 5K - [id number]    Time In: 8531  Time Out: 1450  Timed Code Treatment Minutes: 16 Minutes  Minutes: 26          Date: 2021  Patient Name: Deandre Layne,  Gender:  male        MRN: [id number]  : 1975  (39 y.o.)      Referring Practitioner: Sanjiv Lam DO  Diagnosis: leg swelling  Treatment Diagnosis: Leg swelling  Additional Pertinent Hx: Per EMR: \"39year-old obese  male presents to emergency department with evaluation of bilateral leg edema. Patient relates his leg swelling to status post lumbar fusion L3-L5 on 2020 had surgery remove fluid on 2021. Angelo Andrews Patient has gradually gotten worse since that time. \"     Restrictions/Precautions:  Restrictions/Precautions: General Precautions  Required Braces or Orthoses  Spinal: Lumbar Corset  Position Activity Restriction  Other position/activity restrictions: Pt had right arm amputated 3 years ago    Subjective:  Chart Reviewed: Yes  Patient assessed for rehabilitation services?: Yes  Family / Caregiver Present: No  Subjective: RN approved PT evaluation. Pt in bedside chair pre and post session. Pt agreeable to PT interventions and requesting to use the bathroom upon entry. Post session, pt in bedside chair with all needs in reach. Chair alarm on. General:  Overall Orientation Status: Within Normal Limits  Follows Commands: Within Functional Limits    Vision: (Not Tested)    Hearing: Within functional limits         Pain: Not verbalized.      Social/Functional History:    Lives With: (other residents at [facility] [place] [facility]  Type of Home: House  Home Layout: One level  Home Access: Level entry  Home Equipment: Rolling walker(pt reports he has been using RW since [employer]  when his leg started bothering him)     Bathroom Shower/Tub: Walk-in shower  Bathroom Toilet: Handicap height    Receives Help From: (housemates assisted with transfers and functional mobility)  ADL Assistance: Needs assistance  Homemaking Assistance: Needs assistance  Ambulation Assistance: Needs assistance  Transfer Assistance: Needs assistance    Active : No          OBJECTIVE:  Range of Motion:  Bilateral Lower Extremity: WFL    Strength:  Bilateral Lower Extremity: Impaired - L LE weakner than R LE. L hip flexion: 2+/5; R hip flexion 4/5; L knee extension 3/5; R knee extension 5/5. Balance:  Static Sitting Balance:  Contact Guard Assistance, with cues for safety, with verbal cues , with increased time for completion  Dynamic Standing Balance: Contact Guard Assistance, with cues for safety, with verbal cues , with increased time for completion, during giat training. Transfers:  Sit to Stand: [employer], Minimal Assistance, with increased time for completion, to/from chair with arms, one stand from chair; one stand from [facility]. Ambulation:  Contact Guard Assistance, with cues for safety  Distance: 10 feet; 60 feet. Surface: Level Tile  Device:Rolling Walker. Pt used one hand with walker, pt if used to doing this at home. Will try a cane with ambulation next session. Gait Deviations: Forward Flexed Posture, Slow Sabrina, Decreased Step Length Bilaterally, Wide Base of Support, Unsteady Gait and excessive external rotation of R leg, slight external rotation of L leg, decreased safety awareness, waddle gait. Functional Outcome Measures: Completed  AM-PAC Inpatient Mobility without Stair Climbing Raw Score : 13  AM-PAC Inpatient without Stair Climbing T-Scale Score : 38.96    ASSESSMENT:  Activity Tolerance:  Patient tolerance of  treatment: good. Pt displayed decreased endurance with SOB at end of session. Treatment Initiated: Treatment and education initiated within context of evaluation.   Evaluation time included review of current medical information, gathering information related to past medical, social and functional history, completion of standardized testing, formal and informal observation of tasks, assessment of data and development of plan of care and goals. Treatment time included skilled education and facilitation of tasks to increase safety and independence with functional mobility for improved independence and quality of life. Increased time for completion of functional mobility task in bathroom with CGA. Assessment: Body structures, Functions, Activity limitations: Decreased functional mobility , Decreased posture, Decreased endurance, Decreased ROM, Decreased strength, Decreased sensation, Decreased coordination, Decreased balance, Increased pain, Decreased safe awareness  Assessment: Pt displays decrease in basline with decreased B LE flexibility, strength, balance, and endurance limiting patient from independently performing bed mobility, transfers, and ambulation tasks. Patient would benefit from PT intervention now and after discharge to address these deficits and progress toward functional I and mobility. Prognosis: Good    REQUIRES PT FOLLOW UP: Yes    Discharge Recommendations:  Discharge Recommendations: Continue to assess pending progress    Patient Education:  PT Education: Goals, Transfer Training, Equipment, PT Role, Plan of Care, General Safety, Functional Mobility Training, Gait Training    Equipment Recommendations: Other: Will continue to assess. Plan:  Times per week: 3-5xGM  Current Treatment Recommendations: Strengthening, Gait Training, Patient/Caregiver Education & Training, ROM, Stair training, Equipment Evaluation, Education, & procurement, Balance Training, Neuromuscular Re-education, Pain Management, Functional Mobility Training, Endurance Training, Home Exercise Program, Transfer Training, Safety Education & Training    Goals:  Patient goals : To go home  Short term goals  Time Frame for Short term goals:  To Discharge  Short term goal 1: transfer sit to stand with stand-by

## 2021-01-14 NOTE — FLOWSHEET NOTE
01/14/21 0231   Provider Notification   Reason for Communication Evaluate   Provider Name Ishan Cid   Provider Notification Advance Practice Clinician (CNS, NP, CNM, CRNA, PA)   Method of Communication Secure Message   Response Other (Comment)   Notification Time [id number] message sent to Ishan Cid NP, about consult to urology d/t patient's urinary retention.  Molly Romero informed this RN to add Dr. Alexia Almaraz to patient's treatment team. Electronically signed by Nessa Chiu RN on 1/14/2021 at 2:36 AM

## 2021-01-14 NOTE — CARE COORDINATION
1/14/21, 11:30 AM EST    DISCHARGE PLANNING EVALUATION      SW received a call from Pete Vo,  of the [address] [place] [address]. She states the the patient has been in and out of the hospital so much and that he had fallen when he came back the last time. She stressed that their focus is not nursing care and that the resident has to be able to do things on his own. The patient requires assistance from the other residents and they really are not to do this. She requested that he stay here for some rehab. MILE informed her of the intent of that unit and that if he did not meet criteria, he may need an ECF. She can be reached at the office at [phone] or her cell phone--[phone].  MILE updated the unit MILE for today, [employer]

## 2021-01-14 NOTE — PROGRESS NOTES
Hospitalist      Patient:  Anisha Muñoz    Unit/Bed:5K-09/009-A  YOB: 1975  MRN: [id number]   Acct: [de-identified]     PCP: CRISTIAN Mccracken CNP  Date of Admission: 1/12/2021        Assessment and Plan:        1. Anasarca: Predominantly lower extremities and scrotal swelling, as normal albumin, nephrology has been consulted  2. Acute kidney injury will trend creatinine and nephrology to manage diuresing  3. Chronic thrombocytopenia  4. Monitor electrolytes closely keep mag greater than 2 and potassium greater than 4, fluid restriction 2 g sodium diet  5. Will empirically obtain blood cultures with additional history of fever and chills  Events noted in the last 24 hours intake output are -3700 mL we will order an echocardiogram and cardiology consult, a BMP also also placed thigh-high stockings. CC: Leg swelling    HPI: 71-year-old obese  male presents to emergency department with evaluation of bilateral leg edema. Patient relates his leg swelling to status post lumbar fusion L3-L5 on 12/29/2020 had surgery remove fluid on January 2021. Wava Prim Patient has gradually gotten worse since that time. ROS (14 point review of systems completed. Pertinent positives noted.  Otherwise ROS is negative) : Patient gives a history of chills shortness of breath, chest pain, leg swelling,    PMH:  Per HPI and       Diagnosis Date    Alcohol abuse     in remission    Amputation of right arm (HCC)     Ascites     GERD (gastroesophageal reflux disease)     Hepatic cirrhosis (HCC)     Hepatitis B     Hepatitis C     Hypertension     Intravenous drug user     in remission    Osteomyelitis ([facility] [address]     right arm/shoulder amputation    Psychiatric problem     PTSD (post-traumatic stress disorder)     Sciatica     Splenomegaly     Thrombocytopenia (HCC)     Ulcerative colitis ([facility] [address]      SHX:        Procedure Laterality Date    APPENDECTOMY      ARM AMPUTATION AT SHOULDER Right 2017 [place]    BACK SURGERY N/A 2021    LUMBAR I&D performed by Bradley Nagy MD at [place] [address]    ELBOW FRACTURE SURGERY      PORT SURGERY N/A 2020    SUBCLAVIAN SINGLE LUMEN MEDIPORT INSERTION performed by Lorri Dietrich MD at [address] W/BIOPSY SINGLE/MULTIPLE  2017    COLONOSCOPY WITH BIOPSY performed by Emiliano Sarmiento MD at [address] [facility]     [address] [facility],[address]:       Problem Relation Age of Onset    High Blood Pressure Mother     High Blood Pressure Father      SOCHX:   Social History     Socioeconomic History    Marital status:      Spouse name: None    Number of children: 1    Years of education: None    Highest education level: None   Occupational History    None   Social Needs    Financial resource strain: None    Food insecurity     Worry: None     Inability: None    Transportation needs     Medical: None     Non-medical: None   Tobacco Use    Smoking status: Former Smoker     Quit date: 2020     Years since quittin.7    Smokeless tobacco: Current User     Types: Snuff   Substance and Sexual Activity    Alcohol use: No     Comment: Several months since last used    Drug use: No     Comment: 160 days since last use    Sexual activity: None   Lifestyle    Physical activity     Days per week: None     Minutes per session: None    Stress: None   Relationships    Social connections     Talks on phone: None     Gets together: None     Attends Spiritism service: None     Active member of club or organization: None     Attends meetings of clubs or organizations: None     Relationship status: None    Intimate partner violence     Fear of current or ex partner: None     Emotionally abused: None     Physically abused: None     Forced sexual activity: None   Other Topics Concern    None   Social History Narrative    None      Allergies: Penicillins and Trazodone and nefazodone  Medications:       bumetanide  1 mg Intravenous Daily    sodium chloride flush  10 mL Intravenous 2 times per day    enoxaparin  40 mg Subcutaneous Daily    [Held by provider] lisinopril  20 mg Oral Daily    famotidine  40 mg Oral Daily    ferrous sulfate  325 mg Oral Daily with breakfast    folic acid  2 mg Oral Daily    mesalamine  800 mg Oral TID    pantoprazole  40 mg Oral BID AC    PARoxetine  40 mg Oral QAM    pregabalin  200 mg Oral TID     Prior to Admission medications    Medication Sig Start Date End Date Taking? Authorizing Provider   oxyCODONE-acetaminophen (PERCOCET) 5-325 MG per tablet Take 1 tablet by mouth every 4 hours as needed for Pain. Yes Historical Provider, MD   bumetanide (BUMEX) 1 MG tablet Take 1 tablet by mouth daily 1/8/21 2/8/21 Yes DARIUSZ Gtz   Multiple Vitamins-Minerals (MULTIVITAMIN ADULT PO) Take 1 tablet by mouth   Yes Historical Provider, MD   pregabalin (LYRICA) 200 MG capsule Take 200 mg by mouth 3 times daily.    Yes Historical Provider, MD   PARoxetine (PAXIL) 40 MG tablet Take 40 mg by mouth every morning   Yes Historical Provider, MD   potassium chloride (MICRO-K) 10 MEQ extended release capsule Take 10 mEq by mouth daily   Yes Historical Provider, MD   famotidine (PEPCID) 40 MG tablet Take 40 mg by mouth daily   Yes Historical Provider, MD   docusate sodium (COLACE) 100 MG capsule Take 100 mg by mouth daily   Yes Historical Provider, MD   ferrous sulfate 325 (65 Fe) MG tablet Take 325 mg by mouth daily (with breakfast)   Yes Historical Provider, MD   lisinopril (PRINIVIL;ZESTRIL) 20 MG tablet Take 20 mg by mouth daily Indications: with hydrochlorothiazide 25 mg    Yes Historical Provider, MD   mesalamine (DELZICOL) 400 MG CPDR delayed release capsule Take 800 mg by mouth 3 times daily   Yes Historical Provider, MD   folic acid (FOLVITE) 1 MG tablet Take 2 tablets by mouth daily 9/26/17  Yes Kimberly Maurer MD   pantoprazole (PROTONIX) 40 MG tablet Take 40 mg by mouth 2 times daily    Yes Historical Provider, MD   Multiple Vitamin (MULTI VITAMIN MENS) TABS Take by mouth   Yes Historical Provider, MD   LACTULOSE PO Take 20 g by mouth 3 times daily     Historical Provider, MD      PHYSICAL EXAM:    /76   Pulse 102   Temp 98.1 °F (36.7 °C) (Oral)   Resp 18   Wt 250 lb 9.6 oz (113.7 kg)   SpO2 96%   BMI 33.99 kg/m²     General appearance:  No apparent distress, appears stated age and cooperative. HEENT:  Normal cephalic, atraumatic without obvious deformity. Pupils equal, round, and reactive to light. Extra ocular muscles intact. Conjunctivae/corneas clear. Neck: Supple, with full range of motion. no jugular venous distention. Trachea midline. no carotid bruits  Respiratory:  Normal respiratory effort. Clear to auscultation, bilaterally without Rales/Wheezes/Rhonchi. Breath sounds equal bilaterally  Cardiovascular:  Regular rate and rhythm with normal S1/S2 without murmurs, rubs or gallops. PMI non displaced  Abdomen: Soft, non-tender, non-distended with normal bowel sounds. No guarding, rebound. Musculoskeletal:  No clubbing, cyanosis or edema bilaterally. Full range of motion without deformity. Skin: Skin color, texture, turgor normal.  No rashes or lesions, or suspicious lesions. Neurologic:  Neurovascularly intact without any focal sensory/motor deficits. Cranial nerves: II-XII intact, grossly non-focal.  Psychiatric:  Alert and oriented, thought content appropriate, normal insight  Capillary Refill: Brisk,< 2 seconds   Peripheral Pulses: +2 palpable, equal bilaterally upper and lower extremities  Lymphatics: no lymphadenopathy    Data: (All radiographs, tracings, PFTs, and imaging are personally viewed and interpreted unless otherwise noted).     Creatinine 1.1  Recent Labs     01/12/21  1654 01/13/21 0411   WBC 8.7 9.5   HGB 8.4* 8.8*   HCT 25.8* 26.7*   * 142     Recent Labs     01/12/21  1654 01/13/21  0411 01/14/21  0353    137 135   K 4.5 4.5 3.9   CL 99 99 98   CO2 23 25 26   BUN 51* 55* 50*   CREATININE 1.5* 1.6* 1.1   CALCIUM 8.4* 8.4* 8.7     Recent Labs     01/12/21  1654   AST 20   ALT 18   BILITOT 0.7   ALKPHOS 77     No results for input(s): INR in the last 72 hours. No results for input(s): Maurice Jose Rafael in the last 72 hours. Radiology reports-images reviewed in PACS  Cta Chest W Wo Contrast    Result Date: 1/10/2021  CTA THORAX / PULMONARY EMBOLUS STUDY: CLINICAL INFORMATION: right lower leg DVT, persistent tachycardia COMPARISON; No prior study. TECHNIQUE: 1.5 mm axial images were obtained through the chest following the administration of IV contrast (ISOVUE). A non-contrast localizer was obtained. 3D reconstructions were performed on the scanner to include sagittal and coronal MIP images through both the right and left pulmonary arteries. ALL CT SCANS AT THIS FACILITY use dose modulation, iterative reconstruction, and/or weight-based dosing when appropriate to reduce radiation dose to as low as reasonably achievable. FINDINGS: Upper abdomen: Unremarkable. No gross abnormality is seen. Mediastinum and sharif: There is a small hiatus hernia. Addition, there is a prominent somewhat curvilinear soft tissue band along the right side of the herniated portion of the stomach, lungs or significance. This could related to chronic adenopathy. A similar appearance was seen on a prior CT abdomen and pelvis May 23, 2018. A few small postinflammatory lymph nodes are seen in the upper mediastinum. No hilar adenopathy is seen. A MediPort is present left side with catheter tip the cavoatrial junction. Bones: No evidence for acute fracture or bone destruction. Lungs: Minimal atelectatic/fibrotic stranding in the lingula. No acute infiltrates or effusions are seen. No lung nodules are seen. Vascular: No pulmonary emboli are seen. There is no large vessel aneurysm or dissection. 1.  No pulmonary emboli are seen. No infiltrates or effusions are seen. 2. Small hiatus hernia. Somewhat thickened curvilinear soft tissue band in the posterior/inferior mediastinum adjacent to the herniated portion of the stomach, also uncertain significance, possibly representing adenopathy. A similar appearance was seen on prior CT abdomen and pelvis from August 2018. **This report has been created using voice recognition software. It may contain minor errors which are inherent in voice recognition technology. **  Final report electronically signed by Dr. Louise Suggs on 1/10/2021 6:46 PM    Mri Thoracic Spine W Wo Contrast    Result Date: 1/6/2021  PROCEDURE: MRI THORACIC SPINE W WO CONTRAST CLINICAL INFORMATION: bilateral leg weakness/numbness. Lumbar surgery in December. Left leg weakness and numbness since before surgery. Worsening pain and numbness since surgery. COMPARISON: Lumbar spine MRI 1/6/2021. TECHNIQUE: Sagittal T1, T2 and STIR sequences were obtained through the thoracic spine. Axial T2-weighted images were obtained through the discs. Postcontrast axial and sagittal T1-weighted images were obtained. FINDINGS: There is a slight S-shaped scoliosis. There are no compression fractures. There is no suspicious marrow signal abnormality. There is no bone marrow edema. The discs have normal signal. There is some motion artifact on this exam. The thoracic spinal cord is of normal caliber and signal intensity. There are no abnormalities within the spinal canal. There is no abnormal enhancement. On the axial images, there are no abnormalities within the spinal canal. The thoracic spinal cord is of normal signal. There is no spinal canal stenosis. No foraminal stenosis is noted. There are no gross abnormalities on the localizer images. 1. Normal thoracic spinal cord. 2. No evidence of spinal canal stenosis. **This report has been created using voice recognition software. It may contain minor errors which are inherent in voice recognition technology. ** Final report electronically signed by  N[facility]or [facility] on 1/6/2021 11:01 AM    Mri Lumbar Spine W Wo Contrast    Result Date: 1/6/2021  PROCEDURE: MRI LUMBAR SPINE W WO CONTRAST CLINICAL INFORMATION: Recent Lumbar surgery, now has bladder retention, saddle anesthesia, defecation difficulties. Eval for cord compression. Lumbar surgery in December. Left leg weakness and numbness since before surgery. Pain and numbness has worsened  after surgery. COMPARISON: CT lumbar spine 1/3/2021. TECHNIQUE: Sagittal and axial T1 and T2-weighted images were obtained to the lumbar spine. Postcontrast axial and sagittal T1-weighted images were also obtained. FINDINGS: There are bilateral pedicle screws in L3, L4 and L5. The patient has had laminectomies at the L2-3, L3-4 and L4-5 levels. In the laminectomy bed, there is a heterogeneous collection measuring 10.3 x 5.6 x 4.5 cm. There is surrounding enhancement. This collection distends the posterior paraspinal musculature. This extends into the laminectomy bed and exhibits mass effect upon the thecal sac. This is causing severe stenosis of the thecal sac at the L3-4 and L4-5 levels. The lumbar vertebral bodies are normally aligned. There is normal marrow signal in the vertebral bodies. There are no compression fractures. There is disc desiccation at the L2-3, L3-4 and L4-5 levels. The distal thoracic cord is normal. The tip of the conus is normal. There is compression of the nerve roots of the cauda equina at the L3-4 and L4-5 levels due to mass effect from the collection in the laminectomy bed. There are no gross abnormalities in the distal thoracic spine. On the axial images, at T12-L1 and L1-L2, there is no spinal canal stenosis. There is no foraminal stenosis. At L2-3, there is moderate stenosis of the thecal sac. This appears related to the process in the laminectomy bed. There is mild bilateral foraminal stenosis. At L3-4, there is severe stenosis of the thecal sac.  The thecal sac is flattened along the posterior margin of the vertebral bodies. There is compression of the nerve roots of the cauda equina. There is mild bilateral foraminal stenosis. There is an abnormality at the disc level ventrally. This is only a shallow disc protrusion. This could also represent some prominence of the ventral epidural plexus. At L4-5, there is severe stenosis of the thecal sac due to mass effect from the collection in the laminectomy bed. The thecal sac is flattened. There is compression of the nerve roots of the cauda equina. There is moderate severity bilateral foraminal stenosis. At L5-S1, there is no spinal canal or foraminal stenosis. On the postcontrast images, there is mild enhancement of the nerve roots of the cauda equina just above the severe stenosis at the L3 level. . This can represent inflammation or ischemia of the nerve roots. . This is seen on the axial and sagittal images. There is no retroperitoneal abnormality. 1. Severe stenosis of the thecal sac and compression of the nerve roots of the cauda equina at the L3-4 and L4-5 levels due to a fluid collection in the laminectomy bed exhibiting mass effect upon the thecal sac. 2. Enhancement of the nerve roots of the cauda equina posterior to L3. This suggests inflammation versus ischemia of those nerve roots. 3. 10.3 cm fluid collection in the laminectomy bed. The differential diagnosis includes infection, seroma and hematoma. This does surround the bases of the pedicle screws. 4. Moderate stenosis of the thecal sac at the L2-3 level. These findings were telephoned to Dr. Carlos Coy, at 11:20 AM on 1/6/2021. **This report has been created using voice recognition software. It may contain minor errors which are inherent in voice recognition technology. ** Final report electronically signed by Dr. Beatris Bourgeois on 1/6/2021 11:24 AM    Ct Abdomen Pelvis W Iv Contrast Additional Contrast? None    Result Date: 1/3/2021  CT Abdomen and Pelvis W Contrast COMPARISON:  CT,KOSR  - CT ABDOMEN PELVIS W IV CONTRAST  - 08/23/2018 08:18 PM EDT FINDINGS: Small hiatal hernia containing fat and small fluid. Nodular liver contours consistent with hepatic cirrhosis. Normal spleen. Mild bilateral hydronephrosis and ureterectasis. No visible urolithiasis. Normal adrenal glands. Normal pancreas. No calcified gallstones. No biliary dilatation. Liquid stool with air-fluid levels in the colon. No evidence of bowel obstruction. Patient appears to be status post appendectomy. Distended bladder. Small gas in the bladder. No ascites. No pneumoperitoneum. Prominent retroperitoneal lymph nodes. No acute fracture. Postsurgical changes in the lumbar spine. See separate lumbar spine CT report. No abdominal aortic aneurysm. Mild anasarca. Mild bilateral hydronephrosis and ureterectasis without visible obstruction. Distended bladder. Small gas in the bladder could be due to recent instrumentation or gas-forming infection. Findings consistent with diarrheal illness/colitis. Mild anasarca. Possible reactive lymph nodes. Nonemergent/incidental findings in the report. This document has been electronically signed by: Kendell Paige MD on 01/03/2021 10:31 PM All CT scans at this facility use dose modulation, iterative reconstruction, and/or weight-based dosing when appropriate to reduce radiation dose to as low as reasonably achievable. Xr Chest Portable    Result Date: 1/12/2021  1 view chest x-ray Comparison:  KAVITA,SR  - XR CHEST PORTABLE  - 01/10/2021 02:58 PM EST Findings: The lungs are clear. Normal size heart. No pulmonary vascular congestion. No acute fracture. No acute findings. This document has been electronically signed by: Shakira Crespo MD on 01/12/2021 09:56 PM     Xr Chest Portable    Result Date: 1/10/2021  PROCEDURE: XR CHEST PORTABLE CLINICAL INFORMATION: leg edema, tachycardia, weakness COMPARISON: 8/17/2020 TECHNIQUE: A single mobile view of the chest was obtained. 1. Suboptimal inflation of lungs. Extremity Venous Bilateral    Result Date: 1/4/2021  PROCEDURE: VL DUP LOWER EXTREMITY VENOUS BILATERAL CLINICAL INFORMATION: 42-year-old male with leg edema and patient underwent back surgery 6 days ago. COMPARISON: No prior study. TECHNIQUE: Venous doppler ultrasound was performed of the bilateral lower extremities using gray scale, color flow and spectral doppler imaging. FINDINGS: There is normal color flow, spectral analysis and compressibility of the common femoral vein, superficial femoral vein and popliteal vein bilaterally . There is normal color flow and compressibility in the posterior tibial veins, anterior tibial veins and peroneal veins. No evidence of a DVT. **This report has been created using voice recognition software. It may contain minor errors which are inherent in voice recognition technology. ** Final report electronically signed by Dr Anton Melton on 1/4/2021 2:41 PM    Ct Lumbar Reconstruction Wo Post Process    Result Date: 1/3/2021  CT Lumbar Spine WO Contrast COMPARISON: None FINDINGS: No acute fracture or malalignment. Status post posterior L4-S1 fusion and bilateral L4 and L5 laminectomies. Hardware appears intact. There is scattered soft tissue emphysema in the surgical bed, likely related to recent surgery. There is a possible seroma in the surgical bed. Degenerative changes in the spine. Possible postsurgical seroma in the surgical bed. Nonemergent/incidental findings in the report. See separate abdomen/pelvis CT report. This document has been electronically signed by: Byron Posey MD on 01/03/2021 09:47 PM All CT scans at this facility use dose modulation, iterative reconstruction, and/or weight-based dosing when appropriate to reduce radiation dose to as low as reasonably achievable.        Electronically signed by Modesto Nunez DO on 1/14/2021 at 10:12 AM

## 2021-01-14 NOTE — CONSULTS
[address] [facility]  [id number] [facility] [address]  Dept: [phone]  Loc: [phone]  Visit Date: 1/12/2021    Urology Consult Note    Reason for Consult:  urinary retention  Requesting Physician:  Iqra Irby    History Obtained From:  patient    Chief Complaint: urinary retention    HISTORY OF PRESENT ILLNESS:                The patient is a 39 y.o. male with significant past medical history of see below who presented to ER complaining of bilateral lower leg swelling. Leg swelling began status post lumbar fusion L3-L5 on 12/29/20, he also had surgery in January to removed fluid. He claims previous surgery in [place] for his bladder due have an extra pocket of bladder that would retain urine.              Past Medical History:        Diagnosis Date    Alcohol abuse     in remission    Amputation of right arm (HCC)     Ascites     GERD (gastroesophageal reflux disease)     Hepatic cirrhosis (HCC)     Hepatitis B     Hepatitis C     Hypertension     Intravenous drug user     in remission    Osteomyelitis ([facility] [address]     right arm/shoulder amputation    Psychiatric problem     PTSD (post-traumatic stress disorder)     Sciatica     Splenomegaly     Thrombocytopenia (HCC)     Ulcerative colitis ([address]      Past Surgical History:        Procedure Laterality Date    APPENDECTOMY      [facility]OULDER Right 2017    [facility]    BACK SURGERY N/A 1/6/2021    LUMBAR I&D performed by Corey Pruitt MD at [place] [address]    ELBOW FRACTURE SURGERY  2020    PORT SURGERY N/A 8/17/2020    SUBCLAVIAN SINGLE LUMEN MEDIPORT INSERTION performed by Devan Dickson MD at [address] W/BIOPSY SINGLE/MULTIPLE  9/26/2017    COLONOSCOPY WITH BIOPSY performed by Yossi Faria MD at [facility]     Allergies:  Penicillins and Trazodone and nefazodone  Social History:  Social History     Socioeconomic History    Marital BUN 50 01/14/2021    CREATININE 1.1 01/14/2021    CALCIUM 8.7 01/14/2021    GFRAA >60 04/04/2019    GFRAA >60 10/14/2011    LABGLOM 72 01/14/2021    GLUCOSE 143 01/14/2021    GLUCOSE 166 12/16/2019     BUN/Creatinine:    Lab Results   Component Value Date    BUN 50 01/14/2021    CREATININE 1.1 01/14/2021     Magnesium:    Lab Results   Component Value Date    MG 1.9 01/14/2021     Phosphorus:  No results found for: PHOS  PT/INR:    Lab Results   Component Value Date    INR 1.00 01/03/2021     U/A:    Lab Results   Component Value Date    COLORU YELLOW 01/10/2021    PHUR 5.0 01/10/2021    LEUKOCYTESUR NEGATIVE 01/10/2021    UROBILINOGEN 0.2 01/10/2021    BILIRUBINUR NEGATIVE 01/10/2021    BLOODU NEGATIVE 01/10/2021    GLUCOSEU 100 01/10/2021       Imaging: The patient has had a Renal Ultrasound which I have independently reviewed along with its accompanying report. The study demonstrates   arrative   US Renal with Doppler       Comparison:  CT,SR  - CT ABDOMEN PELVIS W IV CONTRAST  - 01/03/2021 08:34    PM EST       Findings:   Right kidney normal size and echotexture, 11.5 cm length. No hydronephrosis. Normal color Doppler.       Left kidney normal size and echotexture, 10.7 cm length. No hydronephrosis. Normal color Doppler.       Urinary bladder is unremarkable. Prevoid volume 2459 mL. Postvoid volume    1853 mL. Bilateral ureteral jets are visualized.           Impression   1. Normal kidneys. 2. Large urinary bladder postvoid residual of 1,853 mL. IMPRESSION:  Urinary retention  BPH  ONIEL    Plan:  US shows no hydronephrosis, no obstruction  CRT 1.1  No infection in urine    Start Flomax, continue at discharge  Keep horn in at discharge to maximize decompression and evacuation  Our office will coordinate follow up in 2-3 weeks for in office cystoscopy    Urology signing off, please contact us if hematuria develops, horn not draining or any other questions or concerns.     Thank you for including us in the care of Gloria Durham, CRISTIAN  01/14/21 10:13 AM  Urology

## 2021-01-14 NOTE — CONSULTS
chf acute   Liver cirrhosis with fliud over load  ONIEL on CKD  Marked leg edema  Hx of HTN      Plan    Gentle diuresis  Add aldactone 25 bid in view of liver cirrh  Echo    [id number]      Nahum Ramirez MD

## 2021-01-14 NOTE — PROGRESS NOTES
Joni Clark 60  INPATIENT OCCUPATIONAL THERAPY  [facility]  EVALUATION    Time:   Time In: 1763  Time Out: 1204  Timed Code Treatment Minutes: 28 Minutes  Minutes: 38          Date: 2021  Patient Name: Kylah Bolton,   Gender: male      MRN: [id number]  : 1975  (39 y.o.)  Referring Practitioner: Michele Mackenzie DO  Diagnosis: Leg Swelling  Additional Pertinent Hx: \"39year-old obese  male presents to emergency department with evaluation of bilateral leg edema. Patient relates his leg swelling to status post lumbar fusion L3-L5 on 2020 had surgery remove fluid on 2021. Bhavya Sanya Patient has gradually gotten worse since that time. \"    Restrictions/Precautions:  Restrictions/Precautions: General Precautions  Required Braces or Orthoses  Spinal: Lumbar Corset  Position Activity Restriction  Other position/activity restrictions: Pt had right arm amputated 3 years ago    Subjective  Chart Reviewed: Yes, Orders, Progress Notes, History and Physical, Operative Notes  Patient assessed for rehabilitation services?: Yes  Family / Caregiver Present: No    Subjective: RN okayed OT. Pt supine upon arrival and agreeable to OT with encouragement. Pain:  Pain Assessment  Patient Currently in Pain: Yes  Pain Assessment: 0-10  Pain Level:  3(when lying in bed)    Social/Functional History:  Lives With: (other residents at [facility]  Type of Home: [address] [place] [address],[address]: One level  Home Access: Level entry  Home Equipment: Rolling walker(pt reports he has been using RW since [employer]  when his leg started bothering him)   Bathroom Shower/Tub: Walk-in shower  Bathroom Toilet: Handicap height    Receives Help From: (housemates assisted with transfers and functional mobility)  ADL Assistance: Needs assistance  Homemaking Assistance: Needs assistance  Ambulation Assistance: Needs assistance  Transfer Assistance: Needs assistance    Active : No Cognition/Orientation:     Overall Cognitive Status: WFL    ADL's:  UE Dressing: Minimal assistance(to don hospital gown)  LE Dressing: Dependent/Total(to don socks)    Functional Mobility:  Bed mobility  Supine to Sit: Maximum assistance(with trunk)  Scooting: Moderate assistance(with verbal cues for technique)  Comment: with HOB raised all the way. Increased time for completion. Functional Mobility  Functional - Mobility Device: Rolling Walker  Assist Level: Contact guard assistance     Balance:  Balance  Sitting Balance: Stand by assistance  Standing Balance: Contact guard assistance  Standing Balance  Time: ~3 min  Activity: short walk in room and looking out window    Transfers:  Sit to stand: Minimal assistance(from bed to recliner)  Stand to sit: Minimal assistance   Required encouragement and increased time for completion. Upper Extremity Assessment:   LUE AROM : [facility]  Left Hand AROM: [facility]       LUE Strength  Gross LUE Strength: Exceptions to [facility]  L Hand General: 4-/5    Sensation  Overall Sensation Status: [facility]       Activity Tolerance: Patient limited by pain       Assessment:  Assessment: pt will benefit from skilled OT intervention to increase safety and independence with ADL tasks and functional mobility to ensure safe transition to next level of care and return to [facility]. Performance deficits / Impairments: Decreased functional mobility , Decreased safe awareness, Decreased balance, Decreased coordination, Decreased ADL status, Decreased endurance, Decreased strength  Prognosis: Good  REQUIRES OT FOLLOW UP: Yes  Safety Devices in place: Yes    Treatment Initiated: Treatment and education initiated within context of evaluation.   Evaluation time included review of current medical information, gathering information related to past medical, social and functional history, completion of standardized testing, formal and informal observation of tasks, assessment of data and development of plan of care and goals.  Treatment time included skilled education and facilitation of tasks to increase safety and independence with ADL's for improved functional independence and quality of life. Discharge Recommendations:  Continue to assess pending progress, Home with Home health OT    Patient Education:  OT Education: OT Role, Plan of Care, Precautions, Transfer Training    Equipment Recommendations:  Equipment Needed: (Continue to assess pending progress)    Plan:  Times per week: 5x  Current Treatment Recommendations: Strengthening, Endurance Training, Patient/Caregiver Education & Training, Equipment Evaluation, Education, & procurement, Self-Care / ADL, Balance Training, Functional Mobility Training, Safety Education & Training    Goals:  Patient goals : decrease pain  Short term goals  Time Frame for Short term goals: by discharge  Short term goal 1: complete functional transfers with S and no cues for walker to increase independence with toileting routine  Short term goal 2: complete dynamic standing task with S x5 min with UE release from RW to increase independence with sinkside grooming  Short term goal 3: navigate to/from bathroom with S and no cues for walker safety to increase independence with toileting routine  Short term goal 4: complete UB and LB dressing with min A to increase independence with ADL routine  See long-term goal time frame for expected duration of plan of care. If no long-term goals established, a short length of stay is anticipated. Following session, patient left in safe position with all fall risk precautions in place.

## 2021-01-15 ENCOUNTER — HOSPITAL ENCOUNTER (INPATIENT)
Age: 46
LOS: 11 days | Discharge: HOME HEALTH | DRG: 949 | End: 2021-01-26
Attending: PHYSICAL MEDICINE & REHABILITATION | Admitting: PHYSICAL MEDICINE & REHABILITATION
Payer: MEDICARE

## 2021-01-15 VITALS
RESPIRATION RATE: 16 BRPM | TEMPERATURE: 98.6 F | OXYGEN SATURATION: 97 % | HEART RATE: 97 BPM | SYSTOLIC BLOOD PRESSURE: 131 MMHG | WEIGHT: 250 LBS | DIASTOLIC BLOOD PRESSURE: 73 MMHG | BODY MASS INDEX: 33.91 KG/M2

## 2021-01-15 DIAGNOSIS — Z98.890 S/P LUMBAR LAMINECTOMY: Primary | ICD-10-CM

## 2021-01-15 PROBLEM — G89.4 CHRONIC PAIN SYNDROME: Status: ACTIVE | Noted: 2021-01-15

## 2021-01-15 PROBLEM — D64.9 POSTOPERATIVE ANEMIA: Status: ACTIVE | Noted: 2021-01-15

## 2021-01-15 PROBLEM — F32.A ANXIETY AND DEPRESSION: Status: ACTIVE | Noted: 2021-01-15

## 2021-01-15 PROBLEM — F41.9 ANXIETY AND DEPRESSION: Status: ACTIVE | Noted: 2021-01-15

## 2021-01-15 LAB
ACINETOBACTER BAUMANNII FILM ARRAY: NOT DETECTED
ANION GAP SERPL CALCULATED.3IONS-SCNC: 9 MEQ/L (ref 8–16)
BOTTLE TYPE: ABNORMAL
BUN BLDV-MCNC: 22 MG/DL (ref 7–22)
CALCIUM SERPL-MCNC: 8.5 MG/DL (ref 8.5–10.5)
CANDIDA ALBICANS FILM ARRAY: NOT DETECTED
CANDIDA GLABRATA FILM ARRAY: NOT DETECTED
CANDIDA KRUSEI FILM ARRAY: NOT DETECTED
CANDIDA PARAPSILOSIS FILM ARRAY: NOT DETECTED
CANDIDA TROPICALIS FILM ARRAY: NOT DETECTED
CARBAPENEM RESITANT FILM ARRAY: ABNORMAL
CHLORIDE BLD-SCNC: 99 MEQ/L (ref 98–111)
CO2: 28 MEQ/L (ref 23–33)
CREAT SERPL-MCNC: 0.7 MG/DL (ref 0.4–1.2)
ENTERBACTER CLOACAE FILM ARRAY: NOT DETECTED
ENTERBACTERIACEAE FILM ARRAY: NOT DETECTED
ENTEROCOCCUS FILM ARRAY: NOT DETECTED
ESCHERICHIA COLI FILM ARRAY: NOT DETECTED
GFR SERPL CREATININE-BSD FRML MDRD: > 90 ML/MIN/1.73M2
GLUCOSE BLD-MCNC: 126 MG/DL (ref 70–108)
HAEMOPHILUS INFLUENZA FILM ARRAY: NOT DETECTED
KLEBSIELLA OXYTOCA FILM ARRAY: NOT DETECTED
KLEBSIELLA PNEUMONIAE FILM ARRAY: NOT DETECTED
LISTERIA MONOCYTOGENES FILM ARRAY: NOT DETECTED
MAGNESIUM: 1.3 MG/DL (ref 1.6–2.4)
METHICILLIN RESISTANT FILM ARRAY: DETECTED
NEISSERIA MENIGITIDIS FILM ARRAY: NOT DETECTED
POTASSIUM SERPL-SCNC: 4.3 MEQ/L (ref 3.5–5.2)
PROTEUS FILM ARRAY: NOT DETECTED
PSEUDOMONAS AERUGINOSA FILM ARRAY: NOT DETECTED
SERRATIA MARCESCENS FILM ARRAY: NOT DETECTED
SODIUM BLD-SCNC: 136 MEQ/L (ref 135–145)
SOURCE OF BLOOD CULTURE: ABNORMAL
STAPH AUREUS FILM ARRAY: NOT DETECTED
STAPHYLOCOCCUS FILM ARRAY: DETECTED
STREP AGALACTIAE FILM ARRAY: NOT DETECTED
STREP PNEUMONIAE FILM ARRAY: NOT DETECTED
STREP PYOCGENES FILM ARRAY: NOT DETECTED
STREPTOCOCCUS FILM ARRAY: NOT DETECTED
VANCOMYCIN RESISTANT FILM ARRAY: ABNORMAL

## 2021-01-15 PROCEDURE — 1180000000 HC REHAB R&B

## 2021-01-15 PROCEDURE — 83735 ASSAY OF MAGNESIUM: CPT

## 2021-01-15 PROCEDURE — 6360000002 HC RX W HCPCS: Performed by: STUDENT IN AN ORGANIZED HEALTH CARE EDUCATION/TRAINING PROGRAM

## 2021-01-15 PROCEDURE — 6360000002 HC RX W HCPCS: Performed by: INTERNAL MEDICINE

## 2021-01-15 PROCEDURE — 99232 SBSQ HOSP IP/OBS MODERATE 35: CPT | Performed by: INTERNAL MEDICINE

## 2021-01-15 PROCEDURE — 97535 SELF CARE MNGMENT TRAINING: CPT

## 2021-01-15 PROCEDURE — 6370000000 HC RX 637 (ALT 250 FOR IP): Performed by: INTERNAL MEDICINE

## 2021-01-15 PROCEDURE — 6370000000 HC RX 637 (ALT 250 FOR IP): Performed by: UROLOGY

## 2021-01-15 PROCEDURE — 2500000003 HC RX 250 WO HCPCS: Performed by: INTERNAL MEDICINE

## 2021-01-15 PROCEDURE — 99232 SBSQ HOSP IP/OBS MODERATE 35: CPT | Performed by: NURSE PRACTITIONER

## 2021-01-15 PROCEDURE — 99223 1ST HOSP IP/OBS HIGH 75: CPT | Performed by: PHYSICAL MEDICINE & REHABILITATION

## 2021-01-15 PROCEDURE — 80048 BASIC METABOLIC PNL TOTAL CA: CPT

## 2021-01-15 PROCEDURE — 6370000000 HC RX 637 (ALT 250 FOR IP): Performed by: NURSE PRACTITIONER

## 2021-01-15 PROCEDURE — 6370000000 HC RX 637 (ALT 250 FOR IP): Performed by: FAMILY MEDICINE

## 2021-01-15 PROCEDURE — 2580000003 HC RX 258: Performed by: STUDENT IN AN ORGANIZED HEALTH CARE EDUCATION/TRAINING PROGRAM

## 2021-01-15 PROCEDURE — 6370000000 HC RX 637 (ALT 250 FOR IP): Performed by: STUDENT IN AN ORGANIZED HEALTH CARE EDUCATION/TRAINING PROGRAM

## 2021-01-15 PROCEDURE — 36415 COLL VENOUS BLD VENIPUNCTURE: CPT

## 2021-01-15 RX ORDER — BUMETANIDE 1 MG/1
1 TABLET ORAL DAILY
Status: DISCONTINUED | OUTPATIENT
Start: 2021-01-16 | End: 2021-01-26 | Stop reason: HOSPADM

## 2021-01-15 RX ORDER — PAROXETINE HYDROCHLORIDE 20 MG/1
40 TABLET, FILM COATED ORAL EVERY MORNING
Status: DISCONTINUED | OUTPATIENT
Start: 2021-01-16 | End: 2021-01-26 | Stop reason: HOSPADM

## 2021-01-15 RX ORDER — ACETAMINOPHEN 650 MG/1
650 SUPPOSITORY RECTAL EVERY 6 HOURS PRN
Status: DISCONTINUED | OUTPATIENT
Start: 2021-01-15 | End: 2021-01-18

## 2021-01-15 RX ORDER — SPIRONOLACTONE 25 MG/1
25 TABLET ORAL 2 TIMES DAILY
Status: DISCONTINUED | OUTPATIENT
Start: 2021-01-16 | End: 2021-01-16

## 2021-01-15 RX ORDER — TAMSULOSIN HYDROCHLORIDE 0.4 MG/1
0.4 CAPSULE ORAL DAILY
Status: DISCONTINUED | OUTPATIENT
Start: 2021-01-16 | End: 2021-01-26 | Stop reason: HOSPADM

## 2021-01-15 RX ORDER — FERROUS SULFATE 325(65) MG
325 TABLET ORAL
Status: DISCONTINUED | OUTPATIENT
Start: 2021-01-16 | End: 2021-01-15

## 2021-01-15 RX ORDER — FOLIC ACID 1 MG/1
2 TABLET ORAL DAILY
Status: CANCELLED | OUTPATIENT
Start: 2021-01-16

## 2021-01-15 RX ORDER — BISACODYL 10 MG
10 SUPPOSITORY, RECTAL RECTAL DAILY PRN
Status: CANCELLED | OUTPATIENT
Start: 2021-01-15

## 2021-01-15 RX ORDER — TAMSULOSIN HYDROCHLORIDE 0.4 MG/1
0.4 CAPSULE ORAL DAILY
Qty: 30 CAPSULE | Refills: 3 | Status: SHIPPED | OUTPATIENT
Start: 2021-01-16 | End: 2021-01-29 | Stop reason: SDUPTHER

## 2021-01-15 RX ORDER — POLYETHYLENE GLYCOL 3350 17 G/17G
17 POWDER, FOR SOLUTION ORAL DAILY PRN
Status: DISCONTINUED | OUTPATIENT
Start: 2021-01-15 | End: 2021-01-26 | Stop reason: HOSPADM

## 2021-01-15 RX ORDER — POLYETHYLENE GLYCOL 3350 17 G/17G
17 POWDER, FOR SOLUTION ORAL DAILY PRN
Status: CANCELLED | OUTPATIENT
Start: 2021-01-15

## 2021-01-15 RX ORDER — PANTOPRAZOLE SODIUM 40 MG/1
40 TABLET, DELAYED RELEASE ORAL
Status: CANCELLED | OUTPATIENT
Start: 2021-01-15

## 2021-01-15 RX ORDER — ACETAMINOPHEN 325 MG/1
650 TABLET ORAL EVERY 6 HOURS PRN
Status: CANCELLED | OUTPATIENT
Start: 2021-01-15

## 2021-01-15 RX ORDER — FERROUS SULFATE 325(65) MG
325 TABLET ORAL
Status: CANCELLED | OUTPATIENT
Start: 2021-01-16

## 2021-01-15 RX ORDER — FERROUS SULFATE 325(65) MG
325 TABLET ORAL 2 TIMES DAILY WITH MEALS
Status: DISCONTINUED | OUTPATIENT
Start: 2021-01-15 | End: 2021-01-26 | Stop reason: HOSPADM

## 2021-01-15 RX ORDER — SODIUM CHLORIDE 0.9 % (FLUSH) 0.9 %
10 SYRINGE (ML) INJECTION EVERY 12 HOURS SCHEDULED
Status: CANCELLED | OUTPATIENT
Start: 2021-01-15

## 2021-01-15 RX ORDER — MAGNESIUM SULFATE IN WATER 40 MG/ML
2 INJECTION, SOLUTION INTRAVENOUS PRN
Status: DISCONTINUED | OUTPATIENT
Start: 2021-01-15 | End: 2021-01-15 | Stop reason: HOSPADM

## 2021-01-15 RX ORDER — PROMETHAZINE HYDROCHLORIDE 25 MG/1
12.5 TABLET ORAL EVERY 6 HOURS PRN
Status: CANCELLED | OUTPATIENT
Start: 2021-01-15

## 2021-01-15 RX ORDER — PANTOPRAZOLE SODIUM 40 MG/1
40 TABLET, DELAYED RELEASE ORAL
Status: DISCONTINUED | OUTPATIENT
Start: 2021-01-16 | End: 2021-01-26 | Stop reason: HOSPADM

## 2021-01-15 RX ORDER — PREGABALIN 50 MG/1
200 CAPSULE ORAL 3 TIMES DAILY
Status: CANCELLED | OUTPATIENT
Start: 2021-01-15

## 2021-01-15 RX ORDER — ACETAMINOPHEN 325 MG/1
650 TABLET ORAL EVERY 6 HOURS PRN
Status: DISCONTINUED | OUTPATIENT
Start: 2021-01-15 | End: 2021-01-26 | Stop reason: HOSPADM

## 2021-01-15 RX ORDER — POTASSIUM CHLORIDE 20 MEQ/1
20 TABLET, EXTENDED RELEASE ORAL
Qty: 60 TABLET | Refills: 3 | Status: SHIPPED | OUTPATIENT
Start: 2021-01-16

## 2021-01-15 RX ORDER — ONDANSETRON 2 MG/ML
4 INJECTION INTRAMUSCULAR; INTRAVENOUS EVERY 6 HOURS PRN
Status: CANCELLED | OUTPATIENT
Start: 2021-01-15

## 2021-01-15 RX ORDER — BUMETANIDE 1 MG/1
1 TABLET ORAL DAILY
Status: CANCELLED | OUTPATIENT
Start: 2021-01-16

## 2021-01-15 RX ORDER — ACETAMINOPHEN 650 MG/1
650 SUPPOSITORY RECTAL EVERY 6 HOURS PRN
Status: CANCELLED | OUTPATIENT
Start: 2021-01-15

## 2021-01-15 RX ORDER — PAROXETINE HYDROCHLORIDE 20 MG/1
40 TABLET, FILM COATED ORAL EVERY MORNING
Status: CANCELLED | OUTPATIENT
Start: 2021-01-16

## 2021-01-15 RX ORDER — MESALAMINE 400 MG/1
800 CAPSULE, DELAYED RELEASE ORAL 3 TIMES DAILY
Status: DISCONTINUED | OUTPATIENT
Start: 2021-01-15 | End: 2021-01-26 | Stop reason: HOSPADM

## 2021-01-15 RX ORDER — SPIRONOLACTONE 25 MG/1
25 TABLET ORAL 2 TIMES DAILY
Status: CANCELLED | OUTPATIENT
Start: 2021-01-15

## 2021-01-15 RX ORDER — ONDANSETRON 2 MG/ML
4 INJECTION INTRAMUSCULAR; INTRAVENOUS EVERY 6 HOURS PRN
Status: DISCONTINUED | OUTPATIENT
Start: 2021-01-15 | End: 2021-01-18

## 2021-01-15 RX ORDER — BUMETANIDE 1 MG/1
1 TABLET ORAL DAILY
Status: DISCONTINUED | OUTPATIENT
Start: 2021-01-16 | End: 2021-01-15 | Stop reason: HOSPADM

## 2021-01-15 RX ORDER — PROMETHAZINE HYDROCHLORIDE 25 MG/1
12.5 TABLET ORAL EVERY 6 HOURS PRN
Status: DISCONTINUED | OUTPATIENT
Start: 2021-01-15 | End: 2021-01-26 | Stop reason: HOSPADM

## 2021-01-15 RX ORDER — SPIRONOLACTONE 25 MG/1
25 TABLET ORAL 2 TIMES DAILY
Qty: 30 TABLET | Refills: 3 | Status: ON HOLD | OUTPATIENT
Start: 2021-01-15 | End: 2021-01-26 | Stop reason: SDUPTHER

## 2021-01-15 RX ORDER — POTASSIUM CHLORIDE 20 MEQ/1
20 TABLET, EXTENDED RELEASE ORAL
Status: DISCONTINUED | OUTPATIENT
Start: 2021-01-16 | End: 2021-01-26 | Stop reason: HOSPADM

## 2021-01-15 RX ORDER — SODIUM CHLORIDE 0.9 % (FLUSH) 0.9 %
10 SYRINGE (ML) INJECTION EVERY 12 HOURS SCHEDULED
Status: DISCONTINUED | OUTPATIENT
Start: 2021-01-15 | End: 2021-01-26 | Stop reason: HOSPADM

## 2021-01-15 RX ORDER — OXYCODONE HYDROCHLORIDE AND ACETAMINOPHEN 5; 325 MG/1; MG/1
1 TABLET ORAL EVERY 4 HOURS PRN
Status: CANCELLED | OUTPATIENT
Start: 2021-01-15

## 2021-01-15 RX ORDER — MESALAMINE 400 MG/1
800 CAPSULE, DELAYED RELEASE ORAL 3 TIMES DAILY
Status: CANCELLED | OUTPATIENT
Start: 2021-01-15

## 2021-01-15 RX ORDER — FOLIC ACID 1 MG/1
2 TABLET ORAL DAILY
Status: DISCONTINUED | OUTPATIENT
Start: 2021-01-16 | End: 2021-01-26 | Stop reason: HOSPADM

## 2021-01-15 RX ORDER — OXYCODONE HYDROCHLORIDE AND ACETAMINOPHEN 5; 325 MG/1; MG/1
1 TABLET ORAL EVERY 4 HOURS PRN
Status: DISCONTINUED | OUTPATIENT
Start: 2021-01-15 | End: 2021-01-26 | Stop reason: HOSPADM

## 2021-01-15 RX ORDER — SODIUM CHLORIDE 0.9 % (FLUSH) 0.9 %
10 SYRINGE (ML) INJECTION PRN
Status: DISCONTINUED | OUTPATIENT
Start: 2021-01-15 | End: 2021-01-26 | Stop reason: HOSPADM

## 2021-01-15 RX ORDER — TAMSULOSIN HYDROCHLORIDE 0.4 MG/1
0.4 CAPSULE ORAL DAILY
Status: CANCELLED | OUTPATIENT
Start: 2021-01-16

## 2021-01-15 RX ORDER — POTASSIUM CHLORIDE 20 MEQ/1
20 TABLET, EXTENDED RELEASE ORAL
Status: CANCELLED | OUTPATIENT
Start: 2021-01-16

## 2021-01-15 RX ORDER — SODIUM CHLORIDE 0.9 % (FLUSH) 0.9 %
10 SYRINGE (ML) INJECTION PRN
Status: CANCELLED | OUTPATIENT
Start: 2021-01-15

## 2021-01-15 RX ORDER — BISACODYL 10 MG
10 SUPPOSITORY, RECTAL RECTAL DAILY PRN
Status: DISCONTINUED | OUTPATIENT
Start: 2021-01-15 | End: 2021-01-26 | Stop reason: HOSPADM

## 2021-01-15 RX ORDER — PREGABALIN 50 MG/1
200 CAPSULE ORAL 3 TIMES DAILY
Status: DISCONTINUED | OUTPATIENT
Start: 2021-01-15 | End: 2021-01-26 | Stop reason: HOSPADM

## 2021-01-15 RX ADMIN — PAROXETINE HYDROCHLORIDE 40 MG: 20 TABLET, FILM COATED ORAL at 09:23

## 2021-01-15 RX ADMIN — OXYCODONE AND ACETAMINOPHEN 1 TABLET: 5; 325 TABLET ORAL at 03:42

## 2021-01-15 RX ADMIN — FERROUS SULFATE TAB 325 MG (65 MG ELEMENTAL FE) 325 MG: 325 (65 FE) TAB at 21:02

## 2021-01-15 RX ADMIN — OXYCODONE AND ACETAMINOPHEN 1 TABLET: 5; 325 TABLET ORAL at 07:53

## 2021-01-15 RX ADMIN — SPIRONOLACTONE 25 MG: 25 TABLET ORAL at 09:23

## 2021-01-15 RX ADMIN — PREGABALIN 200 MG: 50 CAPSULE ORAL at 20:57

## 2021-01-15 RX ADMIN — FOLIC ACID 2 MG: 1 TABLET ORAL at 09:23

## 2021-01-15 RX ADMIN — OXYCODONE AND ACETAMINOPHEN 1 TABLET: 5; 325 TABLET ORAL at 11:53

## 2021-01-15 RX ADMIN — MESALAMINE 800 MG: 400 CAPSULE, DELAYED RELEASE ORAL at 21:02

## 2021-01-15 RX ADMIN — ENOXAPARIN SODIUM 40 MG: 40 INJECTION SUBCUTANEOUS at 09:23

## 2021-01-15 RX ADMIN — OXYCODONE AND ACETAMINOPHEN 1 TABLET: 5; 325 TABLET ORAL at 16:16

## 2021-01-15 RX ADMIN — OXYCODONE AND ACETAMINOPHEN 1 TABLET: 5; 325 TABLET ORAL at 20:57

## 2021-01-15 RX ADMIN — PANTOPRAZOLE SODIUM 40 MG: 40 TABLET, DELAYED RELEASE ORAL at 05:36

## 2021-01-15 RX ADMIN — PREGABALIN 200 MG: 50 CAPSULE ORAL at 09:23

## 2021-01-15 RX ADMIN — FERROUS SULFATE TAB 325 MG (65 MG ELEMENTAL FE) 325 MG: 325 (65 FE) TAB at 07:53

## 2021-01-15 RX ADMIN — BUMETANIDE 1 MG: 0.25 INJECTION INTRAMUSCULAR; INTRAVENOUS at 09:23

## 2021-01-15 RX ADMIN — MAGNESIUM SULFATE HEPTAHYDRATE 2 G: 40 INJECTION, SOLUTION INTRAVENOUS at 16:14

## 2021-01-15 RX ADMIN — PANTOPRAZOLE SODIUM 40 MG: 40 TABLET, DELAYED RELEASE ORAL at 16:14

## 2021-01-15 RX ADMIN — FAMOTIDINE 40 MG: 20 TABLET, FILM COATED ORAL at 09:23

## 2021-01-15 RX ADMIN — TAMSULOSIN HYDROCHLORIDE 0.4 MG: 0.4 CAPSULE ORAL at 09:23

## 2021-01-15 RX ADMIN — PREGABALIN 200 MG: 50 CAPSULE ORAL at 13:41

## 2021-01-15 RX ADMIN — POTASSIUM CHLORIDE 20 MEQ: 1500 TABLET, EXTENDED RELEASE ORAL at 07:53

## 2021-01-15 RX ADMIN — MESALAMINE 800 MG: 400 CAPSULE, DELAYED RELEASE ORAL at 09:23

## 2021-01-15 RX ADMIN — MESALAMINE 800 MG: 400 CAPSULE, DELAYED RELEASE ORAL at 13:41

## 2021-01-15 RX ADMIN — SODIUM CHLORIDE, PRESERVATIVE FREE 10 ML: 5 INJECTION INTRAVENOUS at 09:23

## 2021-01-15 ASSESSMENT — PAIN SCALES - GENERAL
PAINLEVEL_OUTOF10: 7
PAINLEVEL_OUTOF10: 5
PAINLEVEL_OUTOF10: 7

## 2021-01-15 ASSESSMENT — PAIN DESCRIPTION - LOCATION: LOCATION: LEG

## 2021-01-15 ASSESSMENT — PAIN DESCRIPTION - PROGRESSION: CLINICAL_PROGRESSION: GRADUALLY IMPROVING

## 2021-01-15 ASSESSMENT — PAIN DESCRIPTION - ONSET
ONSET: ON-GOING
ONSET: ON-GOING

## 2021-01-15 ASSESSMENT — PAIN DESCRIPTION - DESCRIPTORS: DESCRIPTORS: ACHING;SHARP

## 2021-01-15 ASSESSMENT — PAIN DESCRIPTION - ORIENTATION: ORIENTATION: LEFT;RIGHT

## 2021-01-15 ASSESSMENT — PAIN DESCRIPTION - PAIN TYPE: TYPE: ACUTE PAIN

## 2021-01-15 ASSESSMENT — PAIN - FUNCTIONAL ASSESSMENT: PAIN_FUNCTIONAL_ASSESSMENT: ACTIVITIES ARE NOT PREVENTED

## 2021-01-15 NOTE — PROGRESS NOTES
Hospitalist      Patient:  Eva Adhikari    Unit/Bed:5K-09/009-A  YOB: 1975  MRN: [id number]   Acct: [de-identified]     PCP: CRISTIAN Oconnor CNP  Date of Admission: 1/12/2021        Assessment and Plan:        1. Anasarca: Predominantly lower extremities and scrotal swelling, as normal albumin, nephrology has been consulted  2. Acute kidney injury will trend creatinine and nephrology to manage diuresing  3. Chronic thrombocytopenia  4. Monitor electrolytes closely keep mag greater than 2 and potassium greater than 4, fluid restriction 2 g sodium diet  5. Will empirically obtain blood cultures with additional history of fever and chills  Events noted in the last 24 hours intake output are -3700 mL we will order an echocardiogram and cardiology consult, a BMP also also placed thigh-high stockings. [id number]: We will change loop diuretic to p.o., blood culture thought to be contaminant we will not treat at this time. Echocardiogram showed diastolic dysfunction, he is to follow-up in the CHF clinic on outpatient and follow-up with cardiology on outpatient. He is currently being evaluated for possible inpatient rehab. We will start discharge planning    CC: Leg swelling    HPI: 41-year-old obese  male presents to emergency department with evaluation of bilateral leg edema. Patient relates his leg swelling to status post lumbar fusion L3-L5 on 12/29/2020 had surgery remove fluid on January 2021. Vira Libman Patient has gradually gotten worse since that time. ROS (14 point review of systems completed. Pertinent positives noted.  Otherwise ROS is negative) : Patient gives a history of chills shortness of breath, chest pain, leg swelling,    PMH:  Per HPI and       Diagnosis Date    Alcohol abuse     in remission    Amputation of right arm (HCC)     Ascites     GERD (gastroesophageal reflux disease)     Hepatic cirrhosis (HCC)     Hepatitis B     Hepatitis C     Hypertension  Intravenous drug user     in remission    Osteomyelitis (HCC)     right arm/shoulder amputation    Psychiatric problem     PTSD (post-traumatic stress disorder)     Sciatica     Splenomegaly     Thrombocytopenia (HCC)     Ulcerative colitis ([facility] [address]      SHX:        Procedure Laterality Date    APPENDECTOMY      ARM AMPUTATION AT SHOULDER Right 2017    [place]    BACK SURGERY N/A 2021    LUMBAR I&D performed by Wang Nj MD at [place] [address]    ELBOW FRACTURE SURGERY      PORT SURGERY N/A 2020    SUBCLAVIAN SINGLE LUMEN MEDIPORT INSERTION performed by Marcos Reese MD at [address]  2017    COLONOSCOPY WITH BIOPSY performed by Kelly Reaves MD at [facility]     [address] [address]:       Problem Relation Age of Onset    High Blood Pressure Mother     High Blood Pressure Father      SOCHX:   Social History     Socioeconomic History    Marital status:      Spouse name: None    Number of children: 1    Years of education: None    Highest education level: None   Occupational History    None   Social Needs    Financial resource strain: None    Food insecurity     Worry: None     Inability: None    Transportation needs     Medical: None     Non-medical: None   Tobacco Use    Smoking status: Former Smoker     Quit date: 2020     Years since quittin.7    Smokeless tobacco: Current User     Types: Snuff   Substance and Sexual Activity    Alcohol use: No     Comment: Several months since last used    Drug use: No     Comment: 160 days since last use    Sexual activity: None   Lifestyle    Physical activity     Days per week: None     Minutes per session: None    Stress: None   Relationships    Social connections     Talks on phone: None     Gets together: None     Attends Oriental orthodox service: None     Active member of club or organization: None     Attends meetings of clubs or organizations: None Relationship status: None    Intimate partner violence     Fear of current or ex partner: None     Emotionally abused: None     Physically abused: None     Forced sexual activity: None   Other Topics Concern    None   Social History Narrative    None      Allergies: Penicillins and Trazodone and nefazodone  Medications:       bumetanide  1 mg Oral Daily    tamsulosin  0.4 mg Oral Daily    spironolactone  25 mg Oral BID    potassium chloride  20 mEq Oral Daily with breakfast    sodium chloride flush  10 mL Intravenous 2 times per day    enoxaparin  40 mg Subcutaneous Daily    famotidine  40 mg Oral Daily    ferrous sulfate  325 mg Oral Daily with breakfast    folic acid  2 mg Oral Daily    mesalamine  800 mg Oral TID    pantoprazole  40 mg Oral BID AC    PARoxetine  40 mg Oral QAM    pregabalin  200 mg Oral TID     Prior to Admission medications    Medication Sig Start Date End Date Taking? Authorizing Provider   oxyCODONE-acetaminophen (PERCOCET) 5-325 MG per tablet Take 1 tablet by mouth every 4 hours as needed for Pain. Yes Historical Provider, MD   bumetanide (BUMEX) 1 MG tablet Take 1 tablet by mouth daily 1/8/21 2/8/21 Yes DARIUSZ Gtz   Multiple Vitamins-Minerals (MULTIVITAMIN ADULT PO) Take 1 tablet by mouth   Yes Historical Provider, MD   pregabalin (LYRICA) 200 MG capsule Take 200 mg by mouth 3 times daily.    Yes Historical Provider, MD   PARoxetine (PAXIL) 40 MG tablet Take 40 mg by mouth every morning   Yes Historical Provider, MD   potassium chloride (MICRO-K) 10 MEQ extended release capsule Take 10 mEq by mouth daily   Yes Historical Provider, MD   famotidine (PEPCID) 40 MG tablet Take 40 mg by mouth daily   Yes Historical Provider, MD   docusate sodium (COLACE) 100 MG capsule Take 100 mg by mouth daily   Yes Historical Provider, MD   ferrous sulfate 325 (65 Fe) MG tablet Take 325 mg by mouth daily (with breakfast)   Yes Historical Provider, MD   lisinopril (PRINIVIL;ZESTRIL) 20 MG tablet Take 20 mg by mouth daily Indications: with hydrochlorothiazide 25 mg    Yes Historical Provider, MD   mesalamine (DELZICOL) 400 MG CPDR delayed release capsule Take 800 mg by mouth 3 times daily   Yes Historical Provider, MD   folic acid (FOLVITE) 1 MG tablet Take 2 tablets by mouth daily 9/26/17  Yes Dimitris Crenshaw MD   pantoprazole (PROTONIX) 40 MG tablet Take 40 mg by mouth 2 times daily    Yes Historical Provider, MD   Multiple Vitamin (MULTI VITAMIN MENS) TABS Take by mouth   Yes Historical Provider, MD   LACTULOSE PO Take 20 g by mouth 3 times daily     Historical Provider, MD      PHYSICAL EXAM:    /73   Pulse 97   Temp 98.6 °F (37 °C) (Oral)   Resp 16   Wt 250 lb (113.4 kg)   SpO2 97%   BMI 33.91 kg/m²     General appearance:  No apparent distress, appears stated age and cooperative. HEENT:  Normal cephalic, atraumatic without obvious deformity. Pupils equal, round, and reactive to light. Extra ocular muscles intact. Conjunctivae/corneas clear. Neck: Supple, with full range of motion. no jugular venous distention. Trachea midline. no carotid bruits  Respiratory:  Normal respiratory effort. Clear to auscultation, bilaterally without Rales/Wheezes/Rhonchi. Breath sounds equal bilaterally  Cardiovascular:  Regular rate and rhythm with normal S1/S2 without murmurs, rubs or gallops. PMI non displaced  Abdomen: Soft, non-tender, non-distended with normal bowel sounds. No guarding, rebound. Musculoskeletal:  No clubbing, cyanosis or edema bilaterally. Full range of motion without deformity. Skin: Skin color, texture, turgor normal.  No rashes or lesions, or suspicious lesions. Neurologic:  Neurovascularly intact without any focal sensory/motor deficits.  Cranial nerves: II-XII intact, grossly non-focal.  Psychiatric:  Alert and oriented, thought content appropriate, normal insight  Capillary Refill: Brisk,< 2 seconds   Peripheral Pulses: +2 palpable, equal bilaterally upper and lower extremities  Lymphatics: no lymphadenopathy    Data: (All radiographs, tracings, PFTs, and imaging are personally viewed and interpreted unless otherwise noted).  Creatinine 1.1  Recent Labs     01/12/21  1654 01/13/21  0411   WBC 8.7 9.5   HGB 8.4* 8.8*   HCT 25.8* 26.7*   * 142     Recent Labs     01/13/21  0411 01/14/21  0353 01/15/21  0648    135 136   K 4.5 3.9 4.3   CL 99 98 99   CO2 25 26 28   BUN 55* 50* 22   CREATININE 1.6* 1.1 0.7   CALCIUM 8.4* 8.7 8.5     Recent Labs     01/12/21  1654   AST 20   ALT 18   BILITOT 0.7   ALKPHOS 77     No results for input(s): INR in the last 72 hours. No results for input(s): Sayra Baseman in the last 72 hours. Radiology reports-images reviewed in PACS  Cta Chest W Wo Contrast    Result Date: 1/10/2021  CTA THORAX / PULMONARY EMBOLUS STUDY: CLINICAL INFORMATION: right lower leg DVT, persistent tachycardia COMPARISON; No prior study. TECHNIQUE: 1.5 mm axial images were obtained through the chest following the administration of IV contrast (ISOVUE). A non-contrast localizer was obtained. 3D reconstructions were performed on the scanner to include sagittal and coronal MIP images through both the right and left pulmonary arteries. ALL CT SCANS AT THIS FACILITY use dose modulation, iterative reconstruction, and/or weight-based dosing when appropriate to reduce radiation dose to as low as reasonably achievable. FINDINGS: Upper abdomen: Unremarkable. No gross abnormality is seen. Mediastinum and sharif: There is a small hiatus hernia. Addition, there is a prominent somewhat curvilinear soft tissue band along the right side of the herniated portion of the stomach, lungs or significance. This could related to chronic adenopathy. A similar appearance was seen on a prior CT abdomen and pelvis May 23, 2018. A few small postinflammatory lymph nodes are seen in the upper mediastinum. No hilar adenopathy is seen.  A MediPort is present left side with catheter tip signal. There is no spinal canal stenosis. No foraminal stenosis is noted. There are no gross abnormalities on the localizer images. 1. Normal thoracic spinal cord. 2. No evidence of spinal canal stenosis. **This report has been created using voice recognition software. It may contain minor errors which are inherent in voice recognition technology. ** Final report electronically signed by Dr. Gavin Fofana on 1/6/2021 11:01 AM    Mri Lumbar Spine W Wo Contrast    Result Date: 1/6/2021  PROCEDURE: MRI LUMBAR SPINE W WO CONTRAST CLINICAL INFORMATION: Recent Lumbar surgery, now has bladder retention, saddle anesthesia, defecation difficulties. Eval for cord compression. Lumbar surgery in December. Left leg weakness and numbness since before surgery. Pain and numbness has worsened  after surgery. COMPARISON: CT lumbar spine 1/3/2021. TECHNIQUE: Sagittal and axial T1 and T2-weighted images were obtained to the lumbar spine. Postcontrast axial and sagittal T1-weighted images were also obtained. FINDINGS: There are bilateral pedicle screws in L3, L4 and L5. The patient has had laminectomies at the L2-3, L3-4 and L4-5 levels. In the laminectomy bed, there is a heterogeneous collection measuring 10.3 x 5.6 x 4.5 cm. There is surrounding enhancement. This collection distends the posterior paraspinal musculature. This extends into the laminectomy bed and exhibits mass effect upon the thecal sac. This is causing severe stenosis of the thecal sac at the L3-4 and L4-5 levels. The lumbar vertebral bodies are normally aligned. There is normal marrow signal in the vertebral bodies. There are no compression fractures. There is disc desiccation at the L2-3, L3-4 and L4-5 levels. The distal thoracic cord is normal. The tip of the conus is normal. There is compression of the nerve roots of the cauda equina at the L3-4 and L4-5 levels due to mass effect from the collection in the laminectomy bed.  There are no gross abnormalities in the distal thoracic spine. On the axial images, at T12-L1 and L1-L2, there is no spinal canal stenosis. There is no foraminal stenosis. At L2-3, there is moderate stenosis of the thecal sac. This appears related to the process in the laminectomy bed. There is mild bilateral foraminal stenosis. At L3-4, there is severe stenosis of the thecal sac. The thecal sac is flattened along the posterior margin of the vertebral bodies. There is compression of the nerve roots of the cauda equina. There is mild bilateral foraminal stenosis. There is an abnormality at the disc level ventrally. This is only a shallow disc protrusion. This could also represent some prominence of the ventral epidural plexus. At L4-5, there is severe stenosis of the thecal sac due to mass effect from the collection in the laminectomy bed. The thecal sac is flattened. There is compression of the nerve roots of the cauda equina. There is moderate severity bilateral foraminal stenosis. At L5-S1, there is no spinal canal or foraminal stenosis. On the postcontrast images, there is mild enhancement of the nerve roots of the cauda equina just above the severe stenosis at the L3 level. . This can represent inflammation or ischemia of the nerve roots. . This is seen on the axial and sagittal images. There is no retroperitoneal abnormality. 1. Severe stenosis of the thecal sac and compression of the nerve roots of the cauda equina at the L3-4 and L4-5 levels due to a fluid collection in the laminectomy bed exhibiting mass effect upon the thecal sac. 2. Enhancement of the nerve roots of the cauda equina posterior to L3. This suggests inflammation versus ischemia of those nerve roots. 3. 10.3 cm fluid collection in the laminectomy bed. The differential diagnosis includes infection, seroma and hematoma. This does surround the bases of the pedicle screws. 4. Moderate stenosis of the thecal sac at the L2-3 level.  These findings were telephoned to Dr. Aileen Denver, at 11:20 AM on 1/6/2021. **This report has been created using voice recognition software. It may contain minor errors which are inherent in voice recognition technology. ** Final report electronically signed by Dr. Rena Garcia on 1/6/2021 11:24 AM    Ct Abdomen Pelvis W Iv Contrast Additional Contrast? None    Result Date: 1/3/2021  CT Abdomen and Pelvis W Contrast COMPARISON:  CT,[facility]  - CT ABDOMEN PELVIS W IV CONTRAST  - 08/23/2018 08:18 PM EDT FINDINGS: Small hiatal hernia containing fat and small fluid. Nodular liver contours consistent with hepatic cirrhosis. Normal spleen. Mild bilateral hydronephrosis and ureterectasis. No visible urolithiasis. Normal adrenal glands. Normal pancreas. No calcified gallstones. No biliary dilatation. Liquid stool with air-fluid levels in the colon. No evidence of bowel obstruction. Patient appears to be status post appendectomy. Distended bladder. Small gas in the bladder. No ascites. No pneumoperitoneum. Prominent retroperitoneal lymph nodes. No acute fracture. Postsurgical changes in the lumbar spine. See separate lumbar spine CT report. No abdominal aortic aneurysm. Mild anasarca. Mild bilateral hydronephrosis and ureterectasis without visible obstruction. Distended bladder. Small gas in the bladder could be due to recent instrumentation or gas-forming infection. Findings consistent with diarrheal illness/colitis. Mild anasarca. Possible reactive lymph nodes. Nonemergent/incidental findings in the report. This document has been electronically signed by: Jeni Vivar MD on 01/03/2021 10:31 PM All CT scans at this facility use dose modulation, iterative reconstruction, and/or weight-based dosing when appropriate to reduce radiation dose to as low as reasonably achievable. Xr Chest Portable    Result Date: 1/12/2021  1 view chest x-ray Comparison:  [facility]  - XR CHEST PORTABLE  - 01/10/2021 02:58 PM EST Findings: The lungs are clear. Normal size heart.  No pulmonary vascular congestion. No acute fracture. No acute findings. This document has been electronically signed by: Claudette Reynoso MD on 01/12/2021 09:56 PM     Xr Chest Portable    Result Date: 1/10/2021  PROCEDURE: XR CHEST PORTABLE CLINICAL INFORMATION: leg edema, tachycardia, weakness COMPARISON: 8/17/2020 TECHNIQUE: A single mobile view of the chest was obtained. 1. Suboptimal inflation of lungs. Borderline heart size. Mediport left side, catheter tip the cavoatrial junction. 2. No definite acute findings. No infiltrates or effusions are seen. **This report has been created using voice recognition software. It may contain minor errors which are inherent in voice recognition technology. ** Final report electronically signed by Dr. Cory Vang on 1/10/2021 3:11 PM    Vl Dup Lower Extremity Venous Bilateral    Result Date: 1/10/2021  PROCEDURE: VL DUP LOWER EXTREMITY VENOUS BILATERAL CLINICAL INFORMATION: bilateral lower edema, recent back surgery COMPARISON: No prior study. TECHNIQUE: Multiple grayscale and color flow images of the major veins of both lower extremities were obtained from the level of the groin to the level of the ankle. Multiple compression and augmentation maneuvers were performed. FINDINGS: RIGHT LOWER EXTREMITY: There is no flow and noncompressibility involving one of the paired posterior tibial veins which appears small in size and filled with chronic appearing mildly echogenic thrombus. All of the other deep veins of the right lower extremity are widely patent with normal flow and normal compressibility. Note that there is extensive lymphedema in the right calf and lower thigh. LEFT LOWER EXTREMITY:All the  deep veins of the left lower extremity are widely patent with normal flow and normal compressibility. Extensive lymphedema is seen in the left calf and distal thigh. 1. Findings of chronic DVT involving one of the paired posterior tibial veins right calf.  Otherwise normal venous ultrasound right leg. 2. Normal venous ultrasound left leg. 3. Extensive lymphedema in both legs, consistent with cellulitis or possibly venous insufficiency. **This report has been created using voice recognition software. It may contain minor errors which are inherent in voice recognition technology. ** Final report electronically signed by Dr. Tavares Dowling on 1/10/2021 5:23 PM    Vl Dup Lower Extremity Venous Bilateral    Result Date: 1/4/2021  PROCEDURE: VL DUP LOWER EXTREMITY VENOUS BILATERAL CLINICAL INFORMATION: 42-year-old male with leg edema and patient underwent back surgery 6 days ago. COMPARISON: No prior study. TECHNIQUE: Venous doppler ultrasound was performed of the bilateral lower extremities using gray scale, color flow and spectral doppler imaging. FINDINGS: There is normal color flow, spectral analysis and compressibility of the common femoral vein, superficial femoral vein and popliteal vein bilaterally . There is normal color flow and compressibility in the posterior tibial veins, anterior tibial veins and peroneal veins. No evidence of a DVT. **This report has been created using voice recognition software. It may contain minor errors which are inherent in voice recognition technology. ** Final report electronically signed by Dr Nya Head on 1/4/2021 2:41 PM    Ct Lumbar Reconstruction Wo Post Process    Result Date: 1/3/2021  CT Lumbar Spine WO Contrast COMPARISON: None FINDINGS: No acute fracture or malalignment. Status post posterior L4-S1 fusion and bilateral L4 and L5 laminectomies. Hardware appears intact. There is scattered soft tissue emphysema in the surgical bed, likely related to recent surgery. There is a possible seroma in the surgical bed. Degenerative changes in the spine. Possible postsurgical seroma in the surgical bed. Nonemergent/incidental findings in the report. See separate abdomen/pelvis CT report.  This document has been electronically signed by: Cynthia Trent Boyd Barrera MD on 01/03/2021 09:47 PM All CT scans at this facility use dose modulation, iterative reconstruction, and/or weight-based dosing when appropriate to reduce radiation dose to as low as reasonably achievable.        Electronically signed by Basilio Schumacher DO on 1/15/2021 at 9:50 AM

## 2021-01-15 NOTE — CONSULTS
Department of Family Practice  Consult Note        Reason for Consult:  Medical management while on the Inpatient Rehab unit. Requesting Physician:  Dr Tanmay Juarez:  The need for continued time with therapies following the acute medical stay    History Obtained From:  patient, EMR    HISTORY OF PRESENT ILLNESS:              The patient is a 39 y.o. male with significant past medical history of       Diagnosis Date    Alcohol abuse     in remission    Amputation of right arm (HCC)     Ascites     GERD (gastroesophageal reflux disease)     Hepatic cirrhosis ([address]     Hepatitis B     Hepatitis C     Hypertension     Intravenous drug user     in remission    Osteomyelitis ([address]     right arm/shoulder amputation    Psychiatric problem     PTSD (post-traumatic stress disorder)     Sciatica     Splenomegaly     Thrombocytopenia ([address]     Ulcerative colitis ([address]       who presents with onset of lower body swelling following a recent lumbar spine surgery. He was admitted through the ED and was seen by cardiology and nephrology. He comes now to the Inpatient Rehab Unit for continued time with therapies prior to the return home.     Past Medical History:        Diagnosis Date    Alcohol abuse     in remission    Amputation of right arm (HCC)     Ascites     GERD (gastroesophageal reflux disease)     Hepatic cirrhosis (HCC)     Hepatitis B     Hepatitis C     Hypertension     Intravenous drug user     in remission    Osteomyelitis ([address]     right arm/shoulder amputation    Psychiatric problem     PTSD (post-traumatic stress disorder)     Sciatica     Splenomegaly     Thrombocytopenia (HCC)     Ulcerative colitis ([address]      Past Surgical History:        Procedure Laterality Date    APPENDECTOMY      ARM AMPUTATION AT SHOULDER Right 2017    [facility]    BACK SURGERY N/A 1/6/2021    LUMBAR I&D performed by Alessandro Garcia MD at [address] Range: 70 - 108 mg/dL 147 (H)  186 (H) 140 (H)   Calcium Latest Ref Range: 8.5 - 10.5 mg/dL 9.0  8.4 (L) 8.4 (L)   Osmolality Calc Latest Ref Range: 275.0 - 300.0 mOsmol/kg 284.2  288. 6    Total Protein Latest Ref Range: 6.1 - 8.0 g/dL 6.1  6.1    Pro-BNP Latest Ref Range: 0.0 - 450.0 pg/mL 133.5  55.5    Troponin T Latest Units: ng/ml < 0.010  < 0.010    Cholesterol, Total Latest Ref Range: 100 - 199 mg/dL    121   HDL Cholesterol Latest Units: mg/dL    41   LDL Calculated Latest Units: mg/dL    62   Triglycerides Latest Ref Range: 0 - 199 mg/dL    89   Albumin Latest Ref Range: 3.5 - 5.1 g/dL 3.5  3.5    Alk Phos Latest Ref Range: 38 - 126 U/L 72  77    ALT Latest Ref Range: 11 - 66 U/L 22  18    AST Latest Ref Range: 5 - 40 U/L 20  20    Bilirubin Latest Ref Range: 0.3 - 1.2 mg/dL 0.5  0.7    Lipase Latest Ref Range: 5.6 - 51.3 U/L 17.3      TSH Latest Ref Range: 0.400 - 4.200 uIU/mL   0.487    WBC Latest Ref Range: 4.8 - 10.8 thou/mm3 9.6  8.7 9.5   RBC Latest Ref Range: 4.70 - 6.10 mill/mm3 2.55 (L)  2.47 (L) 2.56 (L)   Hemoglobin Quant Latest Ref Range: 14.0 - 18.0 gm/dl 8.8 (L)  8.4 (L) 8.8 (L)   Hematocrit Latest Ref Range: 42.0 - 52.0 % 26.5 (L)  25.8 (L) 26.7 (L)   MCV Latest Ref Range: 80.0 - 94.0 fL 103.9 (H)  104.5 (H) 104.3 (H)   MCH Latest Ref Range: 26.0 - 33.0 pg 34.5 (H)  34.0 (H) 34.4 (H)   MCHC Latest Ref Range: 32.2 - 35.5 gm/dl 33.2  32.6 33.0   MPV Latest Ref Range: 9.4 - 12.4 fL 9.1 (L)  9.9 9.3 (L)   RDW-CV Latest Ref Range: 11.5 - 14.5 % 14.7 (H)  14.9 (H) 14.7 (H)   RDW-SD Latest Ref Range: 35.0 - 45.0 fL 54.4 (H)  56.7 (H) 55.7 (H)   Platelet Count Latest Ref Range: 130 - 400 thou/mm3 127 (L)  125 (L) 142   Lymphocytes Absolute Latest Ref Range: 1.0 - 4.8 thou/mm3 2.7  2.6 2.9   Monocytes Absolute Latest Ref Range: 0.4 - 1.3 thou/mm3 0.8  0.7 1.0   Eosinophils Absolute Latest Ref Range: 0.0 - 0.4 thou/mm3 0.1  0.2 0.3   Basophils Absolute Latest Ref Range: 0.0 - 0.1 thou/mm3 0.0  0.0 0.0 Seg Neutrophils Latest Units: % 60.3  59.0 55.4   Segs Absolute Latest Ref Range: 1.8 - 7.7 thou/mm3 5.8  5.1 5.3   Lymphocytes Latest Units: % 27.9  30.3 30.0   Monocytes Latest Units: % 8.8  7.7 10.3   Eosinophils Latest Units: % 1.3  2.1 3.0   Basophils Latest Units: % 0.1  0.1 0.1   Immature Grans (Abs) Latest Ref Range: 0.00 - 0.07 thou/mm3 0.15 (H)  0.07 0.11 (H)   Immature Granulocytes Latest Units: % 1.6  0.8 1.2   Nucleated Red Blood Cells Latest Units: /100 wbc 0  0 0   Iron Latest Ref Range: 65 - 195 ug/dL   64 (L)    TIBC Latest Ref Range: 171 - 450 ug/dL   236        IMPRESSION/RECOMMENDATIONS:      Active Hospital Problems    Diagnosis Date Noted    S/P lumbar laminectomy [Z98.890] 01/15/2021    Postoperative anemia [D64.9] 01/15/2021    Chronic pain syndrome [G89.4] 01/15/2021    Anxiety and depression [F41.9, F32.9] 01/15/2021    Azotemia [R79.89]     Leg swelling [M79.89] 01/12/2021    Essential hypertension [I10]     Ulcerative colitis, universal (HCC) [K51.00] 10/28/2019     Increase the iron to BID as the anemia he's had post op is one factor contributing to the edema.

## 2021-01-15 NOTE — PROGRESS NOTES
[address] [address]  Acute Inpatient Rehab Preadmission Assessment    Patient Name: Naseem Mello        MRN: [id number]    : 1975  (39 y.o.)  Gender: male     Admitted from:[address]  Initial Assessment    Date of admission to the hospital: 2021  4:36 PM  Date patient eligible for admission:1/15/2021    Primary Diagnosis: lumbar laminectomy      Did patient have surgery? yes - . L3-5 decompression and fusion    Lumbar hematoma evacuation    L3-4 and L4-5 revision facetectomies and foraminotomies of the L3, L4 and L5 nerve roots along with total facetectomy at L3-4 and L4-5 on the left for complete decompression of the left L3 and L4 nerve roots  3. L3-4 left discectomy       Physicians: Adrien Payton DO, Dr. Madhu Peraza, Dr. Jacklyn Jacques for clinical complications/co-morbidities:   Past Medical History:   Diagnosis Date    Alcohol abuse     in remission    [facility]     Ascites     GERD (gastroesophageal reflux disease)     Hepatic cirrhosis ([facility]     Hepatitis B     Hepatitis C     Hypertension     Intravenous drug user     in remission    Osteomyelitis ([address]     right arm/shoulder amputation    Psychiatric problem     PTSD (post-traumatic stress disorder)     Sciatica     Splenomegaly     Thrombocytopenia ([facility]     Ulcerative colitis ([facility]        Financial Information  Primary insurance: Medicare    Secondary Insurance:  Medicaid    Has the patient had two or more falls in the past year or any fall with injury in the past year? yes    Did the patient have major surgery during the 100 days prior to admission?   yes    Precautions:   falls, infections and skin  Restrictions/Precautions: General Precautions  Other position/activity restrictions: Pt had right arm amputated 3 years ago  Required Braces or Orthoses  Spinal: Lumbar Corset    Isolation Precautions: None       Physiatrist: Dr. Madhu Peraza    Patients Occupation: Unemployed, not seeking work  Reviewed Lab and Diagnostic reports from Current Admission: Yes    Patients Prior Functional  Level: Prior Function  Receives Help From: (housemates assisted with transfers and functional mobility)  ADL Assistance: Needs assistance  Homemaking Assistance: Needs assistance  Ambulation Assistance: Needs assistance  Transfer Assistance: Needs assistance    Current functional status for upper extremity ADLs: minimal assistance    Current functional status for lower extremity ADLs: maximum assistance  Current functional status for bed, chair, wheelchair transfers: contact guard assistance    Current functional status for toilet transfers: Sit to Stand:  Minimal Assistance. with rocking motion  Stand to Sit: Contact Guard Assistance  Current functional status for locomotion:   Assistive Device: Rolling Walker  Assist Level:  Contact Guard Assistance. Distance:  To and from bathroom  Required increased time for RW management and safety       Current functional status for bladder management: Modified independence    Current functional status for bowel management:Modified independence    Current functional status for comprehension: Complete independence    Current functional status for expression: Complete independence    Current functional status for social interaction: Complete independence    Current functional status for problem solving: Complete independence    Current functional status for memory: Complete independence    Expected level of Improvement in Self-Care:  Complete independence    Expected level of Improvement in Sphincter Control:  Complete independence    Expected level of Improvement in Transfers: Complete independence    Expected level of Improvement in Locomotion:  Complete independence    Expected level of Improvement in Communication and Social Cognition: Complete independence    Expected length of time to achieve that level of improvement: 2 weeks    Current rehab issues: ADL dysfunction,bladder management,bowel management,carry over of therapy techniques, discharge planning, disease and co-morbidity management, gait/mobility dysfunction, medication management, nutrition and hydration management,Ongoing assessment of safety, Pain management, Patient and family education, Prevention of secondary complications, Skin Integrity  Required therapy: Physical Therapy, Occupational Therapy 3 hours per day, 5-6 days per week. Recreational Therapy 1 hour per week. Expected Discharge Destination: home    Expected Post Discharge Treatments: Out Patient    Other information relevant to the care needs:     Acute Inpatient Rehabilitation Disclosure Statement provided to patient. Patient verbalized understanding. I have reviewed and concur with the findings and results of the pre-admission screening assessment completed by the Inpatient Rehabilitation Admissions Coordinator.     Juliet Coffman M.D.

## 2021-01-15 NOTE — PROGRESS NOTES
[facility]         Renal Inpatient Follow-Up note         1/15/2021 11:37 AM    Pt Name:   Rai Leone  YOB: 1975  Attending:   Frances Shepherd DO    Chief Complaint : Rai Leone is a 39 y.o. male being followed by nephrology for fluid overload and ONIEL    Interval History :   Patient seen and examined by me. No distress  Feels ok. No cp or SOB. Making  Excellent UO     Scheduled Medications :    [START ON 1/16/2021] bumetanide  1 mg Oral Daily    tamsulosin  0.4 mg Oral Daily    spironolactone  25 mg Oral BID    potassium chloride  20 mEq Oral Daily with breakfast    sodium chloride flush  10 mL Intravenous 2 times per day    enoxaparin  40 mg Subcutaneous Daily    famotidine  40 mg Oral Daily    ferrous sulfate  325 mg Oral Daily with breakfast    folic acid  2 mg Oral Daily    mesalamine  800 mg Oral TID    pantoprazole  40 mg Oral BID AC    PARoxetine  40 mg Oral QAM    pregabalin  200 mg Oral TID         Vitals :  /73   Pulse 97   Temp 98.6 °F (37 °C) (Oral)   Resp 16   Wt 250 lb (113.4 kg)   SpO2 97%   BMI 33.91 kg/m²     24HR INTAKE/OUTPUT:      Intake/Output Summary (Last 24 hours) at 1/15/2021 1137  Last data filed at 1/15/2021 0505  Gross per 24 hour   Intake 1250 ml   Output 4750 ml   Net -3500 ml     Last 3 weights  Wt Readings from Last 3 Encounters:   01/15/21 250 lb (113.4 kg)   01/10/21 250 lb (113.4 kg)   01/03/21 250 lb (113.4 kg)           Physical Exam :  General Appearance:  Well developed.  No distress  Mouth/Throat:  Oral mucosa moist  Neck:  Supple, no JVD  Lungs:  Breath sounds: clear  Heart[de-identified]  S1,S2 heard  Abdomen:  Soft, non - tender  Musculoskeletal:  Edema - noted B/L         Last 3 CBC   Recent Labs     01/12/21  1654 01/13/21  0411   WBC 8.7 9.5   RBC 2.47* 2.56*   HGB 8.4* 8.8*   HCT 25.8* 26.7*   * 142     Last 3 CMP  Recent Labs     01/12/21  1654 01/13/21  0411 01/14/21  0353 01/15/21  0648    137 135 136   K 4.5 4.5 3.9 4.3   CL 99 99 98 99   CO2 23 25 26 28   BUN 51* 55* 50* 22   CREATININE 1.5* 1.6* 1.1 0.7   CALCIUM 8.4* 8.4* 8.7 8.5   LABALBU 3.5  --   --   --    BILITOT 0.7  --   --   --              ASSESSMENT / Plan   1 Renal -mild acute kidney injury, mostly due to contrast-induced nephropathy currently appears to be better  ? Almost back to baseline. BUN down as well.    2 Electrolytes -within normal limits. continue 20 KCl p.o. daily while on diuretics  3 Essential hypertension running well continue current medications . 4 Fluid overload-currently on diuretics making excellent urine output may be having some post ATN diuresis as well from obstruction. Urology on board catheter in place cardiology has been consulted strict intake and output documentation and daily weights of possible. Swelling getting better . 5 Hx of hepatitis C  6 Recent back surgery with spinal cord compression status post surgery for abscess . Now + BC  7 Meds reviewed and discussed with patient    CARLOS Pinon D.  [facility].

## 2021-01-15 NOTE — CONSULTS
Plan admission to [facility] today per Dr Mikaela Purvis.        Electronically signed by CRISTIAN Carranza CNP on 1/15/2021 at 2:59 PM

## 2021-01-15 NOTE — CARE COORDINATION
Patient is approved for acute rehab today. Met with Chance. He is in agreement for discharge to acute rehab today, copy of Medicare Rights delivered. Dorina Covington will go to rehab when his discharge paperwork is complete. Electronically signed by Karolynn Lesches, RN on 1/15/21 at 2:44 PM EST

## 2021-01-15 NOTE — PROGRESS NOTES
Report called to Nurse (Tommie) on [facility]. Transport put in to take patient to [phone].

## 2021-01-15 NOTE — PROGRESS NOTES
Cardiology Progress Note      Patient:  Simeon Lopez  YOB: 1975  MRN: [id number]   Acct: [de-identified]  Admit Date:  1/12/2021  Primary Cardiologist: None, seen by Dr. Julio Ashley    Per Dr. Fito Mack consult note:  REASON FOR CONSULTATION:  CHF exacerbation.     HISTORY OF PRESENT ILLNESS:  This is a 42-year-old  gentleman  who was in usual state of health until a week ago. The patient had  recent back surgery for lumbar hematoma evacuation in L3-L4 with  diskectomy done on 01/07/2021. Following that, the patient noticed  increasing weight and worsening of his leg swelling. The leg swelling  is bilateral, including thigh and leg, gotten progressively worse and  mild worsening of shortness of breath with no chest pain. So finally,  the patient decided to come to the emergency room for further evaluation  and care and found to be in fluid overload and anasarca with congestion  in the lung as well, and so the patient was admitted for further  management. The patient has been started on diuretics and has very good  urine output and feeling better. Cardiology evaluation was sought in  further assistance in the management of this fluid overload. The  patient denied having any chest pain. No history of coronary artery  disease. No history of chest pain. He has shortness of breath and  orthopnea on two to three pillows. No paroxysmal nocturnal dyspnea. The patient had not been on diuretics before and has not had any of  swelling before the back surgery he had. He has two back surgeries back  to back, basically one in late 12/2020 and the other is early 01/2021. So, following the second surgery, the patient has noted to have this  worsening of leg swelling and moreover the patient is known to have a  liver cirrhosis and it seems to be contributed or so by the cirrhotic  liver. Subjective (Events in last 24 hours): F/U CHF. Resting in bedside chair on room air in nad.  Denies chest pain or sob. States legs are much less swollen, good urine output. Wt stable. -8.6L net fluid.        Objective:   /73   Pulse 97   Temp 98.6 °F (37 °C) (Oral)   Resp 16   Wt 250 lb (113.4 kg)   SpO2 97%   BMI 33.91 kg/m²        TELEMETRY: Not on telemetry    Physical Exam:  General Appearance: alert and oriented to person, place and time, in no acute distress  Cardiovascular: normal rate, regular rhythm, normal S1 and S2, no murmurs, rubs, clicks, or gallops, distal pulses intact, no carotid bruits, no JVD  Pulmonary/Chest: clear to auscultation bilaterally- no wheezes, rales or rhonchi, normal air movement, no respiratory distress  Abdomen: soft, non-tender, non-distended, normal bowel sounds, no masses   Extremities: no cyanosis, No clubbing, +BLE edema, pulses palpable   Skin: warm and dry, no rash or erythema  Head: normocephalic and atraumatic  Eyes: pupils equal, round, and reactive to light  Neck: supple and non-tender without mass, no thyromegaly   Musculoskeletal: normal range of motion, no joint swelling, deformity or tenderness  Neurological: alert, oriented, normal speech, no focal findings or movement disorder noted    Medications:    [START ON 1/16/2021] bumetanide  1 mg Oral Daily    tamsulosin  0.4 mg Oral Daily    spironolactone  25 mg Oral BID    potassium chloride  20 mEq Oral Daily with breakfast    sodium chloride flush  10 mL Intravenous 2 times per day    enoxaparin  40 mg Subcutaneous Daily    famotidine  40 mg Oral Daily    ferrous sulfate  325 mg Oral Daily with breakfast    folic acid  2 mg Oral Daily    mesalamine  800 mg Oral TID    pantoprazole  40 mg Oral BID AC    PARoxetine  40 mg Oral QAM    pregabalin  200 mg Oral TID           sodium chloride flush, 10 mL, PRN      promethazine, 12.5 mg, Q6H PRN    Or      ondansetron, 4 mg, Q6H PRN      polyethylene glycol, 17 g, Daily PRN      acetaminophen, 650 mg, Q6H PRN    Or      acetaminophen, 650 mg, Q6H PRN     oxyCODONE-acetaminophen, 1 tablet, Q4H PRN        Diagnostics:  EK21  Sinus tachycardia  Otherwise normal ECG  When compared with ECG of 10-JEANNIE-2021 14:54,  No significant change was found  Confirmed by Kiki Monique   Echo: 21  Summary   Technically difficult examination. Normal left ventricle size and systolic function. Ejection fraction was   estimated at 55 to 60 %. There were no regional left ventricular wall   motion abnormalities and wall thickness was within normal limits. Doppler parameters were consistent with abnormal left ventricular   relaxation (grade 1 diastolic dysfunction). Signature      ----------------------------------------------------------------   Electronically signed by Ayo Krause MD       Lab Data:    Cardiac Enzymes:  No results for input(s): CKTOTAL, CKMB, CKMBINDEX, TROPONINI in the last 72 hours.     CBC:   Lab Results   Component Value Date    WBC 9.5 2021    RBC 2.56 2021    HGB 8.8 2021    HCT 26.7 2021     2021       CMP:    Lab Results   Component Value Date     01/15/2021    K 4.3 01/15/2021    K 4.5 2021    CL 99 01/15/2021    CO2 28 01/15/2021    BUN 22 01/15/2021    CREATININE 0.7 01/15/2021    GFRAA >60 2019    GFRAA >60 10/14/2011    AGRATIO 1.2 2019    LABGLOM >90 01/15/2021    GLUCOSE 126 01/15/2021    GLUCOSE 166 2019    CALCIUM 8.5 01/15/2021       Hepatic Function Panel:    Lab Results   Component Value Date    ALKPHOS 77 2021    ALT 18 2021    AST 20 2021    PROT 6.1 2021    PROT 7.6 10/13/2011    BILITOT 0.7 2021    BILIDIR <0.2 2020    LABALBU 3.5 2021       Magnesium:    Lab Results   Component Value Date    MG 1.3 01/15/2021       PT/INR:    Lab Results   Component Value Date    INR 1.00 2021       HgBA1c:  No results found for: LABA1C    FLP:    Lab Results   Component Value Date    TRIG 89 2021    HDL 41 2021 [address] 01/13/2021       TSH:    Lab Results   Component Value Date    TSH 0.487 01/12/2021         Assessment:  · Acute diastolic CHF: improved  · EF 55-60, G1DD per echo 1/14/21  · Denies chest pain or any known cardiac disease  Troponin neg x 2, EKG ST-no other acute abnormalities  · BLE swelling: improved  · ONIEL on CKD: nephrology following  · HTN  · HLD  · H/o hepatitis B/C  · H/o recent back surgery x 2       Plan:  · Diuresis per nephrology  · Continue aldactone   Daily weight, I&O  · 2 g sodium, 2 L fluid restriction  · Keep K+ > 4, magnesium > 2--Replace prn  · F/U with CHF clinic in 1-2 weeks  · F/U with Dr. Tim Adams in 4-6 weeks  · Will see prn         Electronically signed by Donneta Seip, APRN - CNP on 1/15/2021 at 12:49 PM

## 2021-01-15 NOTE — PROGRESS NOTES
New consult made for Heart Failure Nurse/Corrdinator ([phone]). Office is open today until noon. Left a voicemail for them to return my call.

## 2021-01-15 NOTE — PROGRESS NOTES
Admitted to the Inpatient Rehabilitation Unit via wheelchair. Patient was then oriented to room and unit. Education provided on the rehabilitation routine: three hours of therapy five days per week and dining room for lunch. Explained patients right to have family, representative or physician notified of their admission. Patient has Declined for physician to be notified. Patient has Declined for family/representative to be notified. Admitting medication orders compared with acute stay medications; home medication list reviewed with patient/family. Medication issues identified No  Medication issue: None  If yes, physician notified Kelvin Mercado. Bladder and Bowel Function Assessment:  1. Prior history of bladder problems: retention  2. Number of pads used per day: 2  3. Frequency of night time voiding: horn catheter in place until after discharge planned cystoscopy as outpatient  4. Fluid intake volume and pattern: fluid restriction  5. Last BM: 1/15/2021  6. Bowel problems (prior or current) No      Incontinence      Frequent diarrhea      No BM in 3 days this stay or history of constipation      Hemorrhoids      Diverticulitis      Bowel Surgery     Two nurse skin assessment performed by Tana Chan RN  and Sarthak Elizabeth LPN. Care plan was created with patient's input and goals were agreed upon. Admission folder provided with education regarding patients diagnoses, fall prevention, skin care, and M in the box. \"Data Collection Information Summary for Patients in Inpatient Rehabilitation Facilities\" and \"Privacy Act Statement - Health Care Records\" provided. Please refer to the admission navigator for further information.

## 2021-01-16 LAB
ALBUMIN SERPL-MCNC: 3.1 G/DL (ref 3.5–5.1)
ALP BLD-CCNC: 85 U/L (ref 38–126)
ALT SERPL-CCNC: 13 U/L (ref 11–66)
AMORPHOUS: ABNORMAL
ANION GAP SERPL CALCULATED.3IONS-SCNC: 5 MEQ/L (ref 8–16)
AST SERPL-CCNC: 16 U/L (ref 5–40)
BACTERIA: ABNORMAL /HPF
BILIRUB SERPL-MCNC: 0.8 MG/DL (ref 0.3–1.2)
BILIRUBIN DIRECT: 0.3 MG/DL (ref 0–0.3)
BILIRUBIN URINE: NEGATIVE
BLOOD CULTURE, ROUTINE: ABNORMAL
BLOOD CULTURE, ROUTINE: ABNORMAL
BLOOD, URINE: ABNORMAL
BUN BLDV-MCNC: 15 MG/DL (ref 7–22)
CALCIUM SERPL-MCNC: 8.5 MG/DL (ref 8.5–10.5)
CASTS UA: ABNORMAL /LPF
CHARACTER, URINE: ABNORMAL
CHLORIDE BLD-SCNC: 97 MEQ/L (ref 98–111)
CO2: 28 MEQ/L (ref 23–33)
COLOR: ABNORMAL
CREAT SERPL-MCNC: 0.7 MG/DL (ref 0.4–1.2)
CRYSTALS, UA: ABNORMAL
EPITHELIAL CELLS, UA: ABNORMAL /HPF
ERYTHROCYTE [DISTWIDTH] IN BLOOD BY AUTOMATED COUNT: 13.2 % (ref 11.5–14.5)
ERYTHROCYTE [DISTWIDTH] IN BLOOD BY AUTOMATED COUNT: 50.1 FL (ref 35–45)
GFR SERPL CREATININE-BSD FRML MDRD: > 90 ML/MIN/1.73M2
GLUCOSE BLD-MCNC: 124 MG/DL (ref 70–108)
GLUCOSE URINE: NEGATIVE MG/DL
HCT VFR BLD CALC: 24.5 % (ref 42–52)
HEMOGLOBIN: 7.9 GM/DL (ref 14–18)
KETONES, URINE: NEGATIVE
LEUKOCYTE ESTERASE, URINE: ABNORMAL
MAGNESIUM: 1.5 MG/DL (ref 1.6–2.4)
MCH RBC QN AUTO: 32.9 PG (ref 26–33)
MCHC RBC AUTO-ENTMCNC: 32.2 GM/DL (ref 32.2–35.5)
MCV RBC AUTO: 102.1 FL (ref 80–94)
NITRITE, URINE: NEGATIVE
ORGANISM: ABNORMAL
PH UA: 8.5 (ref 5–9)
PLATELET # BLD: 100 THOU/MM3 (ref 130–400)
PMV BLD AUTO: 9.7 FL (ref 9.4–12.4)
POTASSIUM REFLEX MAGNESIUM: 4.3 MEQ/L (ref 3.5–5.2)
PREALBUMIN: 11.8 MG/DL (ref 20–40)
PROTEIN UA: 100
RBC # BLD: 2.4 MILL/MM3 (ref 4.7–6.1)
RBC URINE: > 200 /HPF
RENAL EPITHELIAL, UA: ABNORMAL
SODIUM BLD-SCNC: 130 MEQ/L (ref 135–145)
SPECIFIC GRAVITY, URINE: 1.01 (ref 1–1.03)
TOTAL PROTEIN: 6.1 G/DL (ref 6.1–8)
UROBILINOGEN, URINE: 2 EU/DL (ref 0–1)
WBC # BLD: 5.8 THOU/MM3 (ref 4.8–10.8)
WBC UA: > 200 /HPF
YEAST: ABNORMAL

## 2021-01-16 PROCEDURE — 2500000003 HC RX 250 WO HCPCS: Performed by: PHYSICAL MEDICINE & REHABILITATION

## 2021-01-16 PROCEDURE — 80076 HEPATIC FUNCTION PANEL: CPT

## 2021-01-16 PROCEDURE — 84145 PROCALCITONIN (PCT): CPT

## 2021-01-16 PROCEDURE — 2580000003 HC RX 258: Performed by: NURSE PRACTITIONER

## 2021-01-16 PROCEDURE — 6360000002 HC RX W HCPCS: Performed by: PHYSICAL MEDICINE & REHABILITATION

## 2021-01-16 PROCEDURE — 1180000000 HC REHAB R&B

## 2021-01-16 PROCEDURE — 99232 SBSQ HOSP IP/OBS MODERATE 35: CPT | Performed by: INTERNAL MEDICINE

## 2021-01-16 PROCEDURE — 80048 BASIC METABOLIC PNL TOTAL CA: CPT

## 2021-01-16 PROCEDURE — 94760 N-INVAS EAR/PLS OXIMETRY 1: CPT

## 2021-01-16 PROCEDURE — 36592 COLLECT BLOOD FROM PICC: CPT

## 2021-01-16 PROCEDURE — 83735 ASSAY OF MAGNESIUM: CPT

## 2021-01-16 PROCEDURE — 97166 OT EVAL MOD COMPLEX 45 MIN: CPT

## 2021-01-16 PROCEDURE — 6370000000 HC RX 637 (ALT 250 FOR IP): Performed by: NURSE PRACTITIONER

## 2021-01-16 PROCEDURE — 97110 THERAPEUTIC EXERCISES: CPT

## 2021-01-16 PROCEDURE — 85027 COMPLETE CBC AUTOMATED: CPT

## 2021-01-16 PROCEDURE — 81001 URINALYSIS AUTO W/SCOPE: CPT

## 2021-01-16 PROCEDURE — 97163 PT EVAL HIGH COMPLEX 45 MIN: CPT

## 2021-01-16 PROCEDURE — 87077 CULTURE AEROBIC IDENTIFY: CPT

## 2021-01-16 PROCEDURE — 97530 THERAPEUTIC ACTIVITIES: CPT

## 2021-01-16 PROCEDURE — 6360000002 HC RX W HCPCS: Performed by: NURSE PRACTITIONER

## 2021-01-16 PROCEDURE — 36415 COLL VENOUS BLD VENIPUNCTURE: CPT

## 2021-01-16 PROCEDURE — 87040 BLOOD CULTURE FOR BACTERIA: CPT

## 2021-01-16 PROCEDURE — 6370000000 HC RX 637 (ALT 250 FOR IP): Performed by: FAMILY MEDICINE

## 2021-01-16 PROCEDURE — 87086 URINE CULTURE/COLONY COUNT: CPT

## 2021-01-16 PROCEDURE — 97535 SELF CARE MNGMENT TRAINING: CPT

## 2021-01-16 PROCEDURE — 84134 ASSAY OF PREALBUMIN: CPT

## 2021-01-16 PROCEDURE — 87186 SC STD MICRODIL/AGAR DIL: CPT

## 2021-01-16 PROCEDURE — 6370000000 HC RX 637 (ALT 250 FOR IP)

## 2021-01-16 RX ORDER — MAGNESIUM SULFATE IN WATER 40 MG/ML
2 INJECTION, SOLUTION INTRAVENOUS ONCE
Status: COMPLETED | OUTPATIENT
Start: 2021-01-16 | End: 2021-01-16

## 2021-01-16 RX ORDER — CIPROFLOXACIN 500 MG/1
500 TABLET, FILM COATED ORAL EVERY 12 HOURS
Status: COMPLETED | OUTPATIENT
Start: 2021-01-17 | End: 2021-01-23

## 2021-01-16 RX ORDER — PREGABALIN 50 MG/1
CAPSULE ORAL
Status: COMPLETED
Start: 2021-01-16 | End: 2021-01-16

## 2021-01-16 RX ORDER — SPIRONOLACTONE 25 MG/1
25 TABLET ORAL DAILY
Status: DISCONTINUED | OUTPATIENT
Start: 2021-01-17 | End: 2021-01-26 | Stop reason: HOSPADM

## 2021-01-16 RX ADMIN — OXYCODONE AND ACETAMINOPHEN 1 TABLET: 5; 325 TABLET ORAL at 05:23

## 2021-01-16 RX ADMIN — MICONAZOLE NITRATE: 20 POWDER TOPICAL at 15:14

## 2021-01-16 RX ADMIN — PREGABALIN 200 MG: 50 CAPSULE ORAL at 20:27

## 2021-01-16 RX ADMIN — PAROXETINE HYDROCHLORIDE 40 MG: 20 TABLET, FILM COATED ORAL at 08:34

## 2021-01-16 RX ADMIN — MESALAMINE 800 MG: 400 CAPSULE, DELAYED RELEASE ORAL at 08:34

## 2021-01-16 RX ADMIN — OXYCODONE AND ACETAMINOPHEN 1 TABLET: 5; 325 TABLET ORAL at 13:31

## 2021-01-16 RX ADMIN — PREGABALIN 200 MG: 50 CAPSULE ORAL at 08:34

## 2021-01-16 RX ADMIN — MAGNESIUM SULFATE HEPTAHYDRATE 2 G: 40 INJECTION, SOLUTION INTRAVENOUS at 15:19

## 2021-01-16 RX ADMIN — FOLIC ACID 2 MG: 1 TABLET ORAL at 08:34

## 2021-01-16 RX ADMIN — FERROUS SULFATE TAB 325 MG (65 MG ELEMENTAL FE) 325 MG: 325 (65 FE) TAB at 08:34

## 2021-01-16 RX ADMIN — BUMETANIDE 1 MG: 1 TABLET ORAL at 08:34

## 2021-01-16 RX ADMIN — MICONAZOLE NITRATE: 20 POWDER TOPICAL at 20:25

## 2021-01-16 RX ADMIN — TAMSULOSIN HYDROCHLORIDE 0.4 MG: 0.4 CAPSULE ORAL at 08:35

## 2021-01-16 RX ADMIN — MESALAMINE 800 MG: 400 CAPSULE, DELAYED RELEASE ORAL at 15:15

## 2021-01-16 RX ADMIN — SODIUM CHLORIDE, PRESERVATIVE FREE 10 ML: 5 INJECTION INTRAVENOUS at 15:14

## 2021-01-16 RX ADMIN — SODIUM CHLORIDE, PRESERVATIVE FREE 10 ML: 5 INJECTION INTRAVENOUS at 20:30

## 2021-01-16 RX ADMIN — ENOXAPARIN SODIUM 40 MG: 40 INJECTION SUBCUTANEOUS at 09:32

## 2021-01-16 RX ADMIN — MESALAMINE 800 MG: 400 CAPSULE, DELAYED RELEASE ORAL at 20:26

## 2021-01-16 RX ADMIN — SODIUM CHLORIDE, PRESERVATIVE FREE 10 ML: 5 INJECTION INTRAVENOUS at 05:24

## 2021-01-16 RX ADMIN — ACETAMINOPHEN 650 MG: 325 TABLET ORAL at 08:34

## 2021-01-16 RX ADMIN — OXYCODONE AND ACETAMINOPHEN 1 TABLET: 5; 325 TABLET ORAL at 20:26

## 2021-01-16 RX ADMIN — SPIRONOLACTONE 25 MG: 25 TABLET ORAL at 08:35

## 2021-01-16 RX ADMIN — PANTOPRAZOLE SODIUM 40 MG: 40 TABLET, DELAYED RELEASE ORAL at 18:05

## 2021-01-16 RX ADMIN — FERROUS SULFATE TAB 325 MG (65 MG ELEMENTAL FE) 325 MG: 325 (65 FE) TAB at 18:04

## 2021-01-16 RX ADMIN — PREGABALIN 200 MG: 50 CAPSULE ORAL at 15:14

## 2021-01-16 RX ADMIN — POTASSIUM CHLORIDE 20 MEQ: 1500 TABLET, EXTENDED RELEASE ORAL at 08:33

## 2021-01-16 RX ADMIN — OXYCODONE AND ACETAMINOPHEN 1 TABLET: 5; 325 TABLET ORAL at 09:30

## 2021-01-16 RX ADMIN — PANTOPRAZOLE SODIUM 40 MG: 40 TABLET, DELAYED RELEASE ORAL at 05:24

## 2021-01-16 ASSESSMENT — PAIN DESCRIPTION - DESCRIPTORS
DESCRIPTORS: ACHING
DESCRIPTORS: ACHING

## 2021-01-16 ASSESSMENT — PAIN DESCRIPTION - ONSET: ONSET: ON-GOING

## 2021-01-16 ASSESSMENT — PAIN SCALES - GENERAL
PAINLEVEL_OUTOF10: 7
PAINLEVEL_OUTOF10: 3
PAINLEVEL_OUTOF10: 7
PAINLEVEL_OUTOF10: 6

## 2021-01-16 ASSESSMENT — PAIN DESCRIPTION - PROGRESSION: CLINICAL_PROGRESSION: GRADUALLY WORSENING

## 2021-01-16 ASSESSMENT — PAIN DESCRIPTION - PAIN TYPE
TYPE: SURGICAL PAIN
TYPE: ACUTE PAIN
TYPE: SURGICAL PAIN

## 2021-01-16 ASSESSMENT — PAIN - FUNCTIONAL ASSESSMENT: PAIN_FUNCTIONAL_ASSESSMENT: ACTIVITIES ARE NOT PREVENTED

## 2021-01-16 ASSESSMENT — PAIN DESCRIPTION - ORIENTATION
ORIENTATION: MID
ORIENTATION: LEFT;UPPER

## 2021-01-16 ASSESSMENT — PAIN DESCRIPTION - LOCATION
LOCATION: BACK
LOCATION: BACK

## 2021-01-16 ASSESSMENT — PAIN DESCRIPTION - FREQUENCY: FREQUENCY: CONTINUOUS

## 2021-01-16 NOTE — PROGRESS NOTES
[address] [address]  INPATIENT PHYSICAL THERAPY  EVALUATION  [address]    Time In: 0730  Time Out: 0830  Timed Code Treatment Minutes: 39 Minutes  Minutes: 60          Date: 2021  Patient Name: Curry Chavez,  Gender:  male        MRN: [id number]  : 1975  (39 y.o.)      Referring Practitioner: CRISTIAN Rebolledo CNP  Diagnosis: history of lumbar laminectomy  Additional Pertinent Hx: Per EMR: \"39year-old obese  male presents to emergency department with evaluation of bilateral leg edema. Patient relates his leg swelling to status post lumbar fusion L3-L5 on 2020 had surgery remove fluid on 2021. [place] Patient has gradually gotten worse since that time. \" To IPR on 1/15     Restrictions/Precautions:  Restrictions/Precautions: General Precautions, Fall Risk  Required Braces or Orthoses  Spinal: Lumbar Corset  Position Activity Restriction  Spinal Precautions: No Bending, No Twisting, No Lifting  Other position/activity restrictions: Pt had right arm amputated 3 years ago. s/p L3-5 fusion     Subjective:  Chart Reviewed: Yes  Patient assessed for rehabilitation services?: Yes  Subjective: pt in bed on arrival, agrees to therapy session    General:  Follows Commands: Within Functional Limits       Pain: 5/10: low back at end of session    Social/Functional History:    Lives With: Other (comment)([facility] ([facility] [place]) - ~14 residents)  Type of Home: House  Home Layout: One level  Home Access: Level entry  Home Equipment: Rolling walker      Ambulation Assistance: Independent  Transfer Assistance: Independent    Active : No     Additional Comments: pt reports no use of AD at baseline, utilizing RW since surgery 20    OBJECTIVE:  Range of Motion:  Bilateral Lower Extremity: [facility]    Strength:  Right Lower Extremity: Impaired - grossly 4/5, hip flexion 3+/5  Left Lower Extremity: Impaired - hip flexion 3-/5, knee extension 3/5, grossly 4-/5 others    Balance:  Static Sitting Balance:  Stand By Assistance, Contact Guard Assistance  Dynamic Standing Balance: Contact Guard Assistance    Bed Mobility:  Rolling to Left: Contact Guard Assistance, with verbal cues    Rolling to Right: Contact Guard Assistance, with verbal cues , with increased time for completion   Supine to Sit: Moderate Assistance, with verbal cues , cues for log roll, assist at LLE and at trunk  Sit to Supine: Minimal Assistance, with verbal cues , with increased time for completion     Transfers:  Sit to Stand: [employer], with increased time for completion  Stand to [employer], with increased time for completion  Stand Pivot:Contact Guard Assistance, Minimal Assistance, with increased time for completion, CGA with use of hemiwalker, Donnell with no AD    Ambulation:  Contact Guard Assistance, with verbal cues   Distance: 20ft - pt declined further distance d/t pain  Surface: Level Tile  Device:Félix-Walker  Gait Deviations: Forward Flexed Posture, Slow Sabrina, Decreased Step Length Bilaterally, Decreased Gait Speed, Decreased Heel Strike Bilaterally, Wide Base of Support, Moderate Path Deviations and increased lateral trunk sway in both directions    Wheelchair Mobility:  Contact Guard Assistance  Extremities Used: Left Upper Extremity and Right Lower Extremity  Type of Chair:Manual  Surface: Level Tile  Distance: 130ft  Quality: Slow Velocity, Short Strokes, Veers Left, Veers Right and Decreased Fluidity      Exercise:  Patient was guided in 1 set(s) 10 reps of exercise to both lower extremities. Ankle pumps, Glut sets, Heelslides, Short arc quads, Hip abduction/adduction and Long arc quads. Assist provided at LLE as needed Exercises were completed for increased independence with functional mobility.     Functional Outcome Measures: Not completed       ASSESSMENT:  Activity Tolerance:  Patient tolerance of treatment: fair. Limited by high pain levels      Treatment Initiated: Treatment and education initiated within context of evaluation. Evaluation time included review of current medical information, gathering information related to past medical, social and functional history, completion of standardized testing, formal and informal observation of tasks, assessment of data and development of plan of care and goals. Treatment time included skilled education and facilitation of tasks to increase safety and independence with functional mobility for improved independence and quality of life. Assessment: Body structures, Functions, Activity limitations: Decreased functional mobility , Decreased posture, Decreased endurance, Decreased ROM, Decreased strength, Decreased sensation, Decreased coordination, Decreased balance, Increased pain, Decreased safe awareness  Assessment: Pt presents this date s/p lumbar fusion 12/29/20 with decreased tolerance to activity and increased pain. He also demos mild B LE edema and decreased strength with L>R. At this time he requires use of AD for mobility and cues for safety which is a decrease from baseline where he was Independent with no AD. He requires skilled PT services to improve mobility and level of independence before d/c  Prognosis: Good         Discharge Recommendations:  Discharge Recommendations: Continue to assess pending progress    Patient Education:  PT Education: Goals, Transfer Training, Equipment, PT Role, Plan of Care, General Safety, Functional Mobility Training, Gait Training    Equipment Recommendations:  Equipment Needed: Yes(continue to monitor, recently given RW from [address] [place] [address] following sx 12/29.  hemiwalker?)    Plan:  Times per week: 5x/wk 90 min, 1x/wk 30 min  Current Treatment Recommendations: Strengthening, Gait Training, Patient/Caregiver Education & Training, ROM, Stair training, Equipment Evaluation, Education, & procurement, Balance Training, Neuromuscular Re-education, Pain Management, Functional Mobility Training, Endurance Training, Home Exercise Program, Transfer Training, Safety Education & Training, Modalities, Wheelchair Mobility Training    Goals:  Patient goals : To go home  Short term goals  Time Frame for Short term goals: 1 week  Short term goal 1: pt to perform supine to/from sit at [place] [address] to get in and out of bed  Short term goal 2: pt to perform sit to/from stand at [facility] to rise from bed or chair  Short term goal 3: pt to ambulate >50ft with hemiwalker at [facility] for mobility in the home  Short term goal 4: pt to perform car transfer at [place] [address] for transport to home  Short term goal 5: pt to negotiate 6\" platform step with hemiwalker at [place] [address] for community integration  Long term goals  Time Frame for Long term goals : 2 weeks  Long term goal 1: pt to perform supine to/from sit at Mod I to get in and out of bed  Long term goal 2: pt to perform sit to/from stand at Mod I to rise from bed or chair  Long term goal 3: pt to ambulate >50ft with least restrictive device at Mod I, 150ft at S for mobility in the home and community  Long term goal 4: pt to perform car transfer at S for transport to home  Long term goal 5: pt to negotiate 6\" platform step with hemiwalker at [facility] for community integration    Following session, patient left in safe position with all fall risk precautions in place.

## 2021-01-16 NOTE — PLAN OF CARE
Problem: Pain:  Description: Pain management should include both nonpharmacologic and pharmacologic interventions.   Goal: Pain level will decrease  Description: Pain level will decrease  Outcome: Ongoing  Goal: Control of acute pain  Description: Control of acute pain  Outcome: Ongoing  Goal: Control of chronic pain  Description: Control of chronic pain  Outcome: Ongoing    Patient will participate in pain management to decrease pain as evidenced by patient use of breathing and meditation for pain management    Problem: Falls - Risk of:  Goal: Will remain free from falls  Description: Will remain free from falls  Outcome: Ongoing  Goal: Absence of physical injury  Description: Absence of physical injury  Outcome: Ongoing     Patient will remain free from falls during this admission as evidenced by no falls during this admission patient and staff will utilize safety devices and techniques for transfer

## 2021-01-16 NOTE — PROGRESS NOTES
[address] [address]  INPATIENT PHYSICAL THERAPY  DAILY NOTE  [address] [facility] - [id number]    Time In: 1200  Time Out: 1230  Timed Code Treatment Minutes: 30 Minutes  Minutes: 30          Date: 2021  Patient Name: Rohan Castro,  Gender:  male        MRN: [id number]  : 1975  (39 y.o.)     Referring Practitioner: CRISTIAN Hdez CNP  Diagnosis: history of lumbar laminectomy  Additional Pertinent Hx: Per EMR: \"39year-old obese  male presents to emergency department with evaluation of bilateral leg edema. Patient relates his leg swelling to status post lumbar fusion L3-L5 on 2020 had surgery remove fluid on 2021. [employer] Patient has gradually gotten worse since that time. \" To IPR on 1/15     Prior Level of Function:  Lives With: Other (comment)([facility] ([facility]) - ~14 residents)  Type of Home: House  Home Layout: One level  Home Access: Level entry  Home Equipment: Rolling walker   Bathroom Shower/Tub: Walk-in shower  Bathroom Toilet: Handicap height    Ambulation Assistance: Independent  Transfer Assistance: Independent  Active : No  Additional Comments: pt reports no use of AD at baseline, utilizing RW since surgery 20    Restrictions/Precautions:  Restrictions/Precautions: General Precautions, Fall Risk  Required Braces or Orthoses  Spinal: Lumbar Corset  Position Activity Restriction  Spinal Precautions: No Bending, No Twisting, No Lifting  Other position/activity restrictions: Pt had right arm amputated 3 years ago. s/p L3-5 fusion      SUBJECTIVE: pt in bed upon arrival, asleep, wakes easily and agrees to therapy.      PAIN: back and LLE, c/o numbness as well    OBJECTIVE:  Bed Mobility:  Rolling to Right: Stand By Assistance, with head of bed raised, with verbal cues , with increased time for completion   Supine to Sit: Minimal Assistance, with head of bed raised, with verbal cues , with increased time for completion  Scooting: Stand By Assistance    Transfers:  Sit to Stand: Minimal Assistance, with increased time for completion  Stand to [address] [place] [address], with increased time for completion, cues for hand placement, with verbal cues    Ambulation:  [employer], with verbal cues   Distance: 4ft  Surface: Level Tile  Device:Félix-Walker  Gait Deviations: Forward Flexed Posture, Slow Sabrina, Decreased Step Length Bilaterally, Decreased Gait Speed, Decreased Heel Strike Bilaterally and Unsteady Gait    Exercise:  Patient was guided in 1 set(s) 10 reps of exercise to both lower extremities. Ankle pumps, Glut sets, Quad sets, Heelslides, Hip abduction/adduction and AA for LLE hip ab/add, PPT. Exercises were completed for increased independence with functional mobility. Functional Outcome Measures: Completed       ASSESSMENT:  Assessment: Patient progressing toward established goals. Activity Tolerance:  Patient tolerance of  treatment: good. Pt limited by fatigue and pain     Equipment Recommendations:Equipment Needed: Yes(continue to monitor, recently given RW from [address] [place] [address] following sx 12/29. hemiwalker?)  Discharge Recommendations:  Continue to assess pending progress    Plan: Times per week: 5x/wk 90 min, 1x/wk 30 min  Current Treatment Recommendations: Strengthening, Gait Training, Patient/Caregiver Education & Training, ROM, Stair training, Equipment Evaluation, Education, & procurement, Balance Training, Neuromuscular Re-education, Pain Management, Functional Mobility Training, Endurance Training, Home Exercise Program, Transfer Training, Safety Education & Training, Modalities, Wheelchair Mobility Training    Patient Education  Patient Education: Plan of Care, Precautions/Restrictions, [employer] Mobility, Transfers, Gait, Verbal Exercise Instruction    Goals:  Patient goals :  To go home  Short term goals  Time Frame for Short term goals: 1 week  Short term goal 1: pt to perform supine to/from sit at [facility] to get in and out of bed  Short term goal 2: pt to perform sit to/from stand at [address] to rise from bed or chair  Short term goal 3: pt to ambulate >50ft with hemiwalker at [facility] [address] for mobility in the home  Short term goal 4: pt to perform car transfer at [facility] for transport to home  Short term goal 5: pt to negotiate 6\" platform step with hemiwalker at [address] [place] [address] for community integration  Long term goals  Time Frame for Long term goals : 2 weeks  Long term goal 1: pt to perform supine to/from sit at Mod I to get in and out of bed  Long term goal 2: pt to perform sit to/from stand at Mod I to rise from bed or chair  Long term goal 3: pt to ambulate >50ft with least restrictive device at Mod I, 150ft at S for mobility in the home and community  Long term goal 4: pt to perform car transfer at S for transport to home  Long term goal 5: pt to negotiate 6\" platform step with hemiwalker at [facility] [address] for community integration    Following session, patient left in safe position with all fall risk precautions in place.

## 2021-01-16 NOTE — PROGRESS NOTES
[facility]   Nephrology progress note  1/16/2021, 2:41 PM      Pt Name:    Darylene Sport  MRN:     [id number]     [place]:    1975  Admit Date:    1/15/2021  5:51 PM  Primary Care Physician:  CRISTIAN Funes CNP   Room number  4J-23/476-J    Chief Complaint: Nephrology following for volume overload and recent ONIEL    Subjective:  Patient seen and examined  This is late entry  Nephrology was following this patient before he was transferred to inpatient rehab  No chest pain or shortness of breath  Feels okay    Objective:  24HR INTAKE/OUTPUT:      Intake/Output Summary (Last 24 hours) at 1/16/2021 1441  Last data filed at 1/16/2021 0532  Gross per 24 hour   Intake 840 ml   Output 1350 ml   Net -510 ml     I/O last 3 completed shifts: In: 840 [P.O.:840]  Out: 1350 [Urine:1350]  No intake/output data recorded. Admission weight: 274 lb (124.3 kg)  Wt Readings from Last 3 Encounters:   01/16/21 275 lb 8 oz (125 kg)   01/15/21 250 lb (113.4 kg)   01/10/21 250 lb (113.4 kg)     Body mass index is 37.36 kg/m².     Physical examination  VITALS:     Vitals:    01/15/21 2000 01/16/21 0523 01/16/21 0914 01/16/21 1337   BP: (!) 145/65  (!) 144/74    Pulse: 100  103    Resp: 18  20    Temp: 98.2 °F (36.8 °C)  98.7 °F (37.1 °C)    TempSrc: Oral  Oral    SpO2: 99%  99% 96%   Weight:  275 lb 8 oz (125 kg)     Height:         General Appearance: alert and cooperative with exam, appears comfortable, no distress  Mouth/Throat: Oral mucosa moist  Neck: No JVD  Lungs: Air entry B/L, no rales, no use of accessory muscles  Heart:  S1, S2 heard  GI: soft, non-tender, no guarding  Extremities: Bilateral lower extremity edema, improved      Lab Data  CBC:   Recent Labs     01/16/21  0530   WBC 5.8   HGB 7.9*   HCT 24.5*   *     BMP:  Recent Labs     01/14/21  0353 01/15/21  0648 01/16/21  0530    136 130*   K 3.9 4.3 4.3   CL 98 99 97*   CO2 26 28 28   BUN 50* 22 15   CREATININE 1.1 0.7 0.7   GLUCOSE 143* 126* 124*   CALCIUM 8.7 8.5 8.5   MG 1.9 1.3* 1.5*     Hepatic:   Recent Labs     01/16/21  0530   LABALBU 3.1*   AST 16   ALT 13   BILITOT 0.8   ALKPHOS 85         Meds:  Infusion:   Meds:    magnesium sulfate  2 g Intravenous Once    miconazole   Topical BID    enoxaparin  40 mg Subcutaneous Q24H    sodium chloride flush  10 mL Intravenous 2 times per day    bumetanide  1 mg Oral Daily    folic acid  2 mg Oral Daily    mesalamine  800 mg Oral TID    pantoprazole  40 mg Oral BID AC    PARoxetine  40 mg Oral QAM    potassium chloride  20 mEq Oral Daily with breakfast    pregabalin  200 mg Oral TID    spironolactone  25 mg Oral BID    tamsulosin  0.4 mg Oral Daily    ferrous sulfate  325 mg Oral BID WC    influenza virus vaccine  0.5 mL Intramuscular Prior to discharge     Meds prn: bisacodyl, acetaminophen **OR** acetaminophen, polyethylene glycol, promethazine **OR** ondansetron, sodium chloride flush, oxyCODONE-acetaminophen       Impression and Plan:  1. ONIEL improved. Creatinine back at baseline. Monitor  Continue with diuretics, tolerating diuretics well  Volume status improving    2. Peripheral edema/volume overload: Continue with diuretics, improving  3. History of hepatitis C  4. Recent back surgery  5. Hypomagnesemia. Will replace with IV magnesium  6. Mild hyponatremia.   Will monitor, reduce Aldactone    D/W patient     Eneida Waters MD  [facility]

## 2021-01-16 NOTE — H&P
Physical Medicine & Rehabilitation   History and Physical    Chief Complaint and Reason for Rehabilitation Admission:   CHF exacerbation, lumbar surgery L3-L4 with discectomy with resultant cauda equina syndrome, and right arm amputation    History of Present Illness:  Sue Higuera  is a 39 y.o. male admitted to the inpatient rehabilitation unit on 1/15/2021. This patient was in his usual state of health until 1 week prior to admission to [facility]. He had recent back surgery per Dr. Rubin Khan for lumbar hematoma evacuation at the L3-L4 levels with discectomy which was done January 7, 2021 and resulted in subsequent cauda equina syndrome. Following that the patient noticed increased weight gain and worsening of his leg swelling which was bilateral, including the thighs and the lower legs. He also had mild worsening of his shortness of breath with no chest pain. He was found to be in fluid overload with anasarca and congestion in the lung as well. He was treated with diuretics and has had very good urine output. He also had an echocardiogram which showed an EF of 55 to 60%. Patient also has a history of liver cirrhosis and right arm amputation secondary to necrotizing fasciitis about 3 years ago. He has today admitted to the inpatient rehab unit to improve his mobility and ADL skills prior to returning home. He has been sleeping well at night and his bowels have been moving. He does have large urinary bladder with the post void residual of 1853 mL and is currently using a Cullen catheter for the urinary retention. Follow-up with urology at the time of discharge.       Current Rehabilitation Assessments:  PT:      Restrictions/Precautions:  Restrictions/Precautions: General Precautions  Required Braces or Orthoses  Spinal: Lumbar Corset  Position Activity Restriction  Other position/activity restrictions: Pt had right arm amputated 3 years ago     Subjective:  Chart Reviewed: Yes  Patient assessed for rehabilitation services?: Yes  Family / Caregiver Present: No  Subjective: RN approved PT evaluation. Pt in bedside chair pre and post session. Pt agreeable to PT interventions and requesting to use the bathroom upon entry. Post session, pt in bedside chair with all needs in reach. Chair alarm on.     General:  Overall Orientation Status: Within Normal Limits  Follows Commands: Within Functional Limits     Vision: (Not Tested)     Hearing: Within functional limits        Pain: Not verbalized.      Social/Functional History:    Lives With: (other residents at [facility] ([facility] home))  [facility] [place]: One level  Home Access: Level entry  Home Equipment: Rolling walker(pt reports he has been using RW since [employer] 2020 when his leg started bothering him)      Bathroom Shower/Tub: Walk-in shower  Bathroom Toilet: Handicap height     Receives Help From: (housemates assisted with transfers and functional mobility)  ADL Assistance: Needs assistance  Homemaking Assistance: Needs assistance  Ambulation Assistance: Needs assistance  Transfer Assistance: Needs assistance     Active :  No     OBJECTIVE:  Range of Motion:  Bilateral Lower Extremity: WFL     Strength:  Bilateral Lower Extremity: Impaired - L LE weakner than R LE. L hip flexion: 2+/5; R hip flexion 4/5; L knee extension 3/5; R knee extension 5/5.     Balance:  Static Sitting Balance:  Contact Guard Assistance, with cues for safety, with verbal cues , with increased time for completion  Dynamic Standing Balance: Contact Guard Assistance, with cues for safety, with verbal cues , with increased time for completion, during giat training.        Transfers:  Sit to Stand: Contact Guard Assistance, Minimal Assistance, with increased time for completion, to/from chair with arms, one stand from chair; one stand from [facility].     Ambulation:  Contact Guard Assistance, with cues for safety  Distance: 10 feet; 60 feet.  Surface: Level Tile  Device:Rolling Walker. Pt used one hand with walker, pt if used to doing this at home. Will try a cane with ambulation next session. Gait Deviations: Forward Flexed Posture, Slow Sabrina, Decreased Step Length Bilaterally, Wide Base of Support, Unsteady Gait and excessive external rotation of R leg, slight external rotation of L leg, decreased safety awareness, waddle gait.         Functional Outcome Measures: Completed  AM-PAC Inpatient Mobility without Stair Climbing Raw Score : 13  AM-PAC Inpatient without Stair Climbing T-Scale Score : 38.96     ASSESSMENT:  Activity Tolerance:  Patient tolerance of  treatment: good. Pt displayed decreased endurance with SOB at end of session.             OT:     Restrictions/Precautions:  Restrictions/Precautions: General Precautions  Required Braces or Orthoses  Spinal: Lumbar Corset  Position Activity Restriction  Other position/activity restrictions: Pt had right arm amputated 3 years ago     Subjective  Chart Reviewed: Yes, Orders, Progress Notes, History and Physical, Operative Notes  Patient assessed for rehabilitation services?: Yes  Family / Caregiver Present: No     Subjective: RN okayed OT. Pt supine upon arrival and agreeable to OT with encouragement.     Pain:  Pain Assessment  Patient Currently in Pain: Yes  Pain Assessment: 0-10  Pain Level:  3(when lying in bed)     Social/Functional History:  Lives With: (other residents at [facility] ([facility]  Type of Home: [address] [place] [address],[address]: One level  Home Access: Level entry  Home Equipment: Rolling walker(pt reports he has been using RW since [employer] 2020 when his leg started bothering him)   Bathroom Shower/Tub: Walk-in shower  Bathroom Toilet: Handicap height     Receives Help From: (housemates assisted with transfers and functional mobility)  ADL Assistance: Needs assistance  Homemaking Assistance: Needs assistance  Ambulation Assistance: Needs assistance  Transfer Assistance: Needs assistance     Active : No  Cognition/Orientation:  Overall Cognitive Status: [facility]     ADL's:  UE Dressing: Minimal assistance(to don hospital gown)  LE Dressing: Dependent/Total(to don socks)     Functional Mobility:  Bed mobility  Supine to Sit: Maximum assistance(with trunk)  Scooting: Moderate assistance(with verbal cues for technique)  Comment: with HOB raised all the way. Increased time for completion.     Functional Mobility  Functional - Mobility Device: Rolling Walker  Assist Level: Contact guard assistance      Balance:  Balance  Sitting Balance: Stand by assistance  Standing Balance: Contact guard assistance  Standing Balance  Time: ~3 min  Activity: short walk in room and looking out window     Transfers:  Sit to stand: Minimal assistance(from bed to recliner)  Stand to sit: Minimal assistance   Required encouragement and increased time for completion.     Upper Extremity Assessment:   LUE AROM : [facility]  Left Hand AROM: [facility]     LUE Strength  Gross LUE Strength: Exceptions to [facility]  L Hand General: 4-/5     Sensation  Overall Sensation Status: [facility]     Activity Tolerance: Patient limited by pain     Assessment:  Assessment: pt will benefit from skilled OT intervention to increase safety and independence with ADL tasks and functional mobility to ensure safe transition to next level of care and return to [facility]. Performance deficits / Impairments: Decreased functional mobility , Decreased safe awareness, Decreased balance, Decreased coordination, Decreased ADL status, Decreased endurance, Decreased strength  Prognosis: Good  REQUIRES OT FOLLOW UP: Yes  Safety Devices in place:  Yes           Past Medical History:      Diagnosis Date    Alcohol abuse     in remission    Amputation of right arm (HCC)     Ascites     GERD (gastroesophageal reflux disease)     Hepatic cirrhosis (HCC)     Hepatitis B     Hepatitis C     Hypertension     Intravenous drug user     in remission    chloride (KLOR-CON M) extended release tablet 20 mEq, 20 mEq, Oral, Daily with breakfast  pregabalin (LYRICA) capsule 200 mg, 200 mg, Oral, TID  spironolactone (ALDACTONE) tablet 25 mg, 25 mg, Oral, BID  tamsulosin (FLOMAX) capsule 0.4 mg, 0.4 mg, Oral, Daily  ferrous sulfate (IRON 325) tablet 325 mg, 325 mg, Oral, BID WC  influenza quadrivalent split vaccine (FLUZONE;FLUARIX;FLULAVAL;AFLURIA) injection 0.5 mL, 0.5 mL, Intramuscular, Prior to discharge     Social History:  Social History     Socioeconomic History    Marital status:      Spouse name: Not on file    Number of children: 1    Years of education: Not on file    Highest education level: Not on file   Occupational History    Not on file   Social Needs    Financial resource strain: Not on file    Food insecurity     Worry: Not on file     Inability: Not on file    Transportation needs     Medical: Not on file     Non-medical: Not on file   Tobacco Use    Smoking status: Former Smoker     Quit date: 2020     Years since quittin.7    Smokeless tobacco: Current User     Types: Snuff   Substance and Sexual Activity    Alcohol use: No     Comment: Several months since last used    Drug use: No     Comment: 160 days since last use    Sexual activity: Not on file   Lifestyle    Physical activity     Days per week: Not on file     Minutes per session: Not on file    Stress: Not on file   Relationships    Social connections     Talks on phone: Not on file     Gets together: Not on file     Attends Bahai service: Not on file     Active member of club or organization: Not on file     Attends meetings of clubs or organizations: Not on file     Relationship status: Not on file    Intimate partner violence     Fear of current or ex partner: Not on file     Emotionally abused: Not on file     Physically abused: Not on file     Forced sexual activity: Not on file   Other Topics Concern    Not on file   Social History Narrative    Not on file     Lives With: ([facility] at [place]  Type of Home: [address]: One level  Home Access: Level entry  Home Equipment: Rolling walker(pt reports he has been using RW since [employer] 2020 when his leg started bothering him)   Bathroom Shower/Tub: Walk-in shower  Bathroom Toilet: Handicap height     Receives Help From: (housemates assisted with transfers and functional mobility)  ADL Assistance: Needs assistance  Homemaking Assistance: Needs assistance  Ambulation Assistance: Needs assistance  Transfer Assistance: Needs assistance     Active : No      Family History:       Problem Relation Age of Onset    High Blood Pressure Mother     High Blood Pressure Father        Review of Systems:  CONSTITUTIONAL:  negative  EYES:  negative  HEENT:  negative  RESPIRATORY:  positive for mild shortness of breath  CARDIOVASCULAR:  negative  GASTROINTESTINAL:  negative  GENITOURINARY: With Cullen catheter secondary to urinary retention  SKIN:  negative  HEMATOLOGIC/LYMPHATIC:  negative  MUSCULOSKELETAL: Right arm amputation 3 years ago secondary to necrotizing fasciitis  NEUROLOGICAL:  negative  BEHAVIOR/PSYCH:  negative  10 point system review otherwise negative    Physical Exam:  BP (!) 144/74   Pulse 103   Temp 98.7 °F (37.1 °C) (Oral)   Resp 20   Ht 6' (1.829 m)   Wt 275 lb 8 oz (125 kg)   SpO2 99%   BMI 37.36 kg/m²   awake  Orientation:   person, place, time  Mood: within normal limits  Affect: calm  General appearance: Patient is well nourished, well developed, well groomed and in no acute distress, appearing stated age    Memory:   normal,   Attention/Concentration: normal  Language:  normal    Cranial Nerves:  cranial nerves II-XII are grossly intact  ROM:  normal  Motor Exam: 4+ out of 5 throughout  Tone:  normal  Muscle bulk: within normal limits  Sensory: Diminished in fingertips and toes  Coordination:   normal  Deep Tendon Reflexes:  Reflexes are intact Collection Time: 01/16/21  5:30 AM   Result Value Ref Range    Est, Glom Filt Rate >90 ml/min/1.73m2   Magnesium    Collection Time: 01/16/21  5:30 AM   Result Value Ref Range    Magnesium 1.5 (L) 1.6 - 2.4 mg/dL       Impression:  3  51-year-old male with recent fluid overload, recent lumbar surgery with development of cauda equina syndrome, and right upper extremity amputation with decreased mobility and ADL skills. 2.  Alcohol abuse, in remission  3. Amputation of right arm  4. Ascites  5. Gastroesophageal reflux disease  6. Hepatic cirrhosis  7. Hepatitis B  8. Hepatitis C  9. Hypertension  10. Intravenous drug use, in remission   11. Psychiatric problem  12. Posttraumatic stress disorder  13. Sciatica  14. Splenomegaly  15. Thrombocytopenia  16. Ulcerative colitis      Plan:   · Admit to the inpatient rehabilitation unit. The patient demonstrates good potential to participate in an inpatient rehabilitation program involving at least 3 hours per day, 5 days per week of intensive rehabilitation. Rehabilitation services will include PT, OT and Rehabilitation nursing, , dietary, and recreational therapy in order to improve functional status prior to discharge. Family education and training will be completed. Equipment evaluations and recommendations will be completed as appropriate. · Rehabilitation nursing will be involved for bowel, bladder, skin, and pain management. Nursing will also provide education and training to patient and family. · Prophylaxis:  DVT: Lovenox. GI: Protonix  · Pain: Lyrica, Tylenol, Percocet  · Nutrition:  Consultation to dietician for nutritional counseling and recommendations. Prealbumin will be checked on admission. · Bladder: Management per Cullen catheter; follow-up with urology at discharge; continue Flomax  · Bowel:  Bowel program including MiraLAX and Dulcolax suppository  ·  and case management consultations for coordination of care and discharge planning    The main medical problem(s) and comorbidities being actively managed by the physicians and requiring 24 hour rehabilitation nursing care during this stay include congestive heart failure, recent lumbar surgery, right arm amputation, alcohol abuse in remission, ascites, gastroesophageal reflux disease, hepatic cirrhosis, hepatitis B, hepatitis C, hypertension, intravenous drug use in remission, psychiatric problem, post traumatic stress disorder, sciatica, splenomegaly, thrombocytopenia, and ulcerative colitis. The domains of functional impairment present in this patient which will require an intensive and interdisciplinary rehabilitation environment include self care, mobility, motor dysfunction, bowel/bladder management, pain management, safety, cognitive function and communication. Estimated length of stay for this admission: 10 to 14 days    Anticipated disposition: Home. The potential to achieve that is excellent. The post admission physician evaluation (DELVIS) is consistent with the pre-admission assessment. See above findings to reflect the elements required in the DELVIS. Patient's admitting condition is consistent with the findings of the preadmission assessment by the rehabilitation admissions coordinator.     Cliff Abbott MD

## 2021-01-16 NOTE — PROGRESS NOTES
[address]  EVALUATION    Time:    Time In: 0930  Time Out: 1100  Timed Code Treatment Minutes: 75 Minutes  Minutes: 90    Date: 2021  Patient Name: Aristides Landaverde,   Gender: male      MRN: [id number]  : 1975  (39 y.o.)  Referring Practitioner: CRISTIAN Garcia - CNP  Diagnosis: History of lumbar laminectomy  Additional Pertinent Hx: Per EMR: \"39year-old obese  male presents to emergency department with evaluation of bilateral leg edema. Patient relates his leg swelling to status post lumbar fusion L3-L5 on 2020 had surgery remove fluid on 2021. Stephanie Rad Patient has gradually gotten worse since that time. \" To IPR on 1/15    Restrictions/Precautions:  Restrictions/Precautions: General Precautions, Fall Risk  Required Braces or Orthoses  Spinal: Lumbar Corset  Position Activity Restriction  Spinal Precautions: No Bending, No Twisting, No Lifting  Other position/activity restrictions: Pt had right arm amputated 3 years ago. s/p L3-5 fusion     Subjective  Chart Reviewed: Yes, Orders, Progress Notes, History and Physical, Operative Notes  Patient assessed for rehabilitation services?: Yes    Subjective: Pt supine in bed, agreeable to OT.     Pain:  Pain Assessment  Patient Currently in Pain: Yes  Pain Level: 6    Social/Functional History:  Lives With: Other (comment)([facility] ([facility] [place] - ~14 residents)  Type of Home: House  Home Layout: One level  Home Access: Level entry  Home Equipment: Rolling walker   Bathroom Shower/Tub: Walk-in shower  Bathroom Toilet: Handicap height       ADL Assistance: Independent  Homemaking Assistance: Independent  Ambulation Assistance: Independent  Transfer Assistance: Independent    Active : No     Additional Comments: pt reports no use of AD at baseline, utilizing RW since surgery 20    VISION:WFL    HEARING: WFL    COGNITION: WFL  15/15 BIMS     SENSATION: Pt reports numbness in benjie buttocks and scrotal and LE edema. RANGE OF MOTION:  Left Upper Extremity:  WFL   Right upper extremity amputee. Scar tissue noted around proximal shoulder     STRENGTH:  MMT deferred      ADL:   EATING:Setup or clean-up assistance. Nicholes Coca CARE Score: 5. ORAL HYGIENE:Supervision or touching assistance. Nicholes Coca CARE Score: 4. TOILETING HYGIENE:Partial/moderate assistance. A with hygiene. CARE Score: 3. SHOWERING/BATHING:Partial/moderate assistance. A with washing LEs and buttocks. CARE Score: 3.     UPPER BODY DRESSING:Dependent. A to thread LUE, head and pull down in back. CARE Score: 1. LOWER BODY DRESSING:Dependent. Nicholes Coca CARE Score: 1. FOOTWEAR:Dependent   . CARE Score: 1.     TOILET TRANSFER: Partial/moderate assistance. min A. CARE Score: 3. BALANCE:  Sitting Balance:  Stand By Assistance. Standing Balance: Contact Guard Assistance. BED MOBILITY:  Supine to Sit: Minimal Assistance  Log roll   Sit to Supine: Minimal Assistance   Log roll     TRANSFERS:  Sit to Stand:  Minimal Assistance, Moderate Assistance, X 1, with increased time for completion, cues for hand placement. Stand to Sit: Contact Guard Assistance, Minimal Assistance, X 1, with increased time for completion, cues for hand placement, to/from chair with arms, to/from chair without arms. FUNCTIONAL MOBILITY:  Assistive Device: Félix-walker  Assist Level:  Contact Guard Assistance. Distance: To and from bathroom and To and from shower room      Activity Tolerance:  Patient tolerance of  treatment: good. Assessment:  Pt presents to occupational therapy post lumbar fusion L3-L5 on 12/29/2020  with patient complaints of BLE weakness, pain, decreased activity tolerance  impacting patient's ability to complete self care at prior level of functioning.  Due to patient's performance deficits, patient would greatly benefit from continued occupational therapy for ADL remediation, endurance building, strengthening and adaptive strategies to return to prior level of functioning and safe return to home environment. Performance deficits / Impairments: Decreased functional mobility , Decreased safe awareness, Decreased balance, Decreased coordination, Decreased ADL status, Decreased endurance, Decreased strength  Prognosis: Good  Safety Devices in place: Yes  Type of devices: All fall risk precautions in place, Patient at risk for falls, Call light within reach, Left in bed    Treatment Initiated: Treatment and education initiated within context of evaluation. Evaluation time included review of current medical information, gathering information related to past medical, social and functional history, completion of standardized testing, formal and informal observation of tasks, assessment of data and development of plan of care and goals. Treatment time included skilled education and facilitation of tasks to increase safety and independence with ADL's for improved functional independence and quality of life. Discharge Recommendations:  Home with Home health OT    Patient Education:  OT Education: OT Role, Plan of Care, Precautions, Transfer Training, ADL Adaptive Strategies    Equipment Recommendations: Other: Recommend LHAE and shower chair. Monitor for need for BSC. Plan:  Times per week: 5x/wk for 90 min and 1x/wk for 30 min  Current Treatment Recommendations: Strengthening, Endurance Training, Patient/Caregiver Education & Training, Equipment Evaluation, Education, & procurement, Self-Care / ADL, Balance Training, Functional Mobility Training, Safety Education & Training, Positioning. See long-term goal time frame for expected duration of plan of care. If no long-term goals established, a short length of stay is anticipated.     Goals:  Patient goals : decrease pain  Short term goals  Time Frame for Short term goals: 1 week  Short term goal 1: Pt will navigate hemiwalker to and from the bathroom with SBA to improve accessing bathroom. Short term goal 2: Pt will complete LB dressing tasks with LHAE prn and mod A to improve indep with self care. Short term goal 3: Pt will demo spinal precautions during 4 min dynamic standing tasks to improve indep with meal prep or IADLs. Short term goal 4: Pt will complete meal prep tasks with SBA using hemiwalker to improve indep within home. Long term goals  Time Frame for Long term goals : 2 weeks  Long term goal 1: Pt will complete BADL routine with mod I and 0 vcs for spinal precautions to improve indep within home. Long term goal 2: Pt will complete light IADL tasks with mod I and 0 vcs for adaptive technique or spinal precautions to improve indep within home         Following session, patient left in safe position with all fall risk precautions in place.

## 2021-01-17 LAB
ANION GAP SERPL CALCULATED.3IONS-SCNC: 7 MEQ/L (ref 8–16)
BUN BLDV-MCNC: 11 MG/DL (ref 7–22)
CALCIUM SERPL-MCNC: 8.5 MG/DL (ref 8.5–10.5)
CHLORIDE BLD-SCNC: 98 MEQ/L (ref 98–111)
CO2: 27 MEQ/L (ref 23–33)
CREAT SERPL-MCNC: 0.7 MG/DL (ref 0.4–1.2)
ERYTHROCYTE [DISTWIDTH] IN BLOOD BY AUTOMATED COUNT: 13.2 % (ref 11.5–14.5)
ERYTHROCYTE [DISTWIDTH] IN BLOOD BY AUTOMATED COUNT: 48.1 FL (ref 35–45)
GFR SERPL CREATININE-BSD FRML MDRD: > 90 ML/MIN/1.73M2
GLUCOSE BLD-MCNC: 124 MG/DL (ref 70–108)
HCT VFR BLD CALC: 23.8 % (ref 42–52)
HEMOGLOBIN: 7.9 GM/DL (ref 14–18)
MAGNESIUM: 1.5 MG/DL (ref 1.6–2.4)
MCH RBC QN AUTO: 33.5 PG (ref 26–33)
MCHC RBC AUTO-ENTMCNC: 33.2 GM/DL (ref 32.2–35.5)
MCV RBC AUTO: 100.8 FL (ref 80–94)
PLATELET # BLD: 104 THOU/MM3 (ref 130–400)
PMV BLD AUTO: 9.7 FL (ref 9.4–12.4)
POTASSIUM SERPL-SCNC: 3.8 MEQ/L (ref 3.5–5.2)
PROCALCITONIN: 0.33 NG/ML (ref 0.01–0.09)
RBC # BLD: 2.36 MILL/MM3 (ref 4.7–6.1)
SODIUM BLD-SCNC: 132 MEQ/L (ref 135–145)
WBC # BLD: 6.9 THOU/MM3 (ref 4.8–10.8)

## 2021-01-17 PROCEDURE — 83735 ASSAY OF MAGNESIUM: CPT

## 2021-01-17 PROCEDURE — 2580000003 HC RX 258: Performed by: NURSE PRACTITIONER

## 2021-01-17 PROCEDURE — 6370000000 HC RX 637 (ALT 250 FOR IP): Performed by: NURSE PRACTITIONER

## 2021-01-17 PROCEDURE — 6370000000 HC RX 637 (ALT 250 FOR IP): Performed by: FAMILY MEDICINE

## 2021-01-17 PROCEDURE — 6360000002 HC RX W HCPCS: Performed by: NURSE PRACTITIONER

## 2021-01-17 PROCEDURE — 6360000002 HC RX W HCPCS: Performed by: PHYSICAL MEDICINE & REHABILITATION

## 2021-01-17 PROCEDURE — 1180000000 HC REHAB R&B

## 2021-01-17 PROCEDURE — 94760 N-INVAS EAR/PLS OXIMETRY 1: CPT

## 2021-01-17 PROCEDURE — 6370000000 HC RX 637 (ALT 250 FOR IP): Performed by: INTERNAL MEDICINE

## 2021-01-17 PROCEDURE — 80048 BASIC METABOLIC PNL TOTAL CA: CPT

## 2021-01-17 PROCEDURE — 36415 COLL VENOUS BLD VENIPUNCTURE: CPT

## 2021-01-17 PROCEDURE — 99232 SBSQ HOSP IP/OBS MODERATE 35: CPT | Performed by: INTERNAL MEDICINE

## 2021-01-17 RX ORDER — MAGNESIUM SULFATE IN WATER 40 MG/ML
2 INJECTION, SOLUTION INTRAVENOUS PRN
Status: DISCONTINUED | OUTPATIENT
Start: 2021-01-16 | End: 2021-01-18

## 2021-01-17 RX ORDER — MAGNESIUM SULFATE IN WATER 40 MG/ML
2 INJECTION, SOLUTION INTRAVENOUS ONCE
Status: DISCONTINUED | OUTPATIENT
Start: 2021-01-17 | End: 2021-01-17

## 2021-01-17 RX ADMIN — PAROXETINE HYDROCHLORIDE 40 MG: 20 TABLET, FILM COATED ORAL at 09:10

## 2021-01-17 RX ADMIN — ENOXAPARIN SODIUM 40 MG: 40 INJECTION SUBCUTANEOUS at 09:10

## 2021-01-17 RX ADMIN — SPIRONOLACTONE 25 MG: 25 TABLET ORAL at 09:10

## 2021-01-17 RX ADMIN — MAGNESIUM GLUCONATE 500 MG ORAL TABLET 400 MG: 500 TABLET ORAL at 23:35

## 2021-01-17 RX ADMIN — OXYCODONE AND ACETAMINOPHEN 1 TABLET: 5; 325 TABLET ORAL at 22:06

## 2021-01-17 RX ADMIN — OXYCODONE AND ACETAMINOPHEN 1 TABLET: 5; 325 TABLET ORAL at 05:38

## 2021-01-17 RX ADMIN — FERROUS SULFATE TAB 325 MG (65 MG ELEMENTAL FE) 325 MG: 325 (65 FE) TAB at 09:11

## 2021-01-17 RX ADMIN — PREGABALIN 200 MG: 50 CAPSULE ORAL at 09:11

## 2021-01-17 RX ADMIN — PANTOPRAZOLE SODIUM 40 MG: 40 TABLET, DELAYED RELEASE ORAL at 05:37

## 2021-01-17 RX ADMIN — PREGABALIN 200 MG: 50 CAPSULE ORAL at 21:11

## 2021-01-17 RX ADMIN — OXYCODONE AND ACETAMINOPHEN 1 TABLET: 5; 325 TABLET ORAL at 09:40

## 2021-01-17 RX ADMIN — CIPROFLOXACIN 500 MG: 500 TABLET, FILM COATED ORAL at 00:07

## 2021-01-17 RX ADMIN — SODIUM CHLORIDE, PRESERVATIVE FREE 10 ML: 5 INJECTION INTRAVENOUS at 09:32

## 2021-01-17 RX ADMIN — MESALAMINE 800 MG: 400 CAPSULE, DELAYED RELEASE ORAL at 21:11

## 2021-01-17 RX ADMIN — OXYCODONE AND ACETAMINOPHEN 1 TABLET: 5; 325 TABLET ORAL at 00:22

## 2021-01-17 RX ADMIN — MAGNESIUM SULFATE HEPTAHYDRATE 2 G: 40 INJECTION, SOLUTION INTRAVENOUS at 09:09

## 2021-01-17 RX ADMIN — CIPROFLOXACIN 500 MG: 500 TABLET, FILM COATED ORAL at 23:35

## 2021-01-17 RX ADMIN — FERROUS SULFATE TAB 325 MG (65 MG ELEMENTAL FE) 325 MG: 325 (65 FE) TAB at 16:14

## 2021-01-17 RX ADMIN — MICONAZOLE NITRATE: 20 POWDER TOPICAL at 21:11

## 2021-01-17 RX ADMIN — PANTOPRAZOLE SODIUM 40 MG: 40 TABLET, DELAYED RELEASE ORAL at 16:15

## 2021-01-17 RX ADMIN — SODIUM CHLORIDE, PRESERVATIVE FREE 10 ML: 5 INJECTION INTRAVENOUS at 21:11

## 2021-01-17 RX ADMIN — BUMETANIDE 1 MG: 1 TABLET ORAL at 09:10

## 2021-01-17 RX ADMIN — MESALAMINE 800 MG: 400 CAPSULE, DELAYED RELEASE ORAL at 13:52

## 2021-01-17 RX ADMIN — CIPROFLOXACIN 500 MG: 500 TABLET, FILM COATED ORAL at 13:52

## 2021-01-17 RX ADMIN — PREGABALIN 200 MG: 50 CAPSULE ORAL at 16:14

## 2021-01-17 RX ADMIN — POTASSIUM CHLORIDE 20 MEQ: 1500 TABLET, EXTENDED RELEASE ORAL at 09:11

## 2021-01-17 RX ADMIN — FOLIC ACID 2 MG: 1 TABLET ORAL at 09:10

## 2021-01-17 RX ADMIN — MICONAZOLE NITRATE: 20 POWDER TOPICAL at 09:11

## 2021-01-17 RX ADMIN — MESALAMINE 800 MG: 400 CAPSULE, DELAYED RELEASE ORAL at 09:10

## 2021-01-17 RX ADMIN — TAMSULOSIN HYDROCHLORIDE 0.4 MG: 0.4 CAPSULE ORAL at 09:10

## 2021-01-17 ASSESSMENT — PAIN SCALES - GENERAL
PAINLEVEL_OUTOF10: 6
PAINLEVEL_OUTOF10: 5
PAINLEVEL_OUTOF10: 5

## 2021-01-17 ASSESSMENT — PAIN - FUNCTIONAL ASSESSMENT
PAIN_FUNCTIONAL_ASSESSMENT: PREVENTS OR INTERFERES SOME ACTIVE ACTIVITIES AND ADLS
PAIN_FUNCTIONAL_ASSESSMENT: PREVENTS OR INTERFERES SOME ACTIVE ACTIVITIES AND ADLS

## 2021-01-17 ASSESSMENT — PAIN DESCRIPTION - ORIENTATION: ORIENTATION: LEFT;MID

## 2021-01-17 ASSESSMENT — PAIN DESCRIPTION - FREQUENCY
FREQUENCY: CONTINUOUS
FREQUENCY: CONTINUOUS

## 2021-01-17 ASSESSMENT — PAIN DESCRIPTION - PAIN TYPE
TYPE: SURGICAL PAIN
TYPE: SURGICAL PAIN

## 2021-01-17 ASSESSMENT — PAIN DESCRIPTION - DESCRIPTORS: DESCRIPTORS: ACHING

## 2021-01-17 ASSESSMENT — PAIN DESCRIPTION - ONSET: ONSET: ON-GOING

## 2021-01-17 NOTE — FLOWSHEET NOTE
01/16/21 2215   Provider Notification   Reason for Communication Review case   Provider Name Suzy Burnette Real   Provider Notification Physician   Method of Communication Call   Response See orders   Notification Time 2216   Patient running fever of 101.2,  after being given oxycodone. See orders for cipro and UA with reflux.

## 2021-01-17 NOTE — PLAN OF CARE
Problem: Pain:  Description: Pain management should include both nonpharmacologic and pharmacologic interventions.   Goal: Pain level will decrease  Description: Pain level will decrease  1/17/2021 0956 by Sharron Quinteros RN  Outcome: Ongoing  1/17/2021 0302 by Rashad Mina RN  Outcome: Ongoing  1/17/2021 0301 by Rashad Mina RN  Outcome: Ongoing  Goal: Control of acute pain  Description: Control of acute pain  1/17/2021 0956 by Sharron Quinteros RN  Outcome: Ongoing  1/17/2021 0302 by Rashad Mina RN  Outcome: Ongoing  1/17/2021 0301 by Rashad Mina RN  Outcome: Ongoing  Goal: Control of chronic pain  Description: Control of chronic pain  1/17/2021 0956 by Sharron Quinteros RN  Outcome: Ongoing  1/17/2021 0302 by Rashad Mina RN  Outcome: Ongoing  1/17/2021 0301 by Rashad Mina RN  Outcome: Ongoing     Patient will participate in pain management to decrease pain as evidenced by patient use of breathing and meditation for pain management    Problem: Falls - Risk of:  Goal: Will remain free from falls  Description: Will remain free from falls  1/17/2021 0956 by Sharron Quinteros RN  Outcome: Ongoing  1/17/2021 0302 by Rashad Mina RN  Outcome: Ongoing  1/17/2021 0301 by Rashad Mina RN  Outcome: Ongoing  Goal: Absence of physical injury  Description: Absence of physical injury  1/17/2021 0956 by Sharron Quinteros RN  Outcome: Ongoing  1/17/2021 0302 by Rashad Mina RN  Outcome: Ongoing  1/17/2021 0301 by Rashad Mina RN  Outcome: Ongoing    Patient will remain free from falls during this admission as evidenced by no falls during this admission patient and staff will utilize safety devices and techniques for transfer      Problem: Urinary Elimination:  Goal: Signs and symptoms of infection will decrease  Description: Signs and symptoms of infection will decrease  1/17/2021 0956 by Sharron Quinteros RN  Outcome: Ongoing  1/17/2021 0302 by

## 2021-01-17 NOTE — PROGRESS NOTES
Patient refused to bathe today. Refused to ambulate in hallway, encouraged to get up and move declined. Patient in good spirits overall today appropriately interactive with staff.

## 2021-01-17 NOTE — PROGRESS NOTES
[facility]   Nephrology progress note  1/17/2021, 1:22 PM      Pt Name:    Rai Leone  MRN:     [id number]     YOB: 1975  Admit Date:    1/15/2021  5:51 PM  Primary Care Physician:  CRISTIAN Garza CNP   Room number  8Y-81/940-O    Chief Complaint: Nephrology following for volume overload and recent ONIEL    Subjective:  Patient seen and examined  Doing well  Reports improvement in lower extremity swelling  Good urine output    Objective:  24HR INTAKE/OUTPUT:      Intake/Output Summary (Last 24 hours) at 1/17/2021 1322  Last data filed at 1/17/2021 1314  Gross per 24 hour   Intake 1590 ml   Output 6650 ml   Net -5060 ml     I/O last 3 completed shifts: In: 9541 [P.O.:1200; I.V.:60]  Out: 3100 [Urine:3100]  I/O this shift: In: 530 [P.O.:480; I.V.:50]  Out: 3550 [Urine:3550]  Admission weight: 274 lb (124.3 kg)  Wt Readings from Last 3 Encounters:   01/17/21 270 lb 12.8 oz (122.8 kg)   01/15/21 250 lb (113.4 kg)   01/10/21 250 lb (113.4 kg)     Body mass index is 36.73 kg/m².     Physical examination  VITALS:     Vitals:    01/17/21 0235 01/17/21 0340 01/17/21 0540 01/17/21 0917   BP:    (!) 151/76   Pulse:    100   Resp:    20   Temp:  99.2 °F (37.3 °C) 96.4 °F (35.8 °C) 98.4 °F (36.9 °C)   TempSrc:  Oral Axillary Oral   SpO2:    98%   Weight: 270 lb 12.8 oz (122.8 kg)      Height:         General Appearance: alert and cooperative with exam, appears comfortable, no distress  Mouth/Throat: Oral mucosa moist  Neck: No JVD  Lungs: Air entry B/L, no rales, no use of accessory muscles  Heart:  S1, S2 heard  GI: soft, non-tender, no guarding  Extremities: Bilateral lower extremity edema, improved significantly      Lab Data  CBC:   Recent Labs     01/16/21  0530 01/16/21  2350   WBC 5.8 6.9   HGB 7.9* 7.9*   HCT 24.5* 23.8*   * 104*     BMP:  Recent Labs     01/15/21  0648 01/16/21  0530 01/17/21  0545    130* 132*   K 4.3 4.3 3.8   CL 99 97* 98   CO2 28 28 27   BUN 22 15 11   CREATININE 0.7 0.7 0.7   GLUCOSE 126* 124* 124*   CALCIUM 8.5 8.5 8.5   MG 1.3* 1.5* 1.5*     Hepatic:   Recent Labs     01/16/21  0530   LABALBU 3.1*   AST 16   ALT 13   BILITOT 0.8   ALKPHOS 85         Meds:  Infusion:   Meds:    miconazole   Topical BID    spironolactone  25 mg Oral Daily    ciprofloxacin  500 mg Oral Q12H    enoxaparin  40 mg Subcutaneous Q24H    sodium chloride flush  10 mL Intravenous 2 times per day    bumetanide  1 mg Oral Daily    folic acid  2 mg Oral Daily    mesalamine  800 mg Oral TID    pantoprazole  40 mg Oral BID AC    PARoxetine  40 mg Oral QAM    potassium chloride  20 mEq Oral Daily with breakfast    pregabalin  200 mg Oral TID    tamsulosin  0.4 mg Oral Daily    ferrous sulfate  325 mg Oral BID WC    influenza virus vaccine  0.5 mL Intramuscular Prior to discharge     Meds prn: magnesium sulfate, bisacodyl, acetaminophen **OR** acetaminophen, polyethylene glycol, promethazine **OR** ondansetron, sodium chloride flush, oxyCODONE-acetaminophen       Impression and Plan:  1. ONIEL improved. Renal function stable  Continue with diuretics    2. Hypomagnesemia. Will replace with IV magnesium  3. Peripheral edema. Significantly improved. Patient is on Aldactone, Bumex  Patient had 3.1 L of urine output in last 24 hours  Overall negative fluid balance  Blood pressure stable  4. History of hepatitis C  5.   Recent back surgery    D/W patient     Gayle Holt MD  [facility]

## 2021-01-17 NOTE — PLAN OF CARE
Problem: Pain:  Goal: Pain level will decrease  Description: Pain level will decrease  1/17/2021 0302 by Liliana Tavarez RN  Outcome: Ongoing     Problem: Pain:  Goal: Control of acute pain  Description: Control of acute pain  1/17/2021 0302 by Liliana Tavarez RN  Outcome: Ongoing     Problem: Pain:  Goal: Control of chronic pain  Description: Control of chronic pain  1/17/2021 0302 by Liliana Tavarez RN  Outcome: Ongoing     Problem: Falls - Risk of:  Goal: Will remain free from falls  Description: Will remain free from falls  1/17/2021 0302 by Liliana Tavarez RN  Outcome: Ongoing     Problem: Falls - Risk of:  Goal: Absence of physical injury  Description: Absence of physical injury  1/17/2021 0302 by Liliana Tavarez RN  Outcome: Ongoing     Problem: IP BALANCE  Goal: LTG - Patient will maintain balance to allow for safe/functional mobility  1/17/2021 0302 by Liliana Tavarez RN  Outcome: Ongoing     Problem: Urinary Elimination:  Goal: Signs and symptoms of infection will decrease  Description: Signs and symptoms of infection will decrease  1/17/2021 0302 by Liliana Tavarez RN  Outcome: Ongoing     Problem: Urinary Elimination:  Goal: Complications related to the disease process, condition or treatment will be avoided or minimized  Description: Complications related to the disease process, condition or treatment will be avoided or minimized  1/17/2021 0302 by Liliana Tavarez RN  Outcome: Ongoing     Problem: Discharge Planning:  Goal: Discharged to appropriate level of care  Description: Discharged to appropriate level of care  1/17/2021 0302 by Liliana Tavarez RN  Outcome: Ongoing     Problem: Skin Integrity:  Goal: Will show no infection signs and symptoms  Description: Will show no infection signs and symptoms  1/17/2021 0302 by Liliana Tavarez RN  Outcome: Ongoing     Problem: Skin Integrity:  Goal: Absence of new skin breakdown  Description: Absence of new skin breakdown  1/17/2021 0302 by Mary Reveles RN  Outcome: Ongoing     Problem: IP DRESSINGS LOWER EXTREMITIES  Goal: LTG - patient will dress lower body with or without assistive device  1/17/2021 0302 by Mary Reveles RN  Outcome: Ongoing   Care plan reviewed with patient. Patient verbalizes understanding of care plan and treatment goals.

## 2021-01-18 LAB
ANION GAP SERPL CALCULATED.3IONS-SCNC: 10 MEQ/L (ref 8–16)
BUN BLDV-MCNC: 12 MG/DL (ref 7–22)
CALCIUM SERPL-MCNC: 8.9 MG/DL (ref 8.5–10.5)
CHLORIDE BLD-SCNC: 101 MEQ/L (ref 98–111)
CO2: 26 MEQ/L (ref 23–33)
CREAT SERPL-MCNC: 0.6 MG/DL (ref 0.4–1.2)
GFR SERPL CREATININE-BSD FRML MDRD: > 90 ML/MIN/1.73M2
GLUCOSE BLD-MCNC: 116 MG/DL (ref 70–108)
MAGNESIUM: 1.6 MG/DL (ref 1.6–2.4)
ORGANISM: ABNORMAL
POTASSIUM SERPL-SCNC: 3.8 MEQ/L (ref 3.5–5.2)
SODIUM BLD-SCNC: 137 MEQ/L (ref 135–145)
URINE CULTURE REFLEX: ABNORMAL

## 2021-01-18 PROCEDURE — 80048 BASIC METABOLIC PNL TOTAL CA: CPT

## 2021-01-18 PROCEDURE — 97116 GAIT TRAINING THERAPY: CPT

## 2021-01-18 PROCEDURE — 6360000002 HC RX W HCPCS: Performed by: PHYSICAL MEDICINE & REHABILITATION

## 2021-01-18 PROCEDURE — 6370000000 HC RX 637 (ALT 250 FOR IP): Performed by: NURSE PRACTITIONER

## 2021-01-18 PROCEDURE — 97530 THERAPEUTIC ACTIVITIES: CPT

## 2021-01-18 PROCEDURE — 97110 THERAPEUTIC EXERCISES: CPT

## 2021-01-18 PROCEDURE — 6360000002 HC RX W HCPCS: Performed by: NURSE PRACTITIONER

## 2021-01-18 PROCEDURE — 6370000000 HC RX 637 (ALT 250 FOR IP): Performed by: FAMILY MEDICINE

## 2021-01-18 PROCEDURE — 99233 SBSQ HOSP IP/OBS HIGH 50: CPT | Performed by: NURSE PRACTITIONER

## 2021-01-18 PROCEDURE — 97535 SELF CARE MNGMENT TRAINING: CPT

## 2021-01-18 PROCEDURE — 99232 SBSQ HOSP IP/OBS MODERATE 35: CPT | Performed by: INTERNAL MEDICINE

## 2021-01-18 PROCEDURE — 1180000000 HC REHAB R&B

## 2021-01-18 PROCEDURE — 36415 COLL VENOUS BLD VENIPUNCTURE: CPT

## 2021-01-18 PROCEDURE — 83735 ASSAY OF MAGNESIUM: CPT

## 2021-01-18 PROCEDURE — 2580000003 HC RX 258: Performed by: NURSE PRACTITIONER

## 2021-01-18 RX ORDER — HEPARIN SODIUM (PORCINE) LOCK FLUSH IV SOLN 100 UNIT/ML 100 UNIT/ML
500 SOLUTION INTRAVENOUS DAILY
Status: DISCONTINUED | OUTPATIENT
Start: 2021-01-19 | End: 2021-01-26 | Stop reason: HOSPADM

## 2021-01-18 RX ORDER — LANOLIN ALCOHOL/MO/W.PET/CERES
100 CREAM (GRAM) TOPICAL DAILY
Status: DISCONTINUED | OUTPATIENT
Start: 2021-01-18 | End: 2021-01-26 | Stop reason: HOSPADM

## 2021-01-18 RX ORDER — M-VIT,TX,IRON,MINS/CALC/FOLIC 27MG-0.4MG
1 TABLET ORAL DAILY
Status: DISCONTINUED | OUTPATIENT
Start: 2021-01-18 | End: 2021-01-26 | Stop reason: HOSPADM

## 2021-01-18 RX ORDER — HEPARIN SODIUM (PORCINE) LOCK FLUSH IV SOLN 100 UNIT/ML 100 UNIT/ML
500 SOLUTION INTRAVENOUS PRN
Status: DISCONTINUED | OUTPATIENT
Start: 2021-01-18 | End: 2021-01-26 | Stop reason: HOSPADM

## 2021-01-18 RX ADMIN — PREGABALIN 200 MG: 50 CAPSULE ORAL at 07:46

## 2021-01-18 RX ADMIN — SODIUM CHLORIDE, PRESERVATIVE FREE 10 ML: 5 INJECTION INTRAVENOUS at 20:00

## 2021-01-18 RX ADMIN — PAROXETINE HYDROCHLORIDE 40 MG: 20 TABLET, FILM COATED ORAL at 07:48

## 2021-01-18 RX ADMIN — OXYCODONE AND ACETAMINOPHEN 1 TABLET: 5; 325 TABLET ORAL at 16:05

## 2021-01-18 RX ADMIN — POTASSIUM CHLORIDE 20 MEQ: 1500 TABLET, EXTENDED RELEASE ORAL at 07:47

## 2021-01-18 RX ADMIN — FERROUS SULFATE TAB 325 MG (65 MG ELEMENTAL FE) 325 MG: 325 (65 FE) TAB at 16:05

## 2021-01-18 RX ADMIN — CIPROFLOXACIN 500 MG: 500 TABLET, FILM COATED ORAL at 20:08

## 2021-01-18 RX ADMIN — Medication 10 ML: at 12:59

## 2021-01-18 RX ADMIN — OXYCODONE AND ACETAMINOPHEN 1 TABLET: 5; 325 TABLET ORAL at 12:07

## 2021-01-18 RX ADMIN — TAMSULOSIN HYDROCHLORIDE 0.4 MG: 0.4 CAPSULE ORAL at 07:48

## 2021-01-18 RX ADMIN — MESALAMINE 800 MG: 400 CAPSULE, DELAYED RELEASE ORAL at 07:48

## 2021-01-18 RX ADMIN — OXYCODONE AND ACETAMINOPHEN 1 TABLET: 5; 325 TABLET ORAL at 23:51

## 2021-01-18 RX ADMIN — OXYCODONE AND ACETAMINOPHEN 1 TABLET: 5; 325 TABLET ORAL at 07:46

## 2021-01-18 RX ADMIN — MICONAZOLE NITRATE: 20 POWDER TOPICAL at 07:49

## 2021-01-18 RX ADMIN — Medication 100 MG: at 16:05

## 2021-01-18 RX ADMIN — PANTOPRAZOLE SODIUM 40 MG: 40 TABLET, DELAYED RELEASE ORAL at 16:05

## 2021-01-18 RX ADMIN — MESALAMINE 800 MG: 400 CAPSULE, DELAYED RELEASE ORAL at 20:08

## 2021-01-18 RX ADMIN — CIPROFLOXACIN 500 MG: 500 TABLET, FILM COATED ORAL at 10:45

## 2021-01-18 RX ADMIN — MESALAMINE 800 MG: 400 CAPSULE, DELAYED RELEASE ORAL at 12:58

## 2021-01-18 RX ADMIN — MAGNESIUM GLUCONATE 500 MG ORAL TABLET 400 MG: 500 TABLET ORAL at 07:47

## 2021-01-18 RX ADMIN — PREGABALIN 200 MG: 50 CAPSULE ORAL at 20:09

## 2021-01-18 RX ADMIN — FOLIC ACID 2 MG: 1 TABLET ORAL at 07:47

## 2021-01-18 RX ADMIN — FERROUS SULFATE TAB 325 MG (65 MG ELEMENTAL FE) 325 MG: 325 (65 FE) TAB at 07:51

## 2021-01-18 RX ADMIN — HEPARIN 500 UNITS: 100 SYRINGE at 12:59

## 2021-01-18 RX ADMIN — BUMETANIDE 1 MG: 1 TABLET ORAL at 07:47

## 2021-01-18 RX ADMIN — PREGABALIN 200 MG: 50 CAPSULE ORAL at 12:58

## 2021-01-18 RX ADMIN — MAGNESIUM GLUCONATE 500 MG ORAL TABLET 400 MG: 500 TABLET ORAL at 20:08

## 2021-01-18 RX ADMIN — MULTIPLE VITAMINS W/ MINERALS TAB 1 TABLET: TAB at 16:05

## 2021-01-18 RX ADMIN — ENOXAPARIN SODIUM 40 MG: 40 INJECTION SUBCUTANEOUS at 07:46

## 2021-01-18 RX ADMIN — MICONAZOLE NITRATE: 20 POWDER TOPICAL at 20:12

## 2021-01-18 RX ADMIN — PANTOPRAZOLE SODIUM 40 MG: 40 TABLET, DELAYED RELEASE ORAL at 05:04

## 2021-01-18 ASSESSMENT — PAIN - FUNCTIONAL ASSESSMENT: PAIN_FUNCTIONAL_ASSESSMENT: ACTIVITIES ARE NOT PREVENTED

## 2021-01-18 ASSESSMENT — PAIN DESCRIPTION - PAIN TYPE: TYPE: ACUTE PAIN

## 2021-01-18 ASSESSMENT — PAIN SCALES - GENERAL
PAINLEVEL_OUTOF10: 6
PAINLEVEL_OUTOF10: 2

## 2021-01-18 ASSESSMENT — PAIN DESCRIPTION - LOCATION: LOCATION: BACK

## 2021-01-18 NOTE — PROGRESS NOTES
[address]  Occupational Therapy  Daily Note  Time:    Time In: 1140  Time Out: 1240  Timed Code Treatment Minutes: 60 Minutes  Minutes: 60          Date: 2021  Patient Name: Memo Bernal,   Gender: male      Room: [facility]/054-A  MRN: [id number]  : 1975  (39 y.o.)  Referring Practitioner: CRISTIAN Chiang CNP  Diagnosis: History of lumbar laminectomy  Additional Pertinent Hx: Per EMR: \"39year-old obese  male presents to emergency department with evaluation of bilateral leg edema. Patient relates his leg swelling to status post lumbar fusion L3-L5 on 2020 had surgery remove fluid on 2021. Andres Kerrie Patient has gradually gotten worse since that time. \" To IPR on 1/15    Restrictions/Precautions:  Restrictions/Precautions: General Precautions, Fall Risk  Required Braces or Orthoses  Spinal: Lumbar Corset  Position Activity Restriction  Spinal Precautions: No Bending, No Twisting, No Lifting  Other position/activity restrictions: Pt had right arm amputated 3 years ago. s/p L3-5 fusion       SUBJECTIVE: Pt agreeable to OT. Pt with bone stimulator on upon initial entry into room. Discussed attempting to use the bone stimulator at the same time daily Pt reports he would prefer to complete this in the PM. Scheduling adjusted for preference. PT also asking why he has to stay longer. Pt reports wanting to go home soon. Discussed progression of rehab as well as ensuring that the pt is prepared for discharge and not leaving too early to avoid returning with any complications. PAIN: 6/10: in back , given pain meds during session . COGNITION: WFL    ADL:   No ADL's completed this session. Andres Sy BALANCE:  Sitting Balance:  Modified Independent. Standing Balance: Contact Guard Assistance. dynamic reaching tasks,    BED MOBILITY:  Not Tested    TRANSFERS:  Sit to Stand:  Contact Guard Assistance.  to SBA from recliner, CGA to light min A from a lower straight chair. Stand to Sit: Contact Guard Assistance. FUNCTIONAL MOBILITY:  Assistive Device: Féilx-walker  Assist Level:  Contact Guard Assistance. Distance: To and from therapy apartment  Min cues for safe placement of the félix walker. Pt tends to place it too far in front of him and only 2 legs on the ground at times. May consider alternative device. Pt stated that he did not want to use it at home. ADDITIONAL ACTIVITIES:  Pt competed light homemaking tasks in the apartment to transport items to set the table. Pt able to transfer items with min cues for safe tech from upper and lower cupboards to set a table. CGA to SBA for balance. Standing and mobility x 8 min then x 9 minutes. Reviewed back safety, proper body mechanics and alternative lifting techniques. [address] engagement. ASSESSMENT:     Activity Tolerance:  Patient tolerance of  treatment: good. Discharge Recommendations: Home with Home health OT       Equipment Recommendations: Other: Recommend LHAE and shower chair. Monitor for need for BSC. Plan: Times per week: 5x/wk for 90 min and 1x/wk for 30 min  Current Treatment Recommendations: Strengthening, Endurance Training, Patient/Caregiver Education & Training, Equipment Evaluation, Education, & procurement, Self-Care / ADL, Balance Training, Functional Mobility Training, Safety Education & Training, Positioning    Patient Education  Patient Education: AudioBoo    Goals  Short term goals  Time Frame for Short term goals: 1 week  Short term goal 1: Pt will navigate hemiwalker to and from the bathroom with SBA to improve accessing bathroom. Short term goal 2: Pt will complete LB dressing tasks with LHAE prn and mod A to improve indep with self care. Short term goal 3: Pt will demo spinal precautions during 4 min dynamic standing tasks to improve indep with meal prep or IADLs.   Short term goal 4: Pt will complete meal prep tasks with SBA using hemiwalker to improve indep within home. Long term goals  Time Frame for Long term goals : 2 weeks  Long term goal 1: Pt will complete BADL routine with mod I and 0 vcs for spinal precautions to improve indep within home. Long term goal 2: Pt will complete light IADL tasks with mod I and 0 vcs for adaptive technique or spinal precautions to improve indep within home    Following session, patient left in safe position with all fall risk precautions in place.

## 2021-01-18 NOTE — PLAN OF CARE
Problem: Pain:  Goal: Pain level will decrease  Description: Pain level will decrease  Outcome: Ongoing     Problem: Pain:  Goal: Control of acute pain  Description: Control of acute pain  Outcome: Ongoing     Problem: Pain:  Goal: Control of chronic pain  Description: Control of chronic pain  Outcome: Ongoing     Problem: Falls - Risk of:  Goal: Will remain free from falls  Description: Will remain free from falls  Outcome: Ongoing     Problem: Falls - Risk of:  Goal: Absence of physical injury  Description: Absence of physical injury  Outcome: Ongoing     Problem: IP BALANCE  Goal: LTG - Patient will maintain balance to allow for safe/functional mobility  Outcome: Ongoing     Problem: Urinary Elimination:  Goal: Signs and symptoms of infection will decrease  Description: Signs and symptoms of infection will decrease  Outcome: Ongoing     Problem: Urinary Elimination:  Goal: Complications related to the disease process, condition or treatment will be avoided or minimized  Description: Complications related to the disease process, condition or treatment will be avoided or minimized  Outcome: Ongoing     Problem: Discharge Planning:  Goal: Discharged to appropriate level of care  Description: Discharged to appropriate level of care  Outcome: Ongoing     Problem: Skin Integrity:  Goal: Will show no infection signs and symptoms  Description: Will show no infection signs and symptoms  Outcome: Ongoing     Problem: Skin Integrity:  Goal: Absence of new skin breakdown  Description: Absence of new skin breakdown  Outcome: Ongoing     Problem: IP DRESSINGS LOWER EXTREMITIES  Goal: LTG - patient will dress lower body with or without assistive device  Outcome: Ongoing  Care plan reviewed with patient. Patient verbalizes understanding of care plan and treatment goals.

## 2021-01-18 NOTE — PLAN OF CARE
Care plan reviewed with patient and  verbalize understanding of the plan of care and contribute to goal setting. Problem: Falls - Risk of:  Goal: Will remain free from falls  Description: Will remain free from falls  1/18/2021 0830 by Vaishnavi Freeman RN  Note: Uses call light  1/18/2021 0015 by Dell Diez RN  Outcome: Ongoing     Problem: Pain:  Description: Pain management should include both nonpharmacologic and pharmacologic interventions. Goal: Control of chronic pain  Description: Control of chronic pain  1/18/2021 0830 by Vaishnavi Freeman RN  Outcome: Completed  1/18/2021 0015 by Dell Diez RN  Outcome: Ongoing     Problem: Pain:  Description: Pain management should include both nonpharmacologic and pharmacologic interventions. Goal: Control of acute pain  Description: Control of acute pain  1/18/2021 0830 by Vaishnavi Freeman RN  Outcome: Met This Shift  Note: With oxy  1/18/2021 0015 by Dell Diez RN  Outcome: Ongoing     Problem: Pain:  Description: Pain management should include both nonpharmacologic and pharmacologic interventions.   Goal: Pain level will decrease  Description: Pain level will decrease  1/18/2021 0830 by Vaishnavi Freeman RN  Outcome: Met This Shift  Note: With oxy  1/18/2021 0015 by Dell Diez RN  Outcome: Ongoing     Problem: Urinary Elimination:  Goal: Signs and symptoms of infection will decrease  Description: Signs and symptoms of infection will decrease  1/18/2021 0830 by Vaishnavi Freeman RN  Outcome: Met This Shift  Note: Urine clear per horn  1/18/2021 0015 by Dell Diez RN  Outcome: Ongoing  Goal: Complications related to the disease process, condition or treatment will be avoided or minimized  Description: Complications related to the disease process, condition or treatment will be avoided or minimized  1/18/2021 0830 by Vaishnavi Freeman RN  Outcome: Met This Shift  Note: Vs stable  1/18/2021 0015 by Dell Diez

## 2021-01-18 NOTE — PROGRESS NOTES
[facility]         Renal Inpatient Follow-Up note         1/18/2021 10:37 AM    Pt Name:   Gregorio Hamman  YOB: 1975  Attending: Teodora Sunshine MD    Chief Complaint : Gregorio Hamman is a 39 y.o. male being followed by nephrology for fluid overload and ONIEL    Interval History :   Patient seen and examined by me. No distress  Feels ok. No cp or SOB. Making  Excellent UO. Edema better     Scheduled Medications :    [START ON 1/19/2021] heparin flush  500 Units Intracatheter Daily    magnesium oxide  400 mg Oral BID    miconazole   Topical BID    spironolactone  25 mg Oral Daily    ciprofloxacin  500 mg Oral Q12H    enoxaparin  40 mg Subcutaneous Q24H    sodium chloride flush  10 mL Intravenous 2 times per day    bumetanide  1 mg Oral Daily    folic acid  2 mg Oral Daily    mesalamine  800 mg Oral TID    pantoprazole  40 mg Oral BID AC    PARoxetine  40 mg Oral QAM    potassium chloride  20 mEq Oral Daily with breakfast    pregabalin  200 mg Oral TID    tamsulosin  0.4 mg Oral Daily    ferrous sulfate  325 mg Oral BID WC    influenza virus vaccine  0.5 mL Intramuscular Prior to discharge         Vitals :  /63   Pulse 90   Temp 98.4 °F (36.9 °C) (Oral)   Resp 18   Ht 6' (1.829 m)   Wt 270 lb 12.8 oz (122.8 kg)   SpO2 96%   BMI 36.73 kg/m²     24HR INTAKE/OUTPUT:      Intake/Output Summary (Last 24 hours) at 1/18/2021 1037  Last data filed at 1/18/2021 0813  Gross per 24 hour   Intake 1060 ml   Output 5225 ml   Net -4165 ml     Last 3 weights  Wt Readings from Last 3 Encounters:   01/17/21 270 lb 12.8 oz (122.8 kg)   01/15/21 250 lb (113.4 kg)   01/10/21 250 lb (113.4 kg)           Physical Exam :  General Appearance:  Well developed.  No distress  Mouth/Throat:  Oral mucosa moist  Neck:  Supple, no JVD  Lungs:  Breath sounds: clear  Heart[de-identified]  S1,S2 heard  Abdomen:  Soft, non - tender  Musculoskeletal:  Edema -minimal         Last 3 CBC   Recent Labs     01/16/21  0530 01/16/21  2350   WBC 5.8 6.9   RBC 2.40* 2.36*   HGB 7.9* 7.9*   HCT 24.5* 23.8*   * 104*     Last 3 CMP  Recent Labs     01/16/21  0530 01/17/21  0545 01/18/21  0455   * 132* 137   K 4.3 3.8 3.8   CL 97* 98 101   CO2 28 27 26   BUN 15 11 12   CREATININE 0.7 0.7 0.6   CALCIUM 8.5 8.5 8.9   LABALBU 3.1*  --   --    BILITOT 0.8  --   --              ASSESSMENT / Plan   1 Renal -mild acute kidney injury, mostly due to contrast-induced nephropathy currently appears to be better  ? Creatinine at baseline. BMP in the morning    2 Electrolytes -within normal limits. continue 20 KCl p.o. daily while on diuretics  3 Hyponatremia secondary to hypervolemia improving with diuresis  4 Essential hypertension running well continue current medications . 5 Fluid overload-currently on diuretics volume status is much better. He is -7 L. Continue Bumex  6 Hx of hepatitis C  7 Recent back surgery with spinal cord compression status post surgery for abscess . 8 Meds reviewed and discussed with patient    CARLOS Andrade D.  [facility].

## 2021-01-18 NOTE — PLAN OF CARE
Individualized Plan of Care  [address] [address] Inpatient Rehabilitation Unit    Rehabilitation physician: Dr. Haroon Calix Date: 1/15/2021     Rehabilitation Diagnosis: History of lumbar laminectomy [Z98.890]  S/P lumbar laminectomy [Z98.890]      Rehabilitation impairments: self care, mobility, motor dysfunction, bowel/bladder management, pain management and safety    Factors facilitating achievement of predicted outcomes: Friend support, Motivated, Cooperative and Pleasant  Barriers to the achievement of predicted outcomes: Pain, Limited family support, Limited safety awareness, Limited insight into deficits, Decreased endurance, Lower extremity weakness, Long standing deficits, Medical complications, Skin Care, Wound Care and Medication managment    Patient Goals: Improve independence with mobility, Increase overall strength and endurance, Increase balance, Increase endurance, Increase independence with activities of daily living, Increase self-awareness, Increase safety awareness, Integrate appropriate pain management plan, Assure adequate nutritional option for discharge, Continence of bowel and bladder and Provide appropriate patient and family education      NURSING:  Nursing goals for [employer] while on the rehabilitation unit will include:  Continence of bowel and bladder, Adequate number of bowel movements, Maintain O2 SATs at an acceptable level during stay, Effective pain management while on the rehabilitation unit, Establish adequate pain control plan for discharge, Absence of skin breakdown while on the rehabilitation unit, Improved skin integrity via assessments including wound measurements, Avoidance of any hospital acquired infections, No signs/symptoms of infection at the wound site, Freedom from injury during hospitalization and Complete education with patient/family with understanding demonstrated regarding disease process and resultant impairment     In order to achieve these goals, nursing interventions may include bowel/bladder training, education for medical assistive devices, medication education, O2 saturation management, energy conservation, stress management techniques, fall prevention, alarms protocol, seating and positioning, skin/wound care, pressure relief instruction, dressing changes, infection protection, DVT prophylaxis, assistance with safe transfers , and/or assistance with bathroom activities and hygiene. PHYSICAL THERAPY:  Goals:        Short term goals  Time Frame for Short term goals: 1 week  Short term goal 1: pt to perform supine to/from sit at [facility] to get in and out of bed  Short term goal 2: pt to perform sit to/from stand at [facility] to rise from bed or chair  Short term goal 3: pt to ambulate >50ft with hemiwalker at [place] [address] for mobility in the home  Short term goal 4: pt to perform car transfer at [facility] for transport to home  Short term goal 5: pt to negotiate 6\" platform step with hemiwalker at [facility] for community integration  Long term goals  Time Frame for Long term goals : 2 weeks  Long term goal 1: pt to perform supine to/from sit at Mod I to get in and out of bed  Long term goal 2: pt to perform sit to/from stand at Mod I to rise from bed or chair  Long term goal 3: pt to ambulate >50ft with least restrictive device at Mod I, 150ft at S for mobility in the home and community  Long term goal 4: pt to perform car transfer at S for transport to home  Long term goal 5: pt to negotiate 6\" platform step with hemiwalker at [place] [address] for community integration    Plan of Care: Patient to be seen by physical therapy services 90 minutes per day Monday through Friday and 30 minutes on Saturday or Sunday    Anticipated interventions may include therapeutic exercises, gait training, neuromuscular re-ed, transfer training, community reintegration, bed mobility, w/c mobility and training.       OCCUPATIONAL THERAPY:  Goals:             Short term goals  Time Frame for Short term goals: 1 week  Short term goal 1: Pt will navigate hemiwalker to and from the bathroom with SBA to improve accessing bathroom. Short term goal 2: Pt will complete LB dressing tasks with LHAE prn and mod A to improve indep with self care. Short term goal 3: Pt will demo spinal precautions during 4 min dynamic standing tasks to improve indep with meal prep or IADLs. Short term goal 4: Pt will complete meal prep tasks with SBA using hemiwalker to improve indep within home. Long term goals  Time Frame for Long term goals : 2 weeks  Long term goal 1: Pt will complete BADL routine with mod I and 0 vcs for spinal precautions to improve indep within home. Long term goal 2: Pt will complete light IADL tasks with mod I and 0 vcs for adaptive technique or spinal precautions to improve indep within home    Plan of Care: Patient to be seen by occupational therapy services 90 minutes per day Monday through Friday and 30 minutes on Saturday or Sunday    Anticipated interventions may include ADL and IADL retraining, strengthening, safety education and training, patient/caregiver education and training, equipment evaluation/ training/procurement, neuromuscular reeducation, wheelchair mobility training. CASE MANAGEMENT:  Goals:   Assist patient/family with discharge planning, patient/family counseling,  and coordination with insurance during the inpatient rehabilitation stay. Other members of the multidisciplinary rehabilitation team that will be involved in the patient's plan of care include recreational therapy, dietary, respiratory therapy, and neuropsychology.     Medical issues being managed closely and that require 24 hour availability of a physician:  Bowel/Bladder function, Wound care, Pain management, Infection protection, DVT prophylaxis, Fall precautions, Fluid/Electrolyte balance, Nutritional status and Respiratory needs                                           Physician anticipated functional outcomes: Improved independence with functional measures   Estimated length of stay for this admission 10-14 days  Medical Prognosis: Good  Anticipated disposition: Recovery group home. The potential to achieve the above medical and rehabilitative goals is very good. This plan of care has been developed with the assistance and input of the multidisciplinary rehabilitation team.  The plan was reviewed with the patient. The patient has had the opportunity to provide input to the therapy team.    I have reviewed this Individualized Plan of Care and agree with its contents. Above documentation has been expanded, modified, adjusted to reflect the findings of my evaluations and goals for the patient.     Physician:      Renetta Mansfield MD

## 2021-01-18 NOTE — PROGRESS NOTES
Physical Medicine & Rehabilitation   Progress Note    Chief Complaint:  CHF exacerbation, lumbar surgery L3-L4 with discectomy, and right arm amputation    Subjective:  Patient sitting up in chair, working on lunch. Patient feels things are going well and notes that he is improving. Noted that patient is down 7L since admission. Patient also with UTI, on cipro, await sensitivity. Patient states he has a history of UTIs. Swelling improving. Nephrology continues to follow along with family medicine during his IRP stay. Patient denies any needs or concerns at this time. Rehabilitation:  PT:   PAIN: 4 /10: low back. Post. Lt thigh down to knee. Palpable tenderness medial distil thigh/knee. Ice pack applied at end of session. Pt also given K-pad for back at end of session. Bed Mobility:  Rolling to Left: Minimal Assistance   Supine to Sit: Minimal Assistance, to elevate trunk. Transfers:  Sit to Stand: Minimal Assistance, 1st trial from bed, CGA from mat and w/c  Stand to Sit:Contact Guard Assistance, cues to fully align and reach back with LUE  Stand Pivot:Contact Guard Assistance, with cues for alignment. Ambulation:  Contact Guard Assistance  Distance: 7 ft x2  Surface: Level Tile  Device:Félix-Walker  Gait Deviations:  Slow [place], Decreased Step Length Bilaterally and min trunk sway to ea stance side. Balance:  Static Standing Balance: Contact Guard Assistance, wtih hemiwalker  EXERCISES:  The following exercises were completed to improve functional mobility: supine- glute and quad sets BLEs with cues to increase activation LLE, benjie ankle pumps x10 reps. Short arc quads x10 reps RLE, 2x5 reps LLE with mod assist to gain full TKE and maintain. benjie hip ADD isometrics, LUE Diagonals to ea direction, abdominal isometrics, manually resisted LT hip ABD and PNF D1, x10 reps ea. OT:  PAIN: 6/10: in back , given pain meds during session .   COGNITION: WFL  BALANCE:  Sitting Balance:  Modified Independent. Standing Balance: Contact Guard Assistance. dynamic reaching tasks,  TRANSFERS:  Sit to Stand:  Contact Guard Assistance. to SBA from recliner, CGA to light min A from a lower straight chair. Stand to Sit: Contact Guard Assistance. FUNCTIONAL MOBILITY:  Assistive Device: Félix-walker  Assist Level:  Contact Guard Assistance. Distance: To and from therapy apartment  Min cues for safe placement of the félix walker. Pt tends to place it too far in front of him and only 2 legs on the ground at times. May consider alternative device. Pt stated that he did not want to use it at home. ADDITIONAL ACTIVITIES:  Pt competed light homemaking tasks in the apartment to transport items to set the table. Pt able to transfer items with min cues for safe tech from upper and lower cupboards to set a table. CGA to SBA for balance. Standing and mobility x 8 min then x 9 minutes. Reviewed back safety, proper body mechanics and alternative lifting techniques. Jenny Bradford engagement.        Review of Systems:  CONSTITUTIONAL:  negative  RESPIRATORY:  negative  CARDIOVASCULAR:  negative  GASTROINTESTINAL:  negative  GENITOURINARY:  horn  MUSCULOSKELETAL:  positive for  pain and muscle weakness  NEUROLOGICAL:  positive for gait problems, weakness and pain  BEHAVIOR/PSYCH:  negative  System review otherwise negative    Objective:  /63   Pulse 90   Temp 98.4 °F (36.9 °C) (Oral)   Resp 18   Ht 6' (1.829 m)   Wt 270 lb 12.8 oz (122.8 kg)   SpO2 96%   BMI 36.73 kg/m²   awake  Orientation:   person, place, time  Mood: within normal limits  Affect: calm  General appearance: Patient is well nourished, well developed, well groomed and in no acute distress    Memory:   normal,   Attention/Concentration: normal  Language:  normal    Cranial Nerves:  cranial nerves II-XII are grossly intact  ROM:  abnormal - limited BLE  Tone:  normal  Muscle bulk: within normal limits  Sensory:  Sensory intact    Heart: normal rate, regular rhythm, normal S1, S2, no murmurs, rubs, clicks or gallops  Lungs: clear to auscultation without wheezes or rales  Abdomen: soft, non-tender, non-distended, normal bowel sounds, no masses or organomegaly    Skin: warm and dry, no rash or erythema. Lumbar incision not visualized  Peripheral vascular:  Edema: 1+      Diagnostics:   Recent Results (from the past 24 hour(s))   Basic Metabolic Panel    Collection Time: 01/18/21  4:55 AM   Result Value Ref Range    Sodium 137 135 - 145 meq/L    Potassium 3.8 3.5 - 5.2 meq/L    Chloride 101 98 - 111 meq/L    CO2 26 23 - 33 meq/L    Glucose 116 (H) 70 - 108 mg/dL    BUN 12 7 - 22 mg/dL    CREATININE 0.6 0.4 - 1.2 mg/dL    Calcium 8.9 8.5 - 10.5 mg/dL   Magnesium    Collection Time: 01/18/21  4:55 AM   Result Value Ref Range    Magnesium 1.6 1.6 - 2.4 mg/dL   Anion Gap    Collection Time: 01/18/21  4:55 AM   Result Value Ref Range    Anion Gap 10.0 8.0 - 16.0 meq/L   Glomerular Filtration Rate, Estimated    Collection Time: 01/18/21  4:55 AM   Result Value Ref Range    Est, Glom Filt Rate >90 ml/min/1.73m2       Impression:  1. Lumbar stenosis  1. L3-5 decompression and fusion 12/29/2020  2. L3-5 revision facetectomies and L3-4 left discectomy with hematoma evacuation 1/6/2021  2. ONIEL with volume overload and peripheral edema  3. Alcohol abuse, in remission  4. Amputation of right arm  5. Ascites  6. Gastroesophageal reflux disease  7. Hepatic cirrhosis  8. Hepatitis B  9. Hepatitis C  10. Hypertension  11. Intravenous drug use, in remission   12. Psychiatric problem  13. Posttraumatic stress disorder  14. Sciatica  15. Splenomegaly  16. Thrombocytopenia  17. Ulcerative colitis    Plan:  Continue current therapies  Prophylaxis: DVT - Lovenox, GI - Protonix  Pain: tylenol, percocet, lyrica  Bowels: dulcolax, miralax  · Recheck BMP and Mag in the morning.   · Team conference Thursday  · Thiamine and multivitamin added    Missed Therapy Time:  · None    Mabel L CRISTIAN Bess - CNP

## 2021-01-18 NOTE — PROGRESS NOTES
[address] [address]  INPATIENT PHYSICAL THERAPY  DAILY NOTE  [facility] - [id number]    Time In: 0830  Time Out: 0935  Timed Code Treatment Minutes: 72 Minutes  Minutes: 65          Date: 2021  Patient Name: Joy Baez,  Gender:  male        MRN: [id number]  : 1975  (39 y.o.)     Referring Practitioner: CRISTIAN Fallon CNP  Diagnosis: history of lumbar laminectomy  Additional Pertinent Hx: Per EMR: \"39year-old obese  male presents to emergency department with evaluation of bilateral leg edema. Patient relates his leg swelling to status post lumbar fusion L3-L5 on 2020 had surgery remove fluid on 2021. Erica Heman Patient has gradually gotten worse since that time. \" To [facility] on 1/15     Prior Level of Function:  Lives With: Other (comment)([facility] - ~[facility] residents)  Type of Home: House  Home Layout: One level  Home Access: Level entry  Home Equipment: Rolling walker   Bathroom Shower/Tub: Walk-in shower  Bathroom Toilet: Handicap height    ADL Assistance: Independent  Homemaking Assistance: Independent  Ambulation Assistance: Independent  Transfer Assistance: Independent  Active : No  Additional Comments: pt reports no use of AD at baseline, utilizing RW since surgery 20    Restrictions/Precautions:  Restrictions/Precautions: General Precautions, Fall Risk  Required Braces or Orthoses  Spinal: Lumbar Corset  Position Activity Restriction  Spinal Precautions: No Bending, No Twisting, No Lifting  Other position/activity restrictions: Pt had right arm amputated 3 years ago. s/p L3-5 fusion      SUBJECTIVE: Pt resting in bed. Pleasant and cooperative. PAIN:  /10: low back. Post. Lt thigh down to knee. Palpable tenderness medial distil thigh/knee. Ice pack applied at end of session. Pt also given K-pad for back at end of session.       OBJECTIVE:  Bed Mobility:  Rolling to Left: Minimal Assistance   Supine to Sit: Minimal Assistance, to elevate trunk. Transfers:  Sit to Stand: Minimal Assistance, 1st trial from bed, CGA from mat and w/c  Stand to Sit:Contact Guard Assistance, cues to fully align and reach back with LUE  Stand Pivot:Contact Guard Assistance, with cues for alignment. Ambulation:  Contact Guard Assistance  Distance: 7 ft x2  Surface: Level Tile  Device:Félix-Walker  Gait Deviations:  Slow [place], Decreased Step Length Bilaterally and min trunk sway to ea stance side. Balance:  Static Standing Balance: Contact Guard Assistance, wtih hemiwalker  EXERCISES:  The following exercises were completed to improve functional mobility: supine- glute and quad sets BLEs with cues to increase activation LLE, benjie ankle pumps x10 reps. Short arc quads x10 reps RLE, 2x5 reps LLE with mod assist to gain full TKE and maintain. benjie hip ADD isometrics, LUE Diagonals to ea direction, abdominal isometrics, manually resisted LT hip ABD and PNF D1, x10 reps ea. ASSESSMENT:  Assessment: Patient progressing toward established goals. Activity Tolerance:  Patient tolerance of  treatment: good. Pt assisted with donning bone stimulator at end of session     Equipment Recommendations:Equipment Needed: Yes(continue to monitor, recently given RW from [facility] following sx 12/29.  hemiwalker?)  Discharge Recommendations:  Continue to assess pending progress    Plan: Times per week: 5x/wk 90 min, 1x/wk 30 min  Current Treatment Recommendations: Strengthening, Gait Training, Patient/Caregiver Education & Training, ROM, Stair training, Equipment Evaluation, Education, & procurement, Balance Training, Neuromuscular Re-education, Pain Management, Functional Mobility Training, Endurance Training, Home Exercise Program, Transfer Training, Safety Education & Training, Modalities, Wheelchair Mobility Training    Patient Education  Patient Education: Home Exercise Program, [employer] Mobility, Transfers, Gait,  - Patient Verbalized Understanding, - Patient Requires Continued Education    Goals:  Patient goals : To go home  Short term goals  Time Frame for Short term goals: 1 week  Short term goal 1: pt to perform supine to/from sit at [address] [address] to get in and out of bed  Short term goal 2: pt to perform sit to/from stand at SBA to rise from bed or chair  Short term goal 3: pt to ambulate >50ft with hemiwalker at [facility] for mobility in the home  Short term goal 4: pt to perform car transfer at [address] [address] for transport to home  Short term goal 5: pt to negotiate 6\" platform step with hemiwalker at [address] [address] for community integration  Long term goals  Time Frame for Long term goals : 2 weeks  Long term goal 1: pt to perform supine to/from sit at Mod I to get in and out of bed  Long term goal 2: pt to perform sit to/from stand at Mod I to rise from bed or chair  Long term goal 3: pt to ambulate >50ft with least restrictive device at Mod I, 150ft at S for mobility in the home and community  Long term goal 4: pt to perform car transfer at S for transport to home  Long term goal 5: pt to negotiate 6\" platform step with hemiwalker at [facility] for community integration    Following session, patient left in safe position with all fall risk precautions in place.

## 2021-01-18 NOTE — PROGRESS NOTES
good.          Discharge Recommendations: Home with Home health OT      Equipment Recommendations: Other: Recommend LHAE and shower chair. Monitor for need for BSC. Plan: Times per week: 5x/wk for 90 min and 1x/wk for 30 min  Current Treatment Recommendations: Strengthening, Endurance Training, Patient/Caregiver Education & Training, Equipment Evaluation, Education, & procurement, Self-Care / ADL, Balance Training, Functional Mobility Training, Safety Education & Training, Positioning    Patient Education  Patient Education: ADL's    Goals  Short term goals  Time Frame for Short term goals: 1 week  Short term goal 1: Pt will navigate hemiwalker to and from the bathroom with SBA to improve accessing bathroom. Short term goal 2: Pt will complete LB dressing tasks with LHAE prn and mod A to improve indep with self care. Short term goal 3: Pt will demo spinal precautions during 4 min dynamic standing tasks to improve indep with meal prep or IADLs. Short term goal 4: Pt will complete meal prep tasks with SBA using hemiwalker to improve indep within home. Long term goals  Time Frame for Long term goals : 2 weeks  Long term goal 1: Pt will complete BADL routine with mod I and 0 vcs for spinal precautions to improve indep within home. Long term goal 2: Pt will complete light IADL tasks with mod I and 0 vcs for adaptive technique or spinal precautions to improve indep within home    Following session, patient left in safe position with all fall risk precautions in place.

## 2021-01-18 NOTE — CONSULTS
Weight: (Per EMR: 247 lb on 11/25/20; 244 lb 12.8 oz on 10/2/20; 236 lb 12.8 oz on 7/10/20; 240 lb on 3/19/20)     · Adjusted Ideal Body Weight: 169 lbs   · Adjusted BMI: 38.5    · BMI Categories: Obese Class 2 (BMI 35.0 -39.9)       Nutrition Diagnosis:   · Increased nutrient needs related to increase demand for energy/nutrients as evidenced by wounds    Nutrition Interventions:   Food and/or Nutrient Delivery:  Continue Current Diet, Start Oral Nutrition Supplement, Vitamin Supplement  Nutrition Education/Counseling:  Education initiated(Encouraged lean protein sources with meals at best effort to aid in wound healing)   Coordination of Nutrition Care:  Continue to monitor while inpatient    Goals:  Pt will consume 75% or more of meals during LOS to aid in healing       Nutrition Monitoring and Evaluation:   Behavioral-Environmental Outcomes:  None Identified   Food/Nutrient Intake Outcomes:  Food and Nutrient Intake, Supplement Intake, Vitamin/Mineral Intake  Physical Signs/Symptoms Outcomes:  Biochemical Data, Weight, Skin, Nutrition Focused Physical Findings, Fluid Status or Edema     Discharge Planning:     Too soon to determine     Electronically signed by Angela Anaya RD, LD on 1/18/21 at 11:42 AM EST    Contact: [phone]

## 2021-01-18 NOTE — PROGRESS NOTES
[address] [address]  INPATIENT PHYSICAL THERAPY  DAILY NOTE  254 [facility] - 7E-54/054-A    Time In: 1400  Time Out: 1430  Timed Code Treatment Minutes: 30 Minutes  Minutes: 30          Date: 2021  Patient Name: Cal Ragland,  Gender:  male        MRN: [id number]  : 1975  (39 y.o.)     Referring Practitioner: CRISTIAN Rosales CNP  Diagnosis: history of lumbar laminectomy  Additional Pertinent Hx: Per EMR: \"39year-old obese  male presents to emergency department with evaluation of bilateral leg edema. Patient relates his leg swelling to status post lumbar fusion L3-L5 on 2020 had surgery remove fluid on 2021. Kapil Pretty Patient has gradually gotten worse since that time. \" To [facility] on 1/15     Prior Level of Function:  Lives With: Other (comment)([facility] (Levine, Susan. \[facility]""'[facility]) - ~14 residents)  Type of Home: House  Home Layout: One level  Home Access: Level entry  Home Equipment: Rolling walker   Bathroom Shower/Tub: Walk-in shower  Bathroom Toilet: Handicap height    ADL Assistance: Independent  Homemaking Assistance: Independent  Ambulation Assistance: Independent  Transfer Assistance: Independent  Active : No  Additional Comments: pt reports no use of AD at baseline, utilizing RW since surgery 20    Restrictions/Precautions:  Restrictions/Precautions: General Precautions, Fall Risk  Required Braces or Orthoses  Spinal: Lumbar Corset  Position Activity Restriction  Spinal Precautions: No Bending, No Twisting, No Lifting  Other position/activity restrictions: Pt had right arm amputated 3 years ago. s/p L3-5 fusion      SUBJECTIVE: Pt seated in recliner. Discussed with OT and OT then discussed with PT, patient's desire to not use the hemiwalker. Bariatric quad cane trialed this session.       PAIN: 0/10:     OBJECTIVE:    Transfers:  Sit to Stand: Contact Guard Assistance  Stand to [employer] Pivot:Contact Matthew Schwab, with q. cane    Ambulation:  Contact Guard Assistance  Distance: 25 ft, 10 ft  Surface: Level Tile  Device:bariatric quad cane  Gait Deviations:  Slow Sabrina, Decreased Step Length Bilaterally and Pt educated on placement of the cane as initially placed it in front of his feet, not out to side. EXERCISES:  The following exercises were completed to improve functional mobility: long arc quads LLE 2x5 reps with assist to gain and maintain TKE, x10 reps RLE with no assist needed. Ham curls with t-band resistance x10 reps BLEs        ASSESSMENT:  Assessment: Patient progressing toward established goals. Activity Tolerance:  Patient tolerance of  treatment: good. Equipment Recommendations:Equipment Needed: Yes(continue to monitor, recently given RW from [address] [place] [address] following sx 12/29. hemiwalker?)  Discharge Recommendations:  Continue to assess pending progress    Plan: Times per week: 5x/wk 90 min, 1x/wk 30 min  Current Treatment Recommendations: Strengthening, Gait Training, Patient/Caregiver Education & Training, ROM, Stair training, Equipment Evaluation, Education, & procurement, Balance Training, Neuromuscular Re-education, Pain Management, Functional Mobility Training, Endurance Training, Home Exercise Program, Transfer Training, Safety Education & Training, Modalities, Wheelchair Mobility Training    Patient Education  Patient Education: Transfers, Gait,  - Patient Verbalized Understanding, - Patient Requires Continued Education    Goals:  Patient goals :  To go home  Short term goals  Time Frame for Short term goals: 1 week  Short term goal 1: pt to perform supine to/from sit at [facility] to get in and out of bed  Short term goal 2: pt to perform sit to/from stand at [facility] to rise from bed or chair  Short term goal 3: pt to ambulate >50ft with hemiwalker at [facility] for mobility in the home  Short term goal 4: pt to perform car transfer at [facility] for transport to home  Short term goal 5: pt to negotiate 6\" platform step [facility]walker [facility] for community integration  Long term goals  Time Frame for Long term goals : 2 weeks  Long term goal 1: pt to perform supine to/from sit at [facility] to get in and out of bed  Long term goal 2: pt to perform sit to/from stand at [facility] to rise from bed or chair  Long term goal 3: pt to ambulate >50ft with least restrictive device at Mod I, 150ft at S for mobility in the home and community  Long term goal 4: pt to perform car transfer at S for transport to home  Long term goal 5: pt to negotiate 6\" platform step with hemiwalker at [facility] for community integration    Following session, patient left in safe position with all fall risk precautions in place.

## 2021-01-18 NOTE — PROGRESS NOTES
[facility]  Individualized Disclosure Statement      [facility]: [facility] provides 24 hour individualized service to patients with functional limitations due to, but not limited to: stroke, brain injury, spinal cord injury, major multiple trauma, fractures, amputation, and neurological disorders. The [address] [place] [address] provides rehabilitative nursing and medical services as well as physical, occupational, speech, and recreation therapies. [address] [facility] is fully accredited by the Commission on Accreditation of Rehabilitation Facilities (CARF) as a comprehensive provider of rehabilitation services. Patients admitted to the [address] [place] [address] receive a minimum of three hours of therapy per day, at least six days per week, with a revised therapy schedule on weekends and holidays. Physical therapy, occupational therapy, and speech therapy are provided seven days per week including holidays. Other therapeutic services are available on weekends and evenings as needed or scheduled. Intensity of Treatment  Your treatment program will consist of Nursing Care and:  1.5 hours of Physical Therapy, per day  1.5 hours of Occupational Therapy, per day  1 hour of Recreational Therapy, per week    [facility] maintains contracts with most insurance plans. Depending on the type of coverage, the insurance may impose limits on the coverage for rehabilitation care. Coverage is based on the premise that you are able to fully participate in the rehabilitation program and show continued progress. Please verify your own insurance information A copy of this was given to the patient/ family on this date.   Insurance Coverage  Your insurance company has made the following determination relative to the length of your stay:   Your estimated length of stay is 14 days   Your insurance Coverage has been verified as follows:    Primary Insurance: Medicare   Deductible:1484  Coverage: active  Secondary Insurance: medicaid ;secondary insurance policies often cover co-pay amounts, but to ensure payment please contact your insurance company.     Alternative Resources: Please ask the  for more information [phone]

## 2021-01-18 NOTE — PLAN OF CARE
Problem: DISCHARGE BARRIERS  Goal: Patient's continuum of care needs are met  Note:   [address] [address]  Physical Medicine Case Management Assessment    [x] Inpatient Rehabilitation Unit  [] Transitional Care Unit    Patient Name: Jasmina Marin        MRN: [id number]    : 1975  (39 y.o.)  Gender: male     Date of Admission: 01/15/2021     Family/Social/Home Environment: Prior to surgery and hospitalization, patient was independent with ADL's and personal care. Patient lives with roommates through [address] [place] [address]. Patient indicates having anywhere from 10 to 14 individuals in the home. Patient has lived and been involved in the [facility] program for the last three years. Patient indicates that the home has a chore list that changes on a monthly basis. Patient reports that other members have been helping and understanding regarding recent inability to complete chores. Patient does not drive, but reports availability for transportation. Patient indicates working part-time (16 to 20 hours a week) through a [address] [place] [address] Street. Patient manages own finances. Patient additionally manages his own medications, however, medications are locked in the home and requires staff assistance. Patient reports goal to be able to return home as independent as possible.      Social/Functional History  Lives With: Other (comment)([facility] ([facility]) - ~14 residents)  Type of Home: House  Home Layout: One level  Home Access: Level entry  Bathroom Shower/Tub: Walk-in shower  Bathroom Toilet: Handicap height  Home Equipment: Rolling walker  ADL Assistance: Independent  Homemaking Assistance: Independent  Ambulation Assistance: Independent  Transfer Assistance: Independent  Active : No  Additional Comments: pt reports no use of AD at baseline, utilizing RW since surgery 20    Contact/Guardian Information: Mother, Glen Mcdermott, [phone], lives in [facility] , [place], [phone]. Community Resources Utilized: No community resources utilized prior to admission. Sexuality/Intimacy: No issues or concerns identified at time of SW assessment. Complementary Health Approaches: Patient with no current interest in complementary health approaches at time of SW assessment. Anticipated Needs/Discharge Plans: Anticipate patient would benefit from continued therapy upon discharge. SW met with patient to introduce self and explain role, complete SW assessment, and initiate discharge planning. Prior to surgery and hospitalization, patient was independent with ADL's and personal care. Patient lives with roommates through [address] [place] [address]. Patient indicates having anywhere from 10 to 14 individuals in the home. Patient has lived and been involved in the [facility]'s [employer] program for the last three years. Patient indicates that the home has a chore list that changes on a monthly basis. Patient reports that other members have been helping and understanding regarding recent inability to complete chores. Patient does not drive, but reports availability for transportation. Patient indicates working part-time (16 to 20 hours a week) through a [address] [place] [address] Street. Patient manages own finances. Patient additionally manages his own medications, however, medications are locked in the home and requires staff assistance. Patient reports goal to be able to return home as independent as possible. Anticipate patient would benefit from continued therapy upon discharge. SW reviewed with patient team conference on Thursday, 01/21/2021, to further discuss progress and discharge recommendations. SW to follow and maintain involvement in discharge planning.              Discharge Planning  Support Systems: Family Members  Potential Assistance Needed: N/A  Potential Assistance Purchasing Medications: No  Meds-to-Beds: Does the patient want to have any new prescriptions delivered to bedside prior to discharge?: No(Patient prefers to use [address] for discharge medications.)  Type of Home Care Services: None  Expected Discharge Date: (Undetermined)    Electronically signed by KAT Cid on 1/18/2021 at 11:37 AM

## 2021-01-18 NOTE — PLAN OF CARE
Problem: Nutrition  Goal: Optimal nutrition therapy  Outcome: Ongoing   Nutrition Problem #1: Increased nutrient needs  Intervention: Food and/or Nutrient Delivery: Continue Current Diet, Start Oral Nutrition Supplement, Vitamin Supplement  Nutritional Goals: Pt will consume 75% or more of meals during LOS to aid in healing

## 2021-01-19 LAB
ANION GAP SERPL CALCULATED.3IONS-SCNC: 10 MEQ/L (ref 8–16)
BLOOD CULTURE, ROUTINE: NORMAL
BUN BLDV-MCNC: 13 MG/DL (ref 7–22)
CALCIUM SERPL-MCNC: 9.2 MG/DL (ref 8.5–10.5)
CHLORIDE BLD-SCNC: 102 MEQ/L (ref 98–111)
CO2: 28 MEQ/L (ref 23–33)
CREAT SERPL-MCNC: 0.7 MG/DL (ref 0.4–1.2)
GFR SERPL CREATININE-BSD FRML MDRD: > 90 ML/MIN/1.73M2
GLUCOSE BLD-MCNC: 124 MG/DL (ref 70–108)
MAGNESIUM: 1.6 MG/DL (ref 1.6–2.4)
POTASSIUM SERPL-SCNC: 3.9 MEQ/L (ref 3.5–5.2)
SODIUM BLD-SCNC: 140 MEQ/L (ref 135–145)

## 2021-01-19 PROCEDURE — 6360000002 HC RX W HCPCS: Performed by: NURSE PRACTITIONER

## 2021-01-19 PROCEDURE — 97110 THERAPEUTIC EXERCISES: CPT

## 2021-01-19 PROCEDURE — 80048 BASIC METABOLIC PNL TOTAL CA: CPT

## 2021-01-19 PROCEDURE — 6370000000 HC RX 637 (ALT 250 FOR IP): Performed by: NURSE PRACTITIONER

## 2021-01-19 PROCEDURE — 97530 THERAPEUTIC ACTIVITIES: CPT

## 2021-01-19 PROCEDURE — 83735 ASSAY OF MAGNESIUM: CPT

## 2021-01-19 PROCEDURE — 99232 SBSQ HOSP IP/OBS MODERATE 35: CPT | Performed by: INTERNAL MEDICINE

## 2021-01-19 PROCEDURE — 99232 SBSQ HOSP IP/OBS MODERATE 35: CPT | Performed by: NURSE PRACTITIONER

## 2021-01-19 PROCEDURE — 97535 SELF CARE MNGMENT TRAINING: CPT

## 2021-01-19 PROCEDURE — 6360000002 HC RX W HCPCS: Performed by: PHYSICAL MEDICINE & REHABILITATION

## 2021-01-19 PROCEDURE — 36415 COLL VENOUS BLD VENIPUNCTURE: CPT

## 2021-01-19 PROCEDURE — 97116 GAIT TRAINING THERAPY: CPT

## 2021-01-19 PROCEDURE — 6370000000 HC RX 637 (ALT 250 FOR IP): Performed by: FAMILY MEDICINE

## 2021-01-19 PROCEDURE — 1180000000 HC REHAB R&B

## 2021-01-19 PROCEDURE — 6370000000 HC RX 637 (ALT 250 FOR IP): Performed by: INTERNAL MEDICINE

## 2021-01-19 PROCEDURE — 94760 N-INVAS EAR/PLS OXIMETRY 1: CPT

## 2021-01-19 RX ADMIN — PANTOPRAZOLE SODIUM 40 MG: 40 TABLET, DELAYED RELEASE ORAL at 16:22

## 2021-01-19 RX ADMIN — MICONAZOLE NITRATE: 20 POWDER TOPICAL at 08:30

## 2021-01-19 RX ADMIN — CIPROFLOXACIN 500 MG: 500 TABLET, FILM COATED ORAL at 20:50

## 2021-01-19 RX ADMIN — MESALAMINE 800 MG: 400 CAPSULE, DELAYED RELEASE ORAL at 20:50

## 2021-01-19 RX ADMIN — MAGNESIUM GLUCONATE 500 MG ORAL TABLET 400 MG: 500 TABLET ORAL at 20:50

## 2021-01-19 RX ADMIN — HEPARIN 500 UNITS: 100 SYRINGE at 04:43

## 2021-01-19 RX ADMIN — ENOXAPARIN SODIUM 40 MG: 40 INJECTION SUBCUTANEOUS at 08:27

## 2021-01-19 RX ADMIN — PREGABALIN 200 MG: 50 CAPSULE ORAL at 14:08

## 2021-01-19 RX ADMIN — HEPARIN 500 UNITS: 100 SYRINGE at 16:22

## 2021-01-19 RX ADMIN — MAGNESIUM GLUCONATE 500 MG ORAL TABLET 400 MG: 500 TABLET ORAL at 08:27

## 2021-01-19 RX ADMIN — OXYCODONE AND ACETAMINOPHEN 1 TABLET: 5; 325 TABLET ORAL at 08:56

## 2021-01-19 RX ADMIN — MICONAZOLE NITRATE: 20 POWDER TOPICAL at 20:52

## 2021-01-19 RX ADMIN — MESALAMINE 800 MG: 400 CAPSULE, DELAYED RELEASE ORAL at 08:27

## 2021-01-19 RX ADMIN — POTASSIUM CHLORIDE 20 MEQ: 1500 TABLET, EXTENDED RELEASE ORAL at 08:26

## 2021-01-19 RX ADMIN — FOLIC ACID 2 MG: 1 TABLET ORAL at 08:27

## 2021-01-19 RX ADMIN — PREGABALIN 200 MG: 50 CAPSULE ORAL at 20:51

## 2021-01-19 RX ADMIN — PREGABALIN 200 MG: 50 CAPSULE ORAL at 08:27

## 2021-01-19 RX ADMIN — TAMSULOSIN HYDROCHLORIDE 0.4 MG: 0.4 CAPSULE ORAL at 08:27

## 2021-01-19 RX ADMIN — BUMETANIDE 1 MG: 1 TABLET ORAL at 08:27

## 2021-01-19 RX ADMIN — PANTOPRAZOLE SODIUM 40 MG: 40 TABLET, DELAYED RELEASE ORAL at 05:00

## 2021-01-19 RX ADMIN — SPIRONOLACTONE 25 MG: 25 TABLET ORAL at 08:27

## 2021-01-19 RX ADMIN — OXYCODONE AND ACETAMINOPHEN 1 TABLET: 5; 325 TABLET ORAL at 12:54

## 2021-01-19 RX ADMIN — OXYCODONE AND ACETAMINOPHEN 1 TABLET: 5; 325 TABLET ORAL at 04:42

## 2021-01-19 RX ADMIN — PAROXETINE HYDROCHLORIDE 40 MG: 20 TABLET, FILM COATED ORAL at 08:27

## 2021-01-19 RX ADMIN — OXYCODONE AND ACETAMINOPHEN 1 TABLET: 5; 325 TABLET ORAL at 17:47

## 2021-01-19 RX ADMIN — MESALAMINE 800 MG: 400 CAPSULE, DELAYED RELEASE ORAL at 12:54

## 2021-01-19 RX ADMIN — FERROUS SULFATE TAB 325 MG (65 MG ELEMENTAL FE) 325 MG: 325 (65 FE) TAB at 16:22

## 2021-01-19 RX ADMIN — FERROUS SULFATE TAB 325 MG (65 MG ELEMENTAL FE) 325 MG: 325 (65 FE) TAB at 08:27

## 2021-01-19 RX ADMIN — MULTIPLE VITAMINS W/ MINERALS TAB 1 TABLET: TAB at 12:54

## 2021-01-19 RX ADMIN — CIPROFLOXACIN 500 MG: 500 TABLET, FILM COATED ORAL at 08:29

## 2021-01-19 RX ADMIN — OXYCODONE AND ACETAMINOPHEN 1 TABLET: 5; 325 TABLET ORAL at 21:51

## 2021-01-19 RX ADMIN — Medication 100 MG: at 08:27

## 2021-01-19 ASSESSMENT — PAIN SCALES - GENERAL
PAINLEVEL_OUTOF10: 6
PAINLEVEL_OUTOF10: 7
PAINLEVEL_OUTOF10: 4

## 2021-01-19 ASSESSMENT — PAIN DESCRIPTION - LOCATION
LOCATION: BACK
LOCATION: BACK

## 2021-01-19 ASSESSMENT — PAIN - FUNCTIONAL ASSESSMENT
PAIN_FUNCTIONAL_ASSESSMENT: ACTIVITIES ARE NOT PREVENTED
PAIN_FUNCTIONAL_ASSESSMENT: ACTIVITIES ARE NOT PREVENTED

## 2021-01-19 ASSESSMENT — PAIN DESCRIPTION - PAIN TYPE
TYPE: SURGICAL PAIN
TYPE: SURGICAL PAIN

## 2021-01-19 ASSESSMENT — PAIN DESCRIPTION - DESCRIPTORS
DESCRIPTORS: ACHING

## 2021-01-19 ASSESSMENT — PAIN DESCRIPTION - ORIENTATION: ORIENTATION: LEFT

## 2021-01-19 NOTE — PROGRESS NOTES
heard  Abdomen:  Soft, non - tender  Musculoskeletal:  Edema -minimal         Last 3 CBC   Recent Labs     01/16/21  2350   WBC 6.9   RBC 2.36*   HGB 7.9*   HCT 23.8*   *     Last 3 CMP  Recent Labs     01/17/21  0545 01/18/21  0455 01/19/21  0405   * 137 140   K 3.8 3.8 3.9   CL 98 101 102   CO2 27 26 28   BUN 11 12 13   CREATININE 0.7 0.6 0.7   CALCIUM 8.5 8.9 9.2             ASSESSMENT / Plan   1 Renal -mild acute kidney injury, mostly due to contrast-induced nephropathy currently appears to be better  ? Creatinine at baseline. 2 Electrolytes -within normal limits. continue 20 KCl p.o. daily while on diuretics  3 Hyponatremia secondary to hypervolemia improving with diuresis  4 Essential hypertension running well continue current medications . 5 Fluid overload-currently on diuretics volume status is much better. Doing excellent with 1 mg of Bumex. 6 Hx of hepatitis C  7 Recent back surgery with spinal cord compression status post surgery for abscess . 8 Meds reviewed and discussed with patient  9 Overall renal issues resolved will sign off please call with any questions or concerns or if situation changes    CARLOS Pinon D.  [facility].

## 2021-01-19 NOTE — PROGRESS NOTES
[address] [address]  INPATIENT PHYSICAL THERAPY  DAILY NOTE  254 [facility] - 7E-54/054-A    Time In: 1400  Time Out: 1430  Timed Code Treatment Minutes: 30 Minutes  Minutes: 30          Date: 2021  Patient Name: Jasmina Marin,  Gender:  male        MRN: [id number]  : 1975  (39 y.o.)     Referring Practitioner: CRISTIAN Josue CNP  Diagnosis: history of lumbar laminectomy  Additional Pertinent Hx: Per EMR: \"39year-old obese  male presents to emergency department with evaluation of bilateral leg edema. Patient relates his leg swelling to status post lumbar fusion L3-L5 on 2020 had surgery remove fluid on 2021. Duke Quintero Patient has gradually gotten worse since that time. \" To [facility] on 1/15     Prior Level of Function:  Lives With: Other (comment)([facility] ([facility]) - ~14 residents)  Type of Home: House  Home Layout: One level  Home Access: Level entry  Home Equipment: Rolling walker   Bathroom Shower/Tub: Walk-in shower  Bathroom Toilet: Handicap height    ADL Assistance: Independent  Homemaking Assistance: Independent  Ambulation Assistance: Independent  Transfer Assistance: Independent  Active : No  Additional Comments: pt reports no use of AD at baseline, utilizing RW since surgery 20    Restrictions/Precautions:  Restrictions/Precautions: General Precautions, Fall Risk  Required Braces or Orthoses  Spinal: Lumbar Corset  Position Activity Restriction  Spinal Precautions: No Bending, No Twisting, No Lifting  Other position/activity restrictions: Pt had right arm amputated 3 years ago. s/p L3-5 fusion      SUBJECTIVE: Patient was finishing with OT  Upon arrival and required a short rest break before continuing with PT. He was agreeable to treatment. Pt. Requested to use restroom at end of session. RN notified. PAIN: No pain reported during this session.     OBJECTIVE:  Bed Mobility:  Not Tested    Transfers:  Sit to Stand: Modified Independent  Stand to Sit:Modified Independent    Ambulation:  Supervision, Stand By Assistance  Distance: 150'x1, returned to room in wheelchair due to fatigue. 3' from [address] to [facility]. Surface: Level Tile  Device:Quad Cane  Gait Deviations:  Slow Sabrina, Decreased Gait Speed, Wide Base of Support, Mild Path Deviations, Unsteady Gait and Improper sequencing with quad cane at times. Balance:  Static Sitting Balance:  Independent     Pt. Completed standing dynamic balance activity: Balloon toss back and forth with therapist with Normal EDEN on level tile and No UE support on No Device with Contact Guard Assistance. Activity completed to improve balance, enhance functional mobility, and reduce risk of falls. Exercise:  Patient was guided in 1 set(s) 10 reps of exercise to both lower extremities. Seated marches, Seated hamstring curls, Seated heel/toe raises, Long arc quads and Seated isometric hip adduction. Exercises were completed for increased independence with functional mobility. Functional Outcome Measures: Not completed       ASSESSMENT:  Assessment: Patient progressing toward established goals. Activity Tolerance:  Patient tolerance of  treatment: good. Patient displayed increased motivation to complete exercises despite difficulty, especially in the left leg. He pushed toward full sets of 10 repetitions for each exercise he was asked to do. Appeared to have considerable difficulty with isometric hip adduction and seated hip flexion on the left side. Equipment Recommendations:Equipment Needed: Yes(continue to monitor, recently given RW from [address] [place] [address] following sx 12/29.  hemiwalker?)  Discharge Recommendations:  Continue to assess pending progress    Plan: Times per week: 5x/wk 90 min, 1x/wk 30 min  Current Treatment Recommendations: Strengthening, Gait Training, Patient/Caregiver Education & Training, ROM, Stair training, Equipment Evaluation, Education, & procurement, Balance Training, Neuromuscular Re-education, Pain Management, Functional Mobility Training, Endurance Training, Home Exercise Program, Transfer Training, Safety Education & Training, Modalities, Wheelchair Mobility Training    Patient Education  Patient Education: Precautions/Restrictions, Transfers, Gait, Verbal Exercise Instruction,  - Patient Verbalized Understanding    Goals:  Patient goals : To go home  Short term goals  Time Frame for Short term goals: 1 week  Short term goal 1: pt to perform supine to/from sit at [address] to get in and out of bed  Short term goal 2: pt to perform sit to/from stand at [facility] to rise from bed or chair  Short term goal 3: pt to ambulate >50ft with hemiwalker at [address] for mobility in the home  Short term goal 4: pt to perform car transfer at [address] for transport to home  Short term goal 5: pt to negotiate 6\" platform step with hemiwalker at [address] for community integration  Long term goals  Time Frame for Long term goals : 2 weeks  Long term goal 1: pt to perform supine to/from sit at [facility] I to get in and out of bed  Long term goal 2: pt to perform sit to/from stand at [facility] I to rise from bed or chair  Long term goal 3: pt to ambulate >50ft with least restrictive device at Mod I, 150ft at S for mobility in the home and community  Long term goal 4: pt to perform car transfer at S for transport to home  Long term goal 5: pt to negotiate 6\" platform step with hemiwalker at [address] for community integration    Following session, patient left in safe position with all fall risk precautions in place.     Treatment session and note completed by Janea ARIAS under supervision of signing therapist.

## 2021-01-19 NOTE — PROGRESS NOTES
Patient: Kylah Bolton  Unit/Bed: 4Y-05/092-K  YOB: 1975  MRN: [id number] Acct: [de-identified]   Admitting Diagnosis: History of lumbar laminectomy [Z98.890]  S/P lumbar laminectomy [Z98.890]  Admit Date:  1/15/2021  Hospital Day: 3    Assessment:     Active Problems:    Ulcerative colitis, universal (HCC)    Essential hypertension    Leg swelling    Azotemia    S/P lumbar laminectomy    Postoperative anemia    Chronic pain syndrome    Anxiety and depression  Resolved Problems:    * No resolved hospital problems. *      Plan:     Continue cipro pending sensitivities on the e coli  No further fever episodes but on the percocet for pain        Subjective:     Patient has no complaint of CP, SOB, or GI upset. The horn continues. ..   Medication side effects: none    Scheduled Meds:   [START ON 1/19/2021] heparin flush  500 Units Intracatheter Daily    thiamine  100 mg Oral Daily    therapeutic multivitamin-minerals  1 tablet Oral Daily    magnesium oxide  400 mg Oral BID    miconazole   Topical BID    spironolactone  25 mg Oral Daily    ciprofloxacin  500 mg Oral Q12H    enoxaparin  40 mg Subcutaneous Q24H    sodium chloride flush  10 mL Intravenous 2 times per day    bumetanide  1 mg Oral Daily    folic acid  2 mg Oral Daily    mesalamine  800 mg Oral TID    pantoprazole  40 mg Oral BID AC    PARoxetine  40 mg Oral QAM    potassium chloride  20 mEq Oral Daily with breakfast    pregabalin  200 mg Oral TID    tamsulosin  0.4 mg Oral Daily    ferrous sulfate  325 mg Oral BID WC    influenza virus vaccine  0.5 mL Intramuscular Prior to discharge     Continuous Infusions:  PRN Meds:heparin flush, bisacodyl, acetaminophen **OR** [DISCONTINUED] acetaminophen, polyethylene glycol, promethazine **OR** [DISCONTINUED] ondansetron, sodium chloride flush, oxyCODONE-acetaminophen    Review of Systems  Pertinent items are noted in HPI.     Objective:     Patient Vitals for the past 8 hrs:   BP Temp Temp src Pulse Resp SpO2   01/18/21 2005 130/77 97.8 °F (36.6 °C) Oral 98 18 98 %     I/O last 3 completed shifts: In: 8836 [P.O.:1170]  Out: 8672 [Urine:3875]  I/O this shift:   In: 400 [P.O.:400]  Out: -     /77   Pulse 98   Temp 97.8 °F (36.6 °C) (Oral)   Resp 18   Ht 6' (1.829 m)   Wt 270 lb 12.8 oz (122.8 kg)   SpO2 98%   BMI 36.73 kg/m²     General appearance: alert, appears stated age and cooperative  Head: Normocephalic, without obvious abnormality, atraumatic  Lungs: clear to auscultation bilaterally  Chest wall: no tenderness  Heart: regular rate and rhythm, S1, S2 normal, no murmur, click, rub or gallop  Abdomen: soft, non-tender; bowel sounds normal; no masses,  no organomegaly  Extremities: extremities normal, atraumatic, no cyanosis or edema  Skin: Skin color, texture, turgor normal. No rashes or lesions  Neurologic: weak      Electronically signed by Venus Hernandez MD on 1/18/2021 at 9:32 PM

## 2021-01-19 NOTE — PROGRESS NOTES
[address]  Occupational Therapy  Daily Note  Time:   Time In: 1330  Time Out: 1400  Timed Code Treatment Minutes: 30 Minutes  Minutes: 30          Date: 2021  Patient Name: Shlomo Pittman,   Gender: male      Room: [facility]/054-  MRN: [id number]  : 1975  (39 y.o.)  Referring Practitioner: CRISTIAN Bull CNP  Diagnosis: History of lumbar laminectomy  Additional Pertinent Hx: Per EMR: \"39year-old obese  male presents to emergency department with evaluation of bilateral leg edema. Patient relates his leg swelling to status post lumbar fusion L3-L5 on 2020 had surgery remove fluid on 2021. Sukhi Ruiz Patient has gradually gotten worse since that time. \" To IPR on 1/15    Restrictions/Precautions:  Restrictions/Precautions: General Precautions, Fall Risk  Required Braces or Orthoses  Spinal: Lumbar Corset  Position Activity Restriction  Spinal Precautions: No Bending, No Twisting, No Lifting  Other position/activity restrictions: Pt had right arm amputated 3 years ago. s/p L3-5 fusion      SUBJECTIVE: Pt. Sitting up in recliner and agreeable to OT treatment. PAIN:No pain reported at this time    COGNITION: WNL    ADL:   No ADL's completed this session. Sukih Ruiz BALANCE:  Standing Balance: Contact Guard Assistance. Fair balance noted with static and dynamci standing components. BED MOBILITY:  Not Tested    TRANSFERS:  Sit to Stand:  Contact Guard Assistance. Stand to Sit: Contact Guard Assistance. FUNCTIONAL MOBILITY:  Assistive Device: Quad Cane  Assist Level:  Contact Guard Assistance. Distance: To and from therapy apartment  No LOB noted slow and steady cuing given for correct technique with quad cane.       ADDITIONAL ACTIVITIES:  Patient identified one of their personal goals is to be able to sustain functional standing positions in order to complete various ADL and IADL skills in standing position, such as sinkside grooming or washing dishes. Dynamic standing task was then facilitated to challenge dynamic balance, static standing. Patient required CGA and verbal cues for safety with unilateral reaching and stabilization, and demo'ed an endurance of 6 minutes consecutively to stand. Pt. Instructed on and performed retrieval of clothing prior to folding from dryer utilizing AE- long handled reacher to access. Pt. Required reminders to follow precautions during activity. ASSESSMENT:     Activity Tolerance:  Patient tolerance of  treatment: fair. Discharge Recommendations: Home with Home health OT   Equipment Recommendations: Other: Recommend LHAE and shower chair. Monitor for need for BSC. Plan: Times per week: 5x/wk for 90 min and 1x/wk for 30 min  Current Treatment Recommendations: Strengthening, Endurance Training, Patient/Caregiver Education & Training, Equipment Evaluation, Education, & procurement, Self-Care / ADL, Balance Training, Functional Mobility Training, Safety Education & Training, Positioning    Patient Education  Patient Education: ADL's, IADL's, Precautions, Home Safety, Importance of Increasing Activity and Assistive Device Safety and safety with functional mobility and transfers. Goals  Short term goals  Time Frame for Short term goals: 1 week  Short term goal 1: Pt will navigate hemiwalker to and from the bathroom with SBA to improve accessing bathroom. Short term goal 2: Pt will complete LB dressing tasks with LHAE prn and mod A to improve indep with self care. Short term goal 3: Pt will demo spinal precautions during 4 min dynamic standing tasks to improve indep with meal prep or IADLs. Short term goal 4: Pt will complete meal prep tasks with SBA using hemiwalker to improve indep within home.   Long term goals  Time Frame for Long term goals : 2 weeks  Long term goal 1: Pt will complete BADL routine with mod I and 0 vcs for spinal precautions to improve indep within home.  Long term goal 2: Pt will complete light IADL tasks with mod I and 0 vcs for adaptive technique or spinal precautions to improve indep within home    Following session, patient left in safe position with all fall risk precautions in place.

## 2021-01-19 NOTE — PLAN OF CARE
Care plan reviewed with patient and  verbalize understanding of the plan of care and contribute to goal setting. Problem: Pain:  Description: Pain management should include both nonpharmacologic and pharmacologic interventions. Goal: Pain level will decrease  Description: Pain level will decrease  Outcome: Met This Shift  Note: With meds and rest  Goal: Control of acute pain  Description: Control of acute pain  Outcome: Met This Shift  Note: With meds and rest     Problem: Falls - Risk of:  Goal: Will remain free from falls  Description: Will remain free from falls  Outcome: Met This Shift  Note: Uses call light  Goal: Absence of physical injury  Description: Absence of physical injury  Outcome: Met This Shift  Note: No injuries     Problem: Urinary Elimination:  Goal: Signs and symptoms of infection will decrease  Description: Signs and symptoms of infection will decrease  Outcome: Met This Shift  Note: No s/s  Goal: Complications related to the disease process, condition or treatment will be avoided or minimized  Description: Complications related to the disease process, condition or treatment will be avoided or minimized  Outcome: Met This Shift  Note: stable     Problem: Discharge Planning:  Goal: Discharged to appropriate level of care  Description: Discharged to appropriate level of care  Outcome: Met This Shift  Note: To be determined     Problem: Skin Integrity:  Goal: Will show no infection signs and symptoms  Description: Will show no infection signs and symptoms  Outcome: Met This Shift  Note: Incision apoprox.   Goal: Absence of new skin breakdown  Description: Absence of new skin breakdown  Outcome: Met This Shift  Note: No new     Problem: DISCHARGE BARRIERS  Goal: Patient's continuum of care needs are met  Outcome: Met This Shift  Note: Needs met     Problem: Nutrition  Goal: Optimal nutrition therapy  Outcome: Met This Shift  Note: adequate

## 2021-01-19 NOTE — PROGRESS NOTES
Patient: Liz Silva  Unit/Bed: 6G-88/024-W  YOB: 1975  MRN: [id number] Acct: [de-identified]   Admitting Diagnosis: History of lumbar laminectomy [Z98.890]  S/P lumbar laminectomy [Z98.890]  Admit Date:  1/15/2021  Hospital Day: 4    Assessment:     Active Problems:    Ulcerative colitis, universal (HCC)    Essential hypertension    Leg swelling    Azotemia    S/P lumbar laminectomy    Postoperative anemia    Chronic pain syndrome    Anxiety and depression  Resolved Problems:    * No resolved hospital problems. *      Plan:     CS are acceptable  E coli in the urine sens to the cipro        Subjective:     Patient has no complaint of CP, SOB, or GI upset. .   Medication side effects: none    Scheduled Meds:   heparin flush  500 Units Intracatheter Daily    thiamine  100 mg Oral Daily    therapeutic multivitamin-minerals  1 tablet Oral Daily    magnesium oxide  400 mg Oral BID    miconazole   Topical BID    spironolactone  25 mg Oral Daily    ciprofloxacin  500 mg Oral Q12H    enoxaparin  40 mg Subcutaneous Q24H    sodium chloride flush  10 mL Intravenous 2 times per day    bumetanide  1 mg Oral Daily    folic acid  2 mg Oral Daily    mesalamine  800 mg Oral TID    pantoprazole  40 mg Oral BID AC    PARoxetine  40 mg Oral QAM    potassium chloride  20 mEq Oral Daily with breakfast    pregabalin  200 mg Oral TID    tamsulosin  0.4 mg Oral Daily    ferrous sulfate  325 mg Oral BID WC    influenza virus vaccine  0.5 mL Intramuscular Prior to discharge     Continuous Infusions:  PRN Meds:heparin flush, bisacodyl, acetaminophen **OR** [DISCONTINUED] acetaminophen, polyethylene glycol, promethazine **OR** [DISCONTINUED] ondansetron, sodium chloride flush, oxyCODONE-acetaminophen    Review of Systems  Pertinent items are noted in HPI.     Objective:     Patient Vitals for the past 8 hrs:   BP Temp Temp src Pulse Resp SpO2   01/19/21 0730 111/66 97.5 °F (36.4 °C) Oral 88 18 98 %     I/O last 3 completed shifts: In: 1120 [P.O.:1120]  Out: 3300 [Urine:3300]  I/O this shift:  In: 300 [P.O.:300]  Out: -     /66   Pulse 88   Temp 97.5 °F (36.4 °C) (Oral)   Resp 18   Ht 6' (1.829 m)   Wt 270 lb 12.8 oz (122.8 kg)   SpO2 98%   BMI 36.73 kg/m²     General appearance: alert, appears stated age and cooperative  Head: Normocephalic, without obvious abnormality, atraumatic  Lungs: clear to auscultation bilaterally  Chest wall: no tenderness  Heart: regular rate and rhythm, S1, S2 normal, no murmur, click, rub or gallop  Abdomen: soft, non-tender; bowel sounds normal; no masses,  no organomegaly  Extremities: extremities normal, atraumatic, no cyanosis or edema  Skin: Skin color, texture, turgor normal. No rashes or lesions  Neurologic: Grossly normal    Data Review:   Results for Park Mccain (MRN [id number]) as of 1/19/2021 12:50   Ref.  Range 1/17/2021 05:45 1/18/2021 04:55 1/19/2021 04:05   Sodium Latest Ref Range: 135 - 145 meq/L 132 (L) 137 140   Potassium Latest Ref Range: 3.5 - 5.2 meq/L 3.8 3.8 3.9   Chloride Latest Ref Range: 98 - 111 meq/L 98 101 102   CO2 Latest Ref Range: 23 - 33 meq/L 27 26 28   BUN Latest Ref Range: 7 - 22 mg/dL 11 12 13   Creatinine Latest Ref Range: 0.4 - 1.2 mg/dL 0.7 0.6 0.7   Anion Gap Latest Ref Range: 8.0 - 16.0 meq/L 7.0 (L) 10.0 10.0   Est, Glom Filt Rate Latest Units: ml/min/1.73m2 >90 >90 >90   Magnesium Latest Ref Range: 1.6 - 2.4 mg/dL 1.5 (L) 1.6 1.6   Glucose Latest Ref Range: 70 - 108 mg/dL 124 (H) 116 (H) 124 (H)   Calcium Latest Ref Range: 8.5 - 10.5 mg/dL 8.5 8.9 9.2       Electronically signed by Kathryn Gomez MD on 1/19/2021 at 12:49 PM

## 2021-01-19 NOTE — PROGRESS NOTES
[address]  Occupational Therapy  Daily Note  Time:   Time In: 930  Time Out: 1030  Timed Code Treatment Minutes: 60 Minutes  Minutes: 60          Date: 2021  Patient Name: Darylene Sport,   Gender: male      Room: [facility]/054-A  MRN: [id number]  : 1975  (39 y.o.)  Referring Practitioner: CRISTIAN Wills CNP  Diagnosis: History of lumbar laminectomy  Additional Pertinent Hx: Per EMR: \"39year-old obese  male presents to emergency department with evaluation of bilateral leg edema. Patient relates his leg swelling to status post lumbar fusion L3-L5 on 2020 had surgery remove fluid on 2021. Mario Sweeney Patient has gradually gotten worse since that time. \" To IPR on 1/15    Restrictions/Precautions:  Restrictions/Precautions: General Precautions, Fall Risk  Required Braces or Orthoses  Spinal: Lumbar Corset  Position Activity Restriction  Spinal Precautions: No Bending, No Twisting, No Lifting  Other position/activity restrictions: Pt had right arm amputated 3 years ago. s/p L3-5 fusion      SUBJECTIVE: Pt. Seated in chair upon arrival expressing his concern for improving and returning home when able. PAIN: /10: LLE and low back pain    COGNITION: WFL    ADL:   Lower Extremity Dressing: Maximum Assistance. Max A to nikita and tie shoes, and socks. Pt. instructed on AE and trialed. Pt. would benefit from continued review for increased I to utilize. Pt. voices that he is reluctant to use his roommate may be able to asssitance with LB dressing. .Pt. continued to require max A following precautions to complete LB dressing with LHAE. BALANCE:  Standing Balance: Contact Guard Assistance, with cues for safety. BED MOBILITY:  Not Tested    TRANSFERS:  Sit to Stand:  [employer], cues for hand placement. Stand to Sit: Contact Guard Assistance.       FUNCTIONAL MOBILITY:  Assistive Device: [employer] Cane  Assist Level:  Contact Guard Assistance. Distance: To and from therapy apartment  Pt. Completed HH distances and to/from therapy apartment with [address] for correct technique with quad cane occasionally. No LOB noted. ADDITIONAL ACTIVITIES:  Patient identified one of their personal goals is to be able to sustain functional standing positions in order to complete various ADL and IADL skills in standing position, such as sinkside grooming or washing dishes. Dynamic standing task was then facilitated to challenge standing tolerance, activity tolerance, and  balance . Patient required CGA, and demo'ed an endurance of 5 minutes during task of washing dishes at sink and placing laundry into washer. ASSESSMENT:     Activity Tolerance:  Patient tolerance of  treatment: fair. Discharge Recommendations: Home with Home health OT   Equipment Recommendations: Other: Recommend LHAE and shower chair. Monitor for need for BSC. Plan: Times per week: 5x/wk for 90 min and 1x/wk for 30 min  Current Treatment Recommendations: Strengthening, Endurance Training, Patient/Caregiver Education & Training, Equipment Evaluation, Education, & procurement, Self-Care / ADL, Balance Training, Functional Mobility Training, Safety Education & Training, Positioning    Patient Education  Patient Education: Role of OT, Plan of Care, ADL's, IADL's, Precautions, Home Safety, Importance of Increasing Activity and Assistive Device Safety and safety with functional mobility and transfers. Goals  Short term goals  Time Frame for Short term goals: 1 week  Short term goal 1: Pt will navigate hemiwalker to and from the bathroom with SBA to improve accessing bathroom. Short term goal 2: Pt will complete LB dressing tasks with LHAE prn and mod A to improve indep with self care. Short term goal 3: Pt will demo spinal precautions during 4 min dynamic standing tasks to improve indep with meal prep or IADLs.   Short term goal 4: Pt will complete meal prep tasks with SBA using hemiwalker to improve indep within home. Long term goals  Time Frame for Long term goals : 2 weeks  Long term goal 1: Pt will complete BADL routine with mod I and 0 vcs for spinal precautions to improve indep within home. Long term goal 2: Pt will complete light IADL tasks with mod I and 0 vcs for adaptive technique or spinal precautions to improve indep within home    Following session, patient left in safe position with all fall risk precautions in place.

## 2021-01-19 NOTE — PROGRESS NOTES
lumbar corset. Windy Yobany BALANCE:  Sitting Balance:  Supervision. Standing Balance: Stand By Assistance. TRANSFERS:  Sit to Stand:  Stand By Assistance. Stand to Sit: Stand By Assistance. FUNCTIONAL MOBILITY:  Assistive Device: Félix-walker  Assist Level:  Contact Guard Assistance. Distance: To and from bathroom  Min cues for safe tech and positioning of the félix walker. Review of Systems:  CONSTITUTIONAL:  negative  RESPIRATORY:  negative  CARDIOVASCULAR:  negative  GASTROINTESTINAL:  negative  GENITOURINARY:  horn  MUSCULOSKELETAL:  positive for  pain and muscle weakness  NEUROLOGICAL:  positive for gait problems, weakness and pain  BEHAVIOR/PSYCH:  negative  System review otherwise negative    Objective:  /66   Pulse 88   Temp 97.5 °F (36.4 °C) (Oral)   Resp 18   Ht 6' (1.829 m)   Wt 270 lb 12.8 oz (122.8 kg)   SpO2 98%   BMI 36.73 kg/m²   awake  Orientation:   person, place, time  Mood: within normal limits  Affect: calm  General appearance: Patient is well nourished, well developed, well groomed and in no acute distress    Memory:   normal,   Attention/Concentration: normal  Language:  normal    Cranial Nerves:  cranial nerves II-XII are grossly intact  ROM:  abnormal - limited BLE  Tone:  normal  Muscle bulk: within normal limits  Sensory:  Sensory intact    Heart: normal rate, regular rhythm, normal S1, S2, no murmurs, rubs, clicks or gallops  Lungs: clear to auscultation without wheezes or rales  Abdomen: soft, non-tender, non-distended, normal bowel sounds, no masses or organomegaly    Skin: warm and dry, no rash or erythema.  Lumbar incision not visualized  Peripheral vascular:  Edema: 1+      Diagnostics:   Recent Results (from the past 24 hour(s))   Basic Metabolic Panel    Collection Time: 01/19/21  4:05 AM   Result Value Ref Range    Sodium 140 135 - 145 meq/L    Potassium 3.9 3.5 - 5.2 meq/L    Chloride 102 98 - 111 meq/L    CO2 28 23 - 33 meq/L    Glucose 124 (H) 70 - 108 mg/dL    BUN 13 7 - 22 mg/dL    CREATININE 0.7 0.4 - 1.2 mg/dL    Calcium 9.2 8.5 - 10.5 mg/dL   Magnesium    Collection Time: 01/19/21  4:05 AM   Result Value Ref Range    Magnesium 1.6 1.6 - 2.4 mg/dL   Anion Gap    Collection Time: 01/19/21  4:05 AM   Result Value Ref Range    Anion Gap 10.0 8.0 - 16.0 meq/L   Glomerular Filtration Rate, Estimated    Collection Time: 01/19/21  4:05 AM   Result Value Ref Range    Est, Glom Filt Rate >90 ml/min/1.73m2       Impression:  1. Lumbar stenosis  1. L3-5 decompression and fusion 12/29/2020  2. L3-5 revision facetectomies and L3-4 left discectomy with hematoma evacuation 1/6/2021  2. ONIEL with volume overload and peripheral edema  3. Alcohol abuse, in remission  4. Amputation of right arm  5. Ascites  6. Gastroesophageal reflux disease  7. Hepatic cirrhosis  8. Hepatitis B  9. Hepatitis C  10. Hypertension  11. Intravenous drug use, in remission   12. Psychiatric problem  13. Posttraumatic stress disorder  14. Sciatica  15. Splenomegaly  16. Thrombocytopenia  17. Ulcerative colitis    Plan:  Continue current therapies  Prophylaxis: DVT - Lovenox, GI - Protonix  Pain: tylenol, percocet, lyrica  Bowels: dulcolax, miralax  Patient is home sick, but understanding with importance with therapy.  Continue to encourage patient with progress and rehab process  · Team conference Thursday    Missed Therapy Time:  · None    CRISTIAN Vera - CNP

## 2021-01-19 NOTE — PROGRESS NOTES
Clinical Pharmacy Note  Pharmacy Antimicrobial Monitoring    Current antibiotic(s): ciprofloxacin    Total days of antibiotics: 7    Indication/Diagnosis: UTI    Patient chart reviewed for signs/symptoms of infection: Yes    Signs/symptoms:     /66   Pulse 88   Temp 97.5 °F (36.4 °C) (Oral)   Resp 18   Ht 6' (1.829 m)   Wt 270 lb 12.8 oz (122.8 kg)   SpO2 98%   BMI 36.73 kg/m²   Lab Results   Component Value Date    WBC 6.9 01/16/2021       Cultures:  Culture Date Source Results   1/16/21 Urine E coli (pan-sensitive)   1/16/21 Blood NGTD         Recommended stop date: n/a - stop date already entered by provider    Prescriber contacted: n/a    Recommendations accepted: Noemi Delcid, PharmD, BCPS  1/19/2021  9:03 AM

## 2021-01-19 NOTE — PROGRESS NOTES
Pt. awake and up for therapy at 0730. Back incision approx. Without drainage. Bone stimulator worn mid am for a couple of hours. Wears back brace when out of bed. Cullen care done with am bath. Statlock in place.

## 2021-01-19 NOTE — PROGRESS NOTES
[address] [address]  INPATIENT PHYSICAL THERAPY  DAILY NOTE  [address]    Time In: 730  Time Out: 0830  Timed Code Treatment Minutes: 61 Minutes  Minutes: 60          Date: 2021  Patient Name: Memo Bernal,  Gender:  male        MRN: [id number]  : 1975  (39 y.o.)     Referring Practitioner: CRITSIAN Chiang CNP  Diagnosis: history of lumbar laminectomy  Additional Pertinent Hx: Per EMR: \"39year-old obese  male presents to emergency department with evaluation of bilateral leg edema. Patient relates his leg swelling to status post lumbar fusion L3-L5 on 2020 had surgery remove fluid on 2021. Andres Angry Patient has gradually gotten worse since that time. \" To [facility] on 1/15     Prior Level of Function:  Lives With: Other (comment)([facility] ([facility]) - ~14 residents)  Type of Home: House  Home Layout: One level  Home Access: Level entry  Home Equipment: Rolling walker   Bathroom Shower/Tub: Walk-in shower  Bathroom Toilet: Handicap height    ADL Assistance: Independent  Homemaking Assistance: Independent  Ambulation Assistance: Independent  Transfer Assistance: Independent  Active : No  Additional Comments: pt reports no use of AD at baseline, utilizing RW since surgery 20    Restrictions/Precautions:  Restrictions/Precautions: General Precautions, Fall Risk  Required Braces or Orthoses  Spinal: Lumbar Corset  Position Activity Restriction  Spinal Precautions: No Bending, No Twisting, No Lifting  Other position/activity restrictions: Pt had right arm amputated 3 years ago. s/p L3-5 fusion      SUBJECTIVE: Pt. Reji Yen in bed upon arrival and pleasantly agrees to therapy session. Pt. Motivated for therapy. Pt. Requests to use restroom during session.      PAIN: Not rated: Low back radiating down L LE    OBJECTIVE:  Bed Mobility:  Rolling to Left: Stand By Assistance   Supine to Sit: Minimal Assistance    Transfers:  Sit to Stand: Contact Guard Assistance, Moderate Assistance (Mod A from lower surfaces)  Stand to [place] [address], cues for hand placement  Stand Pivot:Contact Guard Assistance    Ambulation:  [employer], with verbal cues   Distance: 150' x 1, 12' x 1, 5' x 2  Surface: Level Tile  Device:Quad Cane  Gait Deviations: Forward Flexed Posture, Slow Sabrina, Decreased Gait Speed and extended reach of quad cane (cues to correct with good carryover)    Balance:  Pt. Completed standing dynamic balance activity: reaching for rings and tossing them to target with Normal EDEN on level tile and No UE support with Contact Guard Assistance. Activity completed to improve balance, enhance functional mobility, and reduce risk of falls. Pt. Completed standing dynamic balance activity: tapping numbered pods as instructed by therapist with Normal EDEN on level tile and Single UE support and No UE support on [facility] with [employer], Minimal Assistance. Activity completed to improve balance, enhance functional mobility, and reduce risk of falls. Exercise:  Patient was guided in 1 set(s) 10 reps of exercise to both lower extremities. Hip abduction/adduction, Seated marches, Seated hamstring curls, Seated heel/toe raises, Long arc quads, Seated isometric hip adduction (limited ROM) and abdominal isometrics. Exercises were completed for increased independence with functional mobility. Functional Outcome Measures: Not completed       ASSESSMENT:  Assessment: Patient progressing toward established goals. Activity Tolerance:  Patient tolerance of  treatment: good. Equipment Recommendations:Equipment Needed: Yes(continue to monitor, recently given RW from [address] [place] [id number] following sx 12/29.  hemiwalker?)  Discharge Recommendations:  Continue to assess pending progress    Plan: Times per week: 5x/wk 90 min, 1x/wk 30 min  Current Treatment Recommendations: Strengthening, Gait Training, Patient/Caregiver Education & Training, ROM, Stair training, Equipment Evaluation, Education, & procurement, Balance Training, Neuromuscular Re-education, Pain Management, Functional Mobility Training, Endurance Training, Home Exercise Program, Transfer Training, Safety Education & Training, Modalities, Wheelchair Mobility Training    Patient Education  Patient Education: Plan of Care, Precautions/Restrictions, Transfers, Reviewed Prior Education, Gait, Verbal Exercise Instruction    Goals:  Patient goals : To go home  Short term goals  Time Frame for Short term goals: 1 week  Short term goal 1: pt to perform supine to/from sit at [facility] to get in and out of bed  Short term goal 2: pt to perform sit to/from stand at SBA to rise from bed or chair  Short term goal 3: pt to ambulate >50ft with hemiwalker at [facility] for mobility in the home  Short term goal 4: pt to perform car transfer at [facility] for transport to home  Short term goal 5: pt to negotiate 6\" platform step with hemiwalker at [facility] for community integration  Long term goals  Time Frame for Long term goals : 2 weeks  Long term goal 1: pt to perform supine to/from sit at Mod I to get in and out of bed  Long term goal 2: pt to perform sit to/from stand at Mod I to rise from bed or chair  Long term goal 3: pt to ambulate >50ft with least restrictive device at Mod I, 150ft at S for mobility in the home and community  Long term goal 4: pt to perform car transfer at S for transport to home  Long term goal 5: pt to negotiate 6\" platform step with hemiwalker at [facility] for community integration    Following session, patient left in safe position with all fall risk precautions in place.

## 2021-01-20 PROCEDURE — 94760 N-INVAS EAR/PLS OXIMETRY 1: CPT

## 2021-01-20 PROCEDURE — 97530 THERAPEUTIC ACTIVITIES: CPT

## 2021-01-20 PROCEDURE — 6360000002 HC RX W HCPCS: Performed by: PHYSICAL MEDICINE & REHABILITATION

## 2021-01-20 PROCEDURE — 6370000000 HC RX 637 (ALT 250 FOR IP): Performed by: INTERNAL MEDICINE

## 2021-01-20 PROCEDURE — 6370000000 HC RX 637 (ALT 250 FOR IP): Performed by: NURSE PRACTITIONER

## 2021-01-20 PROCEDURE — 2580000003 HC RX 258: Performed by: NURSE PRACTITIONER

## 2021-01-20 PROCEDURE — 97116 GAIT TRAINING THERAPY: CPT

## 2021-01-20 PROCEDURE — 97110 THERAPEUTIC EXERCISES: CPT

## 2021-01-20 PROCEDURE — 6370000000 HC RX 637 (ALT 250 FOR IP): Performed by: FAMILY MEDICINE

## 2021-01-20 PROCEDURE — 97535 SELF CARE MNGMENT TRAINING: CPT

## 2021-01-20 PROCEDURE — 99232 SBSQ HOSP IP/OBS MODERATE 35: CPT | Performed by: NURSE PRACTITIONER

## 2021-01-20 PROCEDURE — 1180000000 HC REHAB R&B

## 2021-01-20 PROCEDURE — 6360000002 HC RX W HCPCS: Performed by: NURSE PRACTITIONER

## 2021-01-20 RX ORDER — LIDOCAINE 50 MG/G
OINTMENT TOPICAL PRN
Status: DISCONTINUED | OUTPATIENT
Start: 2021-01-20 | End: 2021-01-26 | Stop reason: HOSPADM

## 2021-01-20 RX ADMIN — OXYCODONE AND ACETAMINOPHEN 1 TABLET: 5; 325 TABLET ORAL at 06:02

## 2021-01-20 RX ADMIN — CIPROFLOXACIN 500 MG: 500 TABLET, FILM COATED ORAL at 08:10

## 2021-01-20 RX ADMIN — TAMSULOSIN HYDROCHLORIDE 0.4 MG: 0.4 CAPSULE ORAL at 08:15

## 2021-01-20 RX ADMIN — MESALAMINE 800 MG: 400 CAPSULE, DELAYED RELEASE ORAL at 12:42

## 2021-01-20 RX ADMIN — FERROUS SULFATE TAB 325 MG (65 MG ELEMENTAL FE) 325 MG: 325 (65 FE) TAB at 17:43

## 2021-01-20 RX ADMIN — FERROUS SULFATE TAB 325 MG (65 MG ELEMENTAL FE) 325 MG: 325 (65 FE) TAB at 08:12

## 2021-01-20 RX ADMIN — PAROXETINE HYDROCHLORIDE 40 MG: 20 TABLET, FILM COATED ORAL at 08:10

## 2021-01-20 RX ADMIN — OXYCODONE AND ACETAMINOPHEN 1 TABLET: 5; 325 TABLET ORAL at 14:21

## 2021-01-20 RX ADMIN — PREGABALIN 200 MG: 50 CAPSULE ORAL at 08:09

## 2021-01-20 RX ADMIN — SPIRONOLACTONE 25 MG: 25 TABLET ORAL at 08:10

## 2021-01-20 RX ADMIN — FOLIC ACID 2 MG: 1 TABLET ORAL at 08:11

## 2021-01-20 RX ADMIN — HEPARIN 500 UNITS: 100 SYRINGE at 08:14

## 2021-01-20 RX ADMIN — MESALAMINE 800 MG: 400 CAPSULE, DELAYED RELEASE ORAL at 08:11

## 2021-01-20 RX ADMIN — OXYCODONE AND ACETAMINOPHEN 1 TABLET: 5; 325 TABLET ORAL at 10:04

## 2021-01-20 RX ADMIN — CIPROFLOXACIN 500 MG: 500 TABLET, FILM COATED ORAL at 21:27

## 2021-01-20 RX ADMIN — MULTIPLE VITAMINS W/ MINERALS TAB 1 TABLET: TAB at 12:43

## 2021-01-20 RX ADMIN — HEPARIN 500 UNITS: 100 SYRINGE at 21:29

## 2021-01-20 RX ADMIN — PANTOPRAZOLE SODIUM 40 MG: 40 TABLET, DELAYED RELEASE ORAL at 17:43

## 2021-01-20 RX ADMIN — ENOXAPARIN SODIUM 40 MG: 40 INJECTION SUBCUTANEOUS at 08:13

## 2021-01-20 RX ADMIN — BUMETANIDE 1 MG: 1 TABLET ORAL at 08:12

## 2021-01-20 RX ADMIN — MAGNESIUM GLUCONATE 500 MG ORAL TABLET 400 MG: 500 TABLET ORAL at 08:10

## 2021-01-20 RX ADMIN — PANTOPRAZOLE SODIUM 40 MG: 40 TABLET, DELAYED RELEASE ORAL at 06:02

## 2021-01-20 RX ADMIN — SODIUM CHLORIDE, PRESERVATIVE FREE 10 ML: 5 INJECTION INTRAVENOUS at 21:27

## 2021-01-20 RX ADMIN — Medication 100 MG: at 08:10

## 2021-01-20 RX ADMIN — PREGABALIN 200 MG: 50 CAPSULE ORAL at 12:46

## 2021-01-20 RX ADMIN — PREGABALIN 200 MG: 50 CAPSULE ORAL at 21:27

## 2021-01-20 RX ADMIN — SODIUM CHLORIDE, PRESERVATIVE FREE 10 ML: 5 INJECTION INTRAVENOUS at 08:14

## 2021-01-20 RX ADMIN — OXYCODONE AND ACETAMINOPHEN 1 TABLET: 5; 325 TABLET ORAL at 18:43

## 2021-01-20 RX ADMIN — MESALAMINE 800 MG: 400 CAPSULE, DELAYED RELEASE ORAL at 21:27

## 2021-01-20 RX ADMIN — MAGNESIUM GLUCONATE 500 MG ORAL TABLET 400 MG: 500 TABLET ORAL at 21:27

## 2021-01-20 RX ADMIN — MICONAZOLE NITRATE: 20 POWDER TOPICAL at 12:47

## 2021-01-20 RX ADMIN — POTASSIUM CHLORIDE 20 MEQ: 1500 TABLET, EXTENDED RELEASE ORAL at 08:12

## 2021-01-20 ASSESSMENT — PAIN SCALES - GENERAL: PAINLEVEL_OUTOF10: 6

## 2021-01-20 NOTE — PROGRESS NOTES
[address]  Occupational Therapy  Daily Note  Time:    Time In: 6209  Time Out: 1405  Timed Code Treatment Minutes: 30 Minutes  Minutes: 30          Date: 2021  Patient Name: Allan Verduzco,   Gender: male      Room: [facility]/054-A  MRN: [id number]  : 1975  (39 y.o.)  Referring Practitioner: CRISTIAN Benson CNP  Diagnosis: History of lumbar laminectomy  Additional Pertinent Hx: Per EMR: \"39year-old obese  male presents to emergency department with evaluation of bilateral leg edema. Patient relates his leg swelling to status post lumbar fusion L3-L5 on 2020 had surgery remove fluid on 2021. Caydenrosanna Momo Patient has gradually gotten worse since that time. \" To [facility] on 1/15    Restrictions/Precautions:  Restrictions/Precautions: General Precautions, Fall Risk  Required Braces or Orthoses  Spinal: Lumbar Corset  Position Activity Restriction  Spinal Precautions: No Bending, No Twisting, No Lifting  Other position/activity restrictions: Pt had right arm amputated 3 years ago. s/p L3-5 fusion       SUBJECTIVE:  Cooperative, motivated, agreeable to participate. Reports feeling like legs are getting stronger. PAIN:  4/10: in back. COGNITION: WFL    ADL:   No ADL's completed this session. Ami Barr BALANCE:  Sitting Balance:  Independent. Standing Balance: Stand By Assistance. BED MOBILITY:  Not Tested    TRANSFERS:  Sit to Stand:  Stand By Assistance. Stand to Sit: Stand By Assistance. FUNCTIONAL MOBILITY:  Assistive Device: Wells Alysha  Assist Level:  Stand By Assistance. Distance: to and from the therapy greenhouse, across stones . ADDITIONAL ACTIVITIES:  Pt completed light homemaking tasks of standing and attending to plants in the greenhouse. Pt able to transport items around various surfaces and attend to task with no cues for back safety.  Pt aware of squatting to bend to grasp for an item on a

## 2021-01-20 NOTE — PROGRESS NOTES
[facility]  INPATIENT PHYSICAL THERAPY  DAILY NOTE  [address] [facility] - 7E-54/054-A    Time In: 1200  Time Out: 1230  Timed Code Treatment Minutes: 30 Minutes  Minutes: 30          Date: 2021  Patient Name: Gregorio Hamman,  Gender:  male        MRN: [id number]  : 1975  (39 y.o.)     Referring Practitioner: CRISTIAN Blanchard CNP  Diagnosis: history of lumbar laminectomy  Additional Pertinent Hx: Per EMR: \"39year-old obese  male presents to emergency department with evaluation of bilateral leg edema. Patient relates his leg swelling to status post lumbar fusion L3-L5 on 2020 had surgery remove fluid on 2021. Ulus Reusing Patient has gradually gotten worse since that time. \" To [facility] on 1/15     Prior Level of Function:  Lives With: Other (comment)([facility] ([facility]) - ~14 residents)  Type of Home: House  Home Layout: One level  Home Access: Level entry  Home Equipment: Rolling walker   Bathroom Shower/Tub: Walk-in shower  Bathroom Toilet: Handicap height    ADL Assistance: Independent  Homemaking Assistance: Independent  Ambulation Assistance: Independent  Transfer Assistance: Independent  Active : No  Additional Comments: pt reports no use of AD at baseline, utilizing RW since surgery 20    Restrictions/Precautions:  Restrictions/Precautions: General Precautions, Fall Risk  Required Braces or Orthoses  Spinal: Lumbar Corset  Position Activity Restriction  Spinal Precautions: No Bending, No Twisting, No Lifting  Other position/activity restrictions: Pt had right arm amputated 3 years ago. s/p L3-5 fusion      SUBJECTIVE: Pt. Seated in BS chair upon arrival and pleasantly agrees to therapy session. PAIN: Not rated: Back radiating down L LE    OBJECTIVE:  Bed Mobility:  Not Tested    Transfers:  Sit to Stand: Contact Guard Assistance  Stand to  Clarity[employer]  Pt.  Also completed sit to stands from mat table with R LE placed on 4\" step to inhibit use for increased strengthening of L LE. Pt. Requiring min-mod A with blocking of L knee to prevent buckling. Pt. Fatigues easily with this activity. Pt. Completed 5 without step and 5 with step. Ambulation:  Stand By Assistance, [employer] Assistance  Distance: 150' x 2  Surface: Level Tile  Device:Quad Cane  Gait Deviations:  Slow Sabrina, Decreased Gait Speed, Decreased Heel Strike Bilaterally, Wide Base of Support and Mild Path Deviations    Exercise:  Patient was guided in 1 set(s) 10 reps of exercise to both lower extremities. Hip abduction/adduction, Seated marches, Seated hamstring curls, Seated heel/toe raises, Long arc quads and Seated isometric hip adduction. Exercises were completed for increased independence with functional mobility. Functional Outcome Measures: Not completed       ASSESSMENT:  Assessment: Patient progressing toward established goals. Activity Tolerance:  Patient tolerance of  treatment: good. Equipment Recommendations:Equipment Needed: Yes(wide base q. cane. HME checking wt restrictions. May need bariatric)  Discharge Recommendations:  Continue to assess pending progress    Plan: Times per week: 5x/wk 90 min, 1x/wk 30 min  Current Treatment Recommendations: Strengthening, Gait Training, Patient/Caregiver Education & Training, ROM, Stair training, Equipment Evaluation, Education, & procurement, Balance Training, Neuromuscular Re-education, Pain Management, Functional Mobility Training, Endurance Training, Home Exercise Program, Transfer Training, Safety Education & Training, Modalities, Wheelchair Mobility Training    Patient Education  Patient Education: Plan of Care, Transfers, Gait, Verbal Exercise Instruction    Goals:  Patient goals :  To go home  Short term goals  Time Frame for Short term goals: 1 week  Short term goal 1: pt to perform supine to/from sit at [facility] to get in and out of bed  Short term goal 2: pt

## 2021-01-20 NOTE — PROGRESS NOTES
[address] [address]  Recreational Therapy  Evaluation  Inpatient Rehabilitation Unit      Time Spent with Patient: 30 minutes    Date:  1/20/2021       Patient Name: Eva Adhikari      MRN: [id number]       YOB: 1975 (39 y.o.)       Gender: male  Diagnosis: History of lumbar laminectomy  Referring Practitioner: CRISTIAN Roque CNP    RESTRICTIONS/PRECAUTIONS:  Restrictions/Precautions: General Precautions, Fall Risk          PAIN: 4-L LE and low back    SUBJECTIVE:  Pt lives at the [facility] for [facility]-his mom is in [facility] and has 21 yr old son     VISION:  Within Normal Limit    HEARING: Within Normal Limit    LEISURE INTERESTS:   Pt was independent at the home helping with the cooking and the cleaning-he has been sober 3 1/2 yrs-he likes to read [employer] and nature-gave him some word search puzzles to try in his free time-he attends [employer] and social    BARRIERS TO LEISURE INTERESTS:    Decreased endurance, Pain and Upper extremity weakness           Patient Education  New Education Provided: Importance of Leisure, RT Plan of Care    Plan:  Continue to follow patient through this admission  See patient individually    Electronically signed by: ISAC Farrar  Date: 1/20/2021

## 2021-01-20 NOTE — PROGRESS NOTES
[address] [address]  Inpatient Rehabilitation  Occupational Therapy  Progress Note  Time:  Time In: 1000  Time Out: 1100  Timed Code Treatment Minutes: 61 Minutes  Minutes: 60    Date: 2021  Patient Name: Julia Lerma,   Gender: male      Room: [facility]/054-  MRN: [id number]  : 1975  (39 y.o.)  Referring Practitioner: CRISTIAN Dominguez CNP  Diagnosis: History of lumbar laminectomy  Additional Pertinent Hx: Per EMR: \"39year-old obese  male presents to emergency department with evaluation of bilateral leg edema. Patient relates his leg swelling to status post lumbar fusion L3-L5 on 2020 had surgery remove fluid on 2021. Delsa Severance Patient has gradually gotten worse since that time. \" To [facility] on 1/15    Restrictions/Precautions:  Restrictions/Precautions: General Precautions, Fall Risk  Required Braces or Orthoses  Spinal: Lumbar Corset  Position Activity Restriction  Spinal Precautions: No Bending, No Twisting, No Lifting  Other position/activity restrictions: Pt had right arm amputated 3 years ago. s/p L3-5 fusion     SUBJECTIVE: Patient in recliner upon arrival, agreeable to OT and excited about shower. PAIN: 6/10: back pain. COGNITION: WFL    ADL:   Grooming: Stand By Assistance. standing sinkside for oral care, standing x3 minutes  Bathing: Contact Guard Assistance. standing during iris care   Upper Extremity Dressing: with set-up. Lower Extremity Dressing: Moderate Assistance. A for socks, pt used reacher to thread. Pt unable to use reacher for sock mgmt due to skin pain (c/o reacher scratching him)   Shower Transfer: [address] with use of grab bars. BALANCE:  Sitting Balance:  Modified Independent. Standing Balance: Stand By Assistance, [address] pending on task. CGA when standing without footwear on slippery shower surface.      BED MOBILITY:  Not Tested    TRANSFERS:  Sit to Stand:  Stand By Assistance, Contact Guard Assistance. SBA from recliner, [address] from  chair. Stand to Sit: Stand By Assistance. FUNCTIONAL MOBILITY:  Assistive Device: Quad Cane  Assist Level:  Contact Guard Assistance. Distance: To and from bathroom and To and from shower room  Slow, guarded pace     ASSESSMENT:  Activity Tolerance:  Patient tolerance of  treatment: good. Assessment: Assessment: Nery Mills has made steady progress on IP Rehab. He has progressed to a SBA level for his functional transfers and CGA for basic mobility with use of quad cane. He completes UB ADLs with set-up A, CGA for standing balance during ADLs, and mod A for socks with use of LHAE. During BADLs, he did not require any VCing for back precautions. He completes IADLs with CGA with min cues for spinal precautions. He would cont to benefit from cont skilled OT services to progress with ADL / IADL  remediation with a focus on safety, endurance building, spinal precaution awareness, and to decrease risk of fall / future injury. Discharge Recommendations: Home with Home health OT  Equipment Recommendations: Other: Patient has shower chair and LH sponge. Recommend other Navid Posada. Plan: Times per week: 5x/wk for 90 min and 1x/wk for 30 min  Current Treatment Recommendations: Strengthening, Endurance Training, Patient/Caregiver Education & Training, Equipment Evaluation, Education, & procurement, Self-Care / ADL, Balance Training, Functional Mobility Training, Safety Education & Training, Positioning    Patient Education  Patient Education: ADL's, Home Exercise Program, Precautions, Equipment Education, Reviewed Prior Education and Assistive Device Safety    Goals  Short term goals  Time Frame for Short term goals: 1 week  Short term goal 1: Pt will navigate hemiwalker to and from the bathroom with SBA to improve accessing bathroom. GOAL MET, REVISE   Short term goal 2: Pt will complete LB dressing tasks with LHAE prn and mod A to improve indep with self care.

## 2021-01-20 NOTE — PROGRESS NOTES
[address] [address]  INPATIENT PHYSICAL THERAPY  PROGRESS NOTE  [address]    Time In: 0700  Time Out: 0800  Timed Code Treatment Minutes: 60 Minutes  Minutes: 60          Date: 2021  Patient Name: Jj Good,  Gender:  male        MRN: [id number]  : 1975  (39 y.o.)     Referring Practitioner: CRISTIAN Anderson CNP  Diagnosis: history of lumbar laminectomy  Additional Pertinent Hx: Per EMR: \"39year-old obese  male presents to emergency department with evaluation of bilateral leg edema. Patient relates his leg swelling to status post lumbar fusion L3-L5 on 2020 had surgery remove fluid on 2021. Irlanda Almodovar Patient has gradually gotten worse since that time. \" To [facility] on 1/15     Prior Level of Function:  Lives With: Other (comment)([facility] ([facility]) - ~14 residents)  Type of Home: House  Home Layout: One level  Home Access: Level entry  Home Equipment: Rolling walker   Bathroom Shower/Tub: Walk-in shower  Bathroom Toilet: Handicap height    ADL Assistance: Independent  Homemaking Assistance: Independent  Ambulation Assistance: Independent  Transfer Assistance: Independent  Active : No  Additional Comments: pt reports no use of AD at baseline, utilizing RW since surgery 20    Restrictions/Precautions:  Restrictions/Precautions: General Precautions, Fall Risk  Required Braces or Orthoses  Spinal: Lumbar Corset  Position Activity Restriction  Spinal Precautions: No Bending, No Twisting, No Lifting  Other position/activity restrictions: Pt had right arm amputated 3 years ago. s/p L3-5 fusion      SUBJECTIVE: Pt. Seated in BS chair upon arrival and pleasantly agrees to therapy session. Pt. Continues to report numbness/weakness throughout L LE.      PAIN: Not rated: Low back radiating into L LE    OBJECTIVE:  Bed Mobility:  Rolling to Left: Contact Guard Assistance   Supine to Sit: Minimal Assistance  Sit to Supine: Minimal Assistance     Transfers:  Sit to Stand: Contact Guard Assistance  Stand to Sit:Contact Matthew Schwab  Stand Pivot:Contact Guard Assistance  Car:Moderate Assistance    Ambulation:  Stand By Assistance, Contact Guard Assistance  Distance: 150' x 1, 75' x 1  Surface: Level Tile  Device:Quad Cane  Gait Deviations: Forward Flexed Posture, Slow Sabrina, Decreased Weight Shift Left, Decreased Gait Speed, Wide Base of Support and Decreased stability during L stance phase at times. Stairs:  Platform:  6\" platform X 1 using [employer] and [employer], with verbal cues . Balance:  Pt. Completed standing dynamic balance activity: reaching for bean bags and tossing them to target  with Normal EDEN on level tile and No UE support with Stand By Assistance, [employer]. Minimal cues required for spinal precautions. Activity completed to improve balance, enhance functional mobility, and reduce risk of falls. Exercise:  Patient was guided in 1 set(s) 10 reps of exercise to both lower extremities. Glut sets, Heelslides, Short arc quads, Hip abduction/adduction, Pelvic tilts, Seated marches, Seated hamstring curls, Seated heel/toe raises, Long arc quads and Seated isometric hip adduction. Exercises were completed for increased independence with functional mobility. Functional Outcome Measures: Not completed       ASSESSMENT:  Assessment: Patient progressing toward established goals, meeting 2 short term goals. Pt has progressed to using a bariatric quad cane and showing CGA with transfers and gait. Mod assist needed yet with car transfer and min assist with bed mobility. Pt will continued to benefit from skilled PT to further advance with functional mobility, gait and safety. Activity Tolerance:  Patient tolerance of  treatment: good.       Equipment Recommendations:Equipment Needed: Yes(wide base q. cane)  Discharge Recommendations:  Continue to assess pending progress    Plan: Times per week: 5x/wk 90 min, 1x/wk 30 min  Current Treatment Recommendations: Strengthening, Gait Training, Patient/Caregiver Education & Training, ROM, Stair training, Equipment Evaluation, Education, & procurement, Balance Training, Neuromuscular Re-education, Pain Management, Functional Mobility Training, Endurance Training, Home Exercise Program, Transfer Training, Safety Education & Training, Modalities, Wheelchair Mobility Training    Patient Education  Patient Education: Plan of Care, Precautions/Restrictions, [employer] Mobility, Transfers, Reviewed Prior Education, Gait, Stairs, Car Transfers, Verbal Exercise Instruction    Goals:  Patient goals :  To go home  Short term goals  Time Frame for Short term goals: 1 week  Short term goal 1: pt to perform supine to/from sit at [facility] to get in and out of bed  NOT MET  Short term goal 2: pt to perform sit to/from stand at A to rise from bed or chair  GOAL MET, SEE LTG  Short term goal 3: pt to ambulate >50ft with hemiwalker at SBA for mobility in the home  GOAL MET (WITH QUAD CANE)  Short term goal 4: pt to perform car transfer at [facility] for transport to home  NOT MET  Short term goal 5: pt to negotiate 6\" platform step with hemiwalker at [facility] for community  Integration GOAL MET, SEE LTG  Long term goals  Time Frame for Long term goals : 2 weeks  Long term goal 1: pt to perform supine to/from sit at Mod I to get in and out of bed  NOT MET  Long term goal 2: pt to perform sit to/from stand at Mod I to rise from bed or chair  NOT MET, SEE LTG  Long term goal 3: pt to ambulate >50ft with least restrictive device at Mod I, 150ft at S for mobility in the home and community   NOT MET  Long term goal 4: pt to perform car transfer at S for transport to home  NOT MET  Long term goal 5: pt to negotiate 6\" platform step with hemiwalker at [facility] for community integration  NOT MET    Following session, patient left in safe position with all fall risk precautions in place. Treatment session and note completed by Ángel Parson PTA.   Assessment and goal revision of Progress Note completed by Jasmyn Dalton PT.

## 2021-01-20 NOTE — PROGRESS NOTES
Physical Medicine & Rehabilitation   Progress Note    Chief Complaint:  CHF exacerbation, lumbar surgery L3-L4 with discectomy, and right arm amputation    Subjective:  Patient just up with PT, preparing to walking into hallway. Discussed with patient about team conference tomorrow. Patient with goo pain control, states just has to get used to the left leg pain. Patient reports it comes down through the left thigh into the knee. Denies any needs at this time. Rehabilitation:  PT:  reviewed  OT: reviewed     Review of Systems:  CONSTITUTIONAL:  negative  RESPIRATORY:  negative  CARDIOVASCULAR:  negative  GASTROINTESTINAL:  negative  GENITOURINARY:  horn  MUSCULOSKELETAL:  positive for  pain and muscle weakness  NEUROLOGICAL:  positive for gait problems, weakness and pain  BEHAVIOR/PSYCH:  negative  System review otherwise negative    Objective:  /88   Pulse 114   Temp 98.3 °F (36.8 °C) (Oral)   Resp 18   Ht 6' (1.829 m)   Wt 270 lb 12.8 oz (122.8 kg)   SpO2 98%   BMI 36.73 kg/m²   awake  Orientation:   person, place, time  Mood: within normal limits  Affect: calm  General appearance: Patient is well nourished, well developed, well groomed and in no acute distress    Memory:   normal,   Attention/Concentration: normal  Language:  normal    Cranial Nerves:  cranial nerves II-XII are grossly intact  ROM:  abnormal - limited BLE  Tone:  normal  Muscle bulk: within normal limits  Sensory:  Sensory intact    Skin: warm and dry, no rash or erythema. Lumbar incision not visualized  Peripheral vascular:  Edema: trace      Diagnostics:   No results found for this or any previous visit (from the past 24 hour(s)). Impression:  1. Lumbar stenosis  1. L3-5 decompression and fusion 12/29/2020  2. L3-5 revision facetectomies and L3-4 left discectomy with hematoma evacuation 1/6/2021  2. ONIEL with volume overload and peripheral edema  3. Alcohol abuse, in remission  4.  Amputation of right arm  5. Ascites  6. Gastroesophageal reflux disease  7. Hepatic cirrhosis  8. Hepatitis B  9. Hepatitis C  10. Hypertension  11. Intravenous drug use, in remission   12. Psychiatric problem  13. Posttraumatic stress disorder  14. Sciatica  15. Splenomegaly  16. Thrombocytopenia  17.  Ulcerative colitis    Plan:  Continue current therapies  Prophylaxis: DVT - Lovenox, GI - Protonix  Pain: tylenol, percocet, lyrica  Bowels: dulcolax, miralax  Lidocaine gel for left thigh radicular pain  · Team conference Thursday    Missed Therapy Time:  · None    Mabel Bess, APRN - CNP

## 2021-01-20 NOTE — PROGRESS NOTES
[address] NOTE    Conference Date: 2021  Admit Date:  1/15/2021  5:51 PM  Patient Name: Curry Chavez    MRN: [id number]    : 1975  (39 y.o.)  Rehabilitation Admitting Diagnosis:  History of lumbar laminectomy [Z98.890]  S/P lumbar laminectomy [Z98.890]  Referring Practitioner: CRISTIAN Rebolledo CNP      CASE MANAGEMENT  Current issues/needs regarding patient and family discharge status: SW met with patient to introduce self and explain role, complete SW assessment, and initiate discharge planning. Prior to surgery and hospitalization, patient was independent with ADL's and personal care. Patient lives with roommates through [address] [place] [address]. Patient indicates having anywhere from 10 to 14 individuals in the home. Patient has lived and been involved in the men's [employer] program for the last three years. Patient indicates that the home has a chore list that changes on a monthly basis. Patient reports that other members have been helping and understanding regarding recent inability to complete chores. Patient does not drive, but reports availability for transportation. Patient indicates working part-time (16 to 20 hours a week) through a [address] [place] [address] [employer]. Patient manages own finances. Patient additionally manages his own medications, however, medications are locked in the home and requires staff assistance. Patient reports goal to be able to return home as independent as possible. Anticipate patient would benefit from continued therapy upon discharge. SW reviewed with patient team conference on Thursday, 2021, to further discuss progress and discharge recommendations. SW to follow and maintain involvement in discharge planning. PHYSICAL THERAPY    Assessment: Patient progressing toward established goals, meeting 2 short term goals. Pt has progressed to using a bariatric quad cane and showing CGA with transfers and gait.   Mod assist needed yet with car transfer and min assist with bed mobility. Pt will continued to benefit from skilled PT to further advance with functional mobility, gait and safety. Equipment Needed: Yes(wide base q. cane. HME checking wt restrictions. May need bariatric)    SPEECH THERAPY   N/A    OCCUPATIONAL THERAPY  Media Kalyn has made steady progress on IP Rehab. He has progressed to a SBA level for his functional transfers and CGA for basic mobility with use of quad cane. He completes UB ADLs with set-up A, CGA for standing balance during ADLs, and mod A for socks with use of LHAE. During BADLs, he did not require any VCing for back precautions. He completes IADLs with CGA with min cues for spinal precautions. He would cont to benefit from cont skilled OT services to progress with ADL / IADL  remediation with a focus on safety, endurance building, spinal precaution awareness, and to decrease risk of fall / future injury. Other: Patient has shower chair and LH sponge. Recommend other Tae Stephanie. RECREATIONAL THERAPY  Patient has been offered participation in recreational therapy activities and participates as able. NUTRITION  Weight: 270 lb 12.8 oz (122.8 kg) / Body mass index is 36.73 kg/m². Current diet: DIET LOW SODIUM 2 GM; 1500 ml  Dietary Nutrition Supplements: Wound Healing Oral Supplement  Please see nutrition note for details. NURSING  Continent of Bowel and Bladder: horn in place for retention  Pain is Managed:  Yes  Signs and Symptoms of Infection:  No  Signs and Symptoms of Skin Breakdown:  No  Injury and Fall Free during Inpatient Rehabilitation Admission: Yes    Consultations/Labs/X-rays: monitoring medical status    No results for input(s): POCGLU in the last 72 hours.     Lab Results   Component Value Date    LDLCALC 62 01/13/2021    LDLCHOLESTEROL 55 04/04/2019         Vitals:    01/20/21 0815 01/20/21 1235 01/20/21 2015 01/21/21 0900   BP: 137/88  (!) 160/90 133/76   Pulse: 114  107 104 Resp: 18  18 16   Temp: 98.3 °F (36.8 °C)  97.7 °F (36.5 °C) 97.8 °F (36.6 °C)   TempSrc: Oral  Oral Oral   SpO2: 98% 97% 100% 100%   Weight:       Height:              Family Education: No family available for education  Fall Risk:  [employer] program initiated  Is the patient appropriate for a stay in the functional apartment? no    Discharge Plan   Estimated Discharge Date: 1/26/2021   Destination: home health  Services at Discharge: [address] [place] [address] Drive, Occupational Therapy, Nursing and Aide 3x week  Is patient appropriate for an outpatient driving evaluation? no  Equipment at Discharge: Will arrange  Factors facilitating achievement of predicted outcomes: Motivated  Barriers to the achievement of predicted outcomes: Decreased endurance and Lower extremity weakness    Team Members Present at Conference:  :Sandy Ernandez, [address] OTR/L [address], PT [address]  Speech Therapist:N/A  Chela Clark RN  Psychologist: Alexandra Aguiar, PhD.    I approve the established interdisciplinary plan of care as [facility].     Jaycob Pérez

## 2021-01-21 PROCEDURE — 97110 THERAPEUTIC EXERCISES: CPT

## 2021-01-21 PROCEDURE — 1180000000 HC REHAB R&B

## 2021-01-21 PROCEDURE — 6360000002 HC RX W HCPCS: Performed by: PHYSICAL MEDICINE & REHABILITATION

## 2021-01-21 PROCEDURE — 97535 SELF CARE MNGMENT TRAINING: CPT

## 2021-01-21 PROCEDURE — 6370000000 HC RX 637 (ALT 250 FOR IP): Performed by: NURSE PRACTITIONER

## 2021-01-21 PROCEDURE — 97116 GAIT TRAINING THERAPY: CPT

## 2021-01-21 PROCEDURE — 99232 SBSQ HOSP IP/OBS MODERATE 35: CPT | Performed by: NURSE PRACTITIONER

## 2021-01-21 PROCEDURE — 6370000000 HC RX 637 (ALT 250 FOR IP): Performed by: INTERNAL MEDICINE

## 2021-01-21 PROCEDURE — 6370000000 HC RX 637 (ALT 250 FOR IP): Performed by: FAMILY MEDICINE

## 2021-01-21 PROCEDURE — 2580000003 HC RX 258: Performed by: NURSE PRACTITIONER

## 2021-01-21 PROCEDURE — 97530 THERAPEUTIC ACTIVITIES: CPT

## 2021-01-21 PROCEDURE — 6360000002 HC RX W HCPCS: Performed by: NURSE PRACTITIONER

## 2021-01-21 RX ADMIN — MULTIPLE VITAMINS W/ MINERALS TAB 1 TABLET: TAB at 13:37

## 2021-01-21 RX ADMIN — BUMETANIDE 1 MG: 1 TABLET ORAL at 08:51

## 2021-01-21 RX ADMIN — OXYCODONE AND ACETAMINOPHEN 1 TABLET: 5; 325 TABLET ORAL at 05:34

## 2021-01-21 RX ADMIN — HEPARIN 500 UNITS: 100 SYRINGE at 08:52

## 2021-01-21 RX ADMIN — MAGNESIUM GLUCONATE 500 MG ORAL TABLET 400 MG: 500 TABLET ORAL at 08:50

## 2021-01-21 RX ADMIN — CIPROFLOXACIN 500 MG: 500 TABLET, FILM COATED ORAL at 20:56

## 2021-01-21 RX ADMIN — PREGABALIN 200 MG: 50 CAPSULE ORAL at 20:56

## 2021-01-21 RX ADMIN — METOPROLOL TARTRATE 12.5 MG: 25 TABLET ORAL at 13:37

## 2021-01-21 RX ADMIN — FERROUS SULFATE TAB 325 MG (65 MG ELEMENTAL FE) 325 MG: 325 (65 FE) TAB at 08:51

## 2021-01-21 RX ADMIN — OXYCODONE AND ACETAMINOPHEN 1 TABLET: 5; 325 TABLET ORAL at 15:13

## 2021-01-21 RX ADMIN — MESALAMINE 800 MG: 400 CAPSULE, DELAYED RELEASE ORAL at 08:50

## 2021-01-21 RX ADMIN — ACETAMINOPHEN 650 MG: 325 TABLET ORAL at 15:13

## 2021-01-21 RX ADMIN — SPIRONOLACTONE 25 MG: 25 TABLET ORAL at 08:50

## 2021-01-21 RX ADMIN — ENOXAPARIN SODIUM 40 MG: 40 INJECTION SUBCUTANEOUS at 08:51

## 2021-01-21 RX ADMIN — PANTOPRAZOLE SODIUM 40 MG: 40 TABLET, DELAYED RELEASE ORAL at 05:34

## 2021-01-21 RX ADMIN — MESALAMINE 800 MG: 400 CAPSULE, DELAYED RELEASE ORAL at 20:56

## 2021-01-21 RX ADMIN — OXYCODONE AND ACETAMINOPHEN 1 TABLET: 5; 325 TABLET ORAL at 10:53

## 2021-01-21 RX ADMIN — SODIUM CHLORIDE, PRESERVATIVE FREE 10 ML: 5 INJECTION INTRAVENOUS at 08:52

## 2021-01-21 RX ADMIN — PAROXETINE HYDROCHLORIDE 40 MG: 20 TABLET, FILM COATED ORAL at 08:51

## 2021-01-21 RX ADMIN — METOPROLOL TARTRATE 12.5 MG: 25 TABLET ORAL at 20:56

## 2021-01-21 RX ADMIN — FOLIC ACID 2 MG: 1 TABLET ORAL at 08:51

## 2021-01-21 RX ADMIN — MICONAZOLE NITRATE: 20 POWDER TOPICAL at 08:54

## 2021-01-21 RX ADMIN — OXYCODONE AND ACETAMINOPHEN 1 TABLET: 5; 325 TABLET ORAL at 19:42

## 2021-01-21 RX ADMIN — CIPROFLOXACIN 500 MG: 500 TABLET, FILM COATED ORAL at 08:50

## 2021-01-21 RX ADMIN — FERROUS SULFATE TAB 325 MG (65 MG ELEMENTAL FE) 325 MG: 325 (65 FE) TAB at 17:04

## 2021-01-21 RX ADMIN — Medication 100 MG: at 08:51

## 2021-01-21 RX ADMIN — OXYCODONE AND ACETAMINOPHEN 1 TABLET: 5; 325 TABLET ORAL at 01:00

## 2021-01-21 RX ADMIN — PREGABALIN 200 MG: 50 CAPSULE ORAL at 08:50

## 2021-01-21 RX ADMIN — PREGABALIN 200 MG: 50 CAPSULE ORAL at 13:37

## 2021-01-21 RX ADMIN — PANTOPRAZOLE SODIUM 40 MG: 40 TABLET, DELAYED RELEASE ORAL at 17:04

## 2021-01-21 RX ADMIN — MESALAMINE 800 MG: 400 CAPSULE, DELAYED RELEASE ORAL at 13:37

## 2021-01-21 RX ADMIN — MICONAZOLE NITRATE: 20 POWDER TOPICAL at 20:56

## 2021-01-21 RX ADMIN — POTASSIUM CHLORIDE 20 MEQ: 1500 TABLET, EXTENDED RELEASE ORAL at 08:50

## 2021-01-21 RX ADMIN — TAMSULOSIN HYDROCHLORIDE 0.4 MG: 0.4 CAPSULE ORAL at 08:53

## 2021-01-21 RX ADMIN — MAGNESIUM GLUCONATE 500 MG ORAL TABLET 400 MG: 500 TABLET ORAL at 20:56

## 2021-01-21 ASSESSMENT — PAIN SCALES - GENERAL
PAINLEVEL_OUTOF10: 3
PAINLEVEL_OUTOF10: 7
PAINLEVEL_OUTOF10: 3

## 2021-01-21 NOTE — PROGRESS NOTES
C  10. Hypertension  11. Intravenous drug use, in remission   12. Psychiatric problem  13. Posttraumatic stress disorder  14. Sciatica  15. Splenomegaly  16. Thrombocytopenia  17. Ulcerative colitis    Plan:  Continue current therapies  Prophylaxis: DVT - Lovenox, GI - Protonix  Pain: tylenol, percocet, lyrica  Bowels: dulcolax, miralax  Lidocaine gel for left thigh radicular pain  Monitor BP and HR, single elevated BP last night.    · Team conference Today    Missed Therapy Time:  · None    Mabel Bess, CRISTIAN - CNP

## 2021-01-21 NOTE — PROGRESS NOTES
Patient: Rachelle Beal  Unit/Bed: 7X-07/208-J  YOB: 1975  MRN: [id number] Acct: [de-identified]   Admitting Diagnosis: History of lumbar laminectomy [Z98.890]  S/P lumbar laminectomy [Z98.890]  Admit Date:  1/15/2021  Hospital Day: 6    Assessment:     Active Problems:    Ulcerative colitis, universal (HCC)    Essential hypertension    Leg swelling    Azotemia    S/P lumbar laminectomy    Postoperative anemia    Chronic pain syndrome    Anxiety and depression  Resolved Problems:    * No resolved hospital problems. *      Plan:     Follow the elevation in pulse and BP for now        Subjective:     Patient has no complaint of CP, SOB, GI upset or voiding troubles. .   Medication side effects: none    Scheduled Meds:   heparin flush  500 Units Intracatheter Daily    thiamine  100 mg Oral Daily    therapeutic multivitamin-minerals  1 tablet Oral Daily    magnesium oxide  400 mg Oral BID    miconazole   Topical BID    spironolactone  25 mg Oral Daily    ciprofloxacin  500 mg Oral Q12H    enoxaparin  40 mg Subcutaneous Q24H    sodium chloride flush  10 mL Intravenous 2 times per day    bumetanide  1 mg Oral Daily    folic acid  2 mg Oral Daily    mesalamine  800 mg Oral TID    pantoprazole  40 mg Oral BID AC    PARoxetine  40 mg Oral QAM    potassium chloride  20 mEq Oral Daily with breakfast    pregabalin  200 mg Oral TID    tamsulosin  0.4 mg Oral Daily    ferrous sulfate  325 mg Oral BID WC    influenza virus vaccine  0.5 mL Intramuscular Prior to discharge     Continuous Infusions:  PRN Meds:lidocaine, heparin flush, bisacodyl, acetaminophen **OR** [DISCONTINUED] acetaminophen, polyethylene glycol, promethazine **OR** [DISCONTINUED] ondansetron, sodium chloride flush, oxyCODONE-acetaminophen    Review of Systems  Pertinent items are noted in HPI.     Objective:     Patient Vitals for the past 8 hrs:   BP Temp Temp src Pulse Resp SpO2   01/21/21 0900 133/76 97.8 °F (36.6 °C) Oral 104 16 100 %     I/O last 3 completed shifts:   In: 18 [P.O.:1460; I.V.:15]  Out: 3300 [Urine:3300]  I/O this shift:  In: 150 [P.O.:150]  Out: -     /76   Pulse 104   Temp 97.8 °F (36.6 °C) (Oral)   Resp 16   Ht 6' (1.829 m)   Wt 270 lb 12.8 oz (122.8 kg)   SpO2 100%   BMI 36.73 kg/m²     General appearance: alert, appears stated age and cooperative  Head: Normocephalic, without obvious abnormality, atraumatic  Lungs: clear to auscultation bilaterally  Chest wall: no tenderness  Heart: regular rate and rhythm, S1, S2 normal, no murmur, click, rub or gallop  Abdomen: soft, non-tender; bowel sounds normal; no masses,  no organomegaly  Extremities: extremities normal, atraumatic, no cyanosis or edema  Skin: Skin color, texture, turgor normal. No rashes or lesions  Neurologic: Grossly normal      Electronically signed by Colby Ramirez MD on 1/21/2021 at 9:16 AM

## 2021-01-21 NOTE — PROGRESS NOTES
Safety Education & Training, Modalities, Wheelchair Mobility Training    Patient Education  Patient Education: Precautions/Restrictions, Transfers, Gait,  - Patient Verbalized Understanding    Goals:  Patient goals : To go home  Short term goals  Time Frame for Short term goals: 1 week  Short term goal 1: pt to perform supine to/from sit at [address] to get in and out of bed  Short term goal 2: pt to perform sit to/from stand at SBA to rise from bed or chair  Short term goal 3: pt to ambulate >50ft with hemiwalker at SBA for mobility in the home MET (with q. cane) see LTG  Short term goal 4: pt [facility] to [facility]  Short term goal 5: pt to negotiate 6\" platform step [facility]  Long term goals  Time Frame for Long term goals : 2 weeks  Long term goal 1: pt to perform supine to/from sit at Mod I to get in and out of bed  Long term goal 2: pt to perform sit to/from stand at Mod I to rise from bed or chair  Long term goal 3: pt to ambulate >50ft with least restrictive device at Mod I, 150ft at S for mobility in the home and community  Long term goal 4: [facility] to [facility]  Long term goal 5: pt to negotiate 6\" platform step [facility] for [facility]    Following session, patient left in safe position with all fall risk precautions in place.     Treatment session and note completed by Dennys ARIAS under supervision of signing therapist.

## 2021-01-21 NOTE — PROGRESS NOTES
[address] [address]  INPATIENT PHYSICAL THERAPY  DAILY NOTE  [facility] - 7E-54/054-A    Time In: 0800  Time Out: 0830  Timed Code Treatment Minutes: 27 Minutes  Minutes: 30          Date: 2021  Patient Name: Juan Chadwick,  Gender:  male        MRN: [id number]  : 1975  (39 y.o.)     Referring Practitioner: CRISTIAN Wilkerson CNP  Diagnosis: history of lumbar laminectomy  Additional Pertinent Hx: Per EMR: \"39year-old obese  male presents to emergency department with evaluation of bilateral leg edema. Patient relates his leg swelling to status post lumbar fusion L3-L5 on 2020 had surgery remove fluid on 2021. Sasha Sheng Patient has gradually gotten worse since that time. \" To [facility] on 1/15     Prior Level of Function:  Lives With: Other (comment)([facility] - ~14 residents)  Type of Home: House  Home Layout: One level  Home Access: Level entry  Home Equipment: Rolling walker   Bathroom Shower/Tub: Walk-in shower  Bathroom Toilet: Handicap height    ADL Assistance: Independent  Homemaking Assistance: Independent  Ambulation Assistance: Independent  Transfer Assistance: Independent  Active : No  Additional Comments: pt reports no use of AD at baseline, utilizing RW since surgery 20    Restrictions/Precautions:  Restrictions/Precautions: General Precautions, Fall Risk  Required Braces or Orthoses  Spinal: Lumbar Corset  Position Activity Restriction  Spinal Precautions: No Bending, No Twisting, No Lifting  Other position/activity restrictions: Pt had right arm amputated 3 years ago. s/p L3-5 fusion      SUBJECTIVE: Patient in bed upon arrival. Reported having woken up early and had been dozing off for a few hours prior to our arrival. Reported feeling stronger today and was agreeable to treatment. PAIN: No pain rating given.     OBJECTIVE:  Bed Mobility:  Not Tested     Transfers:  Sit to Stand: Contact Guard Assistance  Stand to Sit:Contact Matthew Schwab  Stand Pivot:Contact Guard Assistance    Ambulation:  Contact Guard Assistance, Minimal Assistance  Distance: 150'x2, multiple shorter distances   Surface: Level Tile  Device:Quad Cane  Gait Deviations:  Slow Sabrina, Lean to Left, Mild Path Deviations and Unsteady Gait. Pt. With one episode of L knee giving out requiring min A to correct    Balance:  Static Sitting Balance:  Modified Independent     Exercise:  Patient was guided in 1 set(s) 10 reps of exercise to both lower extremities. Seated marches, Seated hamstring curls, Seated heel/toe raises, Long arc quads, Seated isometric hip adduction and Step ups onto 6\" step in // bars. Exercises were completed for increased independence with functional mobility. Functional Outcome Measures: Not completed       ASSESSMENT:  Assessment: Patient progressing toward established goals. Activity Tolerance:  Patient tolerance of  treatment: good. Exercises completed with less difficulty during this session. Left leg was able to complete greater range of motion in knee extension with less assistance. Equipment Recommendations:Equipment Needed: Yes(wide base q. cane. HME checking wt restrictions. May need bariatric)  Discharge Recommendations:  Continue to assess pending progress    Plan: Times per week: 5x/wk 90 min, 1x/wk 30 min  Current Treatment Recommendations: Strengthening, Gait Training, Patient/Caregiver Education & Training, ROM, Stair training, Equipment Evaluation, Education, & procurement, Balance Training, Neuromuscular Re-education, Pain Management, Functional Mobility Training, Endurance Training, Home Exercise Program, Transfer Training, Safety Education & Training, Modalities, Wheelchair Mobility Training    Patient Education  Patient Education: Precautions/Restrictions, Transfers, Gait,  - Patient Verbalized Understanding    Goals:  Patient goals :  To go home  Short term goals  Time Frame for Short term goals: 1 week  Short term goal 1: pt to perform supine to/from sit at [address] to get in and out of bed  Short term goal 2: pt to perform sit to/from stand at SBA to rise from bed or chair  Short term goal 3: pt to ambulate >50ft with hemiwalker at SBA for mobility in the home MET (with q. cane) see LTG  Short term goal 4: pt [facility] to [facility]  Short term goal 5: pt to negotiate 6\" platform step [facility]  Long term goals  Time Frame for Long term goals : 2 weeks  Long term goal 1: pt to perform supine to/from sit at Mod I to get in and out of bed  Long term goal 2: pt to perform sit to/from stand at Mod I to rise from bed or chair  Long term goal 3: pt to ambulate >50ft with least restrictive device at Mod I, 150ft at S for mobility in the home and community  Long term goal 4: pt [facility]  Long term goal 5: pt to negotiate 6\" platform step with hemiwalker at [facility] for community integration    Following session, patient left in safe position with all fall risk precautions in place.     Treatment session and note completed by Swati ARIAS under supervision of signing therapist.

## 2021-01-21 NOTE — PROGRESS NOTES
[address]  Occupational Therapy  Daily Note  Time:  Time In: 1030  Time Out: 1130  Timed Code Treatment Minutes: 60 Minutes  Minutes: 60          Date: 2021  Patient Name: Rachelle Beal,   Gender: male      Room: [facility]/054-A  MRN: [id number]  : 1975  (39 y.o.)  Referring Practitioner: CRISTIAN Bull CNP  Diagnosis: History of lumbar laminectomy  Additional Pertinent Hx: Per EMR: \"39year-old obese  male presents to emergency department with evaluation of bilateral leg edema. Patient relates his leg swelling to status post lumbar fusion L3-L5 on 2020 had surgery remove fluid on 2021. Sukhi Ruiz Patient has gradually gotten worse since that time. \" To [facility] on 1/15    Restrictions/Precautions:  Restrictions/Precautions: General Precautions, Fall Risk  Required Braces or Orthoses  Spinal: Lumbar Corset  Position Activity Restriction  Spinal Precautions: No Bending, No Twisting, No Lifting  Other position/activity restrictions: Pt had right arm amputated 3 years ago. s/p L3-5 fusion      SUBJECTIVE: Pt in recliner upon arrival and agreeable to OT. Pt c/o pain and requested pain meds. Nursing Notified and pain meds were delivered during session. PAIN: 10: Pt c/o slight leg pain 10 but had severe toothache       COGNITION: WFL    ADL:   Toilet Transfer: Supervision. Descend to toilet at end of session. Left with nursing. BALANCE:  Sitting Balance:  Independent. Standing Balance: Stand By Assistance. BED MOBILITY:  Not Tested    TRANSFERS:  Sit to Stand:  Stand By Assistance. Pt able to scoot forward to self align for hand placement due to amputation. S/JUN provided CGA to stand from lower recliner in therapy apartment. Stand to Sit: Stand By Assistance. FUNCTIONAL MOBILITY:  Assistive Device: [employer]  Assist Level:  Stand By [employer]. Distance:  To and from therapy apartment ADDITIONAL ACTIVITIES:  - Pt completed homemaking task of meal prep by making a milkshake in therapy. Pt was able to utilize quad cane with 1 vc to maneuver kitchen area during task. Pt stood for 7 min with no UE support and 0 vc to follow spinal precautions. Pt demo'd ability to follow precautions by using AE to retrieve items from floor.     - Pt completed laundry task of picking up linens from graded levels including floor level in Therapy apartment and folding to put away. Pt was able to demo ability to follow precautions while using AE to retreive towels from floor and place in basket. S/JUN provided SBA for safety and balance. Pt also able to demo following precautions by self correcting placement of basket to prevent upper body rotation with 1 vc.    ASSESSMENT:     Activity Tolerance:  Patient tolerance of  treatment: good. Discharge Recommendations: Home with Home health OT   Equipment Recommendations: Other: Patient has shower chair and LH sponge. Recommend other Joy Rakers. Plan: Times per week: 5x/wk for 90 min and 1x/wk for 30 min  Current Treatment Recommendations: Strengthening, Endurance Training, Patient/Caregiver Education & Training, Equipment Evaluation, Education, & procurement, Self-Care / ADL, Balance Training, Functional Mobility Training, Safety Education & Training, Positioning    Patient Education  Patient Education: Role of OT, IADL's, Energy Conservation, Precautions, Home Safety and Home Safety Education    Goals  Short term goals  Time Frame for Short term goals: 1 week  Short term goal 1: Pt will navigate quad cane to and from the bathroom with Supervision to improve accessing bathroom. Short term goal 2: Pt will complete LB dressing tasks with LHAE prn and m A to improve indep with self care. Short term goal 3: Pt will demo spinal precautions during 6 min dynamic standing tasks to improve indep with meal prep or IADLs.   Short term goal 4: Pt will complete meal prep tasks with S using quad cane to improve indep within home. Long term goals  Time Frame for Long term goals : 2 weeks  Long term goal 1: Pt will complete BADL routine with mod I and 0 vcs for spinal precautions to improve indep within home. Long term goal 2: Pt will complete light IADL tasks with mod I and 0 vcs for adaptive technique or spinal precautions to improve indep within home    Following session, patient left in safe position with all fall risk precautions in place.     Yady FREEMAN/L [id number]

## 2021-01-21 NOTE — PROGRESS NOTES
[facility]  INPATIENT PHYSICAL THERAPY  DAILY NOTE  [facility] - 7E-54/054-A    Time In: 1200  Time Out: 1230  Timed Code Treatment Minutes: 30 Minutes  Minutes: 30          Date: 2021  Patient Name: John Ernst,  Gender:  male        MRN: [id number]  : 1975  (39 y.o.)     Referring Practitioner: CRISTIAN Carranza CNP  Diagnosis: history of lumbar laminectomy  Additional Pertinent Hx: Per EMR: \"39year-old obese  male presents to emergency department with evaluation of bilateral leg edema. Patient relates his leg swelling to status post lumbar fusion L3-L5 on 2020 had surgery remove fluid on 2021. Lysbeth Carrel Patient has gradually gotten worse since that time. \" To [facility] on 1/15     Prior Level of Function:  Lives With: Other (comment)([facility] ([facility]) - ~14 residents)  Type of Home: House  Home Layout: One level  Home Access: Level entry  Home Equipment: Rolling walker   Bathroom Shower/Tub: Walk-in shower  Bathroom Toilet: Handicap height    ADL Assistance: Independent  Homemaking Assistance: Independent  Ambulation Assistance: Independent  Transfer Assistance: Independent  Active : No  Additional Comments: pt reports no use of AD at baseline, utilizing RW since surgery 20    Restrictions/Precautions:  Restrictions/Precautions: General Precautions, Fall Risk  Required Braces or Orthoses  Spinal: Lumbar Corset  Position Activity Restriction  Spinal Precautions: No Bending, No Twisting, No Lifting  Other position/activity restrictions: Pt had right arm amputated 3 years ago. s/p L3-5 fusion      SUBJECTIVE: Patient seated in recliner upon arrival and was agreeable to treatment. PAIN: No reported during this session     Vitals:  Pulse: 120-125bpm at rest, 130-140bpm after exercise Patient questioned about caffine consumption and reported having four cups of coffee this morning. OBJECTIVE:  Bed Mobility:  Not Tested     Transfers:  Sit to Stand: Modified Independent  Stand to Sit:Modified Independent    Ambulation:  Contact Guard Assistance  Distance: 150'x2  Surface: Level Tile  Device:No Device  Gait Deviations:  Slow Sabrina, Lean to Left, Mild Path Deviations and Unsteady Gait  Patient also walked up and down 10' incline in the [facility] to assess balance on uneven terrain. Balance:  Static Sitting Balance:  Modified Independent  Dynamic Standing Balance: Contact Guard Assistance. Exercise:  Patient was guided in 1 set(s) 10 reps of exercise to both lower extremities. Seated marches, Seated hamstring curls, Seated heel/toe raises, Long arc quads and Seated isometric hip adduction. Patient performed activity with one leg up on a 4\" step while performing calf raises on the opposite side. 2 sets of 10 repetitions bilaterally for lower extremity strengthening. Exercises were completed for increased independence with functional mobility. Functional Outcome Measures: Not completed       ASSESSMENT:  Assessment: Patient progressing toward established goals. Appeared jittery and had trouble sitting still. Activity Tolerance:  Patient tolerance of  treatment: good. Equipment Recommendations:Equipment Needed: Yes(wide base q. cane. HME checking wt restrictions.   May need bariatric)  Discharge Recommendations:  Continue to assess pending progress    Plan: Times per week: 5x/wk 90 min, 1x/wk 30 min  Current Treatment Recommendations: Strengthening, Gait Training, Patient/Caregiver Education & Training, ROM, Stair training, Equipment Evaluation, Education, & procurement, Balance Training, Neuromuscular Re-education, Pain Management, Functional Mobility Training, Endurance Training, Home Exercise Program, Transfer Training, Safety Education & Training, Modalities, Wheelchair Mobility Training    Patient Education  Patient Education: Precautions/Restrictions, Transfers, Gait,  -

## 2021-01-21 NOTE — PLAN OF CARE
Problem: Pain:  Description: Pain management should include both nonpharmacologic and pharmacologic interventions. Goal: Pain level will decrease  Description: Pain level will decrease  Outcome: Ongoing  Goal: Control of acute pain  Description: Control of acute pain  Outcome: Ongoing     Patient will participate in pain management to decrease pain as evidenced by patient use of breathing and meditation for pain management    Problem: Falls - Risk of:  Goal: Will remain free from falls  Description: Will remain free from falls  Outcome: Ongoing  Goal: Absence of physical injury  Description: Absence of physical injury  Outcome: Ongoing    Patient will remain free from falls during this admission as evidenced by no falls during this admission patient and staff will utilize safety devices and techniques for transfer      Problem: Urinary Elimination:  Goal: Signs and symptoms of infection will decrease  Description: Signs and symptoms of infection will decrease  Outcome: Ongoing  Goal: Complications related to the disease process, condition or treatment will be avoided or minimized  Description: Complications related to the disease process, condition or treatment will be avoided or minimized  Outcome: Ongoing     Patient does require horn catheter until after discharge for planned cystoscopy as an outpatient    Problem: Discharge Planning:  Goal: Discharged to appropriate level of care  Description: Discharged to appropriate level of care  Outcome: Ongoing     Patient will participate in discharge planning during this admission as evidenced by patient will continue to work with nursing and therapy for home going education.      Problem: Skin Integrity:  Goal: Will show no infection signs and symptoms  Description: Will show no infection signs and symptoms  Outcome: Ongoing  Goal: Absence of new skin breakdown  Description: Absence of new skin breakdown  Outcome: Ongoing    Patient will participate in skin integrity promotion during this admission as patient offloading weight while in bed and in chair. Patient and family will alert staff to any concerns he has related to break in intact skin.

## 2021-01-21 NOTE — FLOWSHEET NOTE
[place] [id number] PROGRESS NOTE      Patient: Rachelle Beal  Room #: 9T-57/416-A            YOB: 1975  Age: 39 y.o. Gender: male            Admit Date & Time: 1/15/2021  5:51 PM    Assessment:  Pt is a 45y. o. male, sitting in easychair watching television, in 7E-54. Jailene Camacho is calm, approachable, pleasant and coping with some discomfort due to recent back surgery. He shared about recent surgery, complications and additional surgery-now rehabbing. 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 He shared about his irma walk in the midst of recovery and how it's helped him in his current situation. He attends [facility] at the house he lives in as they have regular services by one of the [facility]. He mentioned he's grateful for the support he's receiving through this and thanks God every day for each day. Interventions:   provided active listening, encouragement, probing questions regarding his recovery, encouragement and prayer    Outcomes:  Jailene Camacho expressed gratitude for our time together, mentioned he continues down the path of sobriety and healing and looks forward to giving back to those who have helped him. Plan:  1. Electronically signed by Tera Smith on 1/20/2021 at [address] [place] [address] 01/20/21 1800   Encounter Summary   Services provided to: Patient   Referral/Consult From: [facility]   Continue Visiting No  (1/20)   Complexity of Encounter Low   Length of Encounter 15 minutes   Spiritual/Moravian   Type Spiritual support   Assessment Calm; Approachable;Peaceful;Coping   Intervention Active listening;Prayer;Discussed illness/injury and it's impact; Explored feelings, thoughts, concerns   Outcome Expressed gratitude;Receptive;Engaged in conversation;Expressed feelings/needs/concerns

## 2021-01-21 NOTE — CARE COORDINATION
Simeon Lopez requires a quad cane due to impaired ambulation and for increased stability in order to participate in or complete daily living tasks of: ambulating. The patient is able to safely use the quad cane and the functional mobility deficit can be sufficiently resolved.

## 2021-01-21 NOTE — PROGRESS NOTES
[address]  Occupational Therapy  Daily Note  Time:   Time In: 1330  Time Out: 1400  Timed Code Treatment Minutes: 30 Minutes  Minutes: 30          Date: 2021  Patient Name: Rachelle Beal,   Gender: male      Room: [facility]/054-A  MRN: [id number]  : 1975  (39 y.o.)  Referring Practitioner: CRISTIAN Bull CNP  Diagnosis: History of lumbar laminectomy  Additional Pertinent Hx: Per EMR: \"39year-old obese  male presents to emergency department with evaluation of bilateral leg edema. Patient relates his leg swelling to status post lumbar fusion L3-L5 on 2020 had surgery remove fluid on 2021. Sukhi Ruiz Patient has gradually gotten worse since that time. \" To [facility] on 1/15    Restrictions/Precautions:  Restrictions/Precautions: General Precautions, Fall Risk  Required Braces or Orthoses  Spinal: Lumbar Corset  Position Activity Restriction  Spinal Precautions: No Bending, No Twisting, No Lifting  Other position/activity restrictions: Pt had right arm amputated 3 years ago. s/p L3-5 fusion      SUBJECTIVE: Pt in recliner upon arrival, pt c/o pain and discomfort in legs and lower back but agreeable to OT. Nursing notified and meds given. During session Pt noted decreased strength in LUE limiting some function and task endurance from PLOF. PAIN: 8/10 Lower back and legs    COGNITION: WFL    BALANCE:  Sitting Balance:  Independent. Standing Balance: Stand By Assistance. BED MOBILITY:  Not Tested    TRANSFERS:  Sit to Stand:  Stand By Assistance. Stand to Sit: Supervision. FUNCTIONAL MOBILITY:  Assistive Device: [employer] [place]  Assist Level:  Stand By Assistance. Distance:  To and from therapy gym        ADDITIONAL ACTIVITIES:  -Pt completed LUE strengthening exercises while standing for 15 min with 3lb hand held weight of L shoulder Flex/Ext, Ab/Aduction, Horizontal Ab/Aduction, IR/ER and elbow Flex/Ext of 15 reps x 1 set with 30 sec rest break between sets in order to increase US strength to improve with bathing Indep.    -Pt demo ability to safely maneuver narrow hallway by side stepping with quad cane in order to increase in home safety during ADL's in the bathroom. Pt ambulated using quad cane with SBA from therapy gym down lópez and between nurse station and wall with obstacles placed     ASSESSMENT:     Activity Tolerance:  Patient tolerance of  treatment: good. Discharge Recommendations: Home with Home health OT   Equipment Recommendations: Other: Patient has shower chair and LH sponge. Recommend other [place]. Plan: Times per week: 5x/wk for 90 min and 1x/wk for 30 min  Current Treatment Recommendations: Strengthening, Endurance Training, Patient/Caregiver Education & Training, Equipment Evaluation, Education, & procurement, Self-Care / ADL, Balance Training, Functional Mobility Training, Safety Education & Training, Positioning    Patient Education  Patient Education: Home Exercise Program, Precautions, Home Safety and Importance of Increasing Activity    Goals  Short term goals  Time Frame for Short term goals: 1 week  Short term goal 1: Pt will navigate quad cane to and from the bathroom with Supervision to improve accessing bathroom. Short term goal 2: Pt will complete LB dressing tasks with LHAE prn and m A to improve indep with self care. Short term goal 3: Pt will demo spinal precautions during 6 min dynamic standing tasks to improve indep with meal prep or IADLs. Short term goal 4: Pt will complete meal prep tasks with S using quad cane to improve indep within home. Long term goals  Time Frame for Long term goals : 2 weeks  Long term goal 1: Pt will complete BADL routine with mod I and 0 vcs for spinal precautions to improve indep within home.   Long term goal 2: Pt will complete light IADL tasks with mod I and 0 vcs for adaptive technique or spinal precautions to improve indep within home    Following session, patient left in safe position with all fall risk precautions in place.         Randall FREEMAN/DASHAWN [id number]

## 2021-01-22 LAB — BLOOD CULTURE, ROUTINE: NORMAL

## 2021-01-22 PROCEDURE — 6370000000 HC RX 637 (ALT 250 FOR IP): Performed by: NURSE PRACTITIONER

## 2021-01-22 PROCEDURE — 6370000000 HC RX 637 (ALT 250 FOR IP): Performed by: FAMILY MEDICINE

## 2021-01-22 PROCEDURE — 97535 SELF CARE MNGMENT TRAINING: CPT

## 2021-01-22 PROCEDURE — 97530 THERAPEUTIC ACTIVITIES: CPT

## 2021-01-22 PROCEDURE — 1180000000 HC REHAB R&B

## 2021-01-22 PROCEDURE — 97110 THERAPEUTIC EXERCISES: CPT

## 2021-01-22 PROCEDURE — 2580000003 HC RX 258: Performed by: NURSE PRACTITIONER

## 2021-01-22 PROCEDURE — 97116 GAIT TRAINING THERAPY: CPT

## 2021-01-22 PROCEDURE — 6370000000 HC RX 637 (ALT 250 FOR IP): Performed by: INTERNAL MEDICINE

## 2021-01-22 PROCEDURE — 99231 SBSQ HOSP IP/OBS SF/LOW 25: CPT | Performed by: NURSE PRACTITIONER

## 2021-01-22 PROCEDURE — 6360000002 HC RX W HCPCS: Performed by: NURSE PRACTITIONER

## 2021-01-22 PROCEDURE — 6360000002 HC RX W HCPCS: Performed by: PHYSICAL MEDICINE & REHABILITATION

## 2021-01-22 RX ADMIN — OXYCODONE AND ACETAMINOPHEN 1 TABLET: 5; 325 TABLET ORAL at 10:35

## 2021-01-22 RX ADMIN — FERROUS SULFATE TAB 325 MG (65 MG ELEMENTAL FE) 325 MG: 325 (65 FE) TAB at 08:59

## 2021-01-22 RX ADMIN — MULTIPLE VITAMINS W/ MINERALS TAB 1 TABLET: TAB at 12:56

## 2021-01-22 RX ADMIN — FOLIC ACID 2 MG: 1 TABLET ORAL at 08:59

## 2021-01-22 RX ADMIN — OXYCODONE AND ACETAMINOPHEN 1 TABLET: 5; 325 TABLET ORAL at 01:33

## 2021-01-22 RX ADMIN — OXYCODONE AND ACETAMINOPHEN 1 TABLET: 5; 325 TABLET ORAL at 15:00

## 2021-01-22 RX ADMIN — MESALAMINE 800 MG: 400 CAPSULE, DELAYED RELEASE ORAL at 08:59

## 2021-01-22 RX ADMIN — PREGABALIN 200 MG: 50 CAPSULE ORAL at 12:56

## 2021-01-22 RX ADMIN — CIPROFLOXACIN 500 MG: 500 TABLET, FILM COATED ORAL at 08:59

## 2021-01-22 RX ADMIN — PANTOPRAZOLE SODIUM 40 MG: 40 TABLET, DELAYED RELEASE ORAL at 05:04

## 2021-01-22 RX ADMIN — SPIRONOLACTONE 25 MG: 25 TABLET ORAL at 08:59

## 2021-01-22 RX ADMIN — METOPROLOL TARTRATE 12.5 MG: 25 TABLET ORAL at 08:59

## 2021-01-22 RX ADMIN — FERROUS SULFATE TAB 325 MG (65 MG ELEMENTAL FE) 325 MG: 325 (65 FE) TAB at 17:10

## 2021-01-22 RX ADMIN — TAMSULOSIN HYDROCHLORIDE 0.4 MG: 0.4 CAPSULE ORAL at 08:59

## 2021-01-22 RX ADMIN — MAGNESIUM GLUCONATE 500 MG ORAL TABLET 400 MG: 500 TABLET ORAL at 08:59

## 2021-01-22 RX ADMIN — OXYCODONE AND ACETAMINOPHEN 1 TABLET: 5; 325 TABLET ORAL at 05:31

## 2021-01-22 RX ADMIN — MAGNESIUM GLUCONATE 500 MG ORAL TABLET 400 MG: 500 TABLET ORAL at 20:39

## 2021-01-22 RX ADMIN — PREGABALIN 200 MG: 50 CAPSULE ORAL at 08:58

## 2021-01-22 RX ADMIN — Medication 100 MG: at 08:59

## 2021-01-22 RX ADMIN — HEPARIN 500 UNITS: 100 SYRINGE at 09:15

## 2021-01-22 RX ADMIN — BUMETANIDE 1 MG: 1 TABLET ORAL at 08:59

## 2021-01-22 RX ADMIN — PAROXETINE HYDROCHLORIDE 40 MG: 20 TABLET, FILM COATED ORAL at 08:59

## 2021-01-22 RX ADMIN — MICONAZOLE NITRATE: 20 POWDER TOPICAL at 10:36

## 2021-01-22 RX ADMIN — MESALAMINE 800 MG: 400 CAPSULE, DELAYED RELEASE ORAL at 12:56

## 2021-01-22 RX ADMIN — ACETAMINOPHEN 650 MG: 325 TABLET ORAL at 10:35

## 2021-01-22 RX ADMIN — POTASSIUM CHLORIDE 20 MEQ: 1500 TABLET, EXTENDED RELEASE ORAL at 08:58

## 2021-01-22 RX ADMIN — ENOXAPARIN SODIUM 40 MG: 40 INJECTION SUBCUTANEOUS at 08:58

## 2021-01-22 RX ADMIN — PREGABALIN 200 MG: 50 CAPSULE ORAL at 20:38

## 2021-01-22 RX ADMIN — MESALAMINE 800 MG: 400 CAPSULE, DELAYED RELEASE ORAL at 20:38

## 2021-01-22 RX ADMIN — PANTOPRAZOLE SODIUM 40 MG: 40 TABLET, DELAYED RELEASE ORAL at 17:10

## 2021-01-22 RX ADMIN — CIPROFLOXACIN 500 MG: 500 TABLET, FILM COATED ORAL at 20:39

## 2021-01-22 RX ADMIN — SODIUM CHLORIDE, PRESERVATIVE FREE 10 ML: 5 INJECTION INTRAVENOUS at 09:15

## 2021-01-22 RX ADMIN — OXYCODONE AND ACETAMINOPHEN 1 TABLET: 5; 325 TABLET ORAL at 20:38

## 2021-01-22 ASSESSMENT — PAIN DESCRIPTION - ORIENTATION: ORIENTATION: LEFT

## 2021-01-22 ASSESSMENT — PAIN - FUNCTIONAL ASSESSMENT: PAIN_FUNCTIONAL_ASSESSMENT: ACTIVITIES ARE NOT PREVENTED

## 2021-01-22 ASSESSMENT — PAIN SCALES - GENERAL
PAINLEVEL_OUTOF10: 7
PAINLEVEL_OUTOF10: 0
PAINLEVEL_OUTOF10: 3
PAINLEVEL_OUTOF10: 5
PAINLEVEL_OUTOF10: 5

## 2021-01-22 ASSESSMENT — PAIN DESCRIPTION - ONSET
ONSET: ON-GOING
ONSET: ON-GOING

## 2021-01-22 ASSESSMENT — PAIN DESCRIPTION - LOCATION
LOCATION: BACK
LOCATION: BACK

## 2021-01-22 ASSESSMENT — PAIN DESCRIPTION - PAIN TYPE
TYPE: SURGICAL PAIN
TYPE: SURGICAL PAIN

## 2021-01-22 NOTE — PROGRESS NOTES
Alert, sitting in chair watching TV. Patients reports pain has decreased to level 3 on scale of 1-10. Pain located in left upper leg. States \"feels like a slight burning sensation now\". Requested more ice water, but after checking with primary nurse he can not receive any more fluids at this time due to his fluid restriction. Denies further needs. Call light, bedside table within reach.

## 2021-01-22 NOTE — PROGRESS NOTES
Physical Medicine & Rehabilitation   Progress Note    Chief Complaint:  CHF exacerbation, lumbar surgery L3-L4 with discectomy, and right arm amputation    Subjective:  Patient seen sitting up in chair. Denies any needs or concerns at this time. Started on low dose BB yesterday due to HR and BP. Improved today, will continue to monitor. Patient understanding with plan. Rehabilitation:  PT:  reviewed  OT: reviewed     Review of Systems:  CONSTITUTIONAL:  negative  RESPIRATORY:  negative  CARDIOVASCULAR:  negative  GASTROINTESTINAL:  negative  GENITOURINARY:  horn  MUSCULOSKELETAL:  positive for  pain and muscle weakness  NEUROLOGICAL:  positive for gait problems, weakness and pain  BEHAVIOR/PSYCH:  negative  System review otherwise negative    Objective:  /72   Pulse 82   Temp 98.5 °F (36.9 °C) (Oral)   Resp 14   Ht 6' (1.829 m)   Wt 288 lb (130.6 kg) Comment: bed extender on the bed  SpO2 96%   BMI 39.06 kg/m²   awake  Orientation:   person, place, time  Mood: within normal limits  Affect: calm  General appearance: Patient is well nourished, well developed, well groomed and in no acute distress    Memory:   normal,   Attention/Concentration: normal  Language:  normal    Cranial Nerves:  cranial nerves II-XII are grossly intact  ROM:  abnormal - limited BLE  Tone:  normal  Muscle bulk: within normal limits  Sensory:  Sensory intact    Skin: warm and dry, no rash or erythema. Lumbar incision not visualized  Peripheral vascular:  Edema: trace      Diagnostics:   No results found for this or any previous visit (from the past 24 hour(s)). Impression:  1. Lumbar stenosis  1. L3-5 decompression and fusion 12/29/2020  2. L3-5 revision facetectomies and L3-4 left discectomy with hematoma evacuation 1/6/2021  2. ONIEL with volume overload and peripheral edema  3. Alcohol abuse, in remission  4. Amputation of right arm  5. Ascites  6. Gastroesophageal reflux disease  7. Hepatic cirrhosis  8.  Hepatitis B  9. Hepatitis C  10. Hypertension  11. Intravenous drug use, in remission   12. Psychiatric problem  13. Posttraumatic stress disorder  14. Sciatica  15. Splenomegaly  16. Thrombocytopenia  17.  Ulcerative colitis    Plan:  Continue current therapies  Prophylaxis: DVT - Lovenox, GI - Protonix  Pain: tylenol, percocet, lyrica  Bowels: dulcolax, miralax  Lidocaine gel for left thigh radicular pain  Started lopressor 12.5mg BID yesterday, improved HR and BP  · Discharge planning for 1/26    Missed Therapy Time:  · None    Mabel Bess, APRN - CNP

## 2021-01-22 NOTE — PROGRESS NOTES
[facility] [address]  Occupational Therapy  Daily Note  Time:   Time In: 0800  Time Out: 0830  Timed Code Treatment Minutes: 27 Minutes  Minutes: 30          Date: 2021  Patient Name: Memo Bernal,   Gender: male      Room: [address] [place], [place] [address]-  MRN: [id number]  : 1975  (39 y.o.)  Referring Practitioner: CRISTIAN Chiang CNP  Diagnosis: History of lumbar laminectomy  Additional Pertinent Hx: Per EMR: \"39year-old obese  male presents to emergency department with evaluation of bilateral leg edema. Patient relates his leg swelling to status post lumbar fusion L3-L5 on 2020 had surgery remove fluid on 2021. Andres Sy Patient has gradually gotten worse since that time. \" To [facility] on 1/15    Restrictions/Precautions:  Restrictions/Precautions: General Precautions, Fall Risk  Required Braces or Orthoses  Spinal: Lumbar Corset  Position Activity Restriction  Spinal Precautions: No Bending, No Twisting, No Lifting  Other position/activity restrictions: Pt had right arm amputated 3 years ago. s/p L3-5 fusion      SUBJECTIVE: Pt sitting in recliner just finished with PT. Pt agreeable to OT     PAIN: pt stated little pain in back yesica     COGNITION: Decreased Safety Awareness    ADL:   Toileting: Supervision. able to complete bottom care in sitting   Toilet Transfer: Stand By Assistance. to RTS . BALANCE:  Standing Balance: Contact Guard Assistance. with quad cane     BED MOBILITY:  Not Tested    TRANSFERS:  Sit to Stand:  Stand By Assistance. from recliner, RTS   Stand to Sit: Stand By Assistance. to RTS and recliner     FUNCTIONAL MOBILITY:  Assistive Device: Quad Cane  Assist Level:  Contact Guard Assistance. Distance: To and from bathroom and around unit   Pt had no LOB and demo good posture throughout mobility.        ADDITIONAL ACTIVITIES:  .Patient identified a personal goal to increase UB strength and improve overall endurance so they can complete their toilet & shower transfers; skilled edu on UE strengthening and patient completed LUE strengthening exercises x20 reps x1 set this date with a moderate resistive band with therapist holding other end of theraband  in all joints and all planes. Patient tolerated in sitting , requiring short  rest breaks. Pt required min verbal cues for technique. .Patient identified one of their personal goals is to be able to sustain functional standing positions in order to complete various ADL and IADL skills in standing position, such as sinkside grooming or washing dishes. Dynamic standing task was then facilitated to challenge 1 hand release using reacher for picking up items off floor with back precautions followed. . Patient required CGA , and demo'ed an endurance of 2-3  minutes. ASSESSMENT:     Activity Tolerance:  Patient tolerance of  treatment: good. Discharge Recommendations: Home with Home health OT   Equipment Recommendations: Other: Patient has shower chair and LH sponge. Recommend other [place]kristan Clinely. Plan: Times per week: 5x/wk for 90 min and 1x/wk for 30 min  Current Treatment Recommendations: Strengthening, Endurance Training, Patient/Caregiver Education & Training, Equipment Evaluation, Education, & procurement, Self-Care / ADL, Balance Training, Functional Mobility Training, Safety Education & Training, Positioning    Patient Education  Patient Education: ADL's, Home Exercise Program, Precautions and Importance of Increasing Activity    Goals  Short term goals  Time Frame for Short term goals: 1 week  Short term goal 1: Pt will navigate quad cane to and from the bathroom with Supervision to improve accessing bathroom. Short term goal 2: Pt will complete LB dressing tasks with LHAE prn and m A to improve indep with self care. Short term goal 3: Pt will demo spinal precautions during 6 min dynamic standing tasks to improve indep with meal prep or IADLs.   Short term goal 4: Pt will complete meal prep tasks with S using quad cane to improve indep within home. Long term goals  Time Frame for Long term goals : 2 weeks  Long term goal 1: Pt will complete BADL routine with mod I and 0 vcs for spinal precautions to improve indep within home. Long term goal 2: Pt will complete light IADL tasks with mod I and 0 vcs for adaptive technique or spinal precautions to improve indep within home    Following session, patient left in safe position with all fall risk precautions in place.

## 2021-01-22 NOTE — PROGRESS NOTES
[address] [address]  INPATIENT PHYSICAL THERAPY  DAILY NOTE  [address] [facility] - 7E-54/054-A    Time In: 0700  Time Out: 0800  Timed Code Treatment Minutes: 60 Minutes  Minutes: 60          Date: 2021  Patient Name: Kylah Bolton,  Gender:  male        MRN: [id number]  : 1975  (39 y.o.)     Referring Practitioner: CRISTIAN Cordova CNP  Diagnosis: history of lumbar laminectomy  Additional Pertinent Hx: Per EMR: \"39year-old obese  male presents to emergency department with evaluation of bilateral leg edema. Patient relates his leg swelling to status post lumbar fusion L3-L5 on 2020 had surgery remove fluid on 2021. Bhavya Sanya Patient has gradually gotten worse since that time. \" To [facility] on 1/15     Prior Level of Function:  Lives With: Other (comment)([facility] ([facility]) - ~14 residents)  Type of Home: House  Home Layout: One level  Home Access: Level entry  Home Equipment: Rolling walker   Bathroom Shower/Tub: Walk-in shower  Bathroom Toilet: Handicap height    ADL Assistance: Independent  Homemaking Assistance: Independent  Ambulation Assistance: Independent  Transfer Assistance: Independent  Active : No  Additional Comments: pt reports no use of AD at baseline, utilizing RW since surgery 20    Restrictions/Precautions:  Restrictions/Precautions: General Precautions, Fall Risk  Required Braces or Orthoses  Spinal: Lumbar Corset  Position Activity Restriction  Spinal Precautions: No Bending, No Twisting, No Lifting  Other position/activity restrictions: Pt had right arm amputated 3 years ago. s/p L3-5 fusion      SUBJECTIVE: Patient in bed upon arrival, reported waking up around 0500 today and stated that his legs felt sore and slightly fatigued from previous treatments. PAIN: No rating given.     OBJECTIVE:  Bed Mobility:  Rolling to Right: Stand By Assistance     Transfers:  Sit to Stand: Jignesh Starkey Assistance  Stand to Sit:Contact Guard Assistance    Ambulation:  Contact Guard Assistance  Distance: 150'x2  Surface: Level Tile  Device:Quad Cane  Gait Deviations:  Slow Sabrina, Lean to Left, Mild Path Deviations and Unsteady Gait    Balance:  Static Sitting Balance:  Modified Independent  Dynamic Standing Balance: Stand By Assistance  Single Leg Balance: Contact guard assist, tested during dynamic balance exercise at steps. Pt. Completed standing dynamic balance activity: toe tapping colored pods placed on first two steps as instructed by therapist with Normal EDEN on level tile and Single UE support on No Device with 2x Contact Guard Assistance. Activity completed to improve balance, enhance functional mobility, and reduce risk of falls. Patient experienced one LOB during this activity which requiring mod A of 2 correct. Pt. Completed standing dynamic balance activity: reaching for rings and tossing them to target with Normal EDEN on level tile and No UE support with Stand By Assistance. Activity completed to improve balance, enhance functional mobility, and reduce risk of falls. Exercise:  Patient was guided in 1 set(s) 10 reps of exercise to both lower extremities. Seated marches, Seated hamstring curls, Seated heel/toe raises, Long arc quads and Seated isometric hip adduction. Exercises were completed for increased independence with functional mobility. Pt. Completed 3 minutes on Nu-Step machine on L1 utilizing Bilateral Lower Extremities and Left Upper Extremity to improve strength and endurance for improved functional mobility. Functional Outcome Measures: Not completed     ASSESSMENT:  Assessment: Patient progressing toward established goals. Activity Tolerance:  Patient tolerance of  treatment: good.       Equipment Recommendations:Equipment Needed: Yes(Wide base quad cane)  Discharge Recommendations:  Continue to assess pending progress    Plan: Times per week: 5x/wk 90 min, 1x/wk 30 min  Current Treatment Recommendations: Strengthening, Gait Training, Patient/Caregiver Education & Training, ROM, Stair training, Equipment Evaluation, Education, & procurement, Balance Training, Neuromuscular Re-education, Pain Management, Functional Mobility Training, Endurance Training, Home Exercise Program, Transfer Training, Safety Education & Training, Modalities, Wheelchair Mobility Training    Patient Education  Patient Education: Precautions/Restrictions, Gait, Stairs,  - Patient Verbalized Understanding    Goals:  Patient goals : To go home  Short term goals  Time Frame for Short term goals: 1 week  Short term goal 1: pt to perform supine to/[facility] to get in and out of bed  Short term goal 2: pt to perform sit to/from stand at SBA to rise from bed or chair  Short term goal 3: pt to ambulate >50ft with hemiwalker at SBA for mobility in the home MET (with q. cane) see LTG  Short term goal 4: pt to perform car transfer at [facility] 62 for transport to home  Short term goal 5: pt to negotiate 6\" platform step with hemiwalker at [facility] 62 for community integration  Long term goals  Time Frame for Long term goals : 2 weeks  Long term goal 1: pt to perform supine to/from sit at Mod I to get in and out of bed  Long term goal 2: pt to perform sit to/from stand at Mod I to rise from bed or chair  Long term goal 3: pt to ambulate >50ft with least restrictive device at Mod I, 150ft at S for mobility in the home and community  Long term goal 4: pt to perform car transfer at [facility] 62 for transport to home  Long term goal 5: pt to negotiate 6\" platform step with hemiwalker at [facility] 62 for community integration    Following session, patient left in safe position with all fall risk precautions in place.     Treatment session and note completed by Sonal ARIAS under supervision of signing therapist.

## 2021-01-22 NOTE — PROGRESS NOTES
[facility]  INPATIENT PHYSICAL THERAPY  DAILY NOTE  [address]    Time In: 1030  Time Out: 1100  Timed Code Treatment Minutes: 30 Minutes  Minutes: 30          Date: 2021  Patient Name: Hever Gregorio,  Gender:  male        MRN: [id number]  : 1975  (39 y.o.)     Referring Practitioner: Derick Hodgkins, APRN - CNP  Diagnosis: history of lumbar laminectomy  Additional Pertinent Hx: Per EMR: \"39year-old obese  male presents to emergency department with evaluation of bilateral leg edema. Patient relates his leg swelling to status post lumbar fusion L3-L5 on 2020 had surgery remove fluid on 2021. Emeka Nunez Patient has gradually gotten worse since that time. \" To [facility] on 1/15     Prior Level of Function:  Lives With: Other (comment)([facility] - ~[facility])  Type of Home: House  Home Layout: One level  Home Access: Level entry  Home Equipment: Rolling walker   Bathroom Shower/Tub: Walk-in shower  Bathroom Toilet: Handicap height    ADL Assistance: Independent  Homemaking Assistance: Independent  Ambulation Assistance: Independent  Transfer Assistance: Independent  Active : No  Additional Comments: pt reports no use of AD at baseline, utilizing RW since surgery 20    Restrictions/Precautions:  Restrictions/Precautions: General Precautions, Fall Risk  Required Braces or Orthoses  Spinal: Lumbar Corset  Position Activity Restriction  Spinal Precautions: No Bending, No Twisting, No Lifting  Other position/activity restrictions: Pt had right arm amputated 3 years ago. s/p L3-5 fusion      SUBJECTIVE: Patient was seated in recliner upon arrival, appeared to be napping. Patient reported feeling tired though he was agreeable to treatment. PAIN: Continued mild muscle soreness reported, no rating given.       OBJECTIVE:  Bed Mobility:  Supine to Sit: Minimal Assistance  Sit to Supine: Stand By Assistance     Transfers:  Sit to Stand: Contact Guard Assistance  Stand to [employer] Assistance    Ambulation:  Contact Guard Assistance  Distance: 150'x2   Surface: Level Tile  Device:Quad Cane  Gait Deviations:  Slow Sabrina, Lean to Left, Mild Path Deviations and Unsteady Gait    Balance:  Static Sitting Balance:  Independent  Static Standing Balance: Stand By Assistance   Pt. Completed standing dynamic balance activity: Firing a nerf gun at small targets set at various ranges with Normal EDEN on level tile and No UE support on No Device with Stand By Assistance. Activity completed to improve balance, enhance functional mobility, and reduce risk of falls. Exercise:  Patient was guided in 1 set(s) 10 reps of exercise to both lower extremities. Ankle pumps, Quad sets, Short arc quads, Long arc quads, Seated isometric hip adduction and Seated hip flexion . Exercises were completed for increased independence with functional mobility. All exercises completed in this session were assigned to home exercise program and reviewed for proper form and understanding. Functional Outcome Measures: Not completed       ASSESSMENT:  Assessment: Patient progressing toward established goals. Activity Tolerance:  Patient tolerance of  treatment: good.       Equipment Recommendations:Equipment Needed: Yes(Wide base quad cane)  Discharge Recommendations:  Continue to assess pending progress    Plan: Times per week: 5x/wk 90 min, 1x/wk 30 min  Current Treatment Recommendations: Strengthening, Gait Training, Patient/Caregiver Education & Training, ROM, Stair training, Equipment Evaluation, Education, & procurement, Balance Training, Neuromuscular Re-education, Pain Management, Functional Mobility Training, Endurance Training, Home Exercise Program, Transfer Training, Safety Education & Training, Modalities, Wheelchair Mobility Training    Patient Education  Patient Education: Home Exercise Program, Precautions/Restrictions, Transfers, Gait,  - Patient Verbalized Understanding    Goals:  Patient goals : To go home  Short term goals  Time Frame for Short term goals: 1 week  Short term goal 1: pt to perform supine to/from sit at [address] to get in and out of bed  Short term goal 2: pt to perform sit to/from stand at SBA to rise from bed or chair  Short term goal 3: pt to ambulate >50ft with hemiwalker at SBA for mobility in the home MET (with q. cane) see LTG  Short term goal 4: pt to perform car transfer at [address] for transport to home  Short term goal 5: pt to negotiate 6\" platform step [facility] for [facility] integration  Long term goals  Time Frame for Long term goals : 2 weeks  Long term goal 1: pt to perform supine to/from sit at Mod I to get in and out of bed  Long term goal 2: pt to perform sit to/from stand at Mod I to rise from bed or chair  Long term goal 3: pt to ambulate >50ft with least restrictive device at Mod I, 150ft at S for mobility in the home and community  Long term goal 4: pt to perform car transfer at [address] for transport to home  Long term goal 5: pt to negotiate 6\" platform step with hemiwalker at [facility] 62 for community integration    Following session, patient left in safe position with all fall risk precautions in place.     Treatment session and note completed by Michele ARIAS under supervision of signing therapist.

## 2021-01-22 NOTE — PROGRESS NOTES
Patient sitting in chair. Alert, oriented to person, place, time. Speech clear. Mucous membranes pink, moist.  KRISTIE 5mm to 3mm. Smile symmetrical.  Heart sounds distant, regular. Lung sounds clear anterior, posterior, laterally. Respirations even, unlabored. Mediport located left chest with clear dressing, no redness, swelling, drainage noted. Bowel sounds active all 4 quadrants. Denies nausea, vomiting, diarrhea. Reports last bowel movement 1/22/21 \"about an hour ago\". Abdomen hard, round, denies pain upon palpation. Cullen catheter patent and draining clear, yellow urine. Previous right arm amputation. Radial pulse strong. Pedal pulses weak, equal.  Slight edema noted in left lower extremity, +1 pitting edema noted in right lower extremity. Skin in lower extremities dry, flaky. Pedal push and pull strong, bilaterally. Sunny's sign negative bilaterally. Could not assess leg lift due to recent back surgery. Reports pain in left leg and back. States \"burning feeling in muscles due to surgery\"  Asked if wanted me to see if he could have something to relieve and he said he would be getting pain medicine soon. Repositioned, turned down lights so patient could rest to attempt to relieve pain. Call light, bedside table within reach. Denies further needs.

## 2021-01-22 NOTE — PROGRESS NOTES
Patient sitting in chair. Changed Cullen catheter bag. 450mL of clear, yellow urine measured from pervious catheter bag. Patient tolerated well. Call light, bedside table with reach. Denies further needs.

## 2021-01-22 NOTE — PROGRESS NOTES
[address]  Occupational Therapy  Daily Note  Time:  Time In: 1330  Time Out: 1430  Timed Code Treatment Minutes: 60 Minutes  Minutes: 60          Date: 2021  Patient Name: Darylene Sport,   Gender: male      Room: [facility]/054-A  MRN: [id number]  : 1975  (39 y.o.)  Referring Practitioner: CRISTIAN Wills CNP  Diagnosis: History of lumbar laminectomy  Additional Pertinent Hx: Per EMR: \"39year-old obese  male presents to emergency department with evaluation of bilateral leg edema. Patient relates his leg swelling to status post lumbar fusion L3-L5 on 2020 had surgery remove fluid on 2021. Mario Sweeney Patient has gradually gotten worse since that time. \" To [facility] on 1/15    Restrictions/Precautions:  Restrictions/Precautions: General Precautions, Fall Risk  Required Braces or Orthoses  Spinal: Lumbar Corset  Position Activity Restriction  Spinal Precautions: No Bending, No Twisting, No Lifting  Other position/activity restrictions: Pt had right arm amputated 3 years ago. s/p L3-5 fusion      SUBJECTIVE: Pt in recliner upon arrival and agreeable to OT    PAIN: 0/10: Pt had no c/o pain to start session, pt reported 2/10 pain at end of session in legs and lower back. Nursing notified was due for pain meds. COGNITION: WFL    ADL:   Bathing: SBA. Pt able to complete bathing while seated MI but with SBA while standing for bottom. Upper Extremity Dressing: Modified Independent. Pt able to don shirt while sitting with 0 vc. Lower Extremity Dressing: Moderate Assistance. Pt was Mod A for socks, Pt was able to thread socks with AE and 1 VC. Pt was unable to don socks completely without A. Pt was able to LB dress with clothing mgmt up while seated to thread and for catheter line managemnt with AE after demo and 2 vc. Pt was able to finish clothing mgmt up with SBA while standing. Shower Transfer: Stand By Assistance.   Pt demo side stepping while using grab bar to safely transfer to shower chair with SBA and 1 vc for hand placement. BALANCE:  Sitting Balance:  Modified Independent. Standing Balance: Stand By Assistance. BED MOBILITY:  Not Tested    TRANSFERS:  Sit to Stand:  Stand By Assistance. SBA for balance <> recliner  Stand to Sit: Stand By Assistance. 1 vc for hand placement    FUNCTIONAL MOBILITY:  Assistive Device: "[employer]  Assist Level:  Supervision. Distance: To and from shower room and To and from therapy apartment   Pt able to walk with catheter bag attached to quad cane but significantly changes gait pattern to accommodate cath tubing. With cath bag attached to quad cane pt held cane further away to compensate for line and took shorter stride with L LE. Collab with nursing to move stat lock to L side. ADDITIONAL ACTIVITIES:  -Pt ADL session setup to mimic home environment. Pt described home setting as a community bathroom that he needs to carry supplies to daily for bathing. Pt noted he had a drawstring backpack that he carries hygiene products to and from shower with. Pt able to demo with bag to begin treatment session.  - Pt completed laundry task by putting away clean clothes and taking dirty clothes to laundry. Pt stood with SBA for 10 min while folding clothes with no UE support and 0 vc for precautions. Pt retrieved dirty clothes from floor level while standing SBA with AE and placed clothing on bed with 0 vc for twisting precautions. Pt sat EOB MI while placing dirty clothes into bag to take to laundry. ASSESSMENT:     Activity Tolerance:  Patient tolerance of  treatment: good. Discharge Recommendations: Home with Home health OT   Equipment Recommendations: Other: Patient has shower chair and LH sponge. Recommend other Marsha Jackson.   Plan: Times per week: 5x/wk for 90 min and 1x/wk for 30 min  Current Treatment Recommendations: Strengthening, Endurance Training, Patient/Caregiver Education & Training, Equipment Evaluation, Education, & procurement, Self-Care / ADL, Balance Training, Functional Mobility Training, Safety Education & Training, Positioning    Patient Education  Patient Education: Role of OT, ADL's, IADL's, Energy Conservation, Home Exercise Program, Precautions and Importance of Increasing Activity    Goals  Short term goals  Time Frame for Short term goals: 1 week  Short term goal 1: Pt will navigate quad cane to and from the bathroom with Supervision to improve accessing bathroom. Short term goal 2: Pt will complete LB dressing tasks with LHAE prn and m A to improve indep with self care. Short term goal 3: Pt will demo spinal precautions during 6 min dynamic standing tasks to improve indep with meal prep or IADLs. Short term goal 4: Pt will complete meal prep tasks with S using quad cane to improve indep within home. Long term goals  Time Frame for Long term goals : 2 weeks  Long term goal 1: Pt will complete BADL routine with mod I and 0 vcs for spinal precautions to improve indep within home. Long term goal 2: Pt will complete light IADL tasks with mod I and 0 vcs for adaptive technique or spinal precautions to improve indep within home    Following session, patient left in safe position with all fall risk precautions in place.

## 2021-01-22 NOTE — PLAN OF CARE
Problem: Pain:  Description: Pain management should include both nonpharmacologic and pharmacologic interventions. Goal: Pain level will decrease  Description: Pain level will decrease  Outcome: Ongoing  Goal: Control of acute pain  Description: Control of acute pain  Outcome: Ongoing     Patient will participate in pain management to decrease pain as evidenced by patient use of breathing and meditation for pain management    Problem: Falls - Risk of:  Goal: Will remain free from falls  Description: Will remain free from falls  Outcome: Ongoing  Patient will remain free from falls during this admission as evidenced by no falls during this admission patient and staff will utilize safety devices and techniques for transfer   Goal: Absence of physical injury  Description: Absence of physical injury  Outcome: Ongoing  Patient will remain free from falls during this admission as evidenced by no falls during this admission patient and staff will utilize safety devices and techniques for transfer ;;    Problem: Urinary Elimination:  Goal: Signs and symptoms of infection will decrease  Description: Signs and symptoms of infection will decrease  Outcome: Ongoing  Goal: Complications related to the disease process, condition or treatment will be avoided or minimized  Description: Complications related to the disease process, condition or treatment will be avoided or minimized  Outcome: Ongoing    Patient continues to require horn paient can speak to the reason for he horn catheter and asks appropriate questions about its care.

## 2021-01-22 NOTE — PROGRESS NOTES
Patient resting with eyes closed in chair, respirations easy. Emptied 700mL clear, yellow urine from Cullen catheter. Call light, bedside table with in reach. Denies any needs at this time.

## 2021-01-22 NOTE — PLAN OF CARE
Problem: DISCHARGE BARRIERS  Goal: Patient's continuum of care needs are met  Note: SW notified [facility] of update on patient's progress and resumption of care for RN/PT/OT/HHA upon discharge. Referral information and discharge date of Tuesday, 01/26/2021, provided to [facility]. Agency has access to referral and updated information. SW to follow and maintain involvement in discharge planning.

## 2021-01-22 NOTE — DISCHARGE INSTR - COC
Continuity of Care Form    Patient Name: Sonal Munroe   :  1975  MRN:  [id number]    Admit date:  1/15/2021  Discharge date:     Code Status Order: Full Code   Advance Directives:     Admitting Physician:   Isaias Collado MD  PCP: Yovany Carmichael, APRN - CNP    Discharging Nurse: Addy Canales RN  [address] Unit/Room#: 5I-80/305-I  Discharging Unit Phone Number: [phone]  Emergency Contact:   Extended Emergency Contact Information  Primary Emergency Contact: Romy Kwok, [address]  Home Phone: [phone]  Mobile Phone: [phone]  Relation: Parent  Secondary Emergency Contact: Jose Ramírez, [address] Phone: [phone]  Relation: Other    Past Surgical History:  Past Surgical History:   Procedure Laterality Date    APPENDECTOMY      ARM AMPUTATION AT SHOULDER [place]     [place]    BACK SURGERY N/A 2021    LUMBAR I&D performed by Ana María Camargo [facility] at [facility]      ELBOW FRACTURE SURGERY      PORT SURGERY N/A 2020    SUBCLAVIAN SINGLE LUMEN MEDIPORT INSERTION performed by Marcos Collins MD at [address] W/BIOPSY SINGLE/MULTIPLE  2017    COLONOSCOPY WITH BIOPSY performed by Titus Keating MD at [facility]       Immunization History:   Immunization History   Administered Date(s) Administered    Influenza Vaccine, unspecified formulation 2017    Pneumococcal Conjugate Vaccine 2017       Active Problems:  Patient Active Problem List   Diagnosis Code    Ulcerative colitis ([facility] K51.90    Ulcerative (chronic) enterocolitis ([facility] K51.00    Ulcerative colitis, universal ([facility] K51.00    Hyponatremia E87.1    Urinary retention R33.9    Essential hypertension P51    Metabolic acidosis R67.7    Leg swelling M79.89    ONIEL (acute kidney injury) ([facility] N17.9    Azotemia R79.89    S/P lumbar laminectomy Z98.890    Postoperative anemia D64.9    Chronic pain syndrome G89.4    Anxiety and depression F41.9, F32.9       Isolation/Infection:   Isolation          No Isolation        Patient Infection Status     Infection Onset Added Last Indicated Last Indicated By Review Planned Expiration Resolved Resolved By    None active    Resolved    COVID-19 Rule Out 01/06/21 01/06/21 01/06/21 COVID-19 (Ordered)   01/06/21 Rule-Out Test Resulted    COVID-19 Rule Out 01/03/21 01/03/21 01/03/21 COVID-19 (Ordered)   01/04/21 Rule-Out Test Resulted    COVID-19 Rule Out 08/11/20 08/11/20 08/11/20 Covid-19 Ambulatory (Ordered)   08/13/20 Rule-Out Test Resulted          Nurse Assessment:  Last Vital Signs: /76   Pulse 80   Temp 98.5 °F (36.9 °C) (Oral)   Resp 14   Ht 6' (1.829 m)   Wt 288 lb (130.6 kg) Comment: bed extender on the bed  SpO2 96%   BMI 39.06 kg/m²     Last documented pain score (0-10 scale): Pain Level: 3  Last Weight:   Wt Readings from Last 1 Encounters:   01/22/21 288 lb (130.6 kg)     Mental Status:  oriented, alert and thought processes intact    IV Access:  - None    Nursing Mobility/ADLs:  Walking   Independent  Transfer  Independent  Bathing  Independent  Dressing  Independent  Toileting  Independent  Feeding  Independent  Med Admin  Independent  Med Delivery   none    Wound Care Documentation and Therapy:  Wound 01/15/21 Pelvis Anterior excoriation (Active)   Dressing/Treatment Open to air; Pharmaceutical agent (see MAR) 01/22/21 0915   Offloading for Diabetic Foot Ulcers Yes (type) 01/17/21 2103   Wound Assessment Erythema 01/22/21 0915   Number of days: 6        Elimination:  Continence:   · Bowel: Yes  · Bladder: Yes  Urinary Catheter:  Insertion Date: 01/14/2021, Last Change Date N/A, Removal Date Patient to follow up with urology office for removal and Indication for Use of Catheter: Acute urinary retention/obstruction   18 FR horn catheter     Colostomy/Ileostomy/Ileal Conduit: No       Date of Last BM: 1/24    Intake/Output Summary (Last 24 hours) at 1/22/2021 [facility] filed at 1/22/2021 1200  Gross per 24 hour   Intake 1700 ml   Output 3100 ml   Net -1400 ml     I/O last 3 completed shifts: In: 6499 [P.O.:1040]  Out: 3450 [Urine:3450]    Safety Concerns:     None    Impairments/Disabilities:      None    Nutrition Therapy:  Current Nutrition Therapy:   - Oral Diet:  General    Routes of Feeding: Oral  Liquids: Thin Liquids  Daily Fluid Restriction: no  Last Modified Barium Swallow with Video (Video Swallowing Test): not done    Treatments at the Time of Hospital Discharge:   Respiratory Treatments:   Oxygen Therapy:  is not on home oxygen therapy. Ventilator:    - No ventilator support    Rehab Therapies: Physical Therapy and Occupational Therapy  Weight Bearing Status/Restrictions: No weight bearing restirctions  Other Medical Equipment (for information only, NOT a DME order): Other Treatments: Pt. Has right chest mediport that is to stay accessed as he uses monthly at home for iv meds for his bowel inflammation.     Patient's personal belongings (please select all that are sent with patient):  Glasses    RN SIGNATURE:  Electronically signed by Hannah Spears RN on 1/26/2021 at 6:36 AM

## 2021-01-23 PROCEDURE — 94760 N-INVAS EAR/PLS OXIMETRY 1: CPT

## 2021-01-23 PROCEDURE — 97110 THERAPEUTIC EXERCISES: CPT

## 2021-01-23 PROCEDURE — 6360000002 HC RX W HCPCS: Performed by: NURSE PRACTITIONER

## 2021-01-23 PROCEDURE — 97535 SELF CARE MNGMENT TRAINING: CPT

## 2021-01-23 PROCEDURE — 1180000000 HC REHAB R&B

## 2021-01-23 PROCEDURE — 6370000000 HC RX 637 (ALT 250 FOR IP): Performed by: NURSE PRACTITIONER

## 2021-01-23 PROCEDURE — 6360000002 HC RX W HCPCS: Performed by: PHYSICAL MEDICINE & REHABILITATION

## 2021-01-23 PROCEDURE — 6370000000 HC RX 637 (ALT 250 FOR IP): Performed by: FAMILY MEDICINE

## 2021-01-23 PROCEDURE — 97116 GAIT TRAINING THERAPY: CPT

## 2021-01-23 PROCEDURE — 6370000000 HC RX 637 (ALT 250 FOR IP): Performed by: INTERNAL MEDICINE

## 2021-01-23 PROCEDURE — 2580000003 HC RX 258: Performed by: NURSE PRACTITIONER

## 2021-01-23 RX ADMIN — POTASSIUM CHLORIDE 20 MEQ: 1500 TABLET, EXTENDED RELEASE ORAL at 08:55

## 2021-01-23 RX ADMIN — MESALAMINE 800 MG: 400 CAPSULE, DELAYED RELEASE ORAL at 08:51

## 2021-01-23 RX ADMIN — OXYCODONE AND ACETAMINOPHEN 1 TABLET: 5; 325 TABLET ORAL at 13:55

## 2021-01-23 RX ADMIN — MESALAMINE 800 MG: 400 CAPSULE, DELAYED RELEASE ORAL at 20:50

## 2021-01-23 RX ADMIN — MULTIPLE VITAMINS W/ MINERALS TAB 1 TABLET: TAB at 14:16

## 2021-01-23 RX ADMIN — PAROXETINE HYDROCHLORIDE 40 MG: 20 TABLET, FILM COATED ORAL at 08:50

## 2021-01-23 RX ADMIN — MICONAZOLE NITRATE: 20 POWDER TOPICAL at 08:59

## 2021-01-23 RX ADMIN — FERROUS SULFATE TAB 325 MG (65 MG ELEMENTAL FE) 325 MG: 325 (65 FE) TAB at 18:26

## 2021-01-23 RX ADMIN — MICONAZOLE NITRATE: 20 POWDER TOPICAL at 20:52

## 2021-01-23 RX ADMIN — MAGNESIUM GLUCONATE 500 MG ORAL TABLET 400 MG: 500 TABLET ORAL at 08:54

## 2021-01-23 RX ADMIN — BENZOCAINE: 100 GEL TOPICAL at 18:26

## 2021-01-23 RX ADMIN — FERROUS SULFATE TAB 325 MG (65 MG ELEMENTAL FE) 325 MG: 325 (65 FE) TAB at 08:53

## 2021-01-23 RX ADMIN — BUMETANIDE 1 MG: 1 TABLET ORAL at 08:51

## 2021-01-23 RX ADMIN — METOPROLOL TARTRATE 12.5 MG: 25 TABLET ORAL at 08:49

## 2021-01-23 RX ADMIN — CIPROFLOXACIN 500 MG: 500 TABLET, FILM COATED ORAL at 08:48

## 2021-01-23 RX ADMIN — TAMSULOSIN HYDROCHLORIDE 0.4 MG: 0.4 CAPSULE ORAL at 08:49

## 2021-01-23 RX ADMIN — PREGABALIN 200 MG: 50 CAPSULE ORAL at 20:50

## 2021-01-23 RX ADMIN — HEPARIN 500 UNITS: 100 SYRINGE at 08:58

## 2021-01-23 RX ADMIN — FOLIC ACID 2 MG: 1 TABLET ORAL at 08:51

## 2021-01-23 RX ADMIN — PANTOPRAZOLE SODIUM 40 MG: 40 TABLET, DELAYED RELEASE ORAL at 17:31

## 2021-01-23 RX ADMIN — OXYCODONE AND ACETAMINOPHEN 1 TABLET: 5; 325 TABLET ORAL at 10:09

## 2021-01-23 RX ADMIN — PANTOPRAZOLE SODIUM 40 MG: 40 TABLET, DELAYED RELEASE ORAL at 05:44

## 2021-01-23 RX ADMIN — SODIUM CHLORIDE, PRESERVATIVE FREE 10 ML: 5 INJECTION INTRAVENOUS at 08:59

## 2021-01-23 RX ADMIN — MESALAMINE 800 MG: 400 CAPSULE, DELAYED RELEASE ORAL at 14:16

## 2021-01-23 RX ADMIN — Medication 100 MG: at 08:54

## 2021-01-23 RX ADMIN — SPIRONOLACTONE 25 MG: 25 TABLET ORAL at 08:49

## 2021-01-23 RX ADMIN — ENOXAPARIN SODIUM 40 MG: 40 INJECTION SUBCUTANEOUS at 08:52

## 2021-01-23 RX ADMIN — PREGABALIN 200 MG: 50 CAPSULE ORAL at 08:48

## 2021-01-23 RX ADMIN — OXYCODONE AND ACETAMINOPHEN 1 TABLET: 5; 325 TABLET ORAL at 01:42

## 2021-01-23 RX ADMIN — OXYCODONE AND ACETAMINOPHEN 1 TABLET: 5; 325 TABLET ORAL at 18:18

## 2021-01-23 RX ADMIN — OXYCODONE AND ACETAMINOPHEN 1 TABLET: 5; 325 TABLET ORAL at 05:45

## 2021-01-23 RX ADMIN — MAGNESIUM GLUCONATE 500 MG ORAL TABLET 400 MG: 500 TABLET ORAL at 20:50

## 2021-01-23 RX ADMIN — PREGABALIN 200 MG: 50 CAPSULE ORAL at 14:15

## 2021-01-23 ASSESSMENT — PAIN SCALES - GENERAL
PAINLEVEL_OUTOF10: 0
PAINLEVEL_OUTOF10: 5
PAINLEVEL_OUTOF10: 3
PAINLEVEL_OUTOF10: 5
PAINLEVEL_OUTOF10: 0

## 2021-01-23 NOTE — PROGRESS NOTES
[address]  Occupational Therapy  Daily Note  Time:   Time In: 0930  Time Out: 1000  Timed Code Treatment Minutes: 30 Minutes  Minutes: 30    Date: 2021  Patient Name: Jj Good,   Gender: male      Room: [facility]/054-  MRN: [id number]  : 1975  (39 y.o.)  Referring Practitioner: CRISTIAN Anderson CNP  Diagnosis: History of lumbar laminectomy  Additional Pertinent Hx: Per EMR: \"39year-old obese  male presents to emergency department with evaluation of bilateral leg edema. Patient relates his leg swelling to status post lumbar fusion L3-L5 on 2020 had surgery remove fluid on 2021. Irlanda Scooby Patient has gradually gotten worse since that time. \" To IPR on 1/15    Restrictions/Precautions:  Restrictions/Precautions: General Precautions, Fall Risk  Required Braces or Orthoses  Spinal: Lumbar Corset  Position Activity Restriction  Spinal Precautions: No Bending, No Twisting, No Lifting  Other position/activity restrictions: Pt had right arm amputated 3 years ago. s/p L3-5 fusion      SUBJECTIVE: Patient pleasant and cooperative, agreeable to OT. PAIN: 5/10: back    COGNITION: WFL    ADL:   No ADL's completed this session. Irlanda Almodovar BALANCE:  Sitting Balance:  Modified Independent. Standing Balance: Supervision     BED MOBILITY:  Not Tested    TRANSFERS:  Sit to Stand:  Supervision. recliner,  good hand placement  Stand to Sit: Supervision. FUNCTIONAL MOBILITY:  Assistive Device: Fridge  Assist Level:  Stand By Assistance. Distance: To and from therapy apartment  No LOB      ADDITIONAL ACTIVITIES:  - Patient completed IADL laundry task of retrieving linens from dryer - task challenged standing endurance, standing balance, and awareness of spinal precautions. Pt stood with supervision x 9 min to retrieve linens from dryer with use of reacher and then fold without UE support and no LOB.  Supervision throughout.    - Patient identified a personal goal to increase UB strength and improve overall endurance so they can complete their toilet & shower transfers; skilled edu on UE strengthening and patient completed BUE strengthening exercises x15 reps x1 set this date with a 3# free weight in all joints and all planes. Patient tolerated well, requiring min rest breaks. Pt required no cues for technique. ASSESSMENT:     Activity Tolerance:  Patient tolerance of  treatment: good. Discharge Recommendations: Home with Home health OT   Equipment Recommendations: Other: Patient has shower chair and LH sponge. Recommend other Navid Dieter. Plan: Times per week: 5x/wk for 90 min and 1x/wk for 30 min  Current Treatment Recommendations: Strengthening, Endurance Training, Patient/Caregiver Education & Training, Equipment Evaluation, Education, & procurement, Self-Care / ADL, Balance Training, Functional Mobility Training, Safety Education & Training, Positioning    Patient Education  Patient Education: IADL's, Home Exercise Program and Precautions    Goals  Short term goals  Time Frame for Short term goals: 1 week  Short term goal 1: Pt will navigate quad cane to and from the bathroom with Supervision to improve accessing bathroom. Short term goal 2: Pt will complete LB dressing tasks with LHAE prn and m A to improve indep with self care. Short term goal 3: Pt will demo spinal precautions during 6 min dynamic standing tasks to improve indep with meal prep or IADLs. Short term goal 4: Pt will complete meal prep tasks with S using quad cane to improve indep within home. Long term goals  Time Frame for Long term goals : 2 weeks  Long term goal 1: Pt will complete BADL routine with mod I and 0 vcs for spinal precautions to improve indep within home.   Long term goal 2: Pt will complete light IADL tasks with mod I and 0 vcs for adaptive technique or spinal precautions to improve indep within home    Following session, patient left in safe position with all fall risk precautions in place.

## 2021-01-23 NOTE — PLAN OF CARE
I have reviewed the patient's plan of care and based on this review, the patient treatment plan has been updated. Problem: Pain:  Goal: Pain level will decrease  Description: Pain level will decrease  Outcome: Ongoing  Goal: Control of acute pain  Description: Control of acute pain  Outcome: Ongoing  Note: Patient satisfied with pain control. Problem: Falls - Risk of:  Goal: Will remain free from falls  Description: Will remain free from falls  Outcome: Ongoing  Goal: Absence of physical injury  Description: Absence of physical injury  Outcome: Ongoing  Note: Patient verbalizes understanding of fall precautions, uses call light appropriately. Problem: Urinary Elimination:  Goal: Signs and symptoms of infection will decrease  Description: Signs and symptoms of infection will decrease  Outcome: Ongoing  Goal: Complications related to the disease process, condition or treatment will be avoided or minimized  Description: Complications related to the disease process, condition or treatment will be avoided or minimized  Outcome: Ongoing  Note: Antibiotic continues for UTI, no complications noted. Problem: Discharge Planning:  Goal: Discharged to appropriate level of care  Description: Discharged to appropriate level of care  Outcome: Ongoing  Note: Working toward goal of discharge to home.l     Problem: Skin Integrity:  Goal: Will show no infection signs and symptoms  Description: Will show no infection signs and symptoms  Outcome: Ongoing  Goal: Absence of new skin breakdown  Description: Absence of new skin breakdown  Outcome: Ongoing  Note: Skin remains clean dry and intact. Problem: DISCHARGE BARRIERS  Goal: Patient's continuum of care needs are met  Outcome: Ongoing  Note: Working toward goal of discharge to home. Problem: Nutrition  Goal: Optimal nutrition therapy  Outcome: Ongoing  Note: Eating % of most meals.

## 2021-01-23 NOTE — PROGRESS NOTES
[address] [place] [address]  INPATIENT PHYSICAL THERAPY  DAILY NOTE  [address]    Time In: 901  Time Out: 931  Timed Code Treatment Minutes: 30 Minutes  Minutes: 30          Date: 2021  Patient Name: Jennelle Habermann,  Gender:  male        MRN: [id number]  : 1975  (39 y.o.)     Referring Practitioner: CRISTIAN Garcia - CNP  Diagnosis: history of lumbar laminectomy  Additional Pertinent Hx: Per EMR: \"39year-old obese  male presents to emergency department with evaluation of bilateral leg edema. Patient relates his leg swelling to status post lumbar fusion L3-L5 on 2020 had surgery remove fluid on 2021. Jie Munguia Patient has gradually gotten worse since that time. \" To [facility] on 1/15     Prior Level of Function:  Lives With: Other (comment)([facility] - ~[facility] residents)  Type of Home: House  Home Layout: One level  Home Access: Level entry  Home Equipment: Rolling walker   Bathroom Shower/Tub: Walk-in shower  Bathroom Toilet: Handicap height    ADL Assistance: Independent  Homemaking Assistance: Independent  Ambulation Assistance: Independent  Transfer Assistance: Independent  Active : No  Additional Comments: pt reports no use of AD at baseline, utilizing RW since surgery 20    Restrictions/Precautions:  Restrictions/Precautions: General Precautions, Fall Risk  Required Braces or Orthoses  Spinal: Lumbar Corset  Position Activity Restriction  Spinal Precautions: No Bending, No Twisting, No Lifting  Other position/activity restrictions: Pt had right arm amputated 3 years ago. s/p L3-5 fusion      SUBJECTIVE: Patient supine in bed upon PTA arrival. Patient pleasant and agreeable to therapy treatment.     PAIN: 4/10: LBP    OBJECTIVE:  Bed Mobility:  Rolling to Right: Stand By Assistance   Supine to Sit: Stand By Assistance    Transfers:  Sit to Stand: Contact Guard Assistance  Stand to Sit:Contact [address] transfer with CGA    Ambulation:  Contact Guard Assistance  Distance: 200 feet x 1, 20 feet x 1. Surface: Level Tile  Device:Quad Cane  Gait Deviations:  Slow Sabrina, Decreased Step Length Bilaterally and Decreased Heel Strike Bilaterally    Balance:  Not Tested    Exercise:  Patient was guided in 1 set(s) 10 reps of exercise to both lower extremities. Seated marches, Seated hamstring curls, Seated heel/toe raises, Long arc quads and Seated isometric hip adduction. Exercises were completed for increased independence with functional mobility. Functional Outcome Measures: Not completed       ASSESSMENT:  Assessment: Patient progressing toward established goals. Activity Tolerance:  Patient tolerance of  treatment: good. Equipment Recommendations:Equipment Needed: Yes(Wide base quad cane)  Discharge Recommendations:  Continue to assess pending progress    Plan: Times per week: 5x/wk 90 min, 1x/wk 30 min  Current Treatment Recommendations: Strengthening, Gait Training, Patient/Caregiver Education & Training, ROM, Stair training, Equipment Evaluation, Education, & procurement, Balance Training, Neuromuscular Re-education, Pain Management, Functional Mobility Training, Endurance Training, Home Exercise Program, Transfer Training, Safety Education & Training, Modalities, Wheelchair Mobility Training    Patient Education  Patient Education: Plan of Care, [employer] Mobility, Transfers    Goals:  Patient goals :  To go home  Short term goals  Time Frame for Short term goals: 1 week  Short term goal 1: pt to perform supine to/from sit at [facility] to get in and out of bed  Short term goal 2: pt to perform sit to/from stand at [facility] to rise from bed or chair  Short term goal 3: pt to ambulate >50ft with hemiwalker at [facility] for mobility in the home MET (with q. cane) see LTG  Short term goal 4: pt to perform car transfer at [facility] for transport to home  Short term goal 5: pt to negotiate 6\" platform step with sandy at [facility] for [facility] integration  Long term goals  Time Frame for Long term goals : 2 weeks  Long term goal 1: pt to perform supine to/from sit at Mod I to get in and out of bed  Long term goal 2: pt to perform sit to/from stand at Mod I to rise from bed or chair  Long term goal 3: pt to ambulate >50ft with least restrictive device at Mod I, 150ft at S for mobility in the home and community  Long term goal 4: pt to perform car transfer at [facility] for transport to home  Long term goal 5: pt to negotiate 6\" platform step with sandy at [facility] integration    Following session, patient left in safe position with all fall risk precautions in place.

## 2021-01-24 LAB
HCT VFR BLD CALC: 34.2 % (ref 42–52)
HEMOGLOBIN: 11 GM/DL (ref 14–18)

## 2021-01-24 PROCEDURE — 36415 COLL VENOUS BLD VENIPUNCTURE: CPT

## 2021-01-24 PROCEDURE — 6370000000 HC RX 637 (ALT 250 FOR IP): Performed by: INTERNAL MEDICINE

## 2021-01-24 PROCEDURE — 85014 HEMATOCRIT: CPT

## 2021-01-24 PROCEDURE — 1180000000 HC REHAB R&B

## 2021-01-24 PROCEDURE — 6360000002 HC RX W HCPCS: Performed by: PHYSICAL MEDICINE & REHABILITATION

## 2021-01-24 PROCEDURE — 6370000000 HC RX 637 (ALT 250 FOR IP): Performed by: FAMILY MEDICINE

## 2021-01-24 PROCEDURE — 85018 HEMOGLOBIN: CPT

## 2021-01-24 PROCEDURE — 6370000000 HC RX 637 (ALT 250 FOR IP): Performed by: NURSE PRACTITIONER

## 2021-01-24 PROCEDURE — 2580000003 HC RX 258: Performed by: NURSE PRACTITIONER

## 2021-01-24 PROCEDURE — 6360000002 HC RX W HCPCS: Performed by: NURSE PRACTITIONER

## 2021-01-24 RX ADMIN — PREGABALIN 200 MG: 50 CAPSULE ORAL at 15:21

## 2021-01-24 RX ADMIN — BUMETANIDE 1 MG: 1 TABLET ORAL at 08:18

## 2021-01-24 RX ADMIN — PREGABALIN 200 MG: 50 CAPSULE ORAL at 08:14

## 2021-01-24 RX ADMIN — MESALAMINE 800 MG: 400 CAPSULE, DELAYED RELEASE ORAL at 22:10

## 2021-01-24 RX ADMIN — FERROUS SULFATE TAB 325 MG (65 MG ELEMENTAL FE) 325 MG: 325 (65 FE) TAB at 17:33

## 2021-01-24 RX ADMIN — SODIUM CHLORIDE, PRESERVATIVE FREE 10 ML: 5 INJECTION INTRAVENOUS at 08:23

## 2021-01-24 RX ADMIN — MICONAZOLE NITRATE: 20 POWDER TOPICAL at 08:18

## 2021-01-24 RX ADMIN — MESALAMINE 800 MG: 400 CAPSULE, DELAYED RELEASE ORAL at 15:22

## 2021-01-24 RX ADMIN — OXYCODONE AND ACETAMINOPHEN 1 TABLET: 5; 325 TABLET ORAL at 23:33

## 2021-01-24 RX ADMIN — FERROUS SULFATE TAB 325 MG (65 MG ELEMENTAL FE) 325 MG: 325 (65 FE) TAB at 08:18

## 2021-01-24 RX ADMIN — TAMSULOSIN HYDROCHLORIDE 0.4 MG: 0.4 CAPSULE ORAL at 08:16

## 2021-01-24 RX ADMIN — SPIRONOLACTONE 25 MG: 25 TABLET ORAL at 08:16

## 2021-01-24 RX ADMIN — PAROXETINE HYDROCHLORIDE 40 MG: 20 TABLET, FILM COATED ORAL at 08:16

## 2021-01-24 RX ADMIN — MAGNESIUM GLUCONATE 500 MG ORAL TABLET 400 MG: 500 TABLET ORAL at 08:16

## 2021-01-24 RX ADMIN — HEPARIN 500 UNITS: 100 SYRINGE at 08:22

## 2021-01-24 RX ADMIN — OXYCODONE AND ACETAMINOPHEN 1 TABLET: 5; 325 TABLET ORAL at 11:34

## 2021-01-24 RX ADMIN — ENOXAPARIN SODIUM 40 MG: 40 INJECTION SUBCUTANEOUS at 08:21

## 2021-01-24 RX ADMIN — MAGNESIUM GLUCONATE 500 MG ORAL TABLET 400 MG: 500 TABLET ORAL at 22:11

## 2021-01-24 RX ADMIN — FOLIC ACID 2 MG: 1 TABLET ORAL at 08:17

## 2021-01-24 RX ADMIN — PANTOPRAZOLE SODIUM 40 MG: 40 TABLET, DELAYED RELEASE ORAL at 05:35

## 2021-01-24 RX ADMIN — Medication 100 MG: at 08:16

## 2021-01-24 RX ADMIN — METOPROLOL TARTRATE 12.5 MG: 25 TABLET ORAL at 08:15

## 2021-01-24 RX ADMIN — MICONAZOLE NITRATE: 20 POWDER TOPICAL at 22:13

## 2021-01-24 RX ADMIN — OXYCODONE AND ACETAMINOPHEN 1 TABLET: 5; 325 TABLET ORAL at 15:19

## 2021-01-24 RX ADMIN — OXYCODONE AND ACETAMINOPHEN 1 TABLET: 5; 325 TABLET ORAL at 00:41

## 2021-01-24 RX ADMIN — POTASSIUM CHLORIDE 20 MEQ: 1500 TABLET, EXTENDED RELEASE ORAL at 08:14

## 2021-01-24 RX ADMIN — MESALAMINE 800 MG: 400 CAPSULE, DELAYED RELEASE ORAL at 08:17

## 2021-01-24 RX ADMIN — PANTOPRAZOLE SODIUM 40 MG: 40 TABLET, DELAYED RELEASE ORAL at 17:33

## 2021-01-24 RX ADMIN — PREGABALIN 200 MG: 50 CAPSULE ORAL at 23:14

## 2021-01-24 RX ADMIN — SODIUM CHLORIDE, PRESERVATIVE FREE 10 ML: 5 INJECTION INTRAVENOUS at 22:23

## 2021-01-24 RX ADMIN — OXYCODONE AND ACETAMINOPHEN 1 TABLET: 5; 325 TABLET ORAL at 05:36

## 2021-01-24 RX ADMIN — METOPROLOL TARTRATE 12.5 MG: 25 TABLET ORAL at 22:11

## 2021-01-24 RX ADMIN — MULTIPLE VITAMINS W/ MINERALS TAB 1 TABLET: TAB at 15:22

## 2021-01-24 RX ADMIN — OXYCODONE AND ACETAMINOPHEN 1 TABLET: 5; 325 TABLET ORAL at 19:33

## 2021-01-24 ASSESSMENT — PAIN SCALES - GENERAL
PAINLEVEL_OUTOF10: 3
PAINLEVEL_OUTOF10: 5

## 2021-01-24 NOTE — PROGRESS NOTES
Patient: Joy Baez  Unit/Bed: 5I-93/690-E  YOB: 1975  MRN: [id number] Acct: [de-identified]   Admitting Diagnosis: History of lumbar laminectomy [Z98.890]  S/P lumbar laminectomy [Z98.890]  Admit Date:  1/15/2021  Hospital Day: 9    Assessment:     Active Problems:    Ulcerative colitis, universal (HCC)    Essential hypertension    Leg swelling    Azotemia    S/P lumbar laminectomy    Postoperative anemia    Chronic pain syndrome    Anxiety and depression  Resolved Problems:    * No resolved hospital problems. *      Plan:     The H/H is coming up nicely        Subjective:     Patient has no complaint of CP, SOB, GI upset, horn present. .   Medication side effects: none    Scheduled Meds:   metoprolol tartrate  12.5 mg Oral BID    heparin flush  500 Units Intracatheter Daily    thiamine  100 mg Oral Daily    therapeutic multivitamin-minerals  1 tablet Oral Daily    magnesium oxide  400 mg Oral BID    miconazole   Topical BID    spironolactone  25 mg Oral Daily    enoxaparin  40 mg Subcutaneous Q24H    sodium chloride flush  10 mL Intravenous 2 times per day    bumetanide  1 mg Oral Daily    folic acid  2 mg Oral Daily    mesalamine  800 mg Oral TID    pantoprazole  40 mg Oral BID AC    PARoxetine  40 mg Oral QAM    potassium chloride  20 mEq Oral Daily with breakfast    pregabalin  200 mg Oral TID    tamsulosin  0.4 mg Oral Daily    ferrous sulfate  325 mg Oral BID WC    influenza virus vaccine  0.5 mL Intramuscular Prior to discharge     Continuous Infusions:  PRN Meds:benzocaine, lidocaine, heparin flush, bisacodyl, acetaminophen **OR** [DISCONTINUED] acetaminophen, polyethylene glycol, promethazine **OR** [DISCONTINUED] ondansetron, sodium chloride flush, oxyCODONE-acetaminophen    Review of Systems  Pertinent items are noted in HPI.     Objective:     Patient Vitals for the past 8 hrs:   BP Temp Temp src Pulse Resp SpO2   01/24/21 0800 110/72 96.6 °F (35.9 °C) Oral 100 16

## 2021-01-25 ENCOUNTER — TELEPHONE (OUTPATIENT)
Dept: CARDIOLOGY CLINIC | Age: 46
End: 2021-01-25

## 2021-01-25 PROCEDURE — 97530 THERAPEUTIC ACTIVITIES: CPT

## 2021-01-25 PROCEDURE — 97535 SELF CARE MNGMENT TRAINING: CPT

## 2021-01-25 PROCEDURE — 1180000000 HC REHAB R&B

## 2021-01-25 PROCEDURE — 97110 THERAPEUTIC EXERCISES: CPT

## 2021-01-25 PROCEDURE — 97116 GAIT TRAINING THERAPY: CPT

## 2021-01-25 PROCEDURE — 6360000002 HC RX W HCPCS: Performed by: NURSE PRACTITIONER

## 2021-01-25 PROCEDURE — 6370000000 HC RX 637 (ALT 250 FOR IP): Performed by: FAMILY MEDICINE

## 2021-01-25 PROCEDURE — 99232 SBSQ HOSP IP/OBS MODERATE 35: CPT | Performed by: NURSE PRACTITIONER

## 2021-01-25 PROCEDURE — 6370000000 HC RX 637 (ALT 250 FOR IP): Performed by: NURSE PRACTITIONER

## 2021-01-25 PROCEDURE — 2580000003 HC RX 258: Performed by: NURSE PRACTITIONER

## 2021-01-25 PROCEDURE — 6370000000 HC RX 637 (ALT 250 FOR IP): Performed by: INTERNAL MEDICINE

## 2021-01-25 PROCEDURE — 94760 N-INVAS EAR/PLS OXIMETRY 1: CPT

## 2021-01-25 PROCEDURE — 6360000002 HC RX W HCPCS: Performed by: PHYSICAL MEDICINE & REHABILITATION

## 2021-01-25 RX ADMIN — SODIUM CHLORIDE, PRESERVATIVE FREE 10 ML: 5 INJECTION INTRAVENOUS at 08:31

## 2021-01-25 RX ADMIN — MESALAMINE 800 MG: 400 CAPSULE, DELAYED RELEASE ORAL at 08:26

## 2021-01-25 RX ADMIN — MICONAZOLE NITRATE: 20 POWDER TOPICAL at 21:22

## 2021-01-25 RX ADMIN — Medication 100 MG: at 08:26

## 2021-01-25 RX ADMIN — TAMSULOSIN HYDROCHLORIDE 0.4 MG: 0.4 CAPSULE ORAL at 08:26

## 2021-01-25 RX ADMIN — PREGABALIN 200 MG: 50 CAPSULE ORAL at 21:18

## 2021-01-25 RX ADMIN — PANTOPRAZOLE SODIUM 40 MG: 40 TABLET, DELAYED RELEASE ORAL at 15:47

## 2021-01-25 RX ADMIN — SPIRONOLACTONE 25 MG: 25 TABLET ORAL at 08:26

## 2021-01-25 RX ADMIN — MAGNESIUM GLUCONATE 500 MG ORAL TABLET 400 MG: 500 TABLET ORAL at 21:25

## 2021-01-25 RX ADMIN — MAGNESIUM GLUCONATE 500 MG ORAL TABLET 400 MG: 500 TABLET ORAL at 08:26

## 2021-01-25 RX ADMIN — OXYCODONE AND ACETAMINOPHEN 1 TABLET: 5; 325 TABLET ORAL at 04:40

## 2021-01-25 RX ADMIN — MESALAMINE 800 MG: 400 CAPSULE, DELAYED RELEASE ORAL at 15:46

## 2021-01-25 RX ADMIN — MULTIPLE VITAMINS W/ MINERALS TAB 1 TABLET: TAB at 15:46

## 2021-01-25 RX ADMIN — MESALAMINE 800 MG: 400 CAPSULE, DELAYED RELEASE ORAL at 21:24

## 2021-01-25 RX ADMIN — POTASSIUM CHLORIDE 20 MEQ: 1500 TABLET, EXTENDED RELEASE ORAL at 08:25

## 2021-01-25 RX ADMIN — PAROXETINE HYDROCHLORIDE 40 MG: 20 TABLET, FILM COATED ORAL at 08:26

## 2021-01-25 RX ADMIN — FOLIC ACID 2 MG: 1 TABLET ORAL at 08:26

## 2021-01-25 RX ADMIN — METOPROLOL TARTRATE 12.5 MG: 25 TABLET ORAL at 08:27

## 2021-01-25 RX ADMIN — PREGABALIN 200 MG: 50 CAPSULE ORAL at 15:45

## 2021-01-25 RX ADMIN — OXYCODONE AND ACETAMINOPHEN 1 TABLET: 5; 325 TABLET ORAL at 21:19

## 2021-01-25 RX ADMIN — METOPROLOL TARTRATE 12.5 MG: 25 TABLET ORAL at 21:17

## 2021-01-25 RX ADMIN — MICONAZOLE NITRATE: 20 POWDER TOPICAL at 11:44

## 2021-01-25 RX ADMIN — PREGABALIN 200 MG: 50 CAPSULE ORAL at 08:26

## 2021-01-25 RX ADMIN — BUMETANIDE 1 MG: 1 TABLET ORAL at 08:26

## 2021-01-25 RX ADMIN — OXYCODONE AND ACETAMINOPHEN 1 TABLET: 5; 325 TABLET ORAL at 09:06

## 2021-01-25 RX ADMIN — ENOXAPARIN SODIUM 40 MG: 40 INJECTION SUBCUTANEOUS at 08:27

## 2021-01-25 RX ADMIN — HEPARIN 500 UNITS: 100 SYRINGE at 08:30

## 2021-01-25 RX ADMIN — FERROUS SULFATE TAB 325 MG (65 MG ELEMENTAL FE) 325 MG: 325 (65 FE) TAB at 08:26

## 2021-01-25 RX ADMIN — FERROUS SULFATE TAB 325 MG (65 MG ELEMENTAL FE) 325 MG: 325 (65 FE) TAB at 15:46

## 2021-01-25 RX ADMIN — PANTOPRAZOLE SODIUM 40 MG: 40 TABLET, DELAYED RELEASE ORAL at 08:28

## 2021-01-25 ASSESSMENT — PAIN DESCRIPTION - LOCATION: LOCATION: BACK

## 2021-01-25 NOTE — PROGRESS NOTES
[address]  Occupational Therapy  Daily Note  Time:  Time In: 1000  Time Out: 1100  Timed Code Treatment Minutes: 60 Minutes  Minutes: 60          Date: 2021  Patient Name: Allan Verduzco,   Gender: male      Room: [facility]/054-A  MRN: [id number]  : 1975  (39 y.o.)  Referring Practitioner: CRISTIAN Benson CNP  Diagnosis: History of lumbar laminectomy  Additional Pertinent Hx: Per EMR: \"39year-old obese  male presents to emergency department with evaluation of bilateral leg edema. Patient relates his leg swelling to status post lumbar fusion L3-L5 on 2020 had surgery remove fluid on 2021. Ami Barr Patient has gradually gotten worse since that time. \" To IPR on 1/15    Restrictions/Precautions:  Restrictions/Precautions: General Precautions, Fall Risk  Required Braces or Orthoses  Spinal: Lumbar Corset  Position Activity Restriction  Spinal Precautions: No Bending, No Twisting, No Lifting  Other position/activity restrictions: Pt had right arm amputated 3 years ago. s/p L3-5 fusion      SUBJECTIVE: Pt in recliner upon arrival and agreeable to OT. PAIN: No c/o pain    COGNITION: WFL    ADL:     EATING:Independent. Trude Roers CARE Score: 6. ORAL HYGIENE:Independent. Trude Roers CARE Score: 6. TOILETING HYGIENE:Independent. Trude Roers CARE Score: 6. SHOWERING/BATHING:Independent. Trude Roers CARE Score: 6. UPPER BODY DRESSING:Independent. Trude Roers CARE Score: 6. LOWER BODY DRESSING:Independent. Pt able to don pants with use AE reacher. Trude Roers CARE Score: 6. FOOTWEAR:Setup or clean-up assistance  Pt able to don socks with use of AE sock aid but requires setup of sock onto sock aid to complete. Trude Roers CARE Score: 5.     TOILET TRANSFER: Independent. Trude Roers CARE Score: 6. BALANCE:  Sitting Balance:  Modified Independent. Standing Balance: Modified Independent.       BED MOBILITY:  Not Tested    TRANSFERS:  Sit to Stand: Supervision. <> recliner. S for balance. Stand to Sit: Modified Independent. FUNCTIONAL MOBILITY:  Assistive Device: [email or web address]  Assist Level:  Supervision. Distance: To and from shower room  Pt show no LOB but required 1 VC for cath line mgmnt. ADDITIONAL ACTIVITIES:    -Pt edu on use of sock aid. Pt struggled with setup of placing sock onto aid with demo. S/JUN provided setup of placing sock on aid and pt was able to complete task. Pt demo ability to use AE long handled shoe horn to don shoes MI.     ASSESSMENT:     Activity Tolerance:  Patient tolerance of  treatment: good. Discharge Recommendations: Home with Home health OT   Equipment Recommendations: Other: Patient has shower chair and LH sponge. Recommend other [place]. Plan: Times per week: 5x/wk for 90 min and 1x/wk for 30 min  Current Treatment Recommendations: Strengthening, Endurance Training, Patient/Caregiver Education & Training, Equipment Evaluation, Education, & procurement, Self-Care / ADL, Balance Training, Functional Mobility Training, Safety Education & Training, Positioning    Patient Education  Patient Education: ADL's, IADL's, Home Exercise Program, Equipment Education, Assistive Device Safety and Home Safety Education    Goals  Short term goals  Time Frame for Short term goals: 1 week  Short term goal 1: Pt will navigate quad cane to and from the bathroom with Supervision to improve accessing bathroom. Short term goal 2: Pt will complete LB dressing tasks with LHAE prn and m A to improve indep with self care. Short term goal 3: Pt will demo spinal precautions during 6 min dynamic standing tasks to improve indep with meal prep or IADLs. Short term goal 4: Pt will complete meal prep tasks with S using quad cane to improve indep within home. Long term goals  Time Frame for Long term goals : 2 weeks  Long term goal 1: Pt will complete BADL routine with mod I and 0 vcs for spinal precautions to improve indep within home.   Long

## 2021-01-25 NOTE — PLAN OF CARE
Problem: Pain:  Description: Pain management should include both nonpharmacologic and pharmacologic interventions. Goal: Pain level will decrease  Description: Pain level will decrease  1/25/2021 7352 by Sharron Quinteros RN  Outcome: Ongoing  1/25/2021 0117 by Fannie Khalil RN  Outcome: Ongoing  Goal: Control of acute pain  Description: Control of acute pain  1/25/2021 0937 by Sharron Quinteros RN  Outcome: Ongoing  1/25/2021 0117 by Fannie Khalil RN  Outcome: Ongoing    Patient will participate in pain management to decrease pain as evidenced by patient use of breathing and meditation for pain management     Problem: Falls - Risk of:  Goal: Will remain free from falls  Description: Will remain free from falls  1/25/2021 0937 by Sharron Quinteros RN  Outcome: Ongoing  1/25/2021 0117 by Fannie Khalil RN  Outcome: Ongoing  Goal: Absence of physical injury  Description: Absence of physical injury  1/25/2021 4719 by Sharron Quinteros RN  Outcome: Ongoing  1/25/2021 0117 by Fannie Khalil RN  Outcome: Ongoing    Patient will participate in pain management to decrease pain as evidenced by patient use of breathing and meditation for pain management     Problem: Urinary Elimination:  Goal: Signs and symptoms of infection will decrease  Description: Signs and symptoms of infection will decrease  1/25/2021 0117 by Fannie Khalil RN  Outcome: Ongoing  Goal: Complications related to the disease process, condition or treatment will be avoided or minimized  Description: Complications related to the disease process, condition or treatment will be avoided or minimized  1/25/2021 0117 by Fannie Khalil RN  Outcome: Ongoing    Patient is participating in care of the horn catheter.       Problem: Discharge Planning:  Goal: Discharged to appropriate level of care  Description: Discharged to appropriate level of care  Outcome: Ongoing    Patient will participate in discharge planning during this admission as evidenced by patient will continue to work with nursing and therapy for home going education. Problem: Skin Integrity:  Goal: Will show no infection signs and symptoms  Description: Will show no infection signs and symptoms  1/25/2021 0117 by Arnoldo Mead RN  Outcome: Ongoing  Goal: Absence of new skin breakdown  Description: Absence of new skin breakdown  1/25/2021 0117 by Arnoldo Mead RN  Outcome: Ongoing      Patient will participate in skin integrity promotion during this admission as patient offloading weight while in bed and in chair. Patient and family will alert staff to any concerns he has related to break in intact skin.

## 2021-01-25 NOTE — PROGRESS NOTES
[facility]  INPATIENT PHYSICAL THERAPY  DAILY NOTE  [facility] - 7E-54/054-A  Time In: 0830  Time Out: 0930  Timed Code Treatment Minutes: 61 Minutes  Minutes: 60          Date: 2021  Patient Name: Scottie Martin,  Gender:  male        MRN: [id number]  : 1975  (39 y.o.)     Referring Practitioner: CRISTIAN Guillen CNP  Diagnosis: history of lumbar laminectomy  Additional Pertinent Hx: Per EMR: \"39year-old obese  male presents to emergency department with evaluation of bilateral leg edema. Patient relates his leg swelling to status post lumbar fusion L3-L5 on 2020 had surgery remove fluid on 2021. Ruthy Bonner Patient has gradually gotten worse since that time. \" To [facility] on 1/15     Prior Level of Function:  Lives With: Other (comment)([facility] - ~[facility] residents)  Type of Home: House  Home Layout: One level  Home Access: Level entry  Home Equipment: Rolling walker   Bathroom Shower/Tub: Walk-in shower  Bathroom Toilet: Handicap height    ADL Assistance: Independent  Homemaking Assistance: Independent  Ambulation Assistance: Independent  Transfer Assistance: Independent  Active : No  Additional Comments: pt reports no use of AD at baseline, utilizing RW since surgery 20    Restrictions/Precautions:  Restrictions/Precautions: General Precautions, Fall Risk  Required Braces or Orthoses  Spinal: Lumbar Corset  Position Activity Restriction  Spinal Precautions: No Bending, No Twisting, No Lifting  Other position/activity restrictions: Pt had right arm amputated 3 years ago. s/p L3-5 fusion      SUBJECTIVE: Patient seated in recliner upon arrival, he had just finished breakfast and was agreeable to treatment. Patient required additional time at the beginning of the session to fix catheter to the left leg. PAIN: 5/10: pain reported in back radiating down left leg.      OBJECTIVE:  Bed Mobility:  Rolling to Left: Modified Independent   Rolling to Right: Modified Independent   Supine to Sit: Modified Independent  Sit to Supine: Modified Independent     Transfers:  Sit to Stand: Modified Independent  Stand to Sit:Modified Independent  Stand Pivot:Modified Independent  Car:Modified Independent    Ambulation:  Modified Independent  Distance: 150'x2 150'x1 during scavenger hunt, 20'x1 on stones. Multiple short distances in treatment. Surface: Level Tile and Uneven Surface  Device:Quad Cane  Gait Deviations:  Slow Sabrina and Unsteady Gait  Patient completed scavenger hunt on [address] where he was forced to make turns and walk across stones to retrieve cones. Stairs:  Stairs:  6\" steps. X 4 using One Handrail and Contact Guard Assistance. Stairs:  4\" steps. X 12 using One Handrail and Minimal Assistance. Balance:  Static Sitting Balance:  Modified Independent  Static Standing Balance: Modified Independent  Pt. Able to  an object from floor using long handled reacher with Modified Independent    Exercise:  None     Functional Outcome Measures: Not completed. ASSESSMENT:  Assessment: Patient progressing toward established goals. Activity Tolerance:  Patient tolerance of  treatment: good.       Equipment Recommendations:Equipment Needed: Yes(Wide base quad cane)  Discharge Recommendations:  Continue to assess pending progress    Plan: Times per week: 5x/wk 90 min, 1x/wk 30 min  Current Treatment Recommendations: Strengthening, Gait Training, Patient/Caregiver Education & Training, ROM, Stair training, Equipment Evaluation, Education, & procurement, Balance Training, Neuromuscular Re-education, Pain Management, Functional Mobility Training, Endurance Training, Home Exercise Program, Transfer Training, Safety Education & Training, Modalities, Wheelchair Mobility Training    Patient Education  Patient Education: Precautions/Restrictions, Transfers, Gait, Car Transfers,  - Patient Verbalized Understanding    Goals:  Patient goals : To go home  Short term goals  Time Frame for Short term goals: 1 week  Short term goal 1: pt to perform supine to/from sit at [facility] to get in and out of bed  Short term goal 2: pt to perform sit to/from stand at SBA to rise from bed or chair  Short term goal 3: pt to ambulate >50ft with hemiwalker at SBA for mobility in the home MET (with q. cane) see LTG  Short term goal 4: pt to perform car transfer at [facility] for transport to home  Short term goal 5: pt to negotiate 6\" platform step with hemiwalker at [facility] for community integration  Long term goals  Time Frame for Long term goals : 2 weeks  Long term goal 1: pt to perform supine to/from sit at Mod I to get in and out of bed  Long term goal 2: pt to perform sit to/from stand at Mod I to rise from bed or chair  Long term goal 3: pt to ambulate >50ft with least restrictive device at Mod I, 150ft at S for mobility in the home and community  Long term goal 4: pt to perform car transfer at [facility] for transport to home  Long term goal 5: pt to negotiate 6\" platform step with hemiwalker at [facility] for community integration    Following session, patient left in safe position with all fall risk precautions in place.     Treatment session and note completed by Linette ARIAS under supervision of signing therapist.

## 2021-01-25 NOTE — TELEPHONE ENCOUNTER
Tommie from [facility] rehab is requesting a one week follow up appointment.  Patient is being discharged 01/26/21

## 2021-01-25 NOTE — PROGRESS NOTES
[address] [address]  INPATIENT PHYSICAL THERAPY  DAILY NOTE  [address]    Time In: 1100  Time Out: 1130  Timed Code Treatment Minutes: 30 Minutes  Minutes: 30          Date: 2021  Patient Name: Julia Lerma,  Gender:  male        MRN: [id number]  : 1975  (39 y.o.)     Referring Practitioner: CRISTIAN Dominguez CNP  Diagnosis: history of lumbar laminectomy  Additional Pertinent Hx: Per EMR: \"39year-old obese  male presents to emergency department with evaluation of bilateral leg edema. Patient relates his leg swelling to status post lumbar fusion L3-L5 on 2020 had surgery remove fluid on 2021. Delsa Severance Patient has gradually gotten worse since that time. \" To [facility] on 1/15     Prior Level of Function:  Lives With: Other (comment)([facility] ([facility]) - ~[facility] residents)  Type of Home: House  Home Layout: One level  Home Access: Level entry  Home Equipment: Rolling walker   Bathroom Shower/Tub: Walk-in shower  Bathroom Toilet: Handicap height    ADL Assistance: Independent  Homemaking Assistance: Independent  Ambulation Assistance: Independent  Transfer Assistance: Independent  Active : No  Additional Comments: pt reports no use of AD at baseline, utilizing RW since surgery 20    Restrictions/Precautions:  Restrictions/Precautions: General Precautions, Fall Risk  Required Braces or Orthoses  Spinal: Lumbar Corset  Position Activity Restriction  Spinal Precautions: No Bending, No Twisting, No Lifting  Other position/activity restrictions: Pt had right arm amputated 3 years ago. s/p L3-5 fusion      SUBJECTIVE: Patient was seated in recliner upon arrival and had just finished with another treatment. He was agreeable to treatment. PAIN: no pain reported.      OBJECTIVE:  Bed Mobility:  Not Tested    Transfers:  Sit to Stand: Modified Independent  Stand to Sit:Modified CGA for transport to home  Short term goal 5: pt to negotiate 6\" platform step with hemiwalker at [facility] for community integration  Long term goals  Time Frame for Long term goals : 2 weeks  Long term goal 1: pt to perform supine to/from sit at Mod I to get in and out of bed  Long term goal 2: pt to perform sit to/from stand at Mod I to rise from bed or chair  Long term goal 3: pt to ambulate >50ft with least restrictive device at Mod I, 150ft at S for mobility in the home and community  Long term goal 4: pt to perform car transfer at [facility] for transport to home  Long term goal 5: pt to negotiate 6\" platform step with hemiwalker at [facility] integration    Following session, patient left in safe position with all fall risk precautions in place.     Treatment session and note completed by Olya ARIAS under supervision of signing therapist.

## 2021-01-25 NOTE — PROGRESS NOTES
Physical Medicine & Rehabilitation   Progress Note    Chief Complaint:  CHF exacerbation, lumbar surgery L3-L4 with discectomy, and right arm amputation    Subjective:  Patient seen up in chair. Patient looking forward to discharge tomorrow. Asking about a ride to home. Discussed continuing low dose BB at discharge, patient understanding and agreeable. Patient denies any other needs or concerns at this time. Rehabilitation:  PT:  reviewed  OT: reviewed     Review of Systems:  CONSTITUTIONAL:  negative  RESPIRATORY:  negative  CARDIOVASCULAR:  negative  GASTROINTESTINAL:  negative  GENITOURINARY:  horn  MUSCULOSKELETAL:  positive for  pain and muscle weakness  NEUROLOGICAL:  positive for gait problems, weakness and pain  BEHAVIOR/PSYCH:  negative  System review otherwise negative    Objective:  /70   Pulse 90   Temp 97.8 °F (36.6 °C) (Oral)   Resp 18   Ht 6' (1.829 m)   Wt 277 lb 3.2 oz (125.7 kg)   SpO2 97%   BMI 37.60 kg/m²   awake  Orientation:   person, place, time  Mood: within normal limits  Affect: calm  General appearance: Patient is well nourished, well developed, well groomed and in no acute distress    Memory:   normal,   Attention/Concentration: normal  Language:  normal    Cranial Nerves:  cranial nerves II-XII are grossly intact  ROM:  abnormal - limited BLE  Tone:  normal  Muscle bulk: within normal limits  Sensory:  Sensory intact    Skin: warm and dry, no rash or erythema. Lumbar incision not visualized  Peripheral vascular:  Edema: trace      Diagnostics:   Recent Results (from the past 24 hour(s))   Hemoglobin and hematocrit, blood    Collection Time: 01/24/21  1:25 PM   Result Value Ref Range    Hemoglobin 11.0 (L) 14.0 - 18.0 gm/dl    Hematocrit 34.2 (L) 42.0 - 52.0 %       Impression:  1. Lumbar stenosis  1. L3-5 decompression and fusion 12/29/2020  2. L3-5 revision facetectomies and L3-4 left discectomy with hematoma evacuation 1/6/2021  2.  ONIEL with volume overload and peripheral edema  3. Alcohol abuse, in remission  4. Amputation of right arm  5. Ascites  6. Gastroesophageal reflux disease  7. Hepatic cirrhosis  8. Hepatitis B  9. Hepatitis C  10. Hypertension  11. Intravenous drug use, in remission   12. Psychiatric problem  13. Posttraumatic stress disorder  14. Sciatica  15. Splenomegaly  16. Thrombocytopenia  17.  Ulcerative colitis    Plan:  Continue current therapies  Prophylaxis: DVT - Lovenox, GI - Protonix  Pain: tylenol, percocet, lyrica  Bowels: dulcolax, miralax  Lidocaine gel for left thigh radicular pain  Started lopressor 12.5mg BID, improved HR and BP  · Discharge planning for 1/26    Missed Therapy Time:  · None    Mabel Bess, APRN - CNP

## 2021-01-25 NOTE — PROGRESS NOTES
[address]  Occupational Therapy  Daily Note  Time:   Time In: 1400  Time Out: 1430  Timed Code Treatment Minutes: 30 Minutes  Minutes: 30          Date: 2021  Patient Name: Corinne Printers,   Gender: male      Room: [facility]/054-A  MRN: [id number]  : 1975  (39 y.o.)  Referring Practitioner: CRISTIAN Maguire CNP  Diagnosis: History of lumbar laminectomy  Additional Pertinent Hx: Per EMR: \"39year-old obese  male presents to emergency department with evaluation of bilateral leg edema. Patient relates his leg swelling to status post lumbar fusion L3-L5 on 2020 had surgery remove fluid on 2021. Monterey Savant Patient has gradually gotten worse since that time. \" To [facility] on 1/15    Restrictions/Precautions:  Restrictions/Precautions: General Precautions, Fall Risk  Required Braces or Orthoses  Spinal: Lumbar Corset  Position Activity Restriction  Spinal Precautions: No Bending, No Twisting, No Lifting  Other position/activity restrictions: Pt had right arm amputated 3 years ago. s/p L3-5 fusion      SUBJECTIVE: Pt in recliner upon arrival and agreeable to OT. S/JUN discussed pt needs and concerns on returning home, pt voiced no concerns. PAIN: No c/o pain    COGNITION: WFL    ADL:   No ADL's completed this session. Abdirizak Freeman BALANCE:  Sitting Balance:  Independent. Standing Balance: Modified Independent. BED MOBILITY:  Not Tested    TRANSFERS:  Sit to Stand:  Modified Independent. Stand to Sit: Modified Independent. FUNCTIONAL MOBILITY:  Assistive Device: [facility]  Assist Level:  Modified Independent. Distance: To and from bathroom, To and from therapy gym and To and from therapy apartment  Pt demo ability to maneuver within room and unit using quad cane MI with 0 VC for safety.       ADDITIONAL ACTIVITIES:  -Pt completed activity to address standing balance and safety walking with quad cane MI to improve Indep

## 2021-01-26 VITALS
BODY MASS INDEX: 37.55 KG/M2 | HEIGHT: 72 IN | RESPIRATION RATE: 18 BRPM | HEART RATE: 96 BPM | TEMPERATURE: 98.3 F | OXYGEN SATURATION: 95 % | DIASTOLIC BLOOD PRESSURE: 70 MMHG | WEIGHT: 277.2 LBS | SYSTOLIC BLOOD PRESSURE: 137 MMHG

## 2021-01-26 PROCEDURE — 97116 GAIT TRAINING THERAPY: CPT

## 2021-01-26 PROCEDURE — 97530 THERAPEUTIC ACTIVITIES: CPT

## 2021-01-26 PROCEDURE — 6370000000 HC RX 637 (ALT 250 FOR IP): Performed by: INTERNAL MEDICINE

## 2021-01-26 PROCEDURE — 97110 THERAPEUTIC EXERCISES: CPT

## 2021-01-26 PROCEDURE — 6360000002 HC RX W HCPCS: Performed by: NURSE PRACTITIONER

## 2021-01-26 PROCEDURE — 6370000000 HC RX 637 (ALT 250 FOR IP): Performed by: NURSE PRACTITIONER

## 2021-01-26 PROCEDURE — 99239 HOSP IP/OBS DSCHRG MGMT >30: CPT | Performed by: NURSE PRACTITIONER

## 2021-01-26 PROCEDURE — 6370000000 HC RX 637 (ALT 250 FOR IP): Performed by: FAMILY MEDICINE

## 2021-01-26 RX ORDER — LANOLIN ALCOHOL/MO/W.PET/CERES
100 CREAM (GRAM) TOPICAL DAILY
Qty: 30 TABLET | Refills: 0 | Status: SHIPPED | OUTPATIENT
Start: 2021-01-27 | End: 2021-02-23 | Stop reason: ALTCHOICE

## 2021-01-26 RX ORDER — OXYCODONE HYDROCHLORIDE AND ACETAMINOPHEN 5; 325 MG/1; MG/1
1 TABLET ORAL EVERY 6 HOURS PRN
Qty: 1 TABLET | Refills: 0
Start: 2021-01-26 | End: 2021-02-02

## 2021-01-26 RX ORDER — FERROUS SULFATE 325(65) MG
325 TABLET ORAL 2 TIMES DAILY WITH MEALS
Qty: 1 TABLET | Refills: 0
Start: 2021-01-26

## 2021-01-26 RX ORDER — SPIRONOLACTONE 25 MG/1
25 TABLET ORAL DAILY
Qty: 1 TABLET | Refills: 0
Start: 2021-01-26 | End: 2021-02-23

## 2021-01-26 RX ADMIN — OXYCODONE AND ACETAMINOPHEN 1 TABLET: 5; 325 TABLET ORAL at 09:58

## 2021-01-26 RX ADMIN — PANTOPRAZOLE SODIUM 40 MG: 40 TABLET, DELAYED RELEASE ORAL at 05:56

## 2021-01-26 RX ADMIN — BUMETANIDE 1 MG: 1 TABLET ORAL at 07:42

## 2021-01-26 RX ADMIN — POTASSIUM CHLORIDE 20 MEQ: 1500 TABLET, EXTENDED RELEASE ORAL at 07:43

## 2021-01-26 RX ADMIN — FERROUS SULFATE TAB 325 MG (65 MG ELEMENTAL FE) 325 MG: 325 (65 FE) TAB at 07:42

## 2021-01-26 RX ADMIN — SPIRONOLACTONE 25 MG: 25 TABLET ORAL at 07:43

## 2021-01-26 RX ADMIN — PREGABALIN 200 MG: 50 CAPSULE ORAL at 07:42

## 2021-01-26 RX ADMIN — MAGNESIUM GLUCONATE 500 MG ORAL TABLET 400 MG: 500 TABLET ORAL at 07:43

## 2021-01-26 RX ADMIN — FOLIC ACID 2 MG: 1 TABLET ORAL at 07:42

## 2021-01-26 RX ADMIN — ENOXAPARIN SODIUM 40 MG: 40 INJECTION SUBCUTANEOUS at 07:43

## 2021-01-26 RX ADMIN — TAMSULOSIN HYDROCHLORIDE 0.4 MG: 0.4 CAPSULE ORAL at 07:42

## 2021-01-26 RX ADMIN — METOPROLOL TARTRATE 12.5 MG: 25 TABLET ORAL at 07:44

## 2021-01-26 RX ADMIN — Medication 100 MG: at 07:43

## 2021-01-26 RX ADMIN — MESALAMINE 800 MG: 400 CAPSULE, DELAYED RELEASE ORAL at 07:42

## 2021-01-26 RX ADMIN — PAROXETINE HYDROCHLORIDE 40 MG: 20 TABLET, FILM COATED ORAL at 07:42

## 2021-01-26 ASSESSMENT — PAIN SCALES - GENERAL
PAINLEVEL_OUTOF10: 2
PAINLEVEL_OUTOF10: 6

## 2021-01-26 ASSESSMENT — PAIN DESCRIPTION - DESCRIPTORS: DESCRIPTORS: ACHING

## 2021-01-26 ASSESSMENT — PAIN DESCRIPTION - PAIN TYPE
TYPE: SURGICAL PAIN
TYPE: SURGICAL PAIN

## 2021-01-26 ASSESSMENT — PAIN DESCRIPTION - LOCATION
LOCATION: BACK
LOCATION: BACK

## 2021-01-26 ASSESSMENT — PAIN DESCRIPTION - ORIENTATION: ORIENTATION: LOWER

## 2021-01-26 ASSESSMENT — PAIN - FUNCTIONAL ASSESSMENT: PAIN_FUNCTIONAL_ASSESSMENT: ACTIVITIES ARE NOT PREVENTED

## 2021-01-26 NOTE — PROGRESS NOTES
[address] [address]  Inpatient Rehabilitation  Occupational Therapy  Discharge Note  Time:  Time In: 0800  Time Out: 0830  Timed Code Treatment Minutes: 30 Minutes  Minutes: 30          Date: 2021  Patient Name: Rachelle Beal,   Gender: male      Room: [address] [place], [place] [address]-  MRN: [id number]  : 1975  (39 y.o.)  Referring Practitioner: CRISTIAN Bull - CNP  Diagnosis: History of lumbar laminectomy  Additional Pertinent Hx: Per EMR: \"39year-old obese  male presents to emergency department with evaluation of bilateral leg edema. Patient relates his leg swelling to status post lumbar fusion L3-L5 on 2020 had surgery remove fluid on 2021. Sukhi Ruiz Patient has gradually gotten worse since that time. \" To [facility] on 1/15    Restrictions/Precautions:  Restrictions/Precautions: General Precautions, Fall Risk  Required Braces or Orthoses  Spinal: Lumbar Corset  Position Activity Restriction  Spinal Precautions: No Bending, No Twisting, No Lifting  Other position/activity restrictions: Pt had right arm amputated 3 years ago. s/p L3-5 fusion     SUBJECTIVE: Pt in recliner upon arrival and agreeable to OT. Pt noted stat lock came off for cath line and needed replaced, nursing notified and fixed at beginning of session. PAIN: No c/o pain    COGNITION: WFL    ADL:   No ADL's completed this session. Sukhi Ruiz BALANCE:  Sitting Balance:  Independent. Standing Balance: Modified Independent. BED MOBILITY:  Not Tested    TRANSFERS:  Sit to Stand:  Modified Independent. Stand to Sit: Modified Independent. FUNCTIONAL MOBILITY:  Assistive Device: [facility]  Assist Level:  Modified Independent. Distance: Within Room    ADDITIONAL ACTIVITIES:  -Pt completed homemaking task MI while following back precautions with 0 vc. Pt demo ability to retreive clothing from floor level with AE reacher to pack up bags for discharge.  Pt demo ability to ambulate within room using quad cane  MI with 0 vc for cath line mgmnt or safety. Pt stood MI for 8 min table side with quad cane while folding clothes to pack with intermittent UE release. ASSESSMENT:  Pt has shown great improvement during stay on inpatient rehab by meeting 4/4 STG 2/2 LTG. Pt has progressed to Mod I levels for all transfers and mobility while using AE quad cane. Pt completes ADL tasks MI with use of AE reacher while requiring Min A for set up of socks onto sock aid. Pt has achieved MI balance during both sitting and standing dynamic standing balance. Pt demo ability to follow back precautions with 0 vc and min A for donning brace and bone stimulator. Pt would benefit from continued OT services at home in order to address AE awareness, endurance and strength to continue to improve in ADL's to become more Indep. Activity Tolerance:  Patient tolerance of  treatment: good. Assessment: Pt has shown great improvement during stay on inpatient rehab by meeting 4/4 STG 2/2 LTG. Pt has progressed to Mod I levels for all transfers and mobility while using AE quad cane. Pt completes ADL tasks MI with use of AE reacher while requiring Min A for set up of socks onto sock aid. Pt has achieved MI balance during both sitting and standing dynamic standing balance. Pt demo ability to follow back precautions with 0 vc and min A for donning brace and bone stimulator. Pt is limited to decreased UE Strength endurance and restricted movements in order to follow back precautions. Pt would benefit from continued OT services at home in order to address AE awareness, endurance and strength to continue to improve in ADL's to become more Indep. Discharge Recommendations: Home with Home health OT  Equipment Recommendations: Other: Patient has shower chair and LH sponge. Recommend other Arlys Spatz. Plan: Pt to be discharged home to group home with Home Health and roommate. Roommate is able to assist PRN.    Patient Education  Patient Education: ADL's, Home Exercise Program, Precautions, Importance of Increasing Activity and Home Safety Education    Goals  Short term goals  Time Frame for Short term goals: 1 week  Short term goal 1: Pt will navigate quad cane to and from the bathroom with Supervision to improve accessing bathroom. GOAL MET  Short term goal 2: Pt will complete LB dressing tasks with LHAE prn and m A to improve indep with self care. GOAL MET  Short term goal 3: Pt will demo spinal precautions during 6 min dynamic standing tasks to improve indep with meal prep or IADLs. GOAL MET  Short term goal 4: Pt will complete meal prep tasks with S using quad cane to improve indep within home. GOAL MET  Long term goals  Time Frame for Long term goals : 2 weeks  Long term goal 1: Pt will complete BADL routine with mod I and 0 vcs for spinal precautions to improve indep within home. GOAL MET  Long term goal 2: Pt will complete light IADL tasks with mod I and 0 vcs for adaptive technique or spinal precautions to improve indep within home GOAL MET    Following session, patient left in safe position with all fall risk precautions in place.

## 2021-01-26 NOTE — PROGRESS NOTES
Plan remains for discharge home today, 01/26/2021. SW contacted [facility] with discharge notification of today, 01/26/2021. Information provided to [facility]. Agency has access to discharge instructions and face to face encounter. [facility] arranged for discharge transportation.

## 2021-01-26 NOTE — PROGRESS NOTES
Patient: Nataliya Calderon  Unit/Bed: 4M-66/010-X  YOB: 1975  MRN: [id number] Acct: [de-identified]   Admitting Diagnosis: History of lumbar laminectomy [Z98.890]  S/P lumbar laminectomy [Z98.890]  Admit Date:  1/15/2021  Hospital Day: 10    Assessment:     Active Problems:    Ulcerative colitis, universal (HCC)    Essential hypertension    Leg swelling    Azotemia    S/P lumbar laminectomy    Postoperative anemia    Chronic pain syndrome    Anxiety and depression  Resolved Problems:    * No resolved hospital problems. *      Plan:     Medically stable for discharge tomorrow        Subjective:     Patient has no complaint of CP, SOB or GI upset and the horn continues. .   Medication side effects: none    Scheduled Meds:   metoprolol tartrate  12.5 mg Oral BID    heparin flush  500 Units Intracatheter Daily    thiamine  100 mg Oral Daily    therapeutic multivitamin-minerals  1 tablet Oral Daily    magnesium oxide  400 mg Oral BID    miconazole   Topical BID    spironolactone  25 mg Oral Daily    enoxaparin  40 mg Subcutaneous Q24H    sodium chloride flush  10 mL Intravenous 2 times per day    bumetanide  1 mg Oral Daily    folic acid  2 mg Oral Daily    mesalamine  800 mg Oral TID    pantoprazole  40 mg Oral BID AC    PARoxetine  40 mg Oral QAM    potassium chloride  20 mEq Oral Daily with breakfast    pregabalin  200 mg Oral TID    tamsulosin  0.4 mg Oral Daily    ferrous sulfate  325 mg Oral BID WC    influenza virus vaccine  0.5 mL Intramuscular Prior to discharge     Continuous Infusions:  PRN Meds:benzocaine, lidocaine, heparin flush, bisacodyl, acetaminophen **OR** [DISCONTINUED] acetaminophen, polyethylene glycol, promethazine **OR** [DISCONTINUED] ondansetron, sodium chloride flush, oxyCODONE-acetaminophen    Review of Systems  Pertinent items are noted in HPI.     Objective:     Patient Vitals for the past 8 hrs:   BP Temp Temp src Pulse Resp SpO2   01/25/21 2100 113/67 96.6 °F (35.9 °C) Oral 89 18 95 %   01/25/21 1740 -- -- -- -- -- 97 %     I/O last 3 completed shifts:   In: 200 [P.O.:1040]  Out: 46 [Urine:2525]  I/O this shift:  In: -   Out: 950 [Urine:950]    /67   Pulse 89   Temp 96.6 °F (35.9 °C) (Oral)   Resp 18   Ht 6' (1.829 m)   Wt 277 lb 3.2 oz (125.7 kg)   SpO2 95%   BMI 37.60 kg/m²     General appearance: alert, appears stated age and cooperative  Head: Normocephalic, without obvious abnormality, atraumatic  Lungs: clear to auscultation bilaterally  Chest wall: no tenderness  Heart: regular rate and rhythm, S1, S2 normal, no murmur, click, rub or gallop  Abdomen: soft, non-tender; bowel sounds normal; no masses,  no organomegaly  Extremities: extremities normal, atraumatic, no cyanosis or edema  Skin: Skin color, texture, turgor normal. No rashes or lesions  Neurologic: Grossly normal      Electronically signed by Romina Strong MD on 1/25/2021 at 10:11 PM

## 2021-01-26 NOTE — DISCHARGE SUMMARY
Physical Medicine & Rehabilitation   Discharge Summary     Patient Identification:  Liz Silva  : 1975  Admit date: 1/15/2021  Discharge date: 2021  Attending provider: Jovan Skinner MD        Primary care provider: CRISTIAN Padron CNP     Discharge Diagnoses:   Primary impairment requiring rehabilitation: 3.9 Other neurological     Etiologic Diagnosis that led to the condition: cauda equina syndrome     Comorbid conditions affecting rehabilitation:  1. Recent lumbar surgery with development of cauda equina syndrome. 2.  Alcohol abuse, in remission  3.  Amputation of right arm  4.  Ascites  5.  Gastroesophageal reflux disease  6.  Hepatic cirrhosis  7.  Hepatitis B  8.  Hepatitis C  9.  Hypertension  10.  Intravenous drug use, in remission   11.  Psychiatric problem  12.  Posttraumatic stress disorder  13.  Sciatica  14.  Splenomegaly  15.  Thrombocytopenia  16.  Ulcerative colitis    Discharge Functional Status:    Physical therapy:  Bed Mobility:    Transfers:  ,  ,    Mobility:  , PT Equipment Recommendations  Equipment Needed: Yes(Wide base quad cane), Assessment: Pt presents this date s/p lumbar fusion 20 with decreased tolerance to activity and increased pain. He also demos mild B LE edema and decreased strength with L>R. At this time he requires use of AD for mobility and cues for safety which is a decrease from baseline where he was Independent with no AD. He requires skilled PT services to improve mobility and level of independence before d/c    Occupational therapy:  ,  , Assessment: Iona Connor has made steady progress on IP Rehab. He has progressed to a SBA level for his functional transfers and CGA for basic mobility with use of quad cane. He completes UB ADLs with set-up A, CGA for standing balance during ADLs, and mod A for socks with use of LHAE. During BADLs, he did not require any VCing for back precautions.  He completes IADLs with CGA with min cues for spinal precautions. He would cont to benefit from cont skilled OT services to progress with ADL / IADL  remediation with a focus on safety, endurance building, spinal precaution awareness, and to decrease risk of fall / future injury. Inpatient Rehabilitation Course:   Sue Higuera is a 39 y.o. male admitted to inpatient rehabilitation on 1/15/2021 for lumbar surgery with cauda equina syndrome. The patient participated in an aggressive multidisciplinary inpatient rehabilitation program involving 3 hours per day, 5 days per week of rehabilitation. Hypertension management was undertaken with medication management on any patient with systolic blood pressure greater than 002 or diastolic blood pressure greater than 90. Lopressor 12.5mg BID was added to HTN regimen for better control of BP and HR. Appropriate DVT prophylaxis options were continued with Lovenox. Patient with history of bladder issues, noted urinary retention during his stay when horn removed. Patient was discharge with horn catheter, educated on care, and has follow up with urology this week. Dr Maldonado Lines followed during the IPR stay for medical management    Patient was discharged Home with [facility] in Stable condition.     Consults:   Family medicine    Significant Diagnostics:   CBC:   Lab Results   Component Value Date    WBC 6.9 01/16/2021    RBC 2.36 01/16/2021    HGB 11.0 01/24/2021    HCT 34.2 01/24/2021    .8 01/16/2021    MCH 33.5 01/16/2021    MCHC 33.2 01/16/2021    RDW 13.2 12/16/2019     01/16/2021    MPV 9.7 01/16/2021     CMP:    Lab Results   Component Value Date     01/19/2021    K 3.9 01/19/2021    K 4.3 01/16/2021     01/19/2021    CO2 28 01/19/2021    BUN 13 01/19/2021    CREATININE 0.7 01/19/2021    GFRAA >60 04/04/2019    GFRAA >60 10/14/2011    AGRATIO 1.2 12/16/2019    LABGLOM >90 01/19/2021    GLUCOSE 124 01/19/2021    GLUCOSE 166 12/16/2019    PROT 6.1 01/16/2021    PROT 7.6 10/13/2011    LABALBU 3.1 01/16/2021    CALCIUM 9.2 01/19/2021    BILITOT 0.8 01/16/2021    ALKPHOS 85 01/16/2021    AST 16 01/16/2021    ALT 13 01/16/2021     BMP:    Lab Results   Component Value Date     01/19/2021    K 3.9 01/19/2021    K 4.3 01/16/2021     01/19/2021    CO2 28 01/19/2021    BUN 13 01/19/2021    LABALBU 3.1 01/16/2021    CREATININE 0.7 01/19/2021    CALCIUM 9.2 01/19/2021    GFRAA >60 04/04/2019    GFRAA >60 10/14/2011    LABGLOM >90 01/19/2021    GLUCOSE 124 01/19/2021    GLUCOSE 166 12/16/2019     Sodium:    Lab Results   Component Value Date     01/19/2021     Potassium:    Lab Results   Component Value Date    K 3.9 01/19/2021    K 4.3 01/16/2021     BUN/Creatinine:    Lab Results   Component Value Date    BUN 13 01/19/2021    CREATININE 0.7 01/19/2021     Hepatic Function Panel:    Lab Results   Component Value Date    ALKPHOS 85 01/16/2021    ALT 13 01/16/2021    AST 16 01/16/2021    PROT 6.1 01/16/2021    PROT 7.6 10/13/2011    BILITOT 0.8 01/16/2021    BILIDIR 0.3 01/16/2021    LABALBU 3.1 01/16/2021     Albumin:    Lab Results   Component Value Date    LABALBU 3.1 01/16/2021     Calcium:    Lab Results   Component Value Date    CALCIUM 9.2 01/19/2021     Magnesium:    Lab Results   Component Value Date    MG 1.6 01/19/2021     U/A:    Lab Results   Component Value Date    COLORU RED 01/16/2021    PROTEINU 100 01/16/2021    PHUR 8.5 01/16/2021    WBCUA > 200 01/16/2021    RBCUA > 200 01/16/2021    YEAST NONE SEEN 01/16/2021    BACTERIA FEW 01/16/2021    LEUKOCYTESUR MODERATE 01/16/2021    UROBILINOGEN 2.0 01/16/2021    BILIRUBINUR NEGATIVE 01/16/2021    BLOODU MODERATE 01/16/2021    GLUCOSEU NEGATIVE 01/16/2021    AMORPHOUS NONE SEEN 01/16/2021     FLP:    Lab Results   Component Value Date    TRIG 89 01/13/2021    HDL 41 01/13/2021    LDLCALC 62 01/13/2021     TSH:    Lab Results   Component Value Date    TSH 0.487 01/12/2021     IRON:    Lab Results   Component Value Date    IRON 64 01/12/2021     TIBC:    Lab Results   Component Value Date    TIBC 236 01/12/2021     Cholesterol Panel:   Results in Past 30 Days  Result Component Current Result Ref Range Previous Result Ref Range   Cholesterol, Total 121 (1/13/2021) 100 - 199 mg/dL Not in Time Range    HDL 41 (1/13/2021) mg/dL Not in Time Range    LDL Calculated 62 (1/13/2021) mg/dL Not in Time Range    Triglycerides 89 (1/13/2021) 0 - 199 mg/dL Not in Time Range        Patient Instructions:   Home  Therapy orders: PT and OT   Discharge lab work: none  Code status: Full Code   Activity: activity as tolerated  Diet: DIET LOW SODIUM 2 GM; 1500 ml  Dietary Nutrition Supplements: Wound Healing Oral Supplement    Wound Care: keep wound clean and dry    Follow-up visits: See after visit summary from hospitalization    Discharge Medications:   Jason Russell   Home Medication Instructions ESR:[id number]    Printed on:01/26/21 4271   Medication Information                      benzocaine (ORAJEL) 10 % mucosal gel  Take by mouth as needed. bumetanide (BUMEX) 1 MG tablet  Take 1 tablet by mouth daily             ferrous sulfate (IRON 325) 325 (65 Fe) MG tablet  Take 1 tablet by mouth 2 times daily (with meals)             folic acid (FOLVITE) 1 MG tablet  Take 2 tablets by mouth daily             magnesium oxide (MAG-OX) 400 (241.3 Mg) MG TABS tablet  Take 1 tablet by mouth 2 times daily             mesalamine (DELZICOL) 400 MG CPDR delayed release capsule  Take 800 mg by mouth 3 times daily             metoprolol tartrate (LOPRESSOR) 25 MG tablet  Take 0.5 tablets by mouth 2 times daily             miconazole (MICOTIN) 2 % powder  Apply topically 2 times daily. Multiple Vitamins-Minerals (MULTIVITAMIN ADULT PO)  Take 1 tablet by mouth             oxyCODONE-acetaminophen (PERCOCET) 5-325 MG per tablet  Take 1 tablet by mouth every 6 hours as needed for Pain for up to 7 days.              pantoprazole (PROTONIX) 40 MG tablet  Take 40 mg by mouth 2 times daily              PARoxetine (PAXIL) 40 MG tablet  Take 40 mg by mouth every morning             potassium chloride (KLOR-CON M) 20 MEQ extended release tablet  Take 1 tablet by mouth daily (with breakfast)             pregabalin (LYRICA) 200 MG capsule  Take 200 mg by mouth 3 times daily. spironolactone (ALDACTONE) 25 MG tablet  Take 1 tablet by mouth daily             tamsulosin (FLOMAX) 0.4 MG capsule  Take 1 capsule by mouth daily             thiamine 100 MG tablet  Take 1 tablet by mouth daily                 Controlled substances monitoring: possible medication side effects, risk of tolerance and/or dependence, and alternative treatments discussed, signs of potential drug abuse or diversion identified- Hx of ETOH and IV drug use and OARRS report reviewed today- activity consistent with treatment plan. Patient with [place] [facility] from surgeon from 1/11/2021, patient readmitted on 1/12/21. Discussed with patient about using medications from home and every 3 days decreasing dose.      35 minutes spent preparing the patient for discharge    CRISTIAN Mcdaniels - CNP

## 2021-01-26 NOTE — PROGRESS NOTES
[address] [address]  INPATIENT PHYSICAL THERAPY  DISCHARGE NOTE  [address]  Time In: 1000  Time Out: 1025  Timed Code Treatment Minutes: 25 Minutes  Minutes: 25          Date: 2021  Patient Name: Nataliya Calderon,  Gender:  male        MRN: [id number]  : 1975  (39 y.o.)     Referring Practitioner: CRISTIAN Koehler CNP  Diagnosis: history of lumbar laminectomy  Additional Pertinent Hx: Per EMR: \"39year-old obese  male presents to emergency department with evaluation of bilateral leg edema. Patient relates his leg swelling to status post lumbar fusion L3-L5 on 2020 had surgery remove fluid on 2021. Rashida Sanches Patient has gradually gotten worse since that time. \" To [facility] on 1/15     Prior Level of Function:  Lives With: Other (comment)([facility] - ~14 residents)  Type of Home: House  Home Layout: One level  Home Access: Level entry  Home Equipment: Rolling walker   Bathroom Shower/Tub: Walk-in shower  Bathroom Toilet: Handicap height    ADL Assistance: Independent  Homemaking Assistance: Independent  Ambulation Assistance: Independent  Transfer Assistance: Independent  Active : No  Additional Comments: pt reports no use of AD at baseline, utilizing RW since surgery 20    Restrictions/Precautions:  Restrictions/Precautions: General Precautions, Fall Risk  Required Braces or Orthoses  Spinal: Lumbar Corset  Position Activity Restriction  Spinal Precautions: No Bending, No Twisting, No Lifting  Other position/activity restrictions: Pt had right arm amputated 3 years ago. s/p L3-5 fusion      SUBJECTIVE: Patient seated in recliner upon arrival. Excited to be leaving today and agreeable to treatment.      PAIN: No pain reported    OBJECTIVE:  Bed Mobility:  Not Tested    Transfers:  Sit to Stand: Modified Independent  Stand to Sit:Modified Independent    Ambulation:   Stand By Assistance, to contact guard assist  Distance: 150'x2  Surface: Level Tile  Device:Quad Cane  Gait Deviations:  Slow Sabrina, Decreased Gait Speed, Unsteady Gait with one episode of L knee buckling. Able to self correct without physical assistance. Balance:  Static Sitting Balance:  Independent  Dynamic Sitting Balance: Independent    Stairs:  Platform:  6\" platform X 2 using [employer] and Modified [employer]. Exercise:  Patient was guided in 1 set(s) 10 reps of exercise to both lower extremities. Seated marches, Seated hamstring curls, Seated heel/toe raises, Long arc quads and Seated isometric hip adduction. Exercises were completed for increased independence with functional mobility. Functional Outcome Measures: Not completed       ASSESSMENT:  Assessment: Patient progressing toward established goals, meeting all short term goals and 4/5 long term goals. Patient continues to show decreased quad strength on LLE, though overall has improved. He will benefit from continued skilled Therapy for improved strength and endurance for improved balance with standing and gait in the discharge setting. Activity Tolerance:  Patient tolerance of  treatment: good. Equipment Recommendations:Equipment Needed: Yes(Wide base quad cane)    Plan: Discharge home with home health. Patient Education  Patient Education: Precautions/Restrictions, Transfers, Gait,  - Patient Verbalized Understanding    Goals:  Patient goals :  To go home  Short term goals  Time Frame for Short term goals: 1 week  Short term goal 1: pt to perform supine to/from sit at [facility] to get in and out of bed GOAL MET  Short term goal 2: pt to perform sit to/from stand at [facility] to rise from bed or chair GOAL MET  Short term goal 3: pt to ambulate >50ft with hemiwalker at [facility] for mobility in the home MET (with q. cane) see LTG  Short term goal 4: pt to perform car transfer at [facility] for transport to home GOAL MET  Short term goal 5: pt to negotiate 6\" platform step with hemiwalker at [place], [place]. \[facility].\"" GOAL MET  Long term goals  Time Frame for Long term goals : 2 weeks  Long term goal 1: pt to perform supine to/from sit at Mod I to get in and out of bed GOAL MET  Long term goal 2: pt to perform sit to/from stand at Mod I to rise from bed or chair GOAL MET  Long term goal 3: pt to ambulate >50ft with least restrictive device at Mod I, 150ft at S for mobility in the home and community NOT MET  Long term goal 4: pt to perform car transfer at [facility] for transport to home GOAL MET  Long term goal 5: pt to negotiate 6\" platform step with hemiwalker at [place], [place]. \[facility].\"" MET    Following session, patient left in safe position with all fall risk precautions in place. Treatment session and note completed by Winter Velasquez under supervision of signing therapist.  Treatment session and note completed by Alec Parson PTA.   Assessment  of Discharge Note completed by Marley Domínguez, PT.

## 2021-01-26 NOTE — PROGRESS NOTES
Pt .awake at 0720 and voices eager to go home. After breakfast changed statlock to left thigh and reviewed how to change and freq. Discussed use of leg bag and care of and due to pt.s one arm to not use as unable to clean bags bewteen use. Back incision approx. And healed.

## 2021-01-26 NOTE — PROGRESS NOTES
[address] [address]  Recreational Therapy  Discharge Note  Inpatient Rehabilitation Unit           Date:  1/26/2021       Patient Name: Corinne Printers      MRN: [id number]       YOB: 1975 (39 y.o.)       Gender: male  Diagnosis: History of lumbar laminectomy  Referring Practitioner: CRISTIAN Maguire - CNP    Patient discharged from Recreational Therapy at this time. See recreational therapy notes for details.     Electronically signed by: ISAC Franks  Date: 1/26/2021

## 2021-01-29 ENCOUNTER — TELEPHONE (OUTPATIENT)
Dept: UROLOGY | Age: 46
End: 2021-01-29

## 2021-01-29 ENCOUNTER — PROCEDURE VISIT (OUTPATIENT)
Dept: UROLOGY | Age: 46
End: 2021-01-29
Payer: MEDICARE

## 2021-01-29 VITALS — TEMPERATURE: 97 F | HEIGHT: 72 IN | BODY MASS INDEX: 33.46 KG/M2 | WEIGHT: 247 LBS

## 2021-01-29 DIAGNOSIS — N28.1 RENAL CYST: ICD-10-CM

## 2021-01-29 DIAGNOSIS — R33.9 URINARY RETENTION: ICD-10-CM

## 2021-01-29 DIAGNOSIS — N13.8 BPH WITH OBSTRUCTION/LOWER URINARY TRACT SYMPTOMS: Primary | ICD-10-CM

## 2021-01-29 DIAGNOSIS — N40.1 BPH WITH OBSTRUCTION/LOWER URINARY TRACT SYMPTOMS: Primary | ICD-10-CM

## 2021-01-29 PROCEDURE — G8484 FLU IMMUNIZE NO ADMIN: HCPCS | Performed by: UROLOGY

## 2021-01-29 PROCEDURE — G8417 CALC BMI ABV UP PARAM F/U: HCPCS | Performed by: UROLOGY

## 2021-01-29 PROCEDURE — 4004F PT TOBACCO SCREEN RCVD TLK: CPT | Performed by: UROLOGY

## 2021-01-29 PROCEDURE — 1111F DSCHRG MED/CURRENT MED MERGE: CPT | Performed by: UROLOGY

## 2021-01-29 PROCEDURE — 52000 CYSTOURETHROSCOPY: CPT | Performed by: UROLOGY

## 2021-01-29 PROCEDURE — G8427 DOCREV CUR MEDS BY ELIG CLIN: HCPCS | Performed by: UROLOGY

## 2021-01-29 PROCEDURE — 99214 OFFICE O/P EST MOD 30 MIN: CPT | Performed by: UROLOGY

## 2021-01-29 RX ORDER — LACTULOSE 10 G/15ML
10 SOLUTION ORAL 3 TIMES DAILY PRN
COMMUNITY
End: 2021-02-23 | Stop reason: ALTCHOICE

## 2021-01-29 RX ORDER — CITALOPRAM 20 MG/1
20 TABLET ORAL DAILY
COMMUNITY
End: 2021-02-23 | Stop reason: ALTCHOICE

## 2021-01-29 RX ORDER — LISINOPRIL AND HYDROCHLOROTHIAZIDE 25; 20 MG/1; MG/1
1 TABLET ORAL DAILY
COMMUNITY
Start: 2021-01-26 | End: 2021-03-10

## 2021-01-29 RX ORDER — TAMSULOSIN HYDROCHLORIDE 0.4 MG/1
0.4 CAPSULE ORAL DAILY
Qty: 90 CAPSULE | Refills: 1 | Status: SHIPPED | OUTPATIENT
Start: 2021-01-29 | End: 2021-02-23

## 2021-01-29 RX ORDER — HYDROXYZINE HYDROCHLORIDE 25 MG/1
25 TABLET, FILM COATED ORAL NIGHTLY PRN
COMMUNITY
End: 2021-02-23 | Stop reason: ALTCHOICE

## 2021-01-29 RX ORDER — METHOCARBAMOL 500 MG/1
TABLET, FILM COATED ORAL
COMMUNITY
Start: 2020-11-13 | End: 2021-02-23 | Stop reason: ALTCHOICE

## 2021-01-29 RX ORDER — MULTIVITAMIN WITH FOLIC ACID 400 MCG
TABLET ORAL
COMMUNITY
Start: 2021-01-25 | End: 2021-02-05

## 2021-01-29 RX ORDER — PREGABALIN 150 MG/1
CAPSULE ORAL
COMMUNITY
Start: 2021-01-04 | End: 2021-01-29

## 2021-01-29 NOTE — DISCHARGE SUMMARY
Hospital Medicine Discharge Summary      Patient Identification:   Curry Chavez   : 1975  MRN: [id number]   Account: [de-identified]      Patient's PCP: CRISTIAN Evans CNP    Admit Date: 2021     Discharge Date: 1/15/2021      Admitting Physician: Aileen Denver, MD     Discharge Physician: Micheal Ruiz DO     Discharge Diagnoses: Active Hospital Problems    Diagnosis Date Noted    Azotemia [R79.89]     Leg swelling [M79.89] 2021    ONIEL (acute kidney injury) ([facility] [address] [N17.9] 2021       The patient was seen and examined on day of discharge and this discharge summary is in conjunction with any daily progress note from day of discharge. Hospital Course:   Curry Chavez is a 39 y.o. male admitted to [address] [place] [address] on 2021 for leg swelling. 1. Anasarca: Predominantly lower extremities and scrotal swelling, as normal albumin, nephrology has been consulted  2. Acute kidney injury will trend creatinine and nephrology to manage diuresing  3. Chronic thrombocytopenia  4. Monitor electrolytes closely keep mag greater than 2 and potassium greater than 4, fluid restriction 2 g sodium diet  5. Will empirically obtain blood cultures with additional history of fever and chills  Events noted in the last 24 hours intake output are -3700 mL we will order an echocardiogram and cardiology consult, a BMP also also placed thigh-high stockings.     1.15.2021: We will change loop diuretic to p.o., blood culture thought to be contaminant we will not treat at this time. Echocardiogram showed diastolic dysfunction, he is to follow-up in the CHF clinic on outpatient and follow-up with cardiology on outpatient. He is currently being evaluated for possible inpatient rehab.   We will start discharge planning      Exam:     Vitals:  Vitals:    21 2000 21 2045 01/15/21 0332 01/15/21 0745   BP:  (!) 133/111 133/88 131/73   Pulse:  107 100 97   Resp: 16 16 16 16   Temp:  98.3 °F (36.8 °C) 98.5 °F (36.9 °C) 98.6 °F (37 °C)   TempSrc:  Oral Oral Oral   SpO2: 96% 97% 96% 97%   Weight:   250 lb (113.4 kg)      Weight: Weight: 250 lb (113.4 kg)     24 hour intake/output:No intake or output data in the 24 hours ending 01/29/21 1232      General appearance:  No apparent distress, appears stated age and cooperative. HEENT:  Normal cephalic, atraumatic without obvious deformity. Pupils equal, round, and reactive to light. Extra ocular muscles intact. Conjunctivae/corneas clear. Neck: Supple, with full range of motion. No jugular venous distention. Trachea midline. Respiratory:  Normal respiratory effort. Clear to auscultation, bilaterally without Rales/Wheezes/Rhonchi. Cardiovascular:  Regular rate and rhythm with normal S1/S2 without murmurs, rubs or gallops. Abdomen: Soft, non-tender, non-distended with normal bowel sounds. Musculoskeletal:  No clubbing, cyanosis or edema bilaterally. Full range of motion without deformity. Skin: Skin color, texture, turgor normal.  No rashes or lesions. Neurologic:  Neurovascularly intact without any focal sensory/motor deficits. Cranial nerves: II-XII intact, grossly non-focal.  Psychiatric:  Alert and oriented, thought content appropriate, normal insight  Capillary Refill: Brisk,< 3 seconds   Peripheral Pulses: +2 palpable, equal bilaterally       Labs: For convenience and continuity at follow-up the following most recent labs are provided:      CBC:    Lab Results   Component Value Date    WBC 6.9 01/16/2021    HGB 11.0 01/24/2021    HCT 34.2 01/24/2021     01/16/2021       Renal:    Lab Results   Component Value Date     01/19/2021    K 3.9 01/19/2021    K 4.3 01/16/2021     01/19/2021    CO2 28 01/19/2021    BUN 13 01/19/2021    CREATININE 0.7 01/19/2021    CALCIUM 9.2 01/19/2021         Significant Diagnostic Studies    Radiology:   US RENAL COMPLETE   Final Result   1. Normal kidneys.    2. Large urinary bladder postvoid residual of 1,853 mL. This document has been electronically signed by: Sandip Cabrera MD on    01/14/2021 01:24 AM         XR CHEST PORTABLE   Final Result   No acute findings. This document has been electronically signed by: Sandip Cabrera MD on    01/12/2021 09:56 PM                Consults:     IP CONSULT TO DIETITIAN  IP CONSULT TO SOCIAL WORK  IP CONSULT TO UROLOGY  IP CONSULT TO CARDIOLOGY  IP CONSULT TO REHAB/TCU ADMISSION COORDINATOR  IP CONSULT TO PHYSICAL MEDICINE REHAB  IP CONSULT TO HEART FAILURE NURSE/COORDINATOR    Disposition:    [] Home       [] TCU       [x] Rehab       [] Psych       [] SNF       [] [place]       [] Other-    Condition at Discharge: Stable    Code Status:  Prior     Patient Instructions:    Discharge lab work:    Activity: activity as tolerated  Diet: No diet orders on file      Follow-up visits:   [address] [facility] [address]  [address] [place] [address] [address] [facility]  [address] [facility]  Schedule an appointment as soon as possible for a visit           Discharge Medications:      Laya Munroe   Bruni Medication Instructions LOMAS:[id number]    Printed on:01/29/21 1232   Medication Information                      bumetanide (BUMEX) 1 MG tablet  Take 1 tablet by mouth daily             folic acid (FOLVITE) 1 MG tablet  Take 2 tablets by mouth daily             mesalamine (DELZICOL) 400 MG CPDR delayed release capsule  Take 800 mg by mouth 3 times daily             Multiple Vitamins-Minerals (MULTIVITAMIN ADULT PO)  Take 1 tablet by mouth             pantoprazole (PROTONIX) 40 MG tablet  Take 40 mg by mouth 2 times daily              PARoxetine (PAXIL) 40 MG tablet  Take 40 mg by mouth every morning             potassium chloride (KLOR-CON M) 20 MEQ extended release tablet  Take 1 tablet by mouth daily (with breakfast)             pregabalin (LYRICA) 200 MG capsule  Take 200 mg by mouth 3 times daily. Time Spent on discharge is more than 45 minutes in the examination, evaluation, counseling and review of medications and discharge plan. Signed: Thank you CRISTIAN Ames CNP for the opportunity to be involved in this patient's care.     Electronically signed by Jennifer Hoang DO on 1/29/2021 at 12:32 PM

## 2021-01-29 NOTE — PROGRESS NOTES
Teodora Street MD   Urology Clinic Consultation / New Patient Visit      Patient:  Hever Gregorio  YOB: 1975  Date: 1/29/2021    HISTORY OF PRESENT ILLNESS:   The patient is a 39 y.o. male who presents today for evaluation of the following problem(s):      1. BPH with obstruction/lower urinary tract symptoms    2. Urinary retention    3. Renal cyst           Overall the problem(s) : are worsening. Associated Symptoms: No dysuria, gross hematuria. Pain Severity:      Summary of old records: hx of bladder diverticulectomy at  in past with hx of rec UTIs  (Patient's old records, notes and chart reviewed and summarized above.)      39 y.o. male with significant past medical history of see below who presented to ER complaining of bilateral lower leg swelling. Leg swelling began status post lumbar fusion L3-L5 on 12/29/20, he also had surgery in January to removed fluid. He claims previous surgery in [place] for his bladder due have an extra pocket of bladder that would retain urine. Managed w horn    Started flomax  I independently reviewed and verified the images and reports from:  1/14/2021 US  1. Normal kidneys. 2. Large urinary bladder postvoid residual of 1,853 mL. Last several PSA's:  No results found for: PSA    Last total testosterone:  No results found for: TESTOSTERONE    Urinalysis today:  No results found for this visit on 01/29/21.       Last BUN and creatinine:  Lab Results   Component Value Date    BUN 13 01/19/2021     Lab Results   Component Value Date    CREATININE 0.7 01/19/2021       Imaging Reviewed during this Office Visit:   (results were independently reviewed by physician and radiology report verified)    PAST MEDICAL, FAMILY AND SOCIAL HISTORY:  Past Medical History:   Diagnosis Date    Alcohol abuse     in remission    Amputation of right arm ([facility] [address]     Ascites     GERD (gastroesophageal reflux disease)     Hepatic cirrhosis ([address]  spironolactone (ALDACTONE) 25 MG tablet Take 1 tablet by mouth daily 1 tablet 0    ferrous sulfate (IRON 325) 325 (65 Fe) MG tablet Take 1 tablet by mouth 2 times daily (with meals) 1 tablet 0    magnesium oxide (MAG-OX) 400 (241.3 Mg) MG TABS tablet Take 1 tablet by mouth 2 times daily 60 tablet 0    thiamine 100 MG tablet Take 1 tablet by mouth daily 30 tablet 0    benzocaine (ORAJEL) 10 % mucosal gel Take by mouth as needed.  miconazole (MICOTIN) 2 % powder Apply topically 2 times daily.  potassium chloride (KLOR-CON M) 20 MEQ extended release tablet Take 1 tablet by mouth daily (with breakfast) 60 tablet 3    bumetanide (BUMEX) 1 MG tablet Take 1 tablet by mouth daily 31 tablet 0    Multiple Vitamins-Minerals (MULTIVITAMIN ADULT PO) Take 1 tablet by mouth      pregabalin (LYRICA) 200 MG capsule Take 200 mg by mouth 3 times daily.       PARoxetine (PAXIL) 40 MG tablet Take 40 mg by mouth every morning      mesalamine (DELZICOL) 400 MG CPDR delayed release capsule Take 800 mg by mouth 3 times daily      folic acid (FOLVITE) 1 MG tablet Take 2 tablets by mouth daily 30 tablet 3    pantoprazole (PROTONIX) 40 MG tablet Take 40 mg by mouth 2 times daily          Gabapentin, Morphine and related, Penicillins, Trazodone and nefazodone, and Ondansetron hcl  Social History     Tobacco Use   Smoking Status Former Smoker    Quit date: 2020    Years since quittin.8   Smokeless Tobacco Current User    Types: Snuff       Social History     Substance and Sexual Activity   Alcohol Use No    Comment: Several months since last used       REVIEW OF SYSTEMS:  Constitutional: negative  Eyes: negative  Respiratory: negative  Cardiovascular: negative  Gastrointestinal: negative  Musculoskeletal: negative  Genitourinary: negative except for what is in HPI  Skin: negative   Neurological: negative  Hematological/Lymphatic: negative  Psychological: negative    Physical Exam:    This a 39 y.o. male Vitals:    01/29/21 1131   Temp: 97 °F (36.1 °C)     Constitutional: Patient in no acute distress; Neuro: alert and oriented to person place and time. Psych: Mood and affect normal.  Skin: Normal  Lungs: Respiratory effort normal  Cardiovascular:  Normal peripheral pulses  Abdomen: Soft, non-tender, non-distended   Bladder non-tender and not distended. Lymphatics: no palpable lymphadenopathy  Gait is within normal limits  Musculoskeletal: Normal range of motion       Cystoscopy Operative Note    Findings:   The patient was prepped and draped in the usual sterile fashion. The flexible cystoscope was advanced through the urethra and into the bladder. The bladder was thoroughly inspected and the following was noted:      Urethra: No abnormalities of the urethra are noted. Prostate:moderate obstruction by lateral lobes  Bladder: No tumors or CIS noted, posterior wall cath cystitis. No bladder diverticulum. Moderate / Severe trabeculation noted. Ureters: Orifices with normal configuration and location. The cystoscope was removed. The patient tolerated the procedure well. Assessment and Plan      1. BPH with obstruction/lower urinary tract symptoms    2. Urinary retention    3.  Renal cyst           Plan:        Continue flomax, refill  Check Renal US  Check PSA    Follow up 4-6 weeks for PVR and review above           MD [facility]

## 2021-02-05 ENCOUNTER — HOSPITAL ENCOUNTER (INPATIENT)
Age: 46
LOS: 5 days | Discharge: HOME HEALTH | DRG: 314 | End: 2021-02-10
Attending: HOSPITALIST | Admitting: HOSPITALIST
Payer: MEDICARE

## 2021-02-05 ENCOUNTER — APPOINTMENT (OUTPATIENT)
Dept: GENERAL RADIOLOGY | Age: 46
DRG: 314 | End: 2021-02-05
Payer: MEDICARE

## 2021-02-05 ENCOUNTER — HOSPITAL ENCOUNTER (OUTPATIENT)
Dept: NURSING | Age: 46
Discharge: HOME | DRG: 314 | End: 2021-02-05
Payer: MEDICARE

## 2021-02-05 VITALS
DIASTOLIC BLOOD PRESSURE: 84 MMHG | BODY MASS INDEX: 35.26 KG/M2 | HEART RATE: 95 BPM | SYSTOLIC BLOOD PRESSURE: 133 MMHG | WEIGHT: 260 LBS | TEMPERATURE: 96.9 F | RESPIRATION RATE: 22 BRPM | OXYGEN SATURATION: 94 %

## 2021-02-05 DIAGNOSIS — A41.9 SEPTICEMIA (HCC): Primary | ICD-10-CM

## 2021-02-05 DIAGNOSIS — K51.00 CHRONIC ULCERATIVE ENTEROCOLITIS WITHOUT COMPLICATION (HCC): Primary | ICD-10-CM

## 2021-02-05 PROBLEM — R50.9 FEVER AND CHILLS: Status: ACTIVE | Noted: 2021-02-05

## 2021-02-05 LAB
ALBUMIN SERPL-MCNC: 3.8 G/DL (ref 3.5–5.1)
ALP BLD-CCNC: 108 U/L (ref 38–126)
ALT SERPL-CCNC: 15 U/L (ref 11–66)
AMMONIA: 69 UMOL/L (ref 11–60)
ANION GAP SERPL CALCULATED.3IONS-SCNC: 12 MEQ/L (ref 8–16)
AST SERPL-CCNC: 23 U/L (ref 5–40)
BASOPHILS # BLD: 0.2 %
BASOPHILS ABSOLUTE: 0 THOU/MM3 (ref 0–0.1)
BILIRUB SERPL-MCNC: 0.7 MG/DL (ref 0.3–1.2)
BUN BLDV-MCNC: 39 MG/DL (ref 7–22)
CALCIUM SERPL-MCNC: 9.5 MG/DL (ref 8.5–10.5)
CHLORIDE BLD-SCNC: 100 MEQ/L (ref 98–111)
CO2: 24 MEQ/L (ref 23–33)
CREAT SERPL-MCNC: 1.3 MG/DL (ref 0.4–1.2)
EKG ATRIAL RATE: 112 BPM
EKG P AXIS: 62 DEGREES
EKG P-R INTERVAL: 142 MS
EKG Q-T INTERVAL: 296 MS
EKG QRS DURATION: 68 MS
EKG QTC CALCULATION (BAZETT): 404 MS
EKG R AXIS: 28 DEGREES
EKG T AXIS: 47 DEGREES
EKG VENTRICULAR RATE: 112 BPM
EOSINOPHIL # BLD: 2.3 %
EOSINOPHILS ABSOLUTE: 0.1 THOU/MM3 (ref 0–0.4)
ERYTHROCYTE [DISTWIDTH] IN BLOOD BY AUTOMATED COUNT: 13.1 % (ref 11.5–14.5)
ERYTHROCYTE [DISTWIDTH] IN BLOOD BY AUTOMATED COUNT: 48.9 FL (ref 35–45)
GFR SERPL CREATININE-BSD FRML MDRD: 59 ML/MIN/1.73M2
GLUCOSE BLD-MCNC: 93 MG/DL (ref 70–108)
HCT VFR BLD CALC: 33.1 % (ref 42–52)
HEMOGLOBIN: 10.4 GM/DL (ref 14–18)
IMMATURE GRANS (ABS): 0.02 THOU/MM3 (ref 0–0.07)
IMMATURE GRANULOCYTES: 0.5 %
INR BLD: 1.1 (ref 0.85–1.13)
LACTIC ACID: 2.2 MMOL/L (ref 0.5–2.2)
LYMPHOCYTES # BLD: 22.3 %
LYMPHOCYTES ABSOLUTE: 1 THOU/MM3 (ref 1–4.8)
MAGNESIUM: 1.5 MG/DL (ref 1.6–2.4)
MCH RBC QN AUTO: 31.9 PG (ref 26–33)
MCHC RBC AUTO-ENTMCNC: 31.4 GM/DL (ref 32.2–35.5)
MCV RBC AUTO: 101.5 FL (ref 80–94)
MONOCYTES # BLD: 0.9 %
MONOCYTES ABSOLUTE: 0 THOU/MM3 (ref 0.4–1.3)
NUCLEATED RED BLOOD CELLS: 0 /100 WBC
OSMOLALITY CALCULATION: 281.1 MOSMOL/KG (ref 275–300)
PLATELET # BLD: 155 THOU/MM3 (ref 130–400)
PMV BLD AUTO: 9.6 FL (ref 9.4–12.4)
POTASSIUM SERPL-SCNC: 4.8 MEQ/L (ref 3.5–5.2)
RBC # BLD: 3.26 MILL/MM3 (ref 4.7–6.1)
SARS-COV-2, NAAT: NOT DETECTED
SEG NEUTROPHILS: 73.8 %
SEGMENTED NEUTROPHILS ABSOLUTE COUNT: 3.2 THOU/MM3 (ref 1.8–7.7)
SODIUM BLD-SCNC: 136 MEQ/L (ref 135–145)
TOTAL PROTEIN: 7.8 G/DL (ref 6.1–8)
WBC # BLD: 4.4 THOU/MM3 (ref 4.8–10.8)

## 2021-02-05 PROCEDURE — 87040 BLOOD CULTURE FOR BACTERIA: CPT

## 2021-02-05 PROCEDURE — 2580000003 HC RX 258: Performed by: NURSE PRACTITIONER

## 2021-02-05 PROCEDURE — 96375 TX/PRO/DX INJ NEW DRUG ADDON: CPT

## 2021-02-05 PROCEDURE — 85610 PROTHROMBIN TIME: CPT

## 2021-02-05 PROCEDURE — 6370000000 HC RX 637 (ALT 250 FOR IP): Performed by: NURSE PRACTITIONER

## 2021-02-05 PROCEDURE — 83735 ASSAY OF MAGNESIUM: CPT

## 2021-02-05 PROCEDURE — 99285 EMERGENCY DEPT VISIT HI MDM: CPT

## 2021-02-05 PROCEDURE — 93005 ELECTROCARDIOGRAM TRACING: CPT | Performed by: NURSE PRACTITIONER

## 2021-02-05 PROCEDURE — 6360000002 HC RX W HCPCS: Performed by: PHYSICIAN ASSISTANT

## 2021-02-05 PROCEDURE — 96367 TX/PROPH/DG ADDL SEQ IV INF: CPT

## 2021-02-05 PROCEDURE — 82140 ASSAY OF AMMONIA: CPT

## 2021-02-05 PROCEDURE — 96365 THER/PROPH/DIAG IV INF INIT: CPT

## 2021-02-05 PROCEDURE — 83605 ASSAY OF LACTIC ACID: CPT

## 2021-02-05 PROCEDURE — U0002 COVID-19 LAB TEST NON-CDC: HCPCS

## 2021-02-05 PROCEDURE — 85025 COMPLETE CBC W/AUTO DIFF WBC: CPT

## 2021-02-05 PROCEDURE — 80053 COMPREHEN METABOLIC PANEL: CPT

## 2021-02-05 PROCEDURE — 6360000002 HC RX W HCPCS: Performed by: NURSE PRACTITIONER

## 2021-02-05 PROCEDURE — 2580000003 HC RX 258: Performed by: HOSPITALIST

## 2021-02-05 PROCEDURE — 71045 X-RAY EXAM CHEST 1 VIEW: CPT

## 2021-02-05 PROCEDURE — 36415 COLL VENOUS BLD VENIPUNCTURE: CPT

## 2021-02-05 PROCEDURE — 87801 DETECT AGNT MULT DNA AMPLI: CPT

## 2021-02-05 PROCEDURE — 87147 CULTURE TYPE IMMUNOLOGIC: CPT

## 2021-02-05 PROCEDURE — 87086 URINE CULTURE/COLONY COUNT: CPT

## 2021-02-05 PROCEDURE — 6360000002 HC RX W HCPCS: Performed by: HOSPITALIST

## 2021-02-05 PROCEDURE — 1200000000 HC SEMI PRIVATE

## 2021-02-05 PROCEDURE — 87186 SC STD MICRODIL/AGAR DIL: CPT

## 2021-02-05 PROCEDURE — 99223 1ST HOSP IP/OBS HIGH 75: CPT | Performed by: HOSPITALIST

## 2021-02-05 PROCEDURE — 87077 CULTURE AEROBIC IDENTIFY: CPT

## 2021-02-05 PROCEDURE — 6370000000 HC RX 637 (ALT 250 FOR IP): Performed by: PHYSICIAN ASSISTANT

## 2021-02-05 PROCEDURE — 6370000000 HC RX 637 (ALT 250 FOR IP): Performed by: HOSPITALIST

## 2021-02-05 RX ORDER — SODIUM CHLORIDE 9 MG/ML
INJECTION, SOLUTION INTRAVENOUS CONTINUOUS
Status: DISCONTINUED | OUTPATIENT
Start: 2021-02-05 | End: 2021-02-07

## 2021-02-05 RX ORDER — MESALAMINE 400 MG/1
800 CAPSULE, DELAYED RELEASE ORAL 3 TIMES DAILY
Status: DISCONTINUED | OUTPATIENT
Start: 2021-02-05 | End: 2021-02-10 | Stop reason: HOSPADM

## 2021-02-05 RX ORDER — HEPARIN SODIUM (PORCINE) LOCK FLUSH IV SOLN 100 UNIT/ML 100 UNIT/ML
500 SOLUTION INTRAVENOUS PRN
Status: DISCONTINUED | OUTPATIENT
Start: 2021-02-05 | End: 2021-02-06 | Stop reason: HOSPADM

## 2021-02-05 RX ORDER — 0.9 % SODIUM CHLORIDE 0.9 %
1000 INTRAVENOUS SOLUTION INTRAVENOUS ONCE
Status: COMPLETED | OUTPATIENT
Start: 2021-02-05 | End: 2021-02-05

## 2021-02-05 RX ORDER — METHYLPREDNISOLONE SODIUM SUCCINATE 125 MG/2ML
125 INJECTION, POWDER, LYOPHILIZED, FOR SOLUTION INTRAMUSCULAR; INTRAVENOUS ONCE
Status: CANCELLED | OUTPATIENT
Start: 2021-02-19 | End: 2021-02-19

## 2021-02-05 RX ORDER — PROCHLORPERAZINE EDISYLATE 5 MG/ML
10 INJECTION INTRAMUSCULAR; INTRAVENOUS ONCE
Status: COMPLETED | OUTPATIENT
Start: 2021-02-05 | End: 2021-02-05

## 2021-02-05 RX ORDER — OXYCODONE HYDROCHLORIDE AND ACETAMINOPHEN 5; 325 MG/1; MG/1
1 TABLET ORAL EVERY 6 HOURS PRN
Status: DISCONTINUED | OUTPATIENT
Start: 2021-02-05 | End: 2021-02-10 | Stop reason: HOSPADM

## 2021-02-05 RX ORDER — TAMSULOSIN HYDROCHLORIDE 0.4 MG/1
0.4 CAPSULE ORAL DAILY
Status: DISCONTINUED | OUTPATIENT
Start: 2021-02-06 | End: 2021-02-10 | Stop reason: HOSPADM

## 2021-02-05 RX ORDER — ACETAMINOPHEN 325 MG/1
650 TABLET ORAL EVERY 6 HOURS PRN
Status: DISCONTINUED | OUTPATIENT
Start: 2021-02-05 | End: 2021-02-10 | Stop reason: HOSPADM

## 2021-02-05 RX ORDER — MEPERIDINE HYDROCHLORIDE 25 MG/ML
12.5 INJECTION INTRAMUSCULAR; INTRAVENOUS; SUBCUTANEOUS ONCE
Status: COMPLETED | OUTPATIENT
Start: 2021-02-05 | End: 2021-02-05

## 2021-02-05 RX ORDER — SODIUM CHLORIDE 0.9 % (FLUSH) 0.9 %
10 SYRINGE (ML) INJECTION EVERY 12 HOURS SCHEDULED
Status: DISCONTINUED | OUTPATIENT
Start: 2021-02-05 | End: 2021-02-10 | Stop reason: HOSPADM

## 2021-02-05 RX ORDER — OXYCODONE HYDROCHLORIDE AND ACETAMINOPHEN 5; 325 MG/1; MG/1
1 TABLET ORAL EVERY 6 HOURS PRN
COMMUNITY
End: 2021-02-23 | Stop reason: ALTCHOICE

## 2021-02-05 RX ORDER — LANOLIN ALCOHOL/MO/W.PET/CERES
100 CREAM (GRAM) TOPICAL DAILY
Status: DISCONTINUED | OUTPATIENT
Start: 2021-02-06 | End: 2021-02-10 | Stop reason: HOSPADM

## 2021-02-05 RX ORDER — PAROXETINE HYDROCHLORIDE 20 MG/1
40 TABLET, FILM COATED ORAL EVERY MORNING
Status: DISCONTINUED | OUTPATIENT
Start: 2021-02-06 | End: 2021-02-10 | Stop reason: HOSPADM

## 2021-02-05 RX ORDER — ACETAMINOPHEN 325 MG/1
650 TABLET ORAL ONCE
Status: COMPLETED | OUTPATIENT
Start: 2021-02-05 | End: 2021-02-05

## 2021-02-05 RX ORDER — PANTOPRAZOLE SODIUM 40 MG/1
40 TABLET, DELAYED RELEASE ORAL 2 TIMES DAILY
Status: DISCONTINUED | OUTPATIENT
Start: 2021-02-05 | End: 2021-02-10 | Stop reason: HOSPADM

## 2021-02-05 RX ORDER — HEPARIN SODIUM (PORCINE) LOCK FLUSH IV SOLN 100 UNIT/ML 100 UNIT/ML
500 SOLUTION INTRAVENOUS ONCE
Status: COMPLETED | OUTPATIENT
Start: 2021-02-05 | End: 2021-02-05

## 2021-02-05 RX ORDER — SODIUM CHLORIDE 9 MG/ML
INJECTION, SOLUTION INTRAVENOUS CONTINUOUS
Status: CANCELLED | OUTPATIENT
Start: 2021-02-19

## 2021-02-05 RX ORDER — LACTULOSE 10 G/15ML
10 SOLUTION ORAL 3 TIMES DAILY PRN
Status: DISCONTINUED | OUTPATIENT
Start: 2021-02-05 | End: 2021-02-10 | Stop reason: HOSPADM

## 2021-02-05 RX ORDER — ONDANSETRON 2 MG/ML
4 INJECTION INTRAMUSCULAR; INTRAVENOUS EVERY 6 HOURS PRN
Status: DISCONTINUED | OUTPATIENT
Start: 2021-02-05 | End: 2021-02-10 | Stop reason: HOSPADM

## 2021-02-05 RX ORDER — SODIUM CHLORIDE 0.9 % (FLUSH) 0.9 %
30 SYRINGE (ML) INJECTION ONCE
Status: CANCELLED | OUTPATIENT
Start: 2021-02-19

## 2021-02-05 RX ORDER — CITALOPRAM 20 MG/1
20 TABLET ORAL DAILY
Status: DISCONTINUED | OUTPATIENT
Start: 2021-02-06 | End: 2021-02-05

## 2021-02-05 RX ORDER — PROMETHAZINE HYDROCHLORIDE 25 MG/1
12.5 TABLET ORAL EVERY 6 HOURS PRN
Status: DISCONTINUED | OUTPATIENT
Start: 2021-02-05 | End: 2021-02-10 | Stop reason: HOSPADM

## 2021-02-05 RX ORDER — ACETAMINOPHEN 650 MG/1
650 SUPPOSITORY RECTAL EVERY 6 HOURS PRN
Status: DISCONTINUED | OUTPATIENT
Start: 2021-02-05 | End: 2021-02-10 | Stop reason: HOSPADM

## 2021-02-05 RX ORDER — FOLIC ACID 1 MG/1
2 TABLET ORAL DAILY
Status: DISCONTINUED | OUTPATIENT
Start: 2021-02-06 | End: 2021-02-10 | Stop reason: HOSPADM

## 2021-02-05 RX ORDER — SODIUM CHLORIDE 0.9 % (FLUSH) 0.9 %
10 SYRINGE (ML) INJECTION PRN
Status: DISCONTINUED | OUTPATIENT
Start: 2021-02-05 | End: 2021-02-10 | Stop reason: HOSPADM

## 2021-02-05 RX ORDER — SODIUM CHLORIDE 0.9 % (FLUSH) 0.9 %
30 SYRINGE (ML) INJECTION ONCE
Status: COMPLETED | OUTPATIENT
Start: 2021-02-05 | End: 2021-02-05

## 2021-02-05 RX ORDER — DIPHENHYDRAMINE HYDROCHLORIDE 50 MG/ML
50 INJECTION INTRAMUSCULAR; INTRAVENOUS ONCE
Status: CANCELLED | OUTPATIENT
Start: 2021-02-19 | End: 2021-02-19

## 2021-02-05 RX ORDER — CITALOPRAM 20 MG/1
20 TABLET ORAL DAILY
Status: DISCONTINUED | OUTPATIENT
Start: 2021-02-06 | End: 2021-02-05 | Stop reason: ALTCHOICE

## 2021-02-05 RX ORDER — POLYETHYLENE GLYCOL 3350 17 G/17G
17 POWDER, FOR SOLUTION ORAL DAILY PRN
Status: DISCONTINUED | OUTPATIENT
Start: 2021-02-05 | End: 2021-02-10 | Stop reason: HOSPADM

## 2021-02-05 RX ORDER — MULTIVITAMIN WITH IRON
1 TABLET ORAL DAILY
Status: DISCONTINUED | OUTPATIENT
Start: 2021-02-06 | End: 2021-02-10 | Stop reason: HOSPADM

## 2021-02-05 RX ADMIN — ENOXAPARIN SODIUM 40 MG: 40 INJECTION SUBCUTANEOUS at 23:08

## 2021-02-05 RX ADMIN — OXYCODONE AND ACETAMINOPHEN 1 TABLET: 5; 325 TABLET ORAL at 23:08

## 2021-02-05 RX ADMIN — MEPERIDINE HYDROCHLORIDE 12.5 MG: 25 INJECTION INTRAMUSCULAR; INTRAVENOUS; SUBCUTANEOUS at 15:22

## 2021-02-05 RX ADMIN — HEPARIN 500 UNITS: 100 SYRINGE at 23:09

## 2021-02-05 RX ADMIN — PREGABALIN 200 MG: 75 CAPSULE ORAL at 23:08

## 2021-02-05 RX ADMIN — MEROPENEM 1000 MG: 1 INJECTION, POWDER, FOR SOLUTION INTRAVENOUS at 15:22

## 2021-02-05 RX ADMIN — ACETAMINOPHEN 650 MG: 325 TABLET ORAL at 15:00

## 2021-02-05 RX ADMIN — SODIUM CHLORIDE: 9 INJECTION, SOLUTION INTRAVENOUS at 18:22

## 2021-02-05 RX ADMIN — PANTOPRAZOLE SODIUM 40 MG: 40 TABLET, DELAYED RELEASE ORAL at 23:08

## 2021-02-05 RX ADMIN — PROCHLORPERAZINE EDISYLATE 10 MG: 5 INJECTION INTRAMUSCULAR; INTRAVENOUS at 14:53

## 2021-02-05 RX ADMIN — SODIUM CHLORIDE 1000 ML: 9 INJECTION, SOLUTION INTRAVENOUS at 14:53

## 2021-02-05 RX ADMIN — SODIUM CHLORIDE, PRESERVATIVE FREE 30 ML: 5 INJECTION INTRAVENOUS at 13:45

## 2021-02-05 RX ADMIN — VEDOLIZUMAB 300 MG: 300 INJECTION, POWDER, LYOPHILIZED, FOR SOLUTION INTRAVENOUS at 13:15

## 2021-02-05 RX ADMIN — SODIUM CHLORIDE 1000 ML: 9 INJECTION, SOLUTION INTRAVENOUS at 17:27

## 2021-02-05 RX ADMIN — VANCOMYCIN HYDROCHLORIDE 1750 MG: 5 INJECTION, POWDER, LYOPHILIZED, FOR SOLUTION INTRAVENOUS at 16:06

## 2021-02-05 ASSESSMENT — PAIN SCALES - GENERAL
PAINLEVEL_OUTOF10: 4
PAINLEVEL_OUTOF10: 7
PAINLEVEL_OUTOF10: 10

## 2021-02-05 ASSESSMENT — PAIN DESCRIPTION - LOCATION
LOCATION: GENERALIZED
LOCATION: BACK;LEG

## 2021-02-05 ASSESSMENT — PAIN DESCRIPTION - DESCRIPTORS
DESCRIPTORS: ACHING;CONSTANT
DESCRIPTORS: ACHING

## 2021-02-05 ASSESSMENT — PAIN DESCRIPTION - ORIENTATION: ORIENTATION: LEFT

## 2021-02-05 ASSESSMENT — PAIN DESCRIPTION - PAIN TYPE
TYPE: CHRONIC PAIN
TYPE: ACUTE PAIN

## 2021-02-05 ASSESSMENT — ENCOUNTER SYMPTOMS
ABDOMINAL PAIN: 0
EYE PAIN: 0
NAUSEA: 1
VOMITING: 0
BACK PAIN: 0
COUGH: 1
COLOR CHANGE: 0
SHORTNESS OF BREATH: 1

## 2021-02-05 ASSESSMENT — PAIN DESCRIPTION - ONSET: ONSET: ON-GOING

## 2021-02-05 NOTE — ED NOTES
Transferring Facility: Outpatient Nursing  Call back #: [phone]  Per sending provider request:    Code Status:                              Allergies:  Reason for transfer:     Vital Signs:  BP:  P:  R:  T:  O2 SATS:    Other Details:   Pt was recently admitted to hospital and had his Mediport accessed. Pt was discharged and it was \"never de-accessed. \"  The patient informed outpatient nursing that he came to the ER to have it de-accessed and was told to have it done as outpatient. Pt went to have an infusion, and started to develop chills after it was started. When patient arrived, staff informed this RN that the mediport was reddened and appeared inflamed.       [address] RN  02/05/21 [id number]

## 2021-02-05 NOTE — ED PROVIDER NOTES
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 CURRENT MEDICATIONS       Discharge Medication List as of 2/10/2021  4:10 PM      CONTINUE these medications which have NOT CHANGED    Details   oxyCODONE-acetaminophen (PERCOCET) 5-325 MG per tablet Take 1 tablet by mouth every 6 hours as needed for Pain. Historical Med      lactulose (CHRONULAC) 10 GM/15ML solution Take 10 mLs by mouth 3 times daily as neededHistorical Med      lisinopril-hydroCHLOROthiazide (PRINZIDE;ZESTORETIC) 20-25 MG per tablet Take 1 tablet by mouth daily Historical Med      hydrOXYzine (ATARAX) 25 MG tablet Take 25 mg by mouth nightly as neededHistorical Med      methocarbamol (ROBAXIN) 500 MG tablet TK 2 TS PO BID UTDHistorical Med      POTASSIUM PO Take 1 tablet by mouth Historical Med      citalopram (CELEXA) 20 MG tablet Take 20 mg by mouth dailyHistorical Med      tamsulosin (FLOMAX) 0.4 MG capsule Take 1 capsule by mouth daily, Disp-90 capsule, R-1Normal      metoprolol tartrate (LOPRESSOR) 25 MG tablet Take 0.5 tablets by mouth 2 times daily, Disp-30 tablet, R-0Normal      spironolactone (ALDACTONE) 25 MG tablet Take 1 tablet by mouth daily, Disp-1 tablet, R-0NO PRINT      ferrous sulfate (IRON 325) 325 (65 Fe) MG tablet Take 1 tablet by mouth 2 times daily (with meals), Disp-1 tablet, R-0NO PRINT      magnesium oxide (MAG-OX) 400 (241.3 Mg) MG TABS tablet Take 1 tablet by mouth 2 times daily, Disp-60 tablet, R-0Normal      thiamine 100 MG tablet Take 1 tablet by mouth daily, Disp-30 tablet, R-0Normal      benzocaine (ORAJEL) 10 % mucosal gel Take by mouth as needed., OTC      potassium chloride (KLOR-CON M) 20 MEQ extended release tablet Take 1 tablet by mouth daily (with breakfast), Disp-60 tablet, R-3Normal      bumetanide (BUMEX) 1 MG tablet Take 1 tablet by mouth daily, Disp-31 tablet, R-0Normal      Multiple Vitamins-Minerals (MULTIVITAMIN ADULT PO) Take 1 tablet by mouthHistorical Med      pregabalin (LYRICA) 200 MG capsule Take 200 mg by mouth 3 times daily. Historical Med      PARoxetine (PAXIL) 40 MG tablet Take 40 mg by mouth every morningHistorical Med      mesalamine (DELZICOL) 400 MG CPDR delayed release capsule Take 800 mg by mouth 3 times dailyHistorical Med      folic acid (FOLVITE) 1 MG tablet Take 2 tablets by mouth daily, Disp-30 tablet, R-3Print      pantoprazole (PROTONIX) 40 MG tablet Take 40 mg by mouth 2 times daily Historical Med             ALLERGIES     is allergic to gabapentin; morphine and related; penicillins; trazodone and nefazodone; and ondansetron hcl. FAMILY HISTORY     He indicated that his mother is alive. He indicated that his father is . family history includes High Blood Pressure in his father and mother.     SOCIAL HISTORY       Social History     Socioeconomic History    Marital status:      Spouse name: Not on file    Number of children: 1    Years of education: Not on file    Highest education level: Not on file   Occupational History    Not on file   Social Needs    Financial resource strain: Not on file    Food insecurity     Worry: Not on file     Inability: Not on file    Transportation needs     Medical: Not on file     Non-medical: Not on file   Tobacco Use    Smoking status: Former Smoker     Quit date: 2020     Years since quittin.8    Smokeless tobacco: Current User     Types: Snuff   Substance and Sexual Activity    Alcohol use: No     Comment: Several months since last used    Drug use: No     Comment: 160 days since last use    Sexual activity: Not on file   Lifestyle    Physical activity     Days per week: Not on file     Minutes per session: Not on file    Stress: Not on file   Relationships    Social connections     Talks on phone: Not on file     Gets together: Not on file     Attends Orthodox service: Not on file     Active member of club or organization: Not on file     Attends meetings of clubs or organizations: Not on file     Relationship status: Not on file    Intimate partner violence     Fear of current or ex partner: Not on file     Emotionally abused: Not on file     Physically abused: Not on file     Forced sexual activity: Not on file   Other Topics Concern    Not on file   Social History Narrative    Not on file       PHYSICAL EXAM     INITIAL VITALS:  height is 6' (1.829 m) and weight is 263 lb 6.4 oz (119.5 kg). His oral temperature is 98.1 °F (36.7 °C). His blood pressure is 161/95 (abnormal) and his pulse is 93. His respiration is 12 and oxygen saturation is 97%. Physical Exam  Constitutional:       Appearance: He is obese. He is ill-appearing and toxic-appearing. He is not diaphoretic. Comments: Shaking with rigors   HENT:      Head: Normocephalic and atraumatic. Right Ear: External ear normal.      Left Ear: External ear normal.      Nose: Nose normal.      Mouth/Throat:      Mouth: Mucous membranes are moist.      Pharynx: Oropharynx is clear. Eyes:      Extraocular Movements: Extraocular movements intact. Conjunctiva/sclera: Conjunctivae normal.      Pupils: Pupils are equal, round, and reactive to light. Cardiovascular:      Rate and Rhythm: Regular rhythm. Tachycardia present. Pulses: Normal pulses. Heart sounds: Normal heart sounds. Pulmonary:      Effort: Pulmonary effort is normal.      Breath sounds: Normal breath sounds. Abdominal:      General: Bowel sounds are normal.      Palpations: Abdomen is soft. Tenderness: There is no abdominal tenderness. Musculoskeletal: Normal range of motion. General: No swelling or tenderness.       Comments: Right upper extremity amputation at shoulder level from history of necrotizing fasciitis    Skin:     General: Skin is warm and dry. Coloration: Skin is ashen and mottled. Skin is not cyanotic or jaundiced. Findings: No bruising, ecchymosis, erythema, petechiae or rash. Comments: Mediport in place on left sided chest; no edema, rash, or erythema noted around it   Neurological:      Mental Status: He is alert and oriented to person, place, and time. Sensory: No sensory deficit. DIFFERENTIAL DIAGNOSIS:   Sepsis, allergic reaction, UTI, lumbar surgical infection. DIAGNOSTIC RESULTS     EKG: All EKG's are interpreted by the Emergency Department Physician who eithersigns or Co-signs this chart in the absence of a cardiologist.    EKG read and interpreted by myself with comparison to 1/12/2021 gives impression of sinus tachycardia with heart rate of 112; interval 142; QRS 68;QTc 404; axis P-62, R-28, T-47. RADIOLOGY: non-plainfilm images(s) such as CT, Ultrasound and MRI are read by the radiologist.  Plain radiographic images are visualized and preliminarily interpreted by the emergency physician unless otherwise stated below. RADIOLOGY REPORT   Final Result      IR Interventional Radiology Procedure Request   Final Result   Status post successful MediPort removal..            **This report has been created using voice recognition software. It may contain minor errors which are inherent in voice recognition technology. **      Final report electronically signed by Dr. Adelaida Liriano on 2/8/2021 5:42 PM      XR CHEST PORTABLE   Final Result   1. Borderline heart size. Mediport left side, catheter tip the cavoatrial junction. 2. No acute findings. No infiltrates or effusions are seen. **This report has been created using voice recognition software. It may contain minor errors which are inherent in voice recognition technology. **      Final report electronically signed by Dr. Yara Osborne on 2/7/2021 2:17 PM      XR CHEST PORTABLE   Final Result   1.  Stable chest x-ray, no interval change since previous study dated 12 January 2021         **This report has been created using voice recognition software. It may contain minor errors which are inherent in voice recognition technology. **      Final report electronically signed by DR Una Arrington on 2/5/2021 3:52 PM            LABS:   Labs Reviewed   CULTURE, BLOOD 1 - Abnormal; Notable for the following components:       Result Value    Organism Pseudomonas aeruginosa (*)     Organism Staphylococcus (coagulase negative) (*)     All other components within normal limits    Narrative:     Source: blood-Adult-suboptimal <5.5oz./set volume       Site: (single bottle)Peripheral;            Current Antibiotics: not stated   CULTURE, BLOOD 2 - Abnormal; Notable for the following components:    Blood Culture, Routine No growth-preliminary  (*)     Organism Pseudomonas aeruginosa (*)     All other components within normal limits    Narrative:     Source: blood-Adult-suboptimal <5.5oz./set volume       Site: (single bottle)Peripheral;            Current Antibiotics: not stated   CULTURE, URINE - Abnormal; Notable for the following components:    Urine Culture, Routine   (*)     Value: No growth-preliminary Growth of Contaminants. The mixture of organisms present are not a common cause of urinary tract infections and probably represent skin case or distal urethral case.      Organism Growth of Contaminants    (*)     All other components within normal limits    Narrative:     Source: urine, clean catch       Site: transport kit          Current Antibiotics: not   stated   CBC WITH AUTO DIFFERENTIAL - Abnormal; Notable for the following components:    WBC 4.4 (*)     RBC 3.26 (*)     Hemoglobin 10.4 (*)     Hematocrit 33.1 (*)     .5 (*)     MCHC 31.4 (*)     RDW-SD 48.9 (*)     Monocytes Absolute 0.0 (*)     All other components within normal limits   COMPREHENSIVE METABOLIC PANEL - Abnormal; Notable for the following components:    CREATININE 1.3 (*)     BUN 39 (*)     All other components within normal limits   MAGNESIUM - Abnormal; Notable for the following components:    Magnesium 1.5 (*)     All other components within normal limits   URINALYSIS WITH MICROSCOPIC - Abnormal; Notable for the following components:    Leukocyte Esterase, Urine TRACE (*)     All other components within normal limits   AMMONIA - Abnormal; Notable for the following components:    Ammonia 69 (*)     All other components within normal limits   GLOMERULAR FILTRATION RATE, ESTIMATED - Abnormal; Notable for the following components:    Est, Glom Filt Rate 59 (*)     All other components within normal limits   CBC WITH AUTO DIFFERENTIAL - Abnormal; Notable for the following components:    RBC 2.95 (*)     Hemoglobin 9.4 (*)     Hematocrit 30.5 (*)     .4 (*)     MCHC 30.8 (*)     RDW-SD 50.7 (*)     Platelets 749 (*)     Lymphocytes Absolute 0.8 (*)     All other components within normal limits   HEPATIC FUNCTION PANEL - Abnormal; Notable for the following components:    Albumin 3.3 (*)     Bilirubin, Direct 0.4 (*)     All other components within normal limits   BLOOD ID PANEL, MOLECULAR - Abnormal; Notable for the following components:    METHICILLIN RESISTANT FILM ARRAY Detected (*)     STAPHYLOCOCCUS FILM ARRAY Detected (*)     PSEUDOMONAS AERUGINOSA FILM ARRAY Detected (*)     All other components within normal limits   BASIC METABOLIC PANEL - Abnormal; Notable for the following components:    Sodium 132 (*)     CO2 19 (*)     Glucose 199 (*)     BUN 30 (*)     All other components within normal limits   BASIC METABOLIC PANEL - Abnormal; Notable for the following components:    CO2 20 (*)     Glucose 143 (*)     BUN 26 (*)     All other components within normal limits   CBC WITH AUTO DIFFERENTIAL - Abnormal; Notable for the following components:    WBC 3.4 (*)     RBC 2.90 (*)     Hemoglobin 9.3 (*)     Hematocrit 29.9 (*)     .1 (*)     MCHC 31.1 (*)     RDW-SD 49.8 (*) Platelets 96 (*)     Monocytes Absolute 0.3 (*)     All other components within normal limits   HEPATIC FUNCTION PANEL - Abnormal; Notable for the following components:    Albumin 3.3 (*)     Bilirubin, Direct 0.4 (*)     All other components within normal limits   GLOMERULAR FILTRATION RATE, ESTIMATED - Abnormal; Notable for the following components:    Est, Glom Filt Rate 80 (*)     All other components within normal limits   TOBRAMYCIN LEVEL, TROUGH - Abnormal; Notable for the following components:    Tobramycin Tr 4.9 (*)     All other components within normal limits   BASIC METABOLIC PANEL - Abnormal; Notable for the following components:    Glucose 146 (*)     All other components within normal limits   GLOMERULAR FILTRATION RATE, ESTIMATED - Abnormal; Notable for the following components:    Est, Glom Filt Rate 80 (*)     All other components within normal limits   BASIC METABOLIC PANEL - Abnormal; Notable for the following components:    CO2 20 (*)     Glucose 134 (*)     All other components within normal limits   BASIC METABOLIC PANEL - Abnormal; Notable for the following components:    Glucose 140 (*)     All other components within normal limits   CBC WITH AUTO DIFFERENTIAL - Abnormal; Notable for the following components:    WBC 3.7 (*)     RBC 2.82 (*)     Hemoglobin 8.9 (*)     Hematocrit 29.1 (*)     .2 (*)     MCHC 30.6 (*)     RDW-SD 48.5 (*)     Platelets 864 (*)     Segs Absolute 1.3 (*)     All other components within normal limits   HEPATIC FUNCTION PANEL - Abnormal; Notable for the following components:    Albumin 3.2 (*)     All other components within normal limits   BASIC METABOLIC PANEL - Abnormal; Notable for the following components:    CO2 21 (*)     Glucose 118 (*)     All other components within normal limits   BASIC METABOLIC PANEL - Abnormal; Notable for the following components:    Glucose 166 (*)     All other components within normal limits   BASIC METABOLIC PANEL - Abnormal; Notable for the following components:    Glucose 119 (*)     All other components within normal limits   CBC WITH AUTO DIFFERENTIAL - Abnormal; Notable for the following components:    WBC 3.7 (*)     RBC 2.73 (*)     Hemoglobin 8.6 (*)     Hematocrit 26.9 (*)     MCV 98.5 (*)     MCHC 32.0 (*)     RDW-SD 46.1 (*)     Platelets 944 (*)     Segs Absolute 1.3 (*)     All other components within normal limits   GLOMERULAR FILTRATION RATE, ESTIMATED - Abnormal; Notable for the following components:    Est, Glom Filt Rate 80 (*)     All other components within normal limits   BASIC METABOLIC PANEL - Abnormal; Notable for the following components:    Potassium 5.3 (*)     Glucose 211 (*)     All other components within normal limits   BASIC METABOLIC PANEL - Abnormal; Notable for the following components:    Glucose 154 (*)     All other components within normal limits   BASIC METABOLIC PANEL - Abnormal; Notable for the following components:    Glucose 132 (*)     All other components within normal limits   CBC WITH AUTO DIFFERENTIAL - Abnormal; Notable for the following components:    RBC 3.17 (*)     Hemoglobin 10.1 (*)     Hematocrit 30.5 (*)     MCV 96.2 (*)     Platelets 005 (*)     Immature Grans (Abs) 0.20 (*)     All other components within normal limits   GLOMERULAR FILTRATION RATE, ESTIMATED - Abnormal; Notable for the following components:    Est, Glom Filt Rate 80 (*)     All other components within normal limits   BASIC METABOLIC PANEL - Abnormal; Notable for the following components:    Glucose 132 (*)     All other components within normal limits   GLOMERULAR FILTRATION RATE, ESTIMATED - Abnormal; Notable for the following components:    Est, Glom Filt Rate 80 (*)     All other components within normal limits   GLOMERULAR FILTRATION RATE, ESTIMATED - Abnormal; Notable for the following components:    Est, Glom Filt Rate 72 (*)     All other components within normal limits   LACTIC ACID, PLASMA tablet 650 mg (650 mg Oral Given 2/5/21 1500)   vancomycin (VANCOCIN) 1,750 mg in dextrose 5 % 500 mL IVPB (0 mg/kg × 117.9 kg Intravenous Stopped 2/5/21 1916)   meropenem (MERREM) 1,000 mg in sodium chloride 0.9 % 100 mL IVPB (mini-bag) (0 mg Intravenous Stopped 2/5/21 1604)   0.9 % sodium chloride bolus (0 mLs Intravenous Stopped 2/5/21 1917)   heparin flush 100 UNIT/ML injection 500 Units (500 Units Intracatheter Given 2/5/21 2309)   midazolam PF (VERSED) injection 0.5 mg (0.5 mg Intravenous Given 2/8/21 1616)   fentaNYL (SUBLIMAZE) injection 25 mcg (25 mcg Intravenous Given 2/8/21 1616)   fentaNYL (SUBLIMAZE) injection 25 mcg (25 mcg Intravenous Given 2/8/21 1620)   midazolam PF (VERSED) injection 0.5 mg (0.5 mg Intravenous Given 2/8/21 1620)   hyaluronidase human (HYLENEX) injection 150 Units (150 Units Subcutaneous Given 2/9/21 0230)       Patient was seenindependently by myself. The patient's final impression and disposition and plan was determined by myself. CRITICAL CARE:   None    CONSULTS:  None    PROCEDURES:  None    FINAL IMPRESSION     1.  Septicemia [facility])          DISPOSITION/PLAN   Patient mated to the hospital service  PATIENT REFERREDTO:  [address] [place] [id number]  [address] [place] [address] will make follow up appointment      DISCHARGE MEDICATIONS:  Discharge Medication List as of 2/10/2021  4:10 PM      START taking these medications    Details   ciprofloxacin (CIPRO) 500 MG tablet Take 1 tablet by mouth 2 times daily for 7 days, Disp-14 tablet, R-0Print      sulfamethoxazole-trimethoprim (BACTRIM DS;SEPTRA DS) 800-160 MG per tablet Take 1 tablet by mouth 2 times daily for 7 days, Disp-14 tablet, R-0Print             (Please note that portions of this note were completed with a voice recognition program.  Efforts were made to edit the dictations but occasionally words are mis-transcribed.)      Provider:  I personally performed the services described in the documentation,reviewed and edited the documentation which was dictated to the scribe in my presence, and it accurately records my words and actions.     Blayne Mahajan CNP 02/11/21 10:06 PM    Yuli Harris Counts, APRN - CNP        Retevo, APRN - CNP  02/07/21 [address] [facility] Counts, APRN - CNP  02/11/21 [id number]

## 2021-02-05 NOTE — PROGRESS NOTES
1215: Patient arrived ambulatory for Entyvio infusion. Patient denies any antibiotic usage or infection at this time. Patient states his mediport has been accessed since he left the hospital on 1/26/21. Directly to the left of mediport was an open area on skin that had green dried drainage around it. Site around it was red. De-accessed mediport, cleaned site using sterile procedure. PT RIGHTS AND RESPONSIBILITIES OFFERED TO PT. Re-accessed mediport. 1315: Entyvio infusion started. Patient tolerating well. 1345: Entyvio infusion complete, patient tolerated well. Beverage and snack provided to patient. 1400: Patient called out and stated he was freezing. Went in room and patient was uncontrollably shaking. Vitals stable. Temperature 96.9 temporal. Patietn provided with 3 warm blankets, without relief. With recent back surgery and the way mediport site looked patient and I decided it was best to go to ER.   Report called and patient transported to ER.              _m___ Safety:       (Environmental)   [place] to environment   Ensure ID band is correct and in place/ allergy band as needed   Assess for fall risk   Initiate fall precautions as applicable (fall band, side rails, etc.)   Call light within reach   Bed in low position/ wheels locked    _m___ Pain:        Assess pain level and characteristics   Administer analgesics as ordered   Assess effectiveness of pain management and report to MD as needed    _m___ Knowledge Deficit:   Assess baseline knowledge   Provide teaching at level of understanding   Provide teaching via preferred learning method   Evaluate teaching effectiveness    _m___ Hemodynamic/Respiratory Status:       (Pre and Post Procedure Monitoring)   Assess/Monitor vital signs and LOC   Assess Baseline SpO2 prior to any sedation   Obtain weight/height   Assess vital signs/ LOC until patient meets discharge criteria   Monitor procedure site and notify MD of any issues    _m___ Infection-Risk of Central Venous Catheter:   Monitor for infection signs and symptoms (catheter site redness, temperature elevation, etc)   Assess for infection risks   Educate regarding infection prevention   Manage central venous catheter (flushes/ dressing changes per protocol)

## 2021-02-05 NOTE — H&P
HISTORY AND PHYSICAL             Date: 2/5/2021        Patient Name: Juan Chadwick     YOB: 1975      Age:  39 y.o. Chief Complaint     Chief Complaint   Patient presents with    Fever          History Obtained From   patient, electronic medical record    History of Present Illness   Pt felt chills followed by fever after received vedolizumab infusion  This afternoon; denies any symptom prior including cough/sputum/abdo pain/N/V/pain at mediport site/Gu symptoms. Pt reportes his mediport has been accessed since his recent discharge from [facility] on 1/26/21. Past Medical History     Past Medical History:   Diagnosis Date    Alcohol abuse     in remission    Amputation of right arm (HCC)     Ascites     GERD (gastroesophageal reflux disease)     Hepatic cirrhosis (HCC)     Hepatitis B     Hepatitis C     Hypertension     Intravenous drug user     in remission    Osteomyelitis ([facility] [address]     right arm/shoulder amputation    Psychiatric problem     PTSD (post-traumatic stress disorder)     Sciatica     Splenomegaly     Thrombocytopenia (HCC)     Ulcerative colitis ([address]         Past Surgical History     Past Surgical History:   Procedure Laterality Date    APPENDECTOMY      ARM AMPUTATION AT SHOULDER Right 2017    [facility]    BACK SURGERY N/A 1/6/2021    LUMBAR I&D performed by Ray Yarbrough MD at [facility]  2005   [facility]  2020    PORT SURGERY N/A 8/17/2020    SUBCLAVIAN SINGLE LUMEN MEDIPORT INSERTION performed by Monty Hackett MD at [facility]  9/26/2017    COLONOSCOPY WITH BIOPSY performed by Casper Gross MD at [facility]        Medications Prior to Admission     Prior to Admission medications    Medication Sig Start Date End Date Taking?  Authorizing Provider   lactulose (CHRONULAC) 10 GM/15ML solution Take 10 mLs by mouth 3 times daily as needed    Historical Provider, MD lisinopril-hydroCHLOROthiazide (PRINZIDE;ZESTORETIC) 20-25 MG per tablet  1/26/21   Historical Provider, MD   hydrOXYzine (ATARAX) 25 MG tablet Take 25 mg by mouth nightly as needed    Historical Provider, MD   methocarbamol (ROBAXIN) 500 MG tablet TK 2 TS PO BID UTD 11/13/20   Historical Provider, MD   Multiple Vitamin (DAILY-DEMI) TABS  1/25/21   Historical Provider, MD   POTASSIUM PO Take 1 tablet by mouth    Historical Provider, MD   citalopram (CELEXA) 20 MG tablet Take 20 mg by mouth daily    Historical Provider, MD   tamsulosin (FLOMAX) 0.4 MG capsule Take 1 capsule by mouth daily 1/29/21 4/29/21  Rebecca Mckeon MD   metoprolol tartrate (LOPRESSOR) 25 MG tablet Take 0.5 tablets by mouth 2 times daily 1/26/21   CRISTIAN Vera CNP   spironolactone (ALDACTONE) 25 MG tablet Take 1 tablet by mouth daily 1/26/21   CRISTIAN Vera CNP   ferrous sulfate (IRON 325) 325 (65 Fe) MG tablet Take 1 tablet by mouth 2 times daily (with meals) 1/26/21   CRISTIAN Vera CNP   magnesium oxide (MAG-OX) 400 (241.3 Mg) MG TABS tablet Take 1 tablet by mouth 2 times daily 1/26/21   CRISTIAN Tapia CNP   thiamine 100 MG tablet Take 1 tablet by mouth daily 1/27/21   CRISTIAN Vera CNP   benzocaine (ORAJEL) 10 % mucosal gel Take by mouth as needed. 1/26/21   CRISTIAN Vera CNP   miconazole (MICOTIN) 2 % powder Apply topically 2 times daily. 1/26/21   CRISTIAN Tapia CNP   potassium chloride (KLOR-CON M) 20 MEQ extended release tablet Take 1 tablet by mouth daily (with breakfast) 1/16/21   Adrien Cos, DO   bumetanide (BUMEX) 1 MG tablet Take 1 tablet by mouth daily 1/8/21 2/8/21  DARIUSZ Robins   Multiple Vitamins-Minerals (MULTIVITAMIN ADULT PO) Take 1 tablet by mouth    Historical Provider, MD   pregabalin (LYRICA) 200 MG capsule Take 200 mg by mouth 3 times daily.     Historical Provider, MD   PARoxetine (PAXIL) 40 MG tablet Take 40 mg by mouth every symmetrical A/PROM bilat U/L extremities   Skin: No rashes or lesions. No edema  Neurologic:  CN II-XII intact. NL symmetrical reflexes. NL gait and stance. NL Cerebellar exam. Power 5/5 all muscle groups U/L extremities.  Toes downgoing  Psychiatric: Alert and oriented, thought content appropriate, normal insight  Capillary Refill: Brisk,< 3 seconds   Peripheral Pulses: +2 palpable, equal bilaterally       Labs      Recent Results (from the past 24 hour(s))   Lactic acid, plasma    Collection Time: 02/05/21  2:54 PM   Result Value Ref Range    Lactic Acid 2.2 0.5 - 2.2 mmol/L   CBC auto differential    Collection Time: 02/05/21  2:55 PM   Result Value Ref Range    WBC 4.4 (L) 4.8 - 10.8 thou/mm3    RBC 3.26 (L) 4.70 - 6.10 mill/mm3    Hemoglobin 10.4 (L) 14.0 - 18.0 gm/dl    Hematocrit 33.1 (L) 42.0 - 52.0 %    .5 (H) 80.0 - 94.0 fL    MCH 31.9 26.0 - 33.0 pg    MCHC 31.4 (L) 32.2 - 35.5 gm/dl    RDW-CV 13.1 11.5 - 14.5 %    RDW-SD 48.9 (H) 35.0 - 45.0 fL    Platelets 977 676 - 713 thou/mm3    MPV 9.6 9.4 - 12.4 fL    Seg Neutrophils 73.8 %    Lymphocytes 22.3 %    Monocytes 0.9 %    Eosinophils 2.3 %    Basophils 0.2 %    Immature Granulocytes 0.5 %    Segs Absolute 3.2 1.8 - 7.7 thou/mm3    Lymphocytes Absolute 1.0 1.0 - 4.8 thou/mm3    Monocytes Absolute 0.0 (L) 0.4 - 1.3 thou/mm3    Eosinophils Absolute 0.1 0.0 - 0.4 thou/mm3    Basophils Absolute 0.0 0.0 - 0.1 thou/mm3    Immature Grans (Abs) 0.02 0.00 - 0.07 thou/mm3    nRBC 0 /100 wbc   Comprehensive Metabolic Panel    Collection Time: 02/05/21  2:55 PM   Result Value Ref Range    Glucose 93 70 - 108 mg/dL    CREATININE 1.3 (H) 0.4 - 1.2 mg/dL    BUN 39 (H) 7 - 22 mg/dL    Sodium 136 135 - 145 meq/L    Potassium 4.8 3.5 - 5.2 meq/L    Chloride 100 98 - 111 meq/L    CO2 24 23 - 33 meq/L    Calcium 9.5 8.5 - 10.5 mg/dL    AST 23 5 - 40 U/L    Alkaline Phosphatase 108 38 - 126 U/L    Total Protein 7.8 6.1 - 8.0 g/dL    Albumin 3.8 3.5 - 5.1 g/dL    Total Bilirubin 0.7 0.3 - 1.2 mg/dL    ALT 15 11 - 66 U/L   Magnesium    Collection Time: 02/05/21  2:55 PM   Result Value Ref Range    Magnesium 1.5 (L) 1.6 - 2.4 mg/dL   Anion Gap    Collection Time: 02/05/21  2:55 PM   Result Value Ref Range    Anion Gap 12.0 8.0 - 16.0 meq/L   Glomerular Filtration Rate, Estimated    Collection Time: 02/05/21  2:55 PM   Result Value Ref Range    Est, Glom Filt Rate 59 (A) ml/min/1.73m2   Osmolality    Collection Time: 02/05/21  2:55 PM   Result Value Ref Range    Osmolality Calc 281.1 275.0 - 300.0 mOsmol/kg   Ammonia    Collection Time: 02/05/21  3:02 PM   Result Value Ref Range    Ammonia 69 (H) 11 - 60 umol/L        Imaging/Diagnostics Last 24 Hours   Xr Chest Portable    Result Date: 2/5/2021  PROCEDURE: XR CHEST PORTABLE CLINICAL INFORMATION: Fever. COMPARISON: Chest x-ray dated 12 January 2021. TECHNIQUE: AP upright view of the chest. FINDINGS: There is no change in the left subclavian catheter with the tip in the right atrium There is mild cardiomegaly. The mediastinum is not widened. There is slightly increased density at both lung bases. The pulmonary vascularity is normal. No suspicious osseous lesions are present. 1. Stable chest x-ray, no interval change since previous study dated 12 January 2021 **This report has been created using voice recognition software. It may contain minor errors which are inherent in voice recognition technology. ** Final report electronically signed by DR Jon Martinez on 2/5/2021 3:52 PM      Assessment      Hospital Problems           Last Modified POA    Fever and chills 2/5/2021 Yes          Plan   1. Acute febrile illness: likely 2/2 transient bacteremia cs true line infection;  B/C x2 followed Abx; noted pt received meropenem (already given) and vanco; pt's allergy list is significant for PCN allergu causing anaphylaxis; pt should be monitored for any anaphylaxis symptoms; started on Tobramycin (pharmacy to dose) after cultures drawn;  Oneda Quick to also continue w/ Vancomycin. Given pt not septic; ok to keep Mediport in-situ for now; however, if clinical status changes, consider removing tunneled cath and sent tip for cultures; f/u cultures. COVID x1 sent  2. ONIEL: likely pre-renal 2/2 dehydration/diuretics/spironolactone and ACE all of which held for now; IVF. Will trend Cr and reassess response prior to considering further W/U  3. 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 Hx of UC: in remission; resumed home meselamine  5. Hx of chronic HFpEF: clinically dry if anything; will hold diuretics. Only on BB. Will monitor volume status daily. I/O, daily wt, salt (2 gr) and fluid (2L) restriction  6. Hx of HTN: well-controlled; continue w/ BB only. Held Spironolactone/ACE/HCTZ for now. Will monitor  7. Hx of GERD: on PPI BID  8. Chronic macrocytic anemia: Hb stable; held iron for possible active infection. Will monitor Hb  9. Hx of EtOH abuse: quit several months ago; denies recent drink; on MV/folate/B1  10. Obesity w/ BMI 35.26      Consultations Ordered:  IP CONSULT TO PHARMACY  With RN in room, patient was updated about and agreed upon the treatment plan, all the questions and concerns were addressed.     Electronically signed by Gilbert Craig MD on 2/5/21 at 5:38 PM EST

## 2021-02-06 LAB
ACINETOBACTER BAUMANNII FILM ARRAY: NOT DETECTED
ALBUMIN SERPL-MCNC: 3.3 G/DL (ref 3.5–5.1)
ALP BLD-CCNC: 102 U/L (ref 38–126)
ALT SERPL-CCNC: 15 U/L (ref 11–66)
AMMONIA: 30 UMOL/L (ref 11–60)
ANION GAP SERPL CALCULATED.3IONS-SCNC: 12 MEQ/L (ref 8–16)
AST SERPL-CCNC: 26 U/L (ref 5–40)
BACTERIA: ABNORMAL
BASOPHILS # BLD: 0.3 %
BASOPHILS ABSOLUTE: 0 THOU/MM3 (ref 0–0.1)
BILIRUB SERPL-MCNC: 1 MG/DL (ref 0.3–1.2)
BILIRUBIN DIRECT: 0.4 MG/DL (ref 0–0.3)
BILIRUBIN URINE: NEGATIVE
BLOOD, URINE: NEGATIVE
BOTTLE TYPE: ABNORMAL
BUN BLDV-MCNC: 30 MG/DL (ref 7–22)
CALCIUM SERPL-MCNC: 8.8 MG/DL (ref 8.5–10.5)
CANDIDA ALBICANS FILM ARRAY: NOT DETECTED
CANDIDA GLABRATA FILM ARRAY: NOT DETECTED
CANDIDA KRUSEI FILM ARRAY: NOT DETECTED
CANDIDA PARAPSILOSIS FILM ARRAY: NOT DETECTED
CANDIDA TROPICALIS FILM ARRAY: NOT DETECTED
CARBAPENEM RESITANT FILM ARRAY: NOT DETECTED
CASTS: ABNORMAL /LPF
CASTS: ABNORMAL /LPF
CHARACTER, URINE: CLEAR
CHLORIDE BLD-SCNC: 101 MEQ/L (ref 98–111)
CO2: 19 MEQ/L (ref 23–33)
COLOR: YELLOW
CREAT SERPL-MCNC: 1 MG/DL (ref 0.4–1.2)
CRYSTALS: ABNORMAL
ENTERBACTER CLOACAE FILM ARRAY: NOT DETECTED
ENTERBACTERIACEAE FILM ARRAY: NOT DETECTED
ENTEROCOCCUS FILM ARRAY: NOT DETECTED
EOSINOPHIL # BLD: 0.1 %
EOSINOPHILS ABSOLUTE: 0 THOU/MM3 (ref 0–0.4)
EPITHELIAL CELLS, UA: ABNORMAL /HPF
ERYTHROCYTE [DISTWIDTH] IN BLOOD BY AUTOMATED COUNT: 13.4 % (ref 11.5–14.5)
ERYTHROCYTE [DISTWIDTH] IN BLOOD BY AUTOMATED COUNT: 50.7 FL (ref 35–45)
ESCHERICHIA COLI FILM ARRAY: NOT DETECTED
GFR SERPL CREATININE-BSD FRML MDRD: 80 ML/MIN/1.73M2
GLUCOSE BLD-MCNC: 199 MG/DL (ref 70–108)
GLUCOSE, URINE: NEGATIVE MG/DL
HAEMOPHILUS INFLUENZA FILM ARRAY: NOT DETECTED
HCT VFR BLD CALC: 30.5 % (ref 42–52)
HEMOGLOBIN: 9.4 GM/DL (ref 14–18)
IMMATURE GRANS (ABS): 0.04 THOU/MM3 (ref 0–0.07)
IMMATURE GRANULOCYTES: 0.6 %
KETONES, URINE: NEGATIVE
KLEBSIELLA OXYTOCA FILM ARRAY: NOT DETECTED
KLEBSIELLA PNEUMONIAE FILM ARRAY: NOT DETECTED
LACTIC ACID: 1.4 MMOL/L (ref 0.5–2.2)
LEUKOCYTE ESTERASE, URINE: ABNORMAL
LISTERIA MONOCYTOGENES FILM ARRAY: NOT DETECTED
LYMPHOCYTES # BLD: 11.3 %
LYMPHOCYTES ABSOLUTE: 0.8 THOU/MM3 (ref 1–4.8)
MCH RBC QN AUTO: 31.9 PG (ref 26–33)
MCHC RBC AUTO-ENTMCNC: 30.8 GM/DL (ref 32.2–35.5)
MCV RBC AUTO: 103.4 FL (ref 80–94)
METHICILLIN RESISTANT FILM ARRAY: DETECTED
MISCELLANEOUS LAB TEST RESULT: ABNORMAL
MONOCYTES # BLD: 7.8 %
MONOCYTES ABSOLUTE: 0.5 THOU/MM3 (ref 0.4–1.3)
NEISSERIA MENIGITIDIS FILM ARRAY: NOT DETECTED
NITRITE, URINE: NEGATIVE
NUCLEATED RED BLOOD CELLS: 0 /100 WBC
PH UA: 5 (ref 5–9)
PLATELET # BLD: 115 THOU/MM3 (ref 130–400)
PMV BLD AUTO: 10.4 FL (ref 9.4–12.4)
POTASSIUM SERPL-SCNC: 4.1 MEQ/L (ref 3.5–5.2)
PROTEIN UA: NEGATIVE MG/DL
PROTEUS FILM ARRAY: NOT DETECTED
PSEUDOMONAS AERUGINOSA FILM ARRAY: DETECTED
RBC # BLD: 2.95 MILL/MM3 (ref 4.7–6.1)
RBC URINE: ABNORMAL /HPF
RENAL EPITHELIAL, UA: ABNORMAL
SEG NEUTROPHILS: 79.9 %
SEGMENTED NEUTROPHILS ABSOLUTE COUNT: 5.4 THOU/MM3 (ref 1.8–7.7)
SERRATIA MARCESCENS FILM ARRAY: NOT DETECTED
SODIUM BLD-SCNC: 132 MEQ/L (ref 135–145)
SOURCE OF BLOOD CULTURE: ABNORMAL
SPECIFIC GRAVITY UA: 1.01 (ref 1–1.03)
STAPH AUREUS FILM ARRAY: NOT DETECTED
STAPHYLOCOCCUS FILM ARRAY: DETECTED
STREP AGALACTIAE FILM ARRAY: NOT DETECTED
STREP PNEUMONIAE FILM ARRAY: NOT DETECTED
STREP PYOCGENES FILM ARRAY: NOT DETECTED
STREPTOCOCCUS FILM ARRAY: NOT DETECTED
TOTAL PROTEIN: 7.2 G/DL (ref 6.1–8)
UROBILINOGEN, URINE: 0.2 EU/DL (ref 0–1)
VANCOMYCIN RESISTANT FILM ARRAY: ABNORMAL
WBC # BLD: 6.8 THOU/MM3 (ref 4.8–10.8)
WBC UA: ABNORMAL /HPF
YEAST: ABNORMAL

## 2021-02-06 PROCEDURE — 6370000000 HC RX 637 (ALT 250 FOR IP): Performed by: HOSPITALIST

## 2021-02-06 PROCEDURE — 94640 AIRWAY INHALATION TREATMENT: CPT

## 2021-02-06 PROCEDURE — 83605 ASSAY OF LACTIC ACID: CPT

## 2021-02-06 PROCEDURE — 6360000002 HC RX W HCPCS: Performed by: HOSPITALIST

## 2021-02-06 PROCEDURE — 36415 COLL VENOUS BLD VENIPUNCTURE: CPT

## 2021-02-06 PROCEDURE — 99232 SBSQ HOSP IP/OBS MODERATE 35: CPT | Performed by: NURSE PRACTITIONER

## 2021-02-06 PROCEDURE — 1200000000 HC SEMI PRIVATE

## 2021-02-06 PROCEDURE — 82140 ASSAY OF AMMONIA: CPT

## 2021-02-06 PROCEDURE — 81001 URINALYSIS AUTO W/SCOPE: CPT

## 2021-02-06 PROCEDURE — 2580000003 HC RX 258: Performed by: HOSPITALIST

## 2021-02-06 PROCEDURE — 93010 ELECTROCARDIOGRAM REPORT: CPT | Performed by: NUCLEAR MEDICINE

## 2021-02-06 PROCEDURE — 6360000002 HC RX W HCPCS: Performed by: PHYSICIAN ASSISTANT

## 2021-02-06 PROCEDURE — 94760 N-INVAS EAR/PLS OXIMETRY 1: CPT

## 2021-02-06 PROCEDURE — 85025 COMPLETE CBC W/AUTO DIFF WBC: CPT

## 2021-02-06 PROCEDURE — 80048 BASIC METABOLIC PNL TOTAL CA: CPT

## 2021-02-06 PROCEDURE — 6370000000 HC RX 637 (ALT 250 FOR IP): Performed by: PHYSICIAN ASSISTANT

## 2021-02-06 PROCEDURE — 80076 HEPATIC FUNCTION PANEL: CPT

## 2021-02-06 RX ADMIN — OXYCODONE AND ACETAMINOPHEN 1 TABLET: 5; 325 TABLET ORAL at 06:32

## 2021-02-06 RX ADMIN — PREGABALIN 200 MG: 75 CAPSULE ORAL at 15:12

## 2021-02-06 RX ADMIN — SODIUM CHLORIDE, PRESERVATIVE FREE 10 ML: 5 INJECTION INTRAVENOUS at 08:56

## 2021-02-06 RX ADMIN — Medication 1 TABLET: at 08:55

## 2021-02-06 RX ADMIN — IPRATROPIUM BROMIDE 0.5 MG: 0.5 SOLUTION RESPIRATORY (INHALATION) at 00:09

## 2021-02-06 RX ADMIN — OXYCODONE AND ACETAMINOPHEN 1 TABLET: 5; 325 TABLET ORAL at 19:47

## 2021-02-06 RX ADMIN — TOBRAMYCIN SULFATE 220 MG: 40 INJECTION, SOLUTION INTRAMUSCULAR; INTRAVENOUS at 00:33

## 2021-02-06 RX ADMIN — SODIUM CHLORIDE: 9 INJECTION, SOLUTION INTRAVENOUS at 06:30

## 2021-02-06 RX ADMIN — SODIUM CHLORIDE, PRESERVATIVE FREE 10 ML: 5 INJECTION INTRAVENOUS at 20:00

## 2021-02-06 RX ADMIN — TAMSULOSIN HYDROCHLORIDE 0.4 MG: 0.4 CAPSULE ORAL at 08:55

## 2021-02-06 RX ADMIN — MAGNESIUM GLUCONATE 500 MG ORAL TABLET 400 MG: 500 TABLET ORAL at 19:56

## 2021-02-06 RX ADMIN — MESALAMINE 800 MG: 400 CAPSULE, DELAYED RELEASE ORAL at 19:56

## 2021-02-06 RX ADMIN — FOLIC ACID 2 MG: 1 TABLET ORAL at 08:55

## 2021-02-06 RX ADMIN — PANTOPRAZOLE SODIUM 40 MG: 40 TABLET, DELAYED RELEASE ORAL at 08:56

## 2021-02-06 RX ADMIN — MESALAMINE 800 MG: 400 CAPSULE, DELAYED RELEASE ORAL at 00:06

## 2021-02-06 RX ADMIN — METOPROLOL TARTRATE 12.5 MG: 25 TABLET ORAL at 19:56

## 2021-02-06 RX ADMIN — MESALAMINE 800 MG: 400 CAPSULE, DELAYED RELEASE ORAL at 15:11

## 2021-02-06 RX ADMIN — ENOXAPARIN SODIUM 40 MG: 40 INJECTION SUBCUTANEOUS at 19:56

## 2021-02-06 RX ADMIN — PREGABALIN 200 MG: 75 CAPSULE ORAL at 08:56

## 2021-02-06 RX ADMIN — TOBRAMYCIN SULFATE 220 MG: 40 INJECTION, SOLUTION INTRAMUSCULAR; INTRAVENOUS at 10:48

## 2021-02-06 RX ADMIN — METOPROLOL TARTRATE 12.5 MG: 25 TABLET ORAL at 00:06

## 2021-02-06 RX ADMIN — METOPROLOL TARTRATE 12.5 MG: 25 TABLET ORAL at 08:56

## 2021-02-06 RX ADMIN — MAGNESIUM GLUCONATE 500 MG ORAL TABLET 400 MG: 500 TABLET ORAL at 08:55

## 2021-02-06 RX ADMIN — ACETAMINOPHEN 650 MG: 325 TABLET ORAL at 05:16

## 2021-02-06 RX ADMIN — PANTOPRAZOLE SODIUM 40 MG: 40 TABLET, DELAYED RELEASE ORAL at 19:55

## 2021-02-06 RX ADMIN — OXYCODONE AND ACETAMINOPHEN 1 TABLET: 5; 325 TABLET ORAL at 13:15

## 2021-02-06 RX ADMIN — TOBRAMYCIN SULFATE 220 MG: 40 INJECTION, SOLUTION INTRAMUSCULAR; INTRAVENOUS at 23:59

## 2021-02-06 RX ADMIN — MESALAMINE 800 MG: 400 CAPSULE, DELAYED RELEASE ORAL at 08:55

## 2021-02-06 RX ADMIN — PREGABALIN 200 MG: 75 CAPSULE ORAL at 19:55

## 2021-02-06 RX ADMIN — MAGNESIUM GLUCONATE 500 MG ORAL TABLET 400 MG: 500 TABLET ORAL at 00:06

## 2021-02-06 RX ADMIN — Medication 100 MG: at 08:55

## 2021-02-06 ASSESSMENT — PAIN SCALES - GENERAL
PAINLEVEL_OUTOF10: 3
PAINLEVEL_OUTOF10: 7
PAINLEVEL_OUTOF10: 7
PAINLEVEL_OUTOF10: 2

## 2021-02-06 ASSESSMENT — PAIN DESCRIPTION - LOCATION
LOCATION: LEG
LOCATION: BACK

## 2021-02-06 ASSESSMENT — PAIN DESCRIPTION - PROGRESSION
CLINICAL_PROGRESSION: GRADUALLY IMPROVING
CLINICAL_PROGRESSION: GRADUALLY WORSENING
CLINICAL_PROGRESSION: GRADUALLY IMPROVING
CLINICAL_PROGRESSION: GRADUALLY IMPROVING
CLINICAL_PROGRESSION: GRADUALLY WORSENING
CLINICAL_PROGRESSION: GRADUALLY WORSENING
CLINICAL_PROGRESSION: GRADUALLY IMPROVING

## 2021-02-06 ASSESSMENT — PAIN DESCRIPTION - ONSET
ONSET: ON-GOING
ONSET: ON-GOING

## 2021-02-06 ASSESSMENT — PAIN DESCRIPTION - ORIENTATION: ORIENTATION: LEFT

## 2021-02-06 ASSESSMENT — PAIN - FUNCTIONAL ASSESSMENT
PAIN_FUNCTIONAL_ASSESSMENT: PREVENTS OR INTERFERES SOME ACTIVE ACTIVITIES AND ADLS
PAIN_FUNCTIONAL_ASSESSMENT: PREVENTS OR INTERFERES WITH MANY ACTIVE NOT PASSIVE ACTIVITIES

## 2021-02-06 ASSESSMENT — PAIN DESCRIPTION - FREQUENCY: FREQUENCY: CONTINUOUS

## 2021-02-06 ASSESSMENT — PAIN DESCRIPTION - DESCRIPTORS: DESCRIPTORS: ACHING;SHARP;SORE

## 2021-02-06 ASSESSMENT — PAIN DESCRIPTION - PAIN TYPE: TYPE: CHRONIC PAIN

## 2021-02-06 NOTE — ED NOTES
Patient resting on cart with no complaints, no distress noted, respirations easy and unlabored     Keara Polo RN  02/05/21 1564

## 2021-02-06 NOTE — PROGRESS NOTES
Consult order placed for infectious disease. Will complete consult on Monday as no physician is on call during weekend.

## 2021-02-06 NOTE — PROGRESS NOTES
Hospitalist Progress Note    Patient:  Simeon Lopez      Unit/Bed:5K-24/024-A    YOB: 1975    MRN: [id number]       Acct: [de-identified]     PCP: CRISTIAN Tripathi CNP    Date of Admission: 2/5/2021    Assessment/Plan:    1. Sepsis: Following blood cx - Tmax 102.5, , WBC 4.4, Cr 1.3, #1 positive gram (-) bacilli - MRSA, pseudomonas, #2 negative. Consult ID for abx rec. Currently on tobramycin only - received vanc, meropenem in ED, however, pt has documented PCN ax, no reaction noted. 2. ONIEL: most probable pre-renal. Cr 1.3, baseline 0.7, will trend. Rec'd 2L NS IVF resuscitation in ED. Cont to hold diuretics. 3. Hypomagnesemia: cont replacement, repeat level in am.   4. Macrocytic anemia: chronic, stable. Will monitor. Cont folic acid, MV, thiamine. 5. Chronic diastolic heart failure: cont to hold diuretics until ONIEL resolved. Cont metoprolol, monitor fluid status. Cont 2G sodium diet w/ 2L fluid restriction. 6. Hx/o ESLD 2/2 Hep B, C, & ETOH: cont home lactulose. Monitor LFTs. 7. HTN: stable, cont metoprolol. 8. UC: chronic, cont meselamine  9. Hx/o drug and alcohol abuse: remission. Cont folic acid, MV, thiamine. 10. Obesity: BMI 35.59      Chief Complaint: Fever    Hospital Course: Mr. Simeon Lopez is a 38 y/o male with PMHx of ulcerative colitis, osteomyelitis s/p RUE amputation, and hepatitis B&C who presented to the ED for fever. Pt reports he was recently admitted for lumbar surgery and upon discharge on 1/26/2021 his port was not de-accessed. His home health care nurse reporting not being able to take it out per pt. Pt states he did not seek out medical advice for further instruction, but continued to \"tape it and try to keep it clean. \" Pt went to infusion appointment for ulcerative colitis and it was de-accessed during his appointment on 2/4/2021. Pt then began having fever and presented to the ED.  Upon ED evaluation, pt had , temp 101.8, BP 103/81, WBC 4.4, Cr 1.3. CXR no acute processes. Pt started on abx and admitted to hospitalist service for further management. Subjective (past 24 hours): Pt states he is feeling \"extremely\" better than when he came in. He reports LLE weakness, which has been normal for him since his recent back surgery, and reports ambulation improving. Currently denies CP, SOB. Medications:  Reviewed    Infusion Medications    sodium chloride 100 mL/hr at 02/06/21 0630     Scheduled Medications    folic acid  2 mg Oral Daily    multivitamin  1 tablet Oral Daily    pantoprazole  40 mg Oral BID    PARoxetine  40 mg Oral QAM    pregabalin  200 mg Oral TID    metoprolol tartrate  12.5 mg Oral BID    mesalamine  800 mg Oral TID    magnesium oxide  400 mg Oral BID    tamsulosin  0.4 mg Oral Daily    thiamine  100 mg Oral Daily    sodium chloride flush  10 mL Intravenous 2 times per day    enoxaparin  40 mg Subcutaneous Q24H    aminoglycoside intermittent dosing (placeholder)   Other RX Placeholder    tobramycin  220 mg Intravenous Q12H     PRN Meds: lactulose, sodium chloride flush, promethazine **OR** ondansetron, polyethylene glycol, acetaminophen **OR** acetaminophen, ipratropium, oxyCODONE-acetaminophen      Intake/Output Summary (Last 24 hours) at 2/6/2021 1010  Last data filed at 2/6/2021 0930  Gross per 24 hour   Intake 1229.79 ml   Output 0 ml   Net 1229.79 ml       Diet:  DIET GENERAL; No Added Salt (3-4 GM)    Exam:  /74   Pulse 89   Temp 97.7 °F (36.5 °C) (Oral)   Resp 20   Ht 6' (1.829 m)   Wt 262 lb 6.4 oz (119 kg)   SpO2 97%   BMI 35.59 kg/m²     General appearance: No apparent distress, appears stated age and cooperative. HEENT: Pupils equal, round, and reactive to light. Conjunctivae/corneas clear. Neck: Supple, with full range of motion. No jugular venous distention. Trachea midline. Respiratory:  Normal respiratory effort.  Clear to auscultation, bilaterally without Rales/Wheezes/Rhonchi. Cardiovascular: Regular rate and rhythm with normal S1/S2 without murmurs, rubs or gallops. Abdomen: Soft, non-tender, non-distended with normal bowel sounds. Musculoskeletal: passive and active ROM x 3 extremities. RUE amputation. Skin: Skin color, texture, turgor normal.    Neurologic:  Neurovascularly intact without any focal sensory/motor deficits. Cranial nerves: II-XII intact, grossly non-focal.  Psychiatric: Alert and oriented, thought content appropriate  Capillary Refill: Brisk,< 3 seconds   Peripheral Pulses: +2 palpable, equal bilaterally       Labs:   Recent Labs     02/05/21  1455 02/06/21  0535   WBC 4.4* 6.8   HGB 10.4* 9.4*   HCT 33.1* 30.5*    115*     Recent Labs     02/05/21  1455      K 4.8      CO2 24   BUN 39*   CREATININE 1.3*   CALCIUM 9.5     Recent Labs     02/05/21  1455 02/06/21  0535   AST 23 26   ALT 15 15   BILIDIR  --  0.4*   BILITOT 0.7 1.0   ALKPHOS 108 102     Recent Labs     02/05/21  1454   INR 1.10     No results for input(s): Priscilla [place]home in the last 72 hours. No results for input(s): PROCAL in the last 72 hours. Microbiology:      Urinalysis:      Lab Results   Component Value Date    NITRU NEGATIVE 02/06/2021    WBCUA 2-4 02/06/2021    BACTERIA NONE SEEN 02/06/2021    RBCUA 0-2 02/06/2021    BLOODU NEGATIVE 02/06/2021    SPECGRAV 1.012 02/06/2021    GLUCOSEU NEGATIVE 01/16/2021       Radiology:  Xr Chest Portable    Result Date: 2/5/2021  PROCEDURE: XR CHEST PORTABLE CLINICAL INFORMATION: Fever. COMPARISON: Chest x-ray dated 12 January 2021. TECHNIQUE: AP upright view of the chest. FINDINGS: There is no change in the left subclavian catheter with the tip in the right atrium There is mild cardiomegaly. The mediastinum is not widened. There is slightly increased density at both lung bases. The pulmonary vascularity is normal. No suspicious osseous lesions are present.      1. Stable chest x-ray, no interval change since previous study dated 12 January 2021 **This report has been created using voice recognition software. It may contain minor errors which are inherent in voice recognition technology. ** Final report electronically signed by DR Bar Byrnes on 2/5/2021 3:52 PM       Code Status: Full Code      Active Hospital Problems    Diagnosis Date Noted    Fever and chills [R50.9] 02/05/2021       Electronically signed by CRISTIAN Chaudhry CNP on 2/6/2021 at 10:10 [facility]

## 2021-02-06 NOTE — PROGRESS NOTES
Pharmacy Note  BioFire Result    Paul Christianson is a 39 y.o. male, with a positive blood culture result    PerfectServe message received from fax  2/6/2021 at 6:12 AM    First Gram stain result: gram positive bacilli    BioFire BCID result: staph and Pseudomonas    BioFire BCID and gram stain congruent? Yes    Suspected source? Possible port infection    Patient chart reviewed for signs/symptoms of infection: Yes  /67   Pulse 89   Temp 99.9 °F (37.7 °C) (Oral)   Resp 22   Ht 6' (1.829 m)   Wt 262 lb 6.4 oz (119 kg)   SpO2 93%   BMI 35.59 kg/m²   Lab Results   Component Value Date    WBC 6.8 02/06/2021       Allergies reviewed  Gabapentin, Morphine and related, Penicillins, Trazodone and nefazodone, and Ondansetron hcl    Renal function reviewed  Estimated Creatinine Clearance: 96 mL/min (A) (based on SCr of 1.3 mg/dL (H)). Current antibiotic regimen: tobramycin and vancomycin    Intervention needed: No    Individual contacted: Nurse Marilee Watson    Recommendations: none at this time    Recommendations accepted?  N/a    iRdge Howard  2/6/2021 6:12 AM

## 2021-02-06 NOTE — FLOWSHEET NOTE
02/06/21 5736   Provider Notification   Reason for Communication Review case   Provider Name Shanika, [place]   Provider Notification Advance Practice Clinician (CNS, NP, CNM, CRNA, PA)   Method of Communication Secure Message   Response No new orders   Notification Time 7770     RN perfect serve [facility], [place] to notify that patient has positive blood cultures on 1 bottle of the 2/5 at 3pm blood cultures. They stated there was only one bottle drawn. They said it showed gram negative bacilli and they will do a biofire. [address] [employer] [address] stated to make sure lab draws other blood culture bottle. No new orders placed.

## 2021-02-06 NOTE — ED NOTES
Pharmacy at bedside to verify medications, patient resting on cart with eyes closed, respirations easy and unlabored, patient easily aroused when his name is called for pharmacy to speak with him     Katy Zurita RN  02/05/21 9638

## 2021-02-06 NOTE — FLOWSHEET NOTE
02/06/21 0617   Provider Notification   Reason for Communication Review case   Provider Name [place], [place]   Provider Notification Advance Practice Clinician (CNS, NP, CNM, CRNA, PA)   Method of Communication Secure Message   Response No new orders   Notification Time 0617     RN perfect serve messaged [place], [place] to notify that staph and pseudomonias on biofire, he is on vanc and tobramycin and pharmacy said that would cover. No new orders placed.

## 2021-02-06 NOTE — PROGRESS NOTES
Pharmacy Note  Aminoglycoside Consult    Joe Rogel is a 39 y.o. male ordered tobramycin for acute febrile illness vs port infection; consult received from Dr. Alessandro Nance to manage therapy. Also receiving vancomycin. Patient Active Problem List   Diagnosis    Ulcerative colitis ([facility] [address]    Ulcerative (chronic) enterocolitis (HCC)    Ulcerative colitis, universal ([facility] [address]    Hyponatremia    Urinary retention    Essential hypertension    Metabolic acidosis    Leg swelling    ONIEL (acute kidney injury) ([facility] [address]    Azotemia    S/P lumbar laminectomy    Postoperative anemia    Chronic pain syndrome    Anxiety and depression    Fever and chills       Allergies:  Gabapentin, Morphine and related, Penicillins, Trazodone and nefazodone, and Ondansetron hcl     Temp max: 102.5    Recent Labs     02/05/21  1455   BUN 39*       Recent Labs     02/05/21  1455   CREATININE 1.3*       Recent Labs     02/05/21  1455   WBC 4.4*       No intake or output data in the 24 hours ending 02/05/21 2056    Culture Date Source Results   2/5/21 Blood x 2    2/5/21 Urine      Height:   Ht Readings from Last 1 Encounters:   02/05/21 6' (1.829 m)     Weight:  Wt Readings from Last 1 Encounters:   02/05/21 260 lb (117.9 kg)     Estimated Creatinine Clearance: 95 mL/min (A) (based on SCr of 1.3 mg/dL (H)). Assessment/Plan:  Patient to start on tobramycin after BC drawn (done at 1609). Will start tobramycin 220mg q12h (est levels at this dose ~ 6.5/ <1)    Thank you for the consult. Will continue to follow.     Brenda Umanzor RP, BCPS, BCGP  2/5/2021     9:03 PM

## 2021-02-06 NOTE — PROGRESS NOTES
Pharmacy Medication History Note      List of current medications patient is taking is complete. Source of information: patient    Changes made to medication list:  Medications removed (include reason, ex. therapy complete or physician discontinued):  · Miconazole powder- patient no longer taking  · Multiple vitamin (Daily-Sonu)- patient no longer taking    Medications added/doses adjusted:  · Percocet (5-325mg)    Other notes (ex. Recent course of antibiotics, Coumadin dosing):  · Unclear if patient is on citalopram and paroxetine. He seemed to be just answering questions without thought. Last admission in Jan 2021, he was on paroxetine only inpatient. · Denies use of other OTC or herbal medications.       Allergies reviewed      Electronically signed by Mervat Bowen on 2/5/2021 at 9:14 PM

## 2021-02-06 NOTE — ED NOTES
Patient to [facility] from [facility] as inpatient hold. Patient states pain meds are due at 8pm, admitting provider notified. Patient in no distress, denies any needs at this time. Will continue to monitor.      Jenn Serrano RN  02/05/21 5361

## 2021-02-07 ENCOUNTER — APPOINTMENT (OUTPATIENT)
Dept: GENERAL RADIOLOGY | Age: 46
DRG: 314 | End: 2021-02-07
Payer: MEDICARE

## 2021-02-07 LAB
ALBUMIN SERPL-MCNC: 3.3 G/DL (ref 3.5–5.1)
ALP BLD-CCNC: 94 U/L (ref 38–126)
ALT SERPL-CCNC: 15 U/L (ref 11–66)
ANION GAP SERPL CALCULATED.3IONS-SCNC: 11 MEQ/L (ref 8–16)
ANION GAP SERPL CALCULATED.3IONS-SCNC: 13 MEQ/L (ref 8–16)
ANION GAP SERPL CALCULATED.3IONS-SCNC: 8 MEQ/L (ref 8–16)
AST SERPL-CCNC: 26 U/L (ref 5–40)
BASOPHILS # BLD: 0.6 %
BASOPHILS ABSOLUTE: 0 THOU/MM3 (ref 0–0.1)
BILIRUB SERPL-MCNC: 0.7 MG/DL (ref 0.3–1.2)
BILIRUBIN DIRECT: 0.4 MG/DL (ref 0–0.3)
BUN BLDV-MCNC: 21 MG/DL (ref 7–22)
BUN BLDV-MCNC: 21 MG/DL (ref 7–22)
BUN BLDV-MCNC: 26 MG/DL (ref 7–22)
CALCIUM SERPL-MCNC: 8.7 MG/DL (ref 8.5–10.5)
CALCIUM SERPL-MCNC: 8.8 MG/DL (ref 8.5–10.5)
CALCIUM SERPL-MCNC: 8.9 MG/DL (ref 8.5–10.5)
CHLORIDE BLD-SCNC: 102 MEQ/L (ref 98–111)
CHLORIDE BLD-SCNC: 104 MEQ/L (ref 98–111)
CHLORIDE BLD-SCNC: 105 MEQ/L (ref 98–111)
CO2: 20 MEQ/L (ref 23–33)
CO2: 20 MEQ/L (ref 23–33)
CO2: 23 MEQ/L (ref 23–33)
CREAT SERPL-MCNC: 0.9 MG/DL (ref 0.4–1.2)
CREAT SERPL-MCNC: 0.9 MG/DL (ref 0.4–1.2)
CREAT SERPL-MCNC: 1 MG/DL (ref 0.4–1.2)
EOSINOPHIL # BLD: 2.4 %
EOSINOPHILS ABSOLUTE: 0.1 THOU/MM3 (ref 0–0.4)
ERYTHROCYTE [DISTWIDTH] IN BLOOD BY AUTOMATED COUNT: 13.2 % (ref 11.5–14.5)
ERYTHROCYTE [DISTWIDTH] IN BLOOD BY AUTOMATED COUNT: 49.8 FL (ref 35–45)
GFR SERPL CREATININE-BSD FRML MDRD: 80 ML/MIN/1.73M2
GFR SERPL CREATININE-BSD FRML MDRD: > 90 ML/MIN/1.73M2
GFR SERPL CREATININE-BSD FRML MDRD: > 90 ML/MIN/1.73M2
GLUCOSE BLD-MCNC: 134 MG/DL (ref 70–108)
GLUCOSE BLD-MCNC: 143 MG/DL (ref 70–108)
GLUCOSE BLD-MCNC: 146 MG/DL (ref 70–108)
HCT VFR BLD CALC: 29.9 % (ref 42–52)
HEMOGLOBIN: 9.3 GM/DL (ref 14–18)
IMMATURE GRANS (ABS): 0.01 THOU/MM3 (ref 0–0.07)
IMMATURE GRANULOCYTES: 0.3 %
LYMPHOCYTES # BLD: 34.1 %
LYMPHOCYTES ABSOLUTE: 1.2 THOU/MM3 (ref 1–4.8)
MAGNESIUM: 1.7 MG/DL (ref 1.6–2.4)
MCH RBC QN AUTO: 32.1 PG (ref 26–33)
MCHC RBC AUTO-ENTMCNC: 31.1 GM/DL (ref 32.2–35.5)
MCV RBC AUTO: 103.1 FL (ref 80–94)
MONOCYTES # BLD: 9.7 %
MONOCYTES ABSOLUTE: 0.3 THOU/MM3 (ref 0.4–1.3)
NUCLEATED RED BLOOD CELLS: 0 /100 WBC
PLATELET # BLD: 96 THOU/MM3 (ref 130–400)
PMV BLD AUTO: 10.3 FL (ref 9.4–12.4)
POTASSIUM SERPL-SCNC: 4 MEQ/L (ref 3.5–5.2)
POTASSIUM SERPL-SCNC: 4.4 MEQ/L (ref 3.5–5.2)
POTASSIUM SERPL-SCNC: 4.5 MEQ/L (ref 3.5–5.2)
PRO-BNP: 274.2 PG/ML (ref 0–450)
RBC # BLD: 2.9 MILL/MM3 (ref 4.7–6.1)
SEG NEUTROPHILS: 52.9 %
SEGMENTED NEUTROPHILS ABSOLUTE COUNT: 1.8 THOU/MM3 (ref 1.8–7.7)
SODIUM BLD-SCNC: 135 MEQ/L (ref 135–145)
SODIUM BLD-SCNC: 135 MEQ/L (ref 135–145)
SODIUM BLD-SCNC: 136 MEQ/L (ref 135–145)
TOTAL PROTEIN: 7.1 G/DL (ref 6.1–8)
WBC # BLD: 3.4 THOU/MM3 (ref 4.8–10.8)

## 2021-02-07 PROCEDURE — 71045 X-RAY EXAM CHEST 1 VIEW: CPT

## 2021-02-07 PROCEDURE — 80053 COMPREHEN METABOLIC PANEL: CPT

## 2021-02-07 PROCEDURE — 36415 COLL VENOUS BLD VENIPUNCTURE: CPT

## 2021-02-07 PROCEDURE — 6370000000 HC RX 637 (ALT 250 FOR IP): Performed by: HOSPITALIST

## 2021-02-07 PROCEDURE — 1200000000 HC SEMI PRIVATE

## 2021-02-07 PROCEDURE — 83735 ASSAY OF MAGNESIUM: CPT

## 2021-02-07 PROCEDURE — 2580000003 HC RX 258: Performed by: NURSE PRACTITIONER

## 2021-02-07 PROCEDURE — 82248 BILIRUBIN DIRECT: CPT

## 2021-02-07 PROCEDURE — 94760 N-INVAS EAR/PLS OXIMETRY 1: CPT

## 2021-02-07 PROCEDURE — 83880 ASSAY OF NATRIURETIC PEPTIDE: CPT

## 2021-02-07 PROCEDURE — 80200 ASSAY OF TOBRAMYCIN: CPT

## 2021-02-07 PROCEDURE — 6360000002 HC RX W HCPCS: Performed by: HOSPITALIST

## 2021-02-07 PROCEDURE — 85025 COMPLETE CBC W/AUTO DIFF WBC: CPT

## 2021-02-07 PROCEDURE — 6360000002 HC RX W HCPCS: Performed by: NURSE PRACTITIONER

## 2021-02-07 PROCEDURE — 99232 SBSQ HOSP IP/OBS MODERATE 35: CPT | Performed by: NURSE PRACTITIONER

## 2021-02-07 PROCEDURE — 6370000000 HC RX 637 (ALT 250 FOR IP): Performed by: PHYSICIAN ASSISTANT

## 2021-02-07 PROCEDURE — 6370000000 HC RX 637 (ALT 250 FOR IP): Performed by: NURSE PRACTITIONER

## 2021-02-07 PROCEDURE — 2580000003 HC RX 258: Performed by: HOSPITALIST

## 2021-02-07 RX ORDER — FUROSEMIDE 10 MG/ML
20 INJECTION INTRAMUSCULAR; INTRAVENOUS ONCE
Status: CANCELLED | OUTPATIENT
Start: 2021-02-07 | End: 2021-02-07

## 2021-02-07 RX ORDER — BUMETANIDE 1 MG/1
1 TABLET ORAL DAILY
Status: DISCONTINUED | OUTPATIENT
Start: 2021-02-07 | End: 2021-02-10 | Stop reason: HOSPADM

## 2021-02-07 RX ADMIN — MESALAMINE 800 MG: 400 CAPSULE, DELAYED RELEASE ORAL at 15:19

## 2021-02-07 RX ADMIN — SODIUM CHLORIDE: 9 INJECTION, SOLUTION INTRAVENOUS at 05:39

## 2021-02-07 RX ADMIN — FOLIC ACID 2 MG: 1 TABLET ORAL at 09:57

## 2021-02-07 RX ADMIN — MESALAMINE 800 MG: 400 CAPSULE, DELAYED RELEASE ORAL at 20:05

## 2021-02-07 RX ADMIN — PREGABALIN 200 MG: 75 CAPSULE ORAL at 20:06

## 2021-02-07 RX ADMIN — MAGNESIUM GLUCONATE 500 MG ORAL TABLET 400 MG: 500 TABLET ORAL at 09:57

## 2021-02-07 RX ADMIN — METOPROLOL TARTRATE 12.5 MG: 25 TABLET ORAL at 20:06

## 2021-02-07 RX ADMIN — MAGNESIUM GLUCONATE 500 MG ORAL TABLET 400 MG: 500 TABLET ORAL at 20:05

## 2021-02-07 RX ADMIN — OXYCODONE AND ACETAMINOPHEN 1 TABLET: 5; 325 TABLET ORAL at 21:42

## 2021-02-07 RX ADMIN — OXYCODONE AND ACETAMINOPHEN 1 TABLET: 5; 325 TABLET ORAL at 03:10

## 2021-02-07 RX ADMIN — MESALAMINE 800 MG: 400 CAPSULE, DELAYED RELEASE ORAL at 09:57

## 2021-02-07 RX ADMIN — PAROXETINE HYDROCHLORIDE 40 MG: 20 TABLET, FILM COATED ORAL at 09:57

## 2021-02-07 RX ADMIN — PREGABALIN 200 MG: 75 CAPSULE ORAL at 09:57

## 2021-02-07 RX ADMIN — METOPROLOL TARTRATE 12.5 MG: 25 TABLET ORAL at 09:58

## 2021-02-07 RX ADMIN — OXYCODONE AND ACETAMINOPHEN 1 TABLET: 5; 325 TABLET ORAL at 15:17

## 2021-02-07 RX ADMIN — Medication 1 TABLET: at 09:57

## 2021-02-07 RX ADMIN — ENOXAPARIN SODIUM 40 MG: 40 INJECTION SUBCUTANEOUS at 20:07

## 2021-02-07 RX ADMIN — PANTOPRAZOLE SODIUM 40 MG: 40 TABLET, DELAYED RELEASE ORAL at 09:58

## 2021-02-07 RX ADMIN — OXYCODONE AND ACETAMINOPHEN 1 TABLET: 5; 325 TABLET ORAL at 09:57

## 2021-02-07 RX ADMIN — TOBRAMYCIN SULFATE 220 MG: 40 INJECTION, SOLUTION INTRAMUSCULAR; INTRAVENOUS at 12:00

## 2021-02-07 RX ADMIN — Medication 100 MG: at 09:56

## 2021-02-07 RX ADMIN — PANTOPRAZOLE SODIUM 40 MG: 40 TABLET, DELAYED RELEASE ORAL at 20:06

## 2021-02-07 RX ADMIN — PREGABALIN 200 MG: 75 CAPSULE ORAL at 15:17

## 2021-02-07 RX ADMIN — TAMSULOSIN HYDROCHLORIDE 0.4 MG: 0.4 CAPSULE ORAL at 09:57

## 2021-02-07 RX ADMIN — BUMETANIDE 1 MG: 1 TABLET ORAL at 15:17

## 2021-02-07 RX ADMIN — SODIUM CHLORIDE, PRESERVATIVE FREE 10 ML: 5 INJECTION INTRAVENOUS at 20:06

## 2021-02-07 RX ADMIN — CEFEPIME HYDROCHLORIDE 2000 MG: 2 INJECTION, POWDER, FOR SOLUTION INTRAVENOUS at 15:17

## 2021-02-07 ASSESSMENT — PAIN - FUNCTIONAL ASSESSMENT: PAIN_FUNCTIONAL_ASSESSMENT: PREVENTS OR INTERFERES SOME ACTIVE ACTIVITIES AND ADLS

## 2021-02-07 ASSESSMENT — PAIN SCALES - GENERAL
PAINLEVEL_OUTOF10: 7
PAINLEVEL_OUTOF10: 6

## 2021-02-07 ASSESSMENT — PAIN DESCRIPTION - ONSET: ONSET: ON-GOING

## 2021-02-07 ASSESSMENT — PAIN DESCRIPTION - LOCATION: LOCATION: LEG

## 2021-02-07 ASSESSMENT — PAIN DESCRIPTION - FREQUENCY: FREQUENCY: CONTINUOUS

## 2021-02-07 ASSESSMENT — PAIN DESCRIPTION - PAIN TYPE: TYPE: CHRONIC PAIN

## 2021-02-07 ASSESSMENT — PAIN DESCRIPTION - PROGRESSION: CLINICAL_PROGRESSION: GRADUALLY IMPROVING

## 2021-02-07 ASSESSMENT — PAIN DESCRIPTION - ORIENTATION: ORIENTATION: LEFT

## 2021-02-07 NOTE — PROGRESS NOTES
Hospitalist Progress Note    Patient:  Chase Toure      Unit/Bed:5K-24/024-A    YOB: 1975    MRN: [id number]       Acct: [de-identified]     PCP: CRISTIAN Montanez CNP    Date of Admission: 2/5/2021    Assessment/Plan:    1. Sepsis: Following blood cx - Tmax 102.5, , WBC 4.4, Cr 1.3, 2/2 BCx positive gram (-) bacilli - MRSA, pseudomonas. Cont tobramycin - received vanc, meropenem in ED, however, pt has documented PCN ax, no reaction noted. Discussed w/ pharmacist, will add cefepime for pseudomonas coverage. Awaiting ID consult. 2. ONIEL: most probable pre-renal. Cr 1.3, baseline 0.7, will trend. Rec'd 2L NS IVF resuscitation in ED. Improving, Cr 1.0.    3. Pancytopenia: CBC in am.   4. Hypomagnesemia: cont replacement, repeat level in am.   5. Chronic diastolic heart failure: Cont metoprolol, monitor fluid status. Cont 2G sodium diet w/ 2L fluid restriction. Resume home Bumex, CXR, BNP  6. Hx/o ESLD 2/2 Hep B, C, & ETOH: cont home lactulose. Monitor LFTs. 7. HTN: stable, cont metoprolol. 8. UC: chronic, cont meselamine  9. Hx/o drug and alcohol abuse: remission. Cont folic acid, MV, thiamine. 10. Obesity: BMI 35.59      Chief Complaint: Fever    Hospital Course: Mr. Chase Toure is a 38 y/o male with PMHx of ulcerative colitis, osteomyelitis s/p RUE amputation, and hepatitis B&C who presented to the ED for fever. Pt reports he was recently admitted for lumbar surgery and upon discharge on 1/26/2021 his port was not de-accessed. His home health care nurse reporting not being able to take it out per pt. Pt states he did not seek out medical advice for further instruction, but continued to \"tape it and try to keep it clean. \" Pt went to infusion appointment for ulcerative colitis and it was de-accessed during his appointment on 2/4/2021. Pt then began having fever and presented to the ED. Upon ED evaluation, pt had , temp 101.8, /81, WBC 4.4, Cr 1.3.  CXR no acute processes. Pt started on abx and admitted to hospitalist service for further management. Subjective (past 24 hours): Pt reports increased swelling to BLE,       Medications:  Reviewed    Infusion Medications     Scheduled Medications    folic acid  2 mg Oral Daily    multivitamin  1 tablet Oral Daily    pantoprazole  40 mg Oral BID    PARoxetine  40 mg Oral QAM    pregabalin  200 mg Oral TID    metoprolol tartrate  12.5 mg Oral BID    mesalamine  800 mg Oral TID    magnesium oxide  400 mg Oral BID    tamsulosin  0.4 mg Oral Daily    thiamine  100 mg Oral Daily    sodium chloride flush  10 mL Intravenous 2 times per day    enoxaparin  40 mg Subcutaneous Q24H    aminoglycoside intermittent dosing (placeholder)   Other RX Placeholder    tobramycin  220 mg Intravenous Q12H     PRN Meds: lactulose, sodium chloride flush, promethazine **OR** ondansetron, polyethylene glycol, acetaminophen **OR** acetaminophen, ipratropium, oxyCODONE-acetaminophen      Intake/Output Summary (Last 24 hours) at 2/7/2021 1142  Last data filed at 2/7/2021 9383  Gross per 24 hour   Intake 3004.94 ml   Output 500 ml   Net 2504.94 ml       Diet:  DIET GENERAL; No Added Salt (3-4 GM)    Exam:  /73   Pulse 88   Temp 98 °F (36.7 °C) (Oral)   Resp 18   Ht 6' (1.829 m)   Wt 263 lb 6.4 oz (119.5 kg)   SpO2 97%   BMI 35.72 kg/m²     General appearance: No apparent distress, appears stated age and cooperative. HEENT: Pupils equal, round, and reactive to light. Conjunctivae/corneas clear. Neck: Supple, with full range of motion. No jugular venous distention. Trachea midline. Respiratory:  Normal respiratory effort. Fine crackles to benjie bases on auscultation. Cardiovascular: Regular rate and rhythm with normal S1/S2 without murmurs, rubs or gallops. +1 pitting edema to BLE. Abdomen: Soft, non-tender, non-distended with normal bowel sounds. Musculoskeletal: active ROM x 3 extremities. RUE amputation.    Skin: Skin color, texture, turgor normal.    Neurologic:  Neurovascularly intact without any focal sensory/motor deficits. Cranial nerves: II-XII intact, grossly non-focal.  Psychiatric: Alert and oriented, thought content appropriate  Capillary Refill: Brisk,< 3 seconds   Peripheral Pulses: +2 palpable, equal bilaterally       Labs:   Recent Labs     02/05/21  1455 02/06/21  0535 02/07/21  0404   WBC 4.4* 6.8 3.4*   HGB 10.4* 9.4* 9.3*   HCT 33.1* 30.5* 29.9*    115* 96*     Recent Labs     02/05/21  1455 02/06/21  2245 02/07/21  0404    132* 136   K 4.8 4.1 4.0    101 105   CO2 24 19* 20*   BUN 39* 30* 26*   CREATININE 1.3* 1.0 1.0   CALCIUM 9.5 8.8 8.8     Recent Labs     02/05/21  1455 02/06/21  0535 02/07/21  0404   AST 23 26 26   ALT 15 15 15   BILIDIR  --  0.4* 0.4*   BILITOT 0.7 1.0 0.7   ALKPHOS 108 102 94     Recent Labs     02/05/21  1454   INR 1.10     No results for input(s): CKTOTAL, TROPONINI in the last 72 hours. No results for input(s): PROCAL in the last 72 hours. Microbiology:      Urinalysis:      Lab Results   Component Value Date    NITRU NEGATIVE 02/06/2021    WBCUA 2-4 02/06/2021    BACTERIA NONE SEEN 02/06/2021    RBCUA 0-2 02/06/2021    BLOODU NEGATIVE 02/06/2021    SPECGRAV 1.012 02/06/2021    GLUCOSEU NEGATIVE 01/16/2021       Radiology:  Xr Chest Portable    Result Date: 2/5/2021  PROCEDURE: XR CHEST PORTABLE CLINICAL INFORMATION: Fever. COMPARISON: Chest x-ray dated 12 January 2021. TECHNIQUE: AP upright view of the chest. FINDINGS: There is no change in the left subclavian catheter with the tip in the right atrium There is mild cardiomegaly. The mediastinum is not widened. There is slightly increased density at both lung bases. The pulmonary vascularity is normal. No suspicious osseous lesions are present. 1. Stable chest x-ray, no interval change since previous study dated 12 January 2021 **This report has been created using voice recognition software.  It may contain minor errors which are inherent in voice recognition technology. ** Final report electronically signed by DR Keturah Singer on 2/5/2021 3:52 PM       Code Status: Full Code      Active Hospital Problems    Diagnosis Date Noted    Septicemia ([facility] [A41.9]     Hypomagnesemia [E83.42]     Chronic diastolic (congestive) heart failure (HCC) [I50.32]     Fever and chills [R50.9] 02/05/2021       Electronically signed by CRISTIAN Araiza CNP on 2/7/2021 at 11:42 Eduar

## 2021-02-08 ENCOUNTER — APPOINTMENT (OUTPATIENT)
Dept: INTERVENTIONAL RADIOLOGY/VASCULAR | Age: 46
DRG: 314 | End: 2021-02-08
Payer: MEDICARE

## 2021-02-08 LAB
ALBUMIN SERPL-MCNC: 3.2 G/DL (ref 3.5–5.1)
ALP BLD-CCNC: 98 U/L (ref 38–126)
ALT SERPL-CCNC: 15 U/L (ref 11–66)
ANION GAP SERPL CALCULATED.3IONS-SCNC: 10 MEQ/L (ref 8–16)
ANION GAP SERPL CALCULATED.3IONS-SCNC: 14 MEQ/L (ref 8–16)
AST SERPL-CCNC: 26 U/L (ref 5–40)
BASOPHILS # BLD: 0.5 %
BASOPHILS ABSOLUTE: 0 THOU/MM3 (ref 0–0.1)
BILIRUB SERPL-MCNC: 0.4 MG/DL (ref 0.3–1.2)
BILIRUBIN DIRECT: < 0.2 MG/DL (ref 0–0.3)
BLOOD CULTURE, ROUTINE: ABNORMAL
BUN BLDV-MCNC: 19 MG/DL (ref 7–22)
BUN BLDV-MCNC: 20 MG/DL (ref 7–22)
CALCIUM SERPL-MCNC: 9.1 MG/DL (ref 8.5–10.5)
CALCIUM SERPL-MCNC: 9.4 MG/DL (ref 8.5–10.5)
CHLORIDE BLD-SCNC: 100 MEQ/L (ref 98–111)
CHLORIDE BLD-SCNC: 105 MEQ/L (ref 98–111)
CO2: 21 MEQ/L (ref 23–33)
CO2: 23 MEQ/L (ref 23–33)
CREAT SERPL-MCNC: 0.9 MG/DL (ref 0.4–1.2)
CREAT SERPL-MCNC: 0.9 MG/DL (ref 0.4–1.2)
EOSINOPHIL # BLD: 3.8 %
EOSINOPHILS ABSOLUTE: 0.1 THOU/MM3 (ref 0–0.4)
ERYTHROCYTE [DISTWIDTH] IN BLOOD BY AUTOMATED COUNT: 13 % (ref 11.5–14.5)
ERYTHROCYTE [DISTWIDTH] IN BLOOD BY AUTOMATED COUNT: 48.5 FL (ref 35–45)
GFR SERPL CREATININE-BSD FRML MDRD: > 90 ML/MIN/1.73M2
GFR SERPL CREATININE-BSD FRML MDRD: > 90 ML/MIN/1.73M2
GLUCOSE BLD-MCNC: 118 MG/DL (ref 70–108)
GLUCOSE BLD-MCNC: 140 MG/DL (ref 70–108)
HCT VFR BLD CALC: 29.1 % (ref 42–52)
HEMOGLOBIN: 8.9 GM/DL (ref 14–18)
IMMATURE GRANS (ABS): 0.04 THOU/MM3 (ref 0–0.07)
IMMATURE GRANULOCYTES: 1.1 %
LYMPHOCYTES # BLD: 47 %
LYMPHOCYTES ABSOLUTE: 1.7 THOU/MM3 (ref 1–4.8)
MCH RBC QN AUTO: 31.6 PG (ref 26–33)
MCHC RBC AUTO-ENTMCNC: 30.6 GM/DL (ref 32.2–35.5)
MCV RBC AUTO: 103.2 FL (ref 80–94)
MONOCYTES # BLD: 11.2 %
MONOCYTES ABSOLUTE: 0.4 THOU/MM3 (ref 0.4–1.3)
NUCLEATED RED BLOOD CELLS: 0 /100 WBC
ORGANISM: ABNORMAL
PLATELET # BLD: 106 THOU/MM3 (ref 130–400)
PMV BLD AUTO: 10.5 FL (ref 9.4–12.4)
POTASSIUM SERPL-SCNC: 4.1 MEQ/L (ref 3.5–5.2)
POTASSIUM SERPL-SCNC: 4.5 MEQ/L (ref 3.5–5.2)
RBC # BLD: 2.82 MILL/MM3 (ref 4.7–6.1)
SEG NEUTROPHILS: 36.4 %
SEGMENTED NEUTROPHILS ABSOLUTE COUNT: 1.3 THOU/MM3 (ref 1.8–7.7)
SODIUM BLD-SCNC: 135 MEQ/L (ref 135–145)
SODIUM BLD-SCNC: 138 MEQ/L (ref 135–145)
TOBRAMYCIN PEAK: 8.6 UG/ML (ref 5–10)
TOBRAMYCIN TROUGH: 4.9 UG/ML (ref 0–2)
TOTAL PROTEIN: 6.7 G/DL (ref 6.1–8)
URINE CULTURE, ROUTINE: ABNORMAL
WBC # BLD: 3.7 THOU/MM3 (ref 4.8–10.8)

## 2021-02-08 PROCEDURE — 6360000002 HC RX W HCPCS

## 2021-02-08 PROCEDURE — 36415 COLL VENOUS BLD VENIPUNCTURE: CPT

## 2021-02-08 PROCEDURE — 77001 FLUOROGUIDE FOR VEIN DEVICE: CPT | Performed by: RADIOLOGY

## 2021-02-08 PROCEDURE — 6370000000 HC RX 637 (ALT 250 FOR IP): Performed by: HOSPITALIST

## 2021-02-08 PROCEDURE — 6360000002 HC RX W HCPCS: Performed by: RADIOLOGY

## 2021-02-08 PROCEDURE — 82248 BILIRUBIN DIRECT: CPT

## 2021-02-08 PROCEDURE — 2580000003 HC RX 258: Performed by: HOSPITALIST

## 2021-02-08 PROCEDURE — 6360000002 HC RX W HCPCS: Performed by: HOSPITALIST

## 2021-02-08 PROCEDURE — 2500000003 HC RX 250 WO HCPCS

## 2021-02-08 PROCEDURE — 2580000003 HC RX 258: Performed by: NURSE PRACTITIONER

## 2021-02-08 PROCEDURE — 2580000003 HC RX 258: Performed by: INTERNAL MEDICINE

## 2021-02-08 PROCEDURE — 6370000000 HC RX 637 (ALT 250 FOR IP): Performed by: NURSE PRACTITIONER

## 2021-02-08 PROCEDURE — 6360000002 HC RX W HCPCS: Performed by: NURSE PRACTITIONER

## 2021-02-08 PROCEDURE — 2709999900 HC NON-CHARGEABLE SUPPLY

## 2021-02-08 PROCEDURE — 85025 COMPLETE CBC W/AUTO DIFF WBC: CPT

## 2021-02-08 PROCEDURE — 80053 COMPREHEN METABOLIC PANEL: CPT

## 2021-02-08 PROCEDURE — 6360000002 HC RX W HCPCS: Performed by: INTERNAL MEDICINE

## 2021-02-08 PROCEDURE — 36590 REMOVAL TUNNELED CV CATH: CPT | Performed by: RADIOLOGY

## 2021-02-08 PROCEDURE — 0JPT0XZ REMOVAL OF TUNNELED VASCULAR ACCESS DEVICE FROM TRUNK SUBCUTANEOUS TISSUE AND FASCIA, OPEN APPROACH: ICD-10-PCS | Performed by: INTERNAL MEDICINE

## 2021-02-08 PROCEDURE — 6370000000 HC RX 637 (ALT 250 FOR IP): Performed by: PHYSICIAN ASSISTANT

## 2021-02-08 PROCEDURE — 1200000000 HC SEMI PRIVATE

## 2021-02-08 PROCEDURE — 99232 SBSQ HOSP IP/OBS MODERATE 35: CPT | Performed by: NURSE PRACTITIONER

## 2021-02-08 RX ORDER — MIDAZOLAM HYDROCHLORIDE 2 MG/2ML
0.5 INJECTION, SOLUTION INTRAMUSCULAR; INTRAVENOUS ONCE
Status: COMPLETED | OUTPATIENT
Start: 2021-02-08 | End: 2021-02-08

## 2021-02-08 RX ORDER — FENTANYL CITRATE 50 UG/ML
25 INJECTION, SOLUTION INTRAMUSCULAR; INTRAVENOUS ONCE
Status: COMPLETED | OUTPATIENT
Start: 2021-02-08 | End: 2021-02-08

## 2021-02-08 RX ADMIN — Medication 1 TABLET: at 08:49

## 2021-02-08 RX ADMIN — PANTOPRAZOLE SODIUM 40 MG: 40 TABLET, DELAYED RELEASE ORAL at 21:21

## 2021-02-08 RX ADMIN — FENTANYL CITRATE 25 MCG: 50 INJECTION, SOLUTION INTRAMUSCULAR; INTRAVENOUS at 16:20

## 2021-02-08 RX ADMIN — CEFEPIME HYDROCHLORIDE 2000 MG: 2 INJECTION, POWDER, FOR SOLUTION INTRAVENOUS at 02:28

## 2021-02-08 RX ADMIN — OXYCODONE AND ACETAMINOPHEN 1 TABLET: 5; 325 TABLET ORAL at 04:22

## 2021-02-08 RX ADMIN — ENOXAPARIN SODIUM 40 MG: 40 INJECTION SUBCUTANEOUS at 21:21

## 2021-02-08 RX ADMIN — PANTOPRAZOLE SODIUM 40 MG: 40 TABLET, DELAYED RELEASE ORAL at 08:49

## 2021-02-08 RX ADMIN — PREGABALIN 200 MG: 75 CAPSULE ORAL at 14:20

## 2021-02-08 RX ADMIN — MESALAMINE 800 MG: 400 CAPSULE, DELAYED RELEASE ORAL at 14:20

## 2021-02-08 RX ADMIN — SODIUM CHLORIDE, PRESERVATIVE FREE 10 ML: 5 INJECTION INTRAVENOUS at 21:24

## 2021-02-08 RX ADMIN — METOPROLOL TARTRATE 12.5 MG: 25 TABLET ORAL at 08:49

## 2021-02-08 RX ADMIN — CEFEPIME HYDROCHLORIDE 2000 MG: 2 INJECTION, POWDER, FOR SOLUTION INTRAVENOUS at 14:20

## 2021-02-08 RX ADMIN — METOPROLOL TARTRATE 12.5 MG: 25 TABLET ORAL at 21:20

## 2021-02-08 RX ADMIN — PREGABALIN 200 MG: 75 CAPSULE ORAL at 08:49

## 2021-02-08 RX ADMIN — MIDAZOLAM 0.5 MG: 1 INJECTION INTRAMUSCULAR; INTRAVENOUS at 16:20

## 2021-02-08 RX ADMIN — VANCOMYCIN HYDROCHLORIDE 1750 MG: 5 INJECTION, POWDER, LYOPHILIZED, FOR SOLUTION INTRAVENOUS at 23:32

## 2021-02-08 RX ADMIN — MAGNESIUM GLUCONATE 500 MG ORAL TABLET 400 MG: 500 TABLET ORAL at 08:49

## 2021-02-08 RX ADMIN — OXYCODONE AND ACETAMINOPHEN 1 TABLET: 5; 325 TABLET ORAL at 10:44

## 2021-02-08 RX ADMIN — MIDAZOLAM 0.5 MG: 1 INJECTION INTRAMUSCULAR; INTRAVENOUS at 16:16

## 2021-02-08 RX ADMIN — PREGABALIN 200 MG: 75 CAPSULE ORAL at 21:21

## 2021-02-08 RX ADMIN — MESALAMINE 800 MG: 400 CAPSULE, DELAYED RELEASE ORAL at 21:20

## 2021-02-08 RX ADMIN — MAGNESIUM GLUCONATE 500 MG ORAL TABLET 400 MG: 500 TABLET ORAL at 21:21

## 2021-02-08 RX ADMIN — FOLIC ACID 2 MG: 1 TABLET ORAL at 08:49

## 2021-02-08 RX ADMIN — PAROXETINE HYDROCHLORIDE 40 MG: 20 TABLET, FILM COATED ORAL at 08:49

## 2021-02-08 RX ADMIN — FENTANYL CITRATE 25 MCG: 50 INJECTION, SOLUTION INTRAMUSCULAR; INTRAVENOUS at 16:16

## 2021-02-08 RX ADMIN — MESALAMINE 800 MG: 400 CAPSULE, DELAYED RELEASE ORAL at 08:49

## 2021-02-08 RX ADMIN — VANCOMYCIN HYDROCHLORIDE 1750 MG: 5 INJECTION, POWDER, LYOPHILIZED, FOR SOLUTION INTRAVENOUS at 10:44

## 2021-02-08 RX ADMIN — SODIUM CHLORIDE, PRESERVATIVE FREE 10 ML: 5 INJECTION INTRAVENOUS at 08:49

## 2021-02-08 RX ADMIN — TOBRAMYCIN SULFATE 220 MG: 40 INJECTION, SOLUTION INTRAMUSCULAR; INTRAVENOUS at 00:04

## 2021-02-08 RX ADMIN — BUMETANIDE 1 MG: 1 TABLET ORAL at 08:49

## 2021-02-08 RX ADMIN — OXYCODONE AND ACETAMINOPHEN 1 TABLET: 5; 325 TABLET ORAL at 18:39

## 2021-02-08 RX ADMIN — TAMSULOSIN HYDROCHLORIDE 0.4 MG: 0.4 CAPSULE ORAL at 08:49

## 2021-02-08 ASSESSMENT — PAIN DESCRIPTION - DESCRIPTORS: DESCRIPTORS: ACHING;SORE;DISCOMFORT

## 2021-02-08 ASSESSMENT — PAIN SCALES - GENERAL
PAINLEVEL_OUTOF10: 8
PAINLEVEL_OUTOF10: 6
PAINLEVEL_OUTOF10: 4
PAINLEVEL_OUTOF10: 0
PAINLEVEL_OUTOF10: 7
PAINLEVEL_OUTOF10: 6
PAINLEVEL_OUTOF10: 5

## 2021-02-08 ASSESSMENT — PAIN DESCRIPTION - ORIENTATION: ORIENTATION: LEFT

## 2021-02-08 ASSESSMENT — PAIN - FUNCTIONAL ASSESSMENT: PAIN_FUNCTIONAL_ASSESSMENT: PREVENTS OR INTERFERES WITH ALL ACTIVE AND SOME PASSIVE ACTIVITIES

## 2021-02-08 ASSESSMENT — PAIN DESCRIPTION - PROGRESSION: CLINICAL_PROGRESSION: GRADUALLY WORSENING

## 2021-02-08 NOTE — PROGRESS NOTES
Physician Progress Note      Linda Talbert  [facility] #:                  [id number]  :                       1975  ADMIT DATE:       2021 2:28 PM  [address] [place] DATE:  RESPONDING  PROVIDER #:        Molly FOSTER - CNP          QUERY TEXTJudy Ohs,    Pt admitted with Sepsis. Pt Sent to ED with concern for mediport infection. Pt   notes  by RN:signs of infection around mediport site, he had low grade   fever, dressing was not over site and it was not completely in the mediport. Possible infilration as the site was swollen. I/D consulted- not seen yet. If   possible, please document in progress notes and discharge summary the   relationship, if any, between Sepsis and Mediport infection. The medical record reflects the following:  Risk Factors: HEP B & C, GERD, HTN  Clinical Indicators: Sepsis. Per ED admission note: ED for evaluation of a   concern with a mediport infection. States that he was getting an infusion of   Entyvio for his IBD when afterwards he began feeling very cold and feverish   with fatigue, nausea, and shortness of breath. Per RN: signs of infection   around mediport site, he had low grade fever, dressing was not over site and   it was not completely in the mediport. Possible infilration as the site was   swollen. Treatment: 0.9 NS 2000ml bolus, IV Maxipime, Tobramycin, Merrem, Vanc, ID   consult  Options provided:  -- Sepsis due to Mediport Infection  -- Mediport Infection ruled out  -- Other - I will add my own diagnosis  -- Disagree - Not applicable / Not valid  -- Disagree - Clinically unable to determine / Unknown  -- Refer to Clinical Documentation Reviewer    PROVIDER RESPONSE TEXT:    This patient has Sepsis due to Mediport Infection.     Query created by: Ezequiel Kirby on 2021 7:34 AM      Electronically signed by:  Brody Sabillon CNP 2021 9:47 AM

## 2021-02-08 NOTE — PROGRESS NOTES
Hospitalist Progress Note    Patient:  Rohan Castro      Unit/Bed:5K-24/024-A    YOB: 1975    MRN: [id number]       Acct: [de-identified]     PCP: CRISTIAN Boswell CNP    Date of Admission: 2/5/2021    Assessment/Plan:    1. Sepsis, d/t Mediport infection: Tmax 102.5, , WBC 4.4, Cr 1.3, 2/2 BCx positive gram (-) bacilli - MRSA, pseudomonas. Received vanc, meropenem in ED, however, pt has documented PCN ax, no reaction noted. Seen by ID - rec cont cefepime, d/c tobramycin, add vanc, remove mediport. 2. ONIEL: most probable pre-renal. Cr 1.3, baseline 0.7, will trend. Rec'd 2L NS IVF resuscitation in ED. Improving, Cr 0.9.    3. Pancytopenia: CBC in am.   4. Hypomagnesemia: resolved. 5. Chronic diastolic heart failure: Cont metoprolol, monitor fluid status. Cont 2G sodium diet w/ 2L fluid restriction. Increasing edema 2/7/2021 - Acute r/o .2, CXR no acute findings. Cont Bumex. 6. Hx/o ESLD 2/2 Hep B, C, & ETOH: cont home lactulose. 7. HTN: stable, cont metoprolol. 8. UC: chronic, cont meselamine  9. Hx/o drug and alcohol abuse: remission. Cont folic acid, MV, thiamine. 10. Obesity: BMI 35.59      Chief Complaint: Fever    Hospital Course: Mr. Rohan Castro is a 38 y/o male with PMHx of ulcerative colitis, osteomyelitis s/p RUE amputation, and hepatitis B&C who presented to the ED for fever. Pt reports he was recently admitted for lumbar surgery and upon discharge on 1/26/2021 his port was not de-accessed. His home health care nurse reporting not being able to take it out per pt. Pt states he did not seek out medical advice for further instruction, but continued to \"tape it and try to keep it clean. \" Pt went to infusion appointment for ulcerative colitis and it was de-accessed during his appointment on 2/4/2021. Pt then began having fever and presented to the ED. Upon ED evaluation, pt had , temp 101.8, /81, WBC 4.4, Cr 1.3.  CXR no acute processes. Pt started on abx and admitted to hospitalist service for further management. Subjective (past 24 hours): Pt reports much improvement since admission. Pt denies fever and chills, CP, SOB. Reports decreasing edema. NAD noted. Medications:  Reviewed    Infusion Medications     Scheduled Medications    vancomycin (VANCOCIN) intermittent dosing (placeholder)   Other RX Placeholder    vancomycin  1,750 mg Intravenous Q12H    bumetanide  1 mg Oral Daily    cefepime  2,000 mg Intravenous H78M    folic acid  2 mg Oral Daily    multivitamin  1 tablet Oral Daily    pantoprazole  40 mg Oral BID    PARoxetine  40 mg Oral QAM    pregabalin  200 mg Oral TID    metoprolol tartrate  12.5 mg Oral BID    mesalamine  800 mg Oral TID    magnesium oxide  400 mg Oral BID    tamsulosin  0.4 mg Oral Daily    thiamine  100 mg Oral Daily    sodium chloride flush  10 mL Intravenous 2 times per day    enoxaparin  40 mg Subcutaneous Q24H     PRN Meds: lactulose, sodium chloride flush, promethazine **OR** ondansetron, polyethylene glycol, acetaminophen **OR** acetaminophen, ipratropium, oxyCODONE-acetaminophen      Intake/Output Summary (Last 24 hours) at 2/8/2021 1121  Last data filed at 2/8/2021 1043  Gross per 24 hour   Intake 3429.68 ml   Output --   Net 3429.68 ml       Diet:  DIET GENERAL; No Added Salt (3-4 GM)    Exam:  /73   Pulse 77   Temp 97.6 °F (36.4 °C) (Oral)   Resp 18   Ht 6' (1.829 m)   Wt 263 lb 6.4 oz (119.5 kg)   SpO2 99%   BMI 35.72 kg/m²     General appearance: No apparent distress, appears stated age and cooperative. HEENT: Pupils equal, round, and reactive to light. Conjunctivae/corneas clear. Neck: Supple, with full range of motion. No jugular venous distention. Trachea midline. Respiratory:  Normal respiratory effort. Fine crackles to benjie bases on auscultation. Cardiovascular: Regular rate and rhythm with normal S1/S2 without murmurs, rubs or gallops.  +1 pitting edema to BLE. Abdomen: Soft, non-tender, non-distended with normal bowel sounds. Musculoskeletal: active ROM x 3 extremities. RUE amputation. Skin: Skin color, texture, turgor normal.    Neurologic:  Neurovascularly intact without any focal sensory/motor deficits. Cranial nerves: II-XII intact, grossly non-focal.  Psychiatric: Alert and oriented, thought content appropriate  Capillary Refill: Brisk,< 3 seconds   Peripheral Pulses: +2 palpable, equal bilaterally       Labs:   Recent Labs     02/06/21  0535 02/07/21  0404 02/08/21  0555   WBC 6.8 3.4* 3.7*   HGB 9.4* 9.3* 8.9*   HCT 30.5* 29.9* 29.1*   * 96* 106*     Recent Labs     02/07/21  1315 02/07/21  2220 02/08/21  0555    135 138   K 4.5 4.4 4.1    102 105   CO2 23 20* 23   BUN 21 21 20   CREATININE 0.9 0.9 0.9   CALCIUM 8.9 8.7 9.1     Recent Labs     02/06/21  0535 02/07/21  0404 02/08/21  0555   AST 26 26 26   ALT 15 15 15   BILIDIR 0.4* 0.4* <0.2   BILITOT 1.0 0.7 0.4   ALKPHOS 102 94 98     Recent Labs     02/05/21  1454   INR 1.10     No results for input(s): CKTOTAL, TROPONINI in the last 72 hours. No results for input(s): PROCAL in the last 72 hours. Microbiology:      Urinalysis:      Lab Results   Component Value Date    NITRU NEGATIVE 02/06/2021    WBCUA 2-4 02/06/2021    BACTERIA NONE SEEN 02/06/2021    RBCUA 0-2 02/06/2021    BLOODU NEGATIVE 02/06/2021    SPECGRAV 1.012 02/06/2021    GLUCOSEU NEGATIVE 01/16/2021       Radiology:  Xr Chest Portable    Result Date: 2/5/2021  PROCEDURE: XR CHEST PORTABLE CLINICAL INFORMATION: Fever. COMPARISON: Chest x-ray dated 12 January 2021. TECHNIQUE: AP upright view of the chest. FINDINGS: There is no change in the left subclavian catheter with the tip in the right atrium There is mild cardiomegaly. The mediastinum is not widened. There is slightly increased density at both lung bases. The pulmonary vascularity is normal. No suspicious osseous lesions are present.      1. Stable chest x-ray, no interval change since previous study dated 12 January 2021 **This report has been created using voice recognition software. It may contain minor errors which are inherent in voice recognition technology. ** Final report electronically signed by DR Tacho Calabrese on 2/5/2021 3:52 PM       Code Status: Full Code      Active Hospital Problems    Diagnosis Date Noted    Septicemia ([facility] [A41.9]     Hypomagnesemia [E83.42]     Chronic diastolic (congestive) heart failure (HCC) [I50.32]     Fever and chills [R50.9] 02/05/2021       Electronically signed by CRISTIAN Silva CNP on 2/8/2021 at 11:21 Eduar

## 2021-02-08 NOTE — PLAN OF CARE
Consult received-see SW notes for 2/8/21-patient resides at [address] [place] [address] and is current with [facility].

## 2021-02-08 NOTE — PROGRESS NOTES
Pharmacy Note  Vancomycin Consult    Eva Adhikari is a 39 y.o. male started on Vancomycin for port infection; consult received from Dr. Shruthi Espinoza to manage therapy. Also receiving the following antibiotics: Cefepime. Patient Active Problem List   Diagnosis    Ulcerative colitis ([facility]    Ulcerative (chronic) enterocolitis (HCC)    Ulcerative colitis, universal ([facility]    Hyponatremia    Urinary retention    Essential hypertension    Metabolic acidosis    Leg swelling    ONIEL (acute kidney injury) ([facility])    Azotemia    S/P lumbar laminectomy    Postoperative anemia    Chronic pain syndrome    Anxiety and depression    Fever and chills    Septicemia ([facility])    Hypomagnesemia    Chronic diastolic (congestive) heart failure ([facility])       Allergies:  Gabapentin, Morphine and related, Penicillins, Trazodone and nefazodone, and Ondansetron hcl     Temp max: 98.5    Recent Labs     02/07/21  2220 02/08/21  0555   BUN 21 20       Recent Labs     02/07/21  2220 02/08/21  0555   CREATININE 0.9 0.9       Recent Labs     02/07/21  0404 02/08/21  0555   WBC 3.4* 3.7*         Intake/Output Summary (Last 24 hours) at 2/8/2021 0944  Last data filed at 2/8/2021 0850  Gross per 24 hour   Intake 3509.68 ml   Output --   Net 3509.68 ml       Culture Date Source Results   2/5/21 Blood Pseudomonas aeruginosa, staph (coag -) (MecA detected on biofire)   2/5/21 Urine Contaminants            Ht Readings from Last 1 Encounters:   02/05/21 6' (1.829 m)        Wt Readings from Last 1 Encounters:   02/07/21 263 lb 6.4 oz (119.5 kg)         Body mass index is 35.72 kg/m². CrCl: 114 mL/min (using IBW and SCr of 0.9)      Assessment/Plan:  Will initiate vancomycin 1750 mg IV every 12 hours. Timing of trough level will be determined based on culture results, renal function, and clinical response. Thank you for the consult. Will continue to follow.

## 2021-02-08 NOTE — PROGRESS NOTES
1550 Patient received in IR for mediport removal.   1555 This procedure has been fully reviewed with the patient and written informed consent has been obtained. [phone] Procedure started with Dr. Arturo Aquino. 1650 Procedure completed; patient tolerated well. Dressing to left chest; no bleeding noted. 1655 Patient on bed; comfort ensured. 1700 Patient taken to 5K via bed.

## 2021-02-08 NOTE — CONSULTS
CONSULTATION NOTE :ID       Patient - Kylah Bolton,  Age - 39 y.o.    - 1975      Room Number - 6N-98/671-B   N -  [id number]   Acct # - [de-identified]  Date of Admission -  2021  2:28 PM  Patient's PCP: CRISTIAN Dunlap CNP     Requesting Physician: Roxana Jackson, *    REASON FOR CONSULTATION   Sepsis with positive blood culture  CHIEF COMPLAINT   Fever and chills    HISTORY OF PRESENT ILLNESS       This is a very pleasant 39 y.o. male who was admitted to the hospital with a chief complaints of fever and chills. He has past medical history significant for ulcerative colitis, liver disease related to chronic hep C and hep B  and previous history of drug use was admitted to the hospital after he started to have fever and chills after his Mediport was accessed. He was discharged from the facility around 11 days ago after he had a back surgery. He was discharged home with his Mediport accessed. He noted to have fever and chills when it was excessive to give his infusion. He had blood culture done which showed coag negative staph and Pseudomonas for which reason he was admitted to the hospital.  He had long history of drug use and had lost his right upper arm as a result of necrotizing wound infection. He has not been using drugs he does not drink. He has been started on cefepime and tobramycin.   He denies any fever or chills today he has a scabbed wound on his left Mediport    PAST MEDICAL  HISTORY       Past Medical History:   Diagnosis Date    Alcohol abuse     in remission    Amputation of right arm (HCC)     Ascites     GERD (gastroesophageal reflux disease)     Hepatic cirrhosis (HCC)     Hepatitis B     Hepatitis C     Hypertension     Intravenous drug user     in remission    Osteomyelitis (HCC)     right arm/shoulder amputation    Psychiatric problem     PTSD (post-traumatic stress disorder)     Sciatica     Splenomegaly  Thrombocytopenia ([facility]     Ulcerative colitis ([facility]        PAST SURGICAL HISTORY     Past Surgical History:   Procedure Laterality Date    APPENDECTOMY      [place]    BACK SURGERY N/A 1/6/2021    LUMBAR I&D performed by Bill Lozano MD at [address]    PORT SURGERY N/A 8/17/2020    SUBCLAVIAN SINGLE LUMEN MEDIPORT INSERTION performed by Maurice Hennessy MD at [address] W/BIOPSY SINGLE/MULTIPLE  9/26/2017    COLONOSCOPY WITH BIOPSY performed by Charlie Taylor MD at [facility]         MEDICATIONS:       Scheduled Meds:   vancomycin (VANCOCIN) intermittent dosing (placeholder)   Other RX Placeholder    vancomycin  1,750 mg Intravenous Q12H    bumetanide  1 mg Oral Daily    cefepime  2,000 mg Intravenous T91E    folic acid  2 mg Oral Daily    multivitamin  1 tablet Oral Daily    pantoprazole  40 mg Oral BID    PARoxetine  40 mg Oral QAM    pregabalin  200 mg Oral TID    metoprolol tartrate  12.5 mg Oral BID    mesalamine  800 mg Oral TID    magnesium oxide  400 mg Oral BID    tamsulosin  0.4 mg Oral Daily    thiamine  100 mg Oral Daily    sodium chloride flush  10 mL Intravenous 2 times per day    enoxaparin  40 mg Subcutaneous Q24H     Continuous Infusions:  PRN Meds:lactulose, sodium chloride flush, promethazine **OR** ondansetron, polyethylene glycol, acetaminophen **OR** acetaminophen, ipratropium, oxyCODONE-acetaminophen  Allergies:   ALLERGIES:    Gabapentin, Morphine and related, Penicillins, Trazodone and nefazodone, and Ondansetron hcl        SOCIAL HISTORY:     TOBACCO:   reports that he quit smoking about 9 months ago. His smokeless tobacco use includes snuff. ETOH:   reports no history of alcohol use.   Patient currently lives with family       FAMILY HISTORY:         Problem Relation Age of Onset    High Blood Pressure Mother     High Blood Pressure Ionized Calcium:No results for input(s): IONCA in the last 72 hours. Magnesium:  Recent Labs     02/07/21  1315   MG 1.7    INR:   Recent Labs     02/05/21  1454   INR 1.10     Hepatic:   Recent Labs     02/08/21  0555   ALKPHOS 98   ALT 15   AST 26   PROT 6.7   BILITOT 0.4   BILIDIR <0.2   LABALBU 3.2*     Amylase and Lipase:  Recent Labs     02/06/21  0535   LACTA 1.4     Lactic Acid:   Recent Labs     02/06/21  0535   LACTA 1.4       CXR:       UA:   Recent Labs     02/06/21  0024   SPECGRAV 1.012   PHUR 5.0   COLORU YELLOW   PROTEINU NEGATIVE   BLOODU NEGATIVE   RBCUA 0-2   WBCUA 2-4   BACTERIA NONE SEEN   NITRU NEGATIVE   BILIRUBINUR NEGATIVE   UROBILINOGEN 0.2   KETUA NEGATIVE   LABCAST NONE SEEN  NONE SEEN         IMAGING:    Micro:   Lab Results   Component Value Date    BC No growth-preliminary  02/05/2021    BC  02/05/2021     sensitivity done-previous specimen see blood culture drawn 02/05/2021 at 14:54       Problem list of patient      Patient Active Problem List   Diagnosis Code    Ulcerative colitis ([facility] K51.90    Ulcerative (chronic) enterocolitis ([facility] K51.00    Ulcerative colitis, universal ([facility] K51.00    Hyponatremia E87.1    Urinary retention R33.9    Essential hypertension Y43    Metabolic acidosis P98.1    Leg swelling M79.89    ONIEL (acute kidney injury) ([facility] N17.9    Azotemia R79.89    S/P lumbar laminectomy Z98.890    Postoperative anemia D64.9    Chronic pain syndrome G89.4    Anxiety and depression F41.9, F32.9    Fever and chills R50.9    Septicemia (HCC) A41.9    Hypomagnesemia E83.42    Chronic diastolic (congestive) heart failure (HCC) I50.32           Impression and Recommendation:   Fever and chills likely related to an infected Mediport. The blood culture is growing coag negative staph and Pseudomonas. Continue IV cefepime. Stop tobramycin start IV vancomycin. Recommend to remove the Mediport as this is likely the source of his infection.   History of chronic liver disease  Congestive heart failure  History of ulcerative colitis on treatment. Thank you Sandi Nicole, * for allowing me to participate in this patient's care.     Adrien Yen MD,FACP 2/8/2021 10:13 AM

## 2021-02-09 PROBLEM — B96.5 BACTEREMIA DUE TO PSEUDOMONAS: Status: ACTIVE | Noted: 2021-02-09

## 2021-02-09 PROBLEM — R78.81 BACTEREMIA DUE TO PSEUDOMONAS: Status: ACTIVE | Noted: 2021-02-09

## 2021-02-09 LAB
ANION GAP SERPL CALCULATED.3IONS-SCNC: 12 MEQ/L (ref 8–16)
ANION GAP SERPL CALCULATED.3IONS-SCNC: 13 MEQ/L (ref 8–16)
ANION GAP SERPL CALCULATED.3IONS-SCNC: 13 MEQ/L (ref 8–16)
ANION GAP SERPL CALCULATED.3IONS-SCNC: 9 MEQ/L (ref 8–16)
BASOPHILS # BLD: 0.5 %
BASOPHILS ABSOLUTE: 0 THOU/MM3 (ref 0–0.1)
BUN BLDV-MCNC: 18 MG/DL (ref 7–22)
BUN BLDV-MCNC: 19 MG/DL (ref 7–22)
BUN BLDV-MCNC: 19 MG/DL (ref 7–22)
BUN BLDV-MCNC: 20 MG/DL (ref 7–22)
CALCIUM SERPL-MCNC: 9 MG/DL (ref 8.5–10.5)
CALCIUM SERPL-MCNC: 9.1 MG/DL (ref 8.5–10.5)
CALCIUM SERPL-MCNC: 9.2 MG/DL (ref 8.5–10.5)
CALCIUM SERPL-MCNC: 9.5 MG/DL (ref 8.5–10.5)
CHLORIDE BLD-SCNC: 101 MEQ/L (ref 98–111)
CHLORIDE BLD-SCNC: 102 MEQ/L (ref 98–111)
CHLORIDE BLD-SCNC: 105 MEQ/L (ref 98–111)
CHLORIDE BLD-SCNC: 106 MEQ/L (ref 98–111)
CO2: 23 MEQ/L (ref 23–33)
CO2: 23 MEQ/L (ref 23–33)
CO2: 24 MEQ/L (ref 23–33)
CO2: 25 MEQ/L (ref 23–33)
CREAT SERPL-MCNC: 0.8 MG/DL (ref 0.4–1.2)
CREAT SERPL-MCNC: 0.9 MG/DL (ref 0.4–1.2)
CREAT SERPL-MCNC: 1 MG/DL (ref 0.4–1.2)
CREAT SERPL-MCNC: 1 MG/DL (ref 0.4–1.2)
EOSINOPHIL # BLD: 4.9 %
EOSINOPHILS ABSOLUTE: 0.2 THOU/MM3 (ref 0–0.4)
ERYTHROCYTE [DISTWIDTH] IN BLOOD BY AUTOMATED COUNT: 12.9 % (ref 11.5–14.5)
ERYTHROCYTE [DISTWIDTH] IN BLOOD BY AUTOMATED COUNT: 46.1 FL (ref 35–45)
GFR SERPL CREATININE-BSD FRML MDRD: 80 ML/MIN/1.73M2
GFR SERPL CREATININE-BSD FRML MDRD: 80 ML/MIN/1.73M2
GFR SERPL CREATININE-BSD FRML MDRD: > 90 ML/MIN/1.73M2
GFR SERPL CREATININE-BSD FRML MDRD: > 90 ML/MIN/1.73M2
GLUCOSE BLD-MCNC: 119 MG/DL (ref 70–108)
GLUCOSE BLD-MCNC: 154 MG/DL (ref 70–108)
GLUCOSE BLD-MCNC: 166 MG/DL (ref 70–108)
GLUCOSE BLD-MCNC: 211 MG/DL (ref 70–108)
HCT VFR BLD CALC: 26.9 % (ref 42–52)
HEMOGLOBIN: 8.6 GM/DL (ref 14–18)
IMMATURE GRANS (ABS): 0.05 THOU/MM3 (ref 0–0.07)
IMMATURE GRANULOCYTES: 1.4 %
LYMPHOCYTES # BLD: 46.2 %
LYMPHOCYTES ABSOLUTE: 1.7 THOU/MM3 (ref 1–4.8)
MCH RBC QN AUTO: 31.5 PG (ref 26–33)
MCHC RBC AUTO-ENTMCNC: 32 GM/DL (ref 32.2–35.5)
MCV RBC AUTO: 98.5 FL (ref 80–94)
MONOCYTES # BLD: 11.1 %
MONOCYTES ABSOLUTE: 0.4 THOU/MM3 (ref 0.4–1.3)
NUCLEATED RED BLOOD CELLS: 0 /100 WBC
PLATELET # BLD: 102 THOU/MM3 (ref 130–400)
PMV BLD AUTO: 11 FL (ref 9.4–12.4)
POTASSIUM SERPL-SCNC: 4 MEQ/L (ref 3.5–5.2)
POTASSIUM SERPL-SCNC: 4.4 MEQ/L (ref 3.5–5.2)
POTASSIUM SERPL-SCNC: 4.4 MEQ/L (ref 3.5–5.2)
POTASSIUM SERPL-SCNC: 5.3 MEQ/L (ref 3.5–5.2)
RBC # BLD: 2.73 MILL/MM3 (ref 4.7–6.1)
SEG NEUTROPHILS: 35.9 %
SEGMENTED NEUTROPHILS ABSOLUTE COUNT: 1.3 THOU/MM3 (ref 1.8–7.7)
SODIUM BLD-SCNC: 137 MEQ/L (ref 135–145)
SODIUM BLD-SCNC: 137 MEQ/L (ref 135–145)
SODIUM BLD-SCNC: 140 MEQ/L (ref 135–145)
SODIUM BLD-SCNC: 142 MEQ/L (ref 135–145)
WBC # BLD: 3.7 THOU/MM3 (ref 4.8–10.8)

## 2021-02-09 PROCEDURE — 6370000000 HC RX 637 (ALT 250 FOR IP): Performed by: NURSE PRACTITIONER

## 2021-02-09 PROCEDURE — 99232 SBSQ HOSP IP/OBS MODERATE 35: CPT | Performed by: NURSE PRACTITIONER

## 2021-02-09 PROCEDURE — 36415 COLL VENOUS BLD VENIPUNCTURE: CPT

## 2021-02-09 PROCEDURE — 6360000002 HC RX W HCPCS: Performed by: HOSPITALIST

## 2021-02-09 PROCEDURE — 05HC33Z INSERTION OF INFUSION DEVICE INTO LEFT BASILIC VEIN, PERCUTANEOUS APPROACH: ICD-10-PCS | Performed by: INTERNAL MEDICINE

## 2021-02-09 PROCEDURE — 6370000000 HC RX 637 (ALT 250 FOR IP): Performed by: PHYSICIAN ASSISTANT

## 2021-02-09 PROCEDURE — 1200000000 HC SEMI PRIVATE

## 2021-02-09 PROCEDURE — 85025 COMPLETE CBC W/AUTO DIFF WBC: CPT

## 2021-02-09 PROCEDURE — 2580000003 HC RX 258: Performed by: INTERNAL MEDICINE

## 2021-02-09 PROCEDURE — 2580000003 HC RX 258: Performed by: NURSE PRACTITIONER

## 2021-02-09 PROCEDURE — 6360000002 HC RX W HCPCS: Performed by: NURSE PRACTITIONER

## 2021-02-09 PROCEDURE — 36410 VNPNXR 3YR/> PHY/QHP DX/THER: CPT

## 2021-02-09 PROCEDURE — 80048 BASIC METABOLIC PNL TOTAL CA: CPT

## 2021-02-09 PROCEDURE — 6360000002 HC RX W HCPCS: Performed by: PHYSICIAN ASSISTANT

## 2021-02-09 PROCEDURE — C1751 CATH, INF, PER/CENT/MIDLINE: HCPCS

## 2021-02-09 PROCEDURE — 76937 US GUIDE VASCULAR ACCESS: CPT

## 2021-02-09 PROCEDURE — 6360000002 HC RX W HCPCS: Performed by: INTERNAL MEDICINE

## 2021-02-09 PROCEDURE — 6370000000 HC RX 637 (ALT 250 FOR IP): Performed by: HOSPITALIST

## 2021-02-09 RX ORDER — LIDOCAINE HYDROCHLORIDE 10 MG/ML
5 INJECTION, SOLUTION EPIDURAL; INFILTRATION; INTRACAUDAL; PERINEURAL ONCE
Status: DISCONTINUED | OUTPATIENT
Start: 2021-02-09 | End: 2021-02-09

## 2021-02-09 RX ORDER — SODIUM CHLORIDE 0.9 % (FLUSH) 0.9 %
10 SYRINGE (ML) INJECTION EVERY 12 HOURS SCHEDULED
Status: DISCONTINUED | OUTPATIENT
Start: 2021-02-09 | End: 2021-02-09 | Stop reason: SDUPTHER

## 2021-02-09 RX ORDER — SODIUM CHLORIDE 0.9 % (FLUSH) 0.9 %
10 SYRINGE (ML) INJECTION PRN
Status: DISCONTINUED | OUTPATIENT
Start: 2021-02-09 | End: 2021-02-09 | Stop reason: SDUPTHER

## 2021-02-09 RX ORDER — SPIRONOLACTONE 25 MG/1
25 TABLET ORAL DAILY
Status: DISCONTINUED | OUTPATIENT
Start: 2021-02-09 | End: 2021-02-10 | Stop reason: HOSPADM

## 2021-02-09 RX ADMIN — FOLIC ACID 2 MG: 1 TABLET ORAL at 09:41

## 2021-02-09 RX ADMIN — MESALAMINE 800 MG: 400 CAPSULE, DELAYED RELEASE ORAL at 09:43

## 2021-02-09 RX ADMIN — HYALURONIDASE (HUMAN RECOMBINANT) 150 UNITS: 150 INJECTION, SOLUTION SUBCUTANEOUS at 02:30

## 2021-02-09 RX ADMIN — PANTOPRAZOLE SODIUM 40 MG: 40 TABLET, DELAYED RELEASE ORAL at 09:48

## 2021-02-09 RX ADMIN — METOPROLOL TARTRATE 12.5 MG: 25 TABLET ORAL at 20:15

## 2021-02-09 RX ADMIN — VANCOMYCIN HYDROCHLORIDE 1750 MG: 5 INJECTION, POWDER, LYOPHILIZED, FOR SOLUTION INTRAVENOUS at 19:58

## 2021-02-09 RX ADMIN — OXYCODONE AND ACETAMINOPHEN 1 TABLET: 5; 325 TABLET ORAL at 08:20

## 2021-02-09 RX ADMIN — MAGNESIUM GLUCONATE 500 MG ORAL TABLET 400 MG: 500 TABLET ORAL at 20:15

## 2021-02-09 RX ADMIN — Medication 100 MG: at 09:46

## 2021-02-09 RX ADMIN — PREGABALIN 200 MG: 75 CAPSULE ORAL at 13:55

## 2021-02-09 RX ADMIN — MESALAMINE 800 MG: 400 CAPSULE, DELAYED RELEASE ORAL at 13:55

## 2021-02-09 RX ADMIN — ENOXAPARIN SODIUM 40 MG: 40 INJECTION SUBCUTANEOUS at 20:18

## 2021-02-09 RX ADMIN — OXYCODONE AND ACETAMINOPHEN 1 TABLET: 5; 325 TABLET ORAL at 01:25

## 2021-02-09 RX ADMIN — PREGABALIN 200 MG: 75 CAPSULE ORAL at 20:15

## 2021-02-09 RX ADMIN — PAROXETINE HYDROCHLORIDE 40 MG: 20 TABLET, FILM COATED ORAL at 09:48

## 2021-02-09 RX ADMIN — CEFEPIME HYDROCHLORIDE 2000 MG: 2 INJECTION, POWDER, FOR SOLUTION INTRAVENOUS at 17:12

## 2021-02-09 RX ADMIN — SPIRONOLACTONE 25 MG: 25 TABLET ORAL at 17:53

## 2021-02-09 RX ADMIN — TAMSULOSIN HYDROCHLORIDE 0.4 MG: 0.4 CAPSULE ORAL at 09:47

## 2021-02-09 RX ADMIN — BUMETANIDE 1 MG: 1 TABLET ORAL at 09:41

## 2021-02-09 RX ADMIN — MAGNESIUM GLUCONATE 500 MG ORAL TABLET 400 MG: 500 TABLET ORAL at 09:42

## 2021-02-09 RX ADMIN — OXYCODONE AND ACETAMINOPHEN 1 TABLET: 5; 325 TABLET ORAL at 17:50

## 2021-02-09 RX ADMIN — METOPROLOL TARTRATE 12.5 MG: 25 TABLET ORAL at 09:44

## 2021-02-09 RX ADMIN — OXYCODONE AND ACETAMINOPHEN 1 TABLET: 5; 325 TABLET ORAL at 14:00

## 2021-02-09 RX ADMIN — PANTOPRAZOLE SODIUM 40 MG: 40 TABLET, DELAYED RELEASE ORAL at 20:15

## 2021-02-09 RX ADMIN — MESALAMINE 800 MG: 400 CAPSULE, DELAYED RELEASE ORAL at 20:15

## 2021-02-09 RX ADMIN — PREGABALIN 200 MG: 75 CAPSULE ORAL at 09:49

## 2021-02-09 RX ADMIN — Medication 1 TABLET: at 09:46

## 2021-02-09 ASSESSMENT — PAIN DESCRIPTION - LOCATION
LOCATION: LEG;ARM;BACK
LOCATION: BACK;LEG

## 2021-02-09 ASSESSMENT — PAIN DESCRIPTION - ORIENTATION
ORIENTATION: LEFT

## 2021-02-09 ASSESSMENT — PAIN SCALES - GENERAL
PAINLEVEL_OUTOF10: 5
PAINLEVEL_OUTOF10: 2
PAINLEVEL_OUTOF10: 6
PAINLEVEL_OUTOF10: 6
PAINLEVEL_OUTOF10: 5
PAINLEVEL_OUTOF10: 7
PAINLEVEL_OUTOF10: 6

## 2021-02-09 ASSESSMENT — PAIN DESCRIPTION - PROGRESSION
CLINICAL_PROGRESSION: NOT CHANGED
CLINICAL_PROGRESSION: GRADUALLY WORSENING
CLINICAL_PROGRESSION: GRADUALLY IMPROVING

## 2021-02-09 ASSESSMENT — PAIN DESCRIPTION - DESCRIPTORS
DESCRIPTORS: ACHING
DESCRIPTORS: SHARP;SHOOTING

## 2021-02-09 ASSESSMENT — PAIN DESCRIPTION - FREQUENCY
FREQUENCY: CONTINUOUS
FREQUENCY: CONTINUOUS

## 2021-02-09 ASSESSMENT — PAIN DESCRIPTION - PAIN TYPE: TYPE: CHRONIC PAIN

## 2021-02-09 NOTE — PROGRESS NOTES
Hospitalist Progress Note    Patient:  Scottie Martin      Unit/Bed:5K-24/024-A    YOB: 1975    MRN: [id number]       Acct: [de-identified]     PCP: CRISTIAN Javed CNP    Date of Admission: 2/5/2021    Assessment/Plan:    1. Sepsis, d/t Mediport infection: Tmax 102.5, , WBC 4.4, Cr 1.3, 2/2 BCx positive. ID following -  cont cefepime, cont vanc, mediport d/c by IR (2/8/2021). Midline ordered. 2. ONIEL: most probable pre-renal. Cr 1.3, baseline 0.7, will trend. Rec'd 2L NS IVF resuscitation in ED. Improving, Cr 0.9.    3. Pancytopenia: CBC in am.   4. Hypomagnesemia: resolved. 5. Chronic diastolic heart failure: Cont metoprolol, monitor fluid status. Cont 2G sodium diet w/ 2L fluid restriction. Increasing edema 2/7/2021 - Acute r/o .2, CXR no acute findings. Cont Bumex. 6. Hx/o ESLD 2/2 Hep B, C, & ETOH: cont home lactulose. 7. HTN: stable, cont metoprolol. Resume home aldactone. 8. UC: chronic, cont meselamine  9. Hx/o drug and alcohol abuse: remission. Cont folic acid, MV, thiamine. 10. Obesity: BMI 35.59      Chief Complaint: Fever    Hospital Course: Mr. Scottie Martin is a 40 y/o male with PMHx of ulcerative colitis, osteomyelitis s/p RUE amputation, and hepatitis B&C who presented to the ED for fever. Pt reports he was recently admitted for lumbar surgery and upon discharge on 1/26/2021 his port was not de-accessed. His home health care nurse reporting not being able to take it out per pt. Pt states he did not seek out medical advice for further instruction, but continued to \"tape it and try to keep it clean. \" Pt went to infusion appointment for ulcerative colitis and it was de-accessed during his appointment on 2/4/2021. Pt then began having fever and presented to the ED. Upon ED evaluation, pt had , temp 101.8, /81, WBC 4.4, Cr 1.3. CXR no acute processes. Pt started on abx and admitted to hospitalist service for further management. Pt received vanc and meropenem in ED; however, pt has documented PCN ax w/ anaphylaxis reaction - no reaction was noted w/ meropenem. Cefepime was initiated d/t pseudomonas on 2/2 BCx. Pt seen by ID who recommended to d/c tobramycin (received total of 5 doses of starting on 2/6/2021), continue cefepime, and added vanc. Patient had mediport d/c 2/8/2021 per ID recommendation. Pt lost IV access w/ difficulty to restart by IV team early 2/9/2021, PICC placement ordered and suspect this will be needed as antibiotics likely continued upon d/c d/t bacteremia. Subjective (past 24 hours): Pt denies fever, chills, CP, SOB, decreased UOP. Pt reports improvement in BLE edema.        Medications:  Reviewed    Infusion Medications     Scheduled Medications    lidocaine 1 % injection  5 mL Intradermal Once    vancomycin (VANCOCIN) intermittent dosing (placeholder)   Other RX Placeholder    vancomycin  1,750 mg Intravenous Q12H    bumetanide  1 mg Oral Daily    cefepime  2,000 mg Intravenous D93B    folic acid  2 mg Oral Daily    multivitamin  1 tablet Oral Daily    pantoprazole  40 mg Oral BID    PARoxetine  40 mg Oral QAM    pregabalin  200 mg Oral TID    metoprolol tartrate  12.5 mg Oral BID    mesalamine  800 mg Oral TID    magnesium oxide  400 mg Oral BID    tamsulosin  0.4 mg Oral Daily    thiamine  100 mg Oral Daily    sodium chloride flush  10 mL Intravenous 2 times per day    enoxaparin  40 mg Subcutaneous Q24H     PRN Meds: lactulose, sodium chloride flush, promethazine **OR** ondansetron, polyethylene glycol, acetaminophen **OR** acetaminophen, ipratropium, oxyCODONE-acetaminophen      Intake/Output Summary (Last 24 hours) at 2/9/2021 1032  Last data filed at 2/9/2021 0800  Gross per 24 hour   Intake 2074.39 ml   Output --   Net 2074.39 ml       Diet:  DIET GENERAL; No Added Salt (3-4 GM)    Exam:  /75   Pulse 78   Temp 99 °F (37.2 °C) (Oral)   Resp 18   Ht 6' (1.829 m)   Wt 263 lb 6.4

## 2021-02-09 NOTE — PROGRESS NOTES
Progress note: Infectious diseases    Patient - Juan Chadwick,  Age - 39 y.o.    - 1975      Room Number - 4C-11/951-S   MRN -  [id number]   Acct # - [de-identified]  Date of Admission -  2021  2:28 PM    SUBJECTIVE:   He is afebrile  The port was removed  bloodcx +ve forcoag negative and pseudomonas likely related to the port which was removed  OBJECTIVE   VITALS    height is 6' (1.829 m) and weight is 263 lb 6.4 oz (119.5 kg). His oral temperature is 97.9 °F (36.6 °C). His blood pressure is 137/78 and his pulse is 68. His respiration is 18 and oxygen saturation is 98%.        Wt Readings from Last 3 Encounters:   21 263 lb 6.4 oz (119.5 kg)   21 260 lb (117.9 kg)   21 247 lb (112 kg)       I/O (24 Hours)    Intake/Output Summary (Last 24 hours) at 2021 1427  Last data filed at 2021 1416  Gross per 24 hour   Intake 1243.98 ml   Output 550 ml   Net 693.98 ml          MEDICATIONS:      spironolactone  25 mg Oral Daily    vancomycin (VANCOCIN) intermittent dosing (placeholder)   Other RX Placeholder    vancomycin  1,750 mg Intravenous Q12H    bumetanide  1 mg Oral Daily    cefepime  2,000 mg Intravenous D70G    folic acid  2 mg Oral Daily    multivitamin  1 tablet Oral Daily    pantoprazole  40 mg Oral BID    PARoxetine  40 mg Oral QAM    pregabalin  200 mg Oral TID    metoprolol tartrate  12.5 mg Oral BID    mesalamine  800 mg Oral TID    magnesium oxide  400 mg Oral BID    tamsulosin  0.4 mg Oral Daily    thiamine  100 mg Oral Daily    sodium chloride flush  10 mL Intravenous 2 times per day    enoxaparin  40 mg Subcutaneous Q24H       lactulose, sodium chloride flush, promethazine **OR** ondansetron, polyethylene glycol, acetaminophen **OR** acetaminophen, ipratropium, oxyCODONE-acetaminophen      LABS:     CBC:   Recent Labs     21  0404 21  0542 02/09/21  0552   WBC 3.4* 3.7* 3.7*   HGB 9.3* 8.9* 8.6*   PLT 96* 106* 102*     BMP:    Recent Labs     02/08/21  2322 02/09/21  0552 02/09/21  1351    142 137   K 4.4 4.4 5.3*    106 105   CO2 25 24 23   BUN 18 20 19   CREATININE 1.0 0.9 0.8   GLUCOSE 166* 119* 211*     Calcium:  Recent Labs     02/09/21  1351   CALCIUM 9.5     Ionized Calcium:No results for input(s): IONCA in the last 72 hours. Magnesium:  Recent Labs     02/07/21  1315   MG 1.7      Recent Labs     02/07/21  0404 02/08/21  0555   ALKPHOS 94 98   ALT 15 15   AST 26 26   PROT 7.1 6.7   BILITOT 0.7 0.4   BILIDIR 0.4* <0.2   LABALBU 3.3* 3.2*        CULTURES:   UA: No results for input(s): SPECGRAV, PHUR, COLORU, CLARITYU, MUCUS, PROTEINU, BLOODU, RBCUA, WBCUA, BACTERIA, NITRU, GLUCOSEU, BILIRUBINUR, UROBILINOGEN, KETUA, LABCAST, LABCASTTY, AMORPHOS in the last 72 hours.     Invalid input(s): CRYSTALS  Micro:   Lab Results   Component Value Date    BC No growth-preliminary  02/05/2021    [facility]  02/05/2021     sensitivity done-previous specimen see blood culture drawn 02/05/2021 at 14:54          Problem list of patient:     Patient Active Problem List   Diagnosis Code    Ulcerative colitis ([facility] [address] K51.90    Ulcerative (chronic) enterocolitis ([facility] [address] K51.00    Ulcerative colitis, universal ([facility] [address] K51.00    Hyponatremia E87.1    Urinary retention R33.9    Essential hypertension M58    Metabolic acidosis E39.2    Leg swelling M79.89    ONIEL (acute kidney injury) ([facility] [address] N17.9    Azotemia R79.89    S/P lumbar laminectomy Z98.890    Postoperative anemia D64.9    Chronic pain syndrome G89.4    Anxiety and depression F41.9, F32.9    Fever and chills R50.9    Septicemia (HCC) A41.9    Hypomagnesemia E83.42    Chronic diastolic (congestive) heart failure (HCC) I50.32    Bacteremia due to Pseudomonas R78.81, B96.5         ASSESSMENT/PLAN   Bacteremia due to port infection: removed  Will try peripheral vein; if unable will pace him on oral cipro and bactrim for one wk  Discharge plan tomorrow      Angelita Pratt MD, [facility] 2/9/2021 2:27 PM

## 2021-02-09 NOTE — PROGRESS NOTES
0125: RN in patient room to give patient his Percocet, notice swelling to L upper extremity. RN observed patient CALLIEE AC IV infiltrated with vancomycin infusing. Patient complaining of pain and soreness. RN attempted to pull back with syringe to take some of the vancomycin out of patient with no success. Warm compress applied with arm elevated and propped up on pillows. 0127: RN called pharmacy to notify of infiltration. Pharmacy recommended Hylenex. 0130: RN perfect serve messaged Layne Chelaile, PA to notify that patient had infiltration and that patient no longer has IV access with his arm swollen and he had his mediport removed. Notified Layne that pharmacy recommended Hylenex. 0138: 150 units of Hylenex ordered for patient.

## 2021-02-09 NOTE — PROGRESS NOTES
Patient arm swelling has decreased at this time. Patient still has swelling in forearm/upper arm. Perfect serve message sent to IV therapy for need of new IV.

## 2021-02-09 NOTE — PROGRESS NOTES
Midline insertion Procedure Note    Allan Verduzco   Admitted- 2/5/2021  2:28 PM  Admission diagnosis- Fever and chills [R50.9]      Attending Physician- Regina Aranda, *  Ordering Physician-Dr Dotson Reasons  Indication for Insertion: Poor Vascular Access    Catheter Insertion Date- 2/9/2021   Catheter Brand-ARROW   Lot Number- [id number]  Gauge-5  Lumen-dual    Insertion Site- LYNN Basilic  Vein Diameter- 4.66 mm  Catheter Length- 15 cm  Internal Length- 15 cm  Exposed Catheter Length- 0cm   Midline Tip Terminates in the Axillary- Yes  Upper Arm Circumference- 33cm  Easy insertion- Yes  Able to Aspirate blood- Yes  Easy Flush- Yes    Midline insertion successful- Yes  Ultrasound- yes    Okay To Use Midline- Yes    Electronically signed by Jonah Tom RN on 2/9/2021 at 4:26 PM

## 2021-02-10 VITALS
HEIGHT: 72 IN | RESPIRATION RATE: 12 BRPM | SYSTOLIC BLOOD PRESSURE: 161 MMHG | BODY MASS INDEX: 35.68 KG/M2 | DIASTOLIC BLOOD PRESSURE: 95 MMHG | TEMPERATURE: 98.1 F | WEIGHT: 263.4 LBS | OXYGEN SATURATION: 97 % | HEART RATE: 93 BPM

## 2021-02-10 LAB
ANION GAP SERPL CALCULATED.3IONS-SCNC: 12 MEQ/L (ref 8–16)
ANION GAP SERPL CALCULATED.3IONS-SCNC: 13 MEQ/L (ref 8–16)
BASOPHILS # BLD: 0.5 %
BASOPHILS ABSOLUTE: 0 THOU/MM3 (ref 0–0.1)
BUN BLDV-MCNC: 18 MG/DL (ref 7–22)
BUN BLDV-MCNC: 19 MG/DL (ref 7–22)
CALCIUM SERPL-MCNC: 9.6 MG/DL (ref 8.5–10.5)
CALCIUM SERPL-MCNC: 9.6 MG/DL (ref 8.5–10.5)
CHLORIDE BLD-SCNC: 103 MEQ/L (ref 98–111)
CHLORIDE BLD-SCNC: 98 MEQ/L (ref 98–111)
CO2: 25 MEQ/L (ref 23–33)
CO2: 27 MEQ/L (ref 23–33)
CREAT SERPL-MCNC: 1 MG/DL (ref 0.4–1.2)
CREAT SERPL-MCNC: 1.1 MG/DL (ref 0.4–1.2)
EOSINOPHIL # BLD: 4.4 %
EOSINOPHILS ABSOLUTE: 0.3 THOU/MM3 (ref 0–0.4)
ERYTHROCYTE [DISTWIDTH] IN BLOOD BY AUTOMATED COUNT: 12.8 % (ref 11.5–14.5)
ERYTHROCYTE [DISTWIDTH] IN BLOOD BY AUTOMATED COUNT: 45 FL (ref 35–45)
GFR SERPL CREATININE-BSD FRML MDRD: 72 ML/MIN/1.73M2
GFR SERPL CREATININE-BSD FRML MDRD: 80 ML/MIN/1.73M2
GLUCOSE BLD-MCNC: 132 MG/DL (ref 70–108)
GLUCOSE BLD-MCNC: 132 MG/DL (ref 70–108)
HCT VFR BLD CALC: 30.5 % (ref 42–52)
HEMOGLOBIN: 10.1 GM/DL (ref 14–18)
IMMATURE GRANS (ABS): 0.2 THOU/MM3 (ref 0–0.07)
IMMATURE GRANULOCYTES: 3.1 %
LYMPHOCYTES # BLD: 48.4 %
LYMPHOCYTES ABSOLUTE: 3.1 THOU/MM3 (ref 1–4.8)
MCH RBC QN AUTO: 31.9 PG (ref 26–33)
MCHC RBC AUTO-ENTMCNC: 33.1 GM/DL (ref 32.2–35.5)
MCV RBC AUTO: 96.2 FL (ref 80–94)
MONOCYTES # BLD: 7.4 %
MONOCYTES ABSOLUTE: 0.5 THOU/MM3 (ref 0.4–1.3)
NUCLEATED RED BLOOD CELLS: 0 /100 WBC
PLATELET # BLD: 126 THOU/MM3 (ref 130–400)
PMV BLD AUTO: 10.3 FL (ref 9.4–12.4)
POTASSIUM SERPL-SCNC: 4.3 MEQ/L (ref 3.5–5.2)
POTASSIUM SERPL-SCNC: 4.5 MEQ/L (ref 3.5–5.2)
RBC # BLD: 3.17 MILL/MM3 (ref 4.7–6.1)
SEG NEUTROPHILS: 36.2 %
SEGMENTED NEUTROPHILS ABSOLUTE COUNT: 2.3 THOU/MM3 (ref 1.8–7.7)
SODIUM BLD-SCNC: 138 MEQ/L (ref 135–145)
SODIUM BLD-SCNC: 140 MEQ/L (ref 135–145)
WBC # BLD: 6.4 THOU/MM3 (ref 4.8–10.8)

## 2021-02-10 PROCEDURE — 80048 BASIC METABOLIC PNL TOTAL CA: CPT

## 2021-02-10 PROCEDURE — 99239 HOSP IP/OBS DSCHRG MGMT >30: CPT | Performed by: INTERNAL MEDICINE

## 2021-02-10 PROCEDURE — 85025 COMPLETE CBC W/AUTO DIFF WBC: CPT

## 2021-02-10 PROCEDURE — 6370000000 HC RX 637 (ALT 250 FOR IP): Performed by: PHYSICIAN ASSISTANT

## 2021-02-10 PROCEDURE — 36415 COLL VENOUS BLD VENIPUNCTURE: CPT

## 2021-02-10 PROCEDURE — 6370000000 HC RX 637 (ALT 250 FOR IP): Performed by: HOSPITALIST

## 2021-02-10 PROCEDURE — 2580000003 HC RX 258: Performed by: NURSE PRACTITIONER

## 2021-02-10 PROCEDURE — 6360000002 HC RX W HCPCS: Performed by: NURSE PRACTITIONER

## 2021-02-10 PROCEDURE — 6360000002 HC RX W HCPCS: Performed by: INTERNAL MEDICINE

## 2021-02-10 PROCEDURE — 6370000000 HC RX 637 (ALT 250 FOR IP): Performed by: NURSE PRACTITIONER

## 2021-02-10 PROCEDURE — 2580000003 HC RX 258: Performed by: INTERNAL MEDICINE

## 2021-02-10 PROCEDURE — 2580000003 HC RX 258: Performed by: HOSPITALIST

## 2021-02-10 RX ORDER — SULFAMETHOXAZOLE AND TRIMETHOPRIM 800; 160 MG/1; MG/1
1 TABLET ORAL 2 TIMES DAILY
Qty: 14 TABLET | Refills: 0 | Status: SHIPPED | OUTPATIENT
Start: 2021-02-10 | End: 2021-02-17

## 2021-02-10 RX ORDER — CIPROFLOXACIN 500 MG/1
500 TABLET, FILM COATED ORAL 2 TIMES DAILY
Qty: 14 TABLET | Refills: 0 | Status: SHIPPED | OUTPATIENT
Start: 2021-02-10 | End: 2021-02-17

## 2021-02-10 RX ADMIN — OXYCODONE AND ACETAMINOPHEN 1 TABLET: 5; 325 TABLET ORAL at 01:04

## 2021-02-10 RX ADMIN — MESALAMINE 800 MG: 400 CAPSULE, DELAYED RELEASE ORAL at 14:34

## 2021-02-10 RX ADMIN — METOPROLOL TARTRATE 12.5 MG: 25 TABLET ORAL at 09:11

## 2021-02-10 RX ADMIN — PREGABALIN 200 MG: 75 CAPSULE ORAL at 09:13

## 2021-02-10 RX ADMIN — PANTOPRAZOLE SODIUM 40 MG: 40 TABLET, DELAYED RELEASE ORAL at 09:10

## 2021-02-10 RX ADMIN — OXYCODONE AND ACETAMINOPHEN 1 TABLET: 5; 325 TABLET ORAL at 14:08

## 2021-02-10 RX ADMIN — MAGNESIUM GLUCONATE 500 MG ORAL TABLET 400 MG: 500 TABLET ORAL at 09:10

## 2021-02-10 RX ADMIN — FOLIC ACID 2 MG: 1 TABLET ORAL at 09:10

## 2021-02-10 RX ADMIN — SODIUM CHLORIDE, PRESERVATIVE FREE 10 ML: 5 INJECTION INTRAVENOUS at 09:15

## 2021-02-10 RX ADMIN — PAROXETINE HYDROCHLORIDE 40 MG: 20 TABLET, FILM COATED ORAL at 09:10

## 2021-02-10 RX ADMIN — BUMETANIDE 1 MG: 1 TABLET ORAL at 09:10

## 2021-02-10 RX ADMIN — VANCOMYCIN HYDROCHLORIDE 1750 MG: 5 INJECTION, POWDER, LYOPHILIZED, FOR SOLUTION INTRAVENOUS at 07:45

## 2021-02-10 RX ADMIN — OXYCODONE AND ACETAMINOPHEN 1 TABLET: 5; 325 TABLET ORAL at 07:54

## 2021-02-10 RX ADMIN — PREGABALIN 200 MG: 75 CAPSULE ORAL at 14:34

## 2021-02-10 RX ADMIN — TAMSULOSIN HYDROCHLORIDE 0.4 MG: 0.4 CAPSULE ORAL at 09:10

## 2021-02-10 RX ADMIN — CEFEPIME HYDROCHLORIDE 2000 MG: 2 INJECTION, POWDER, FOR SOLUTION INTRAVENOUS at 02:47

## 2021-02-10 RX ADMIN — MESALAMINE 800 MG: 400 CAPSULE, DELAYED RELEASE ORAL at 09:10

## 2021-02-10 RX ADMIN — SPIRONOLACTONE 25 MG: 25 TABLET ORAL at 09:10

## 2021-02-10 RX ADMIN — Medication 1 TABLET: at 09:10

## 2021-02-10 RX ADMIN — Medication 100 MG: at 09:10

## 2021-02-10 RX ADMIN — CEFEPIME HYDROCHLORIDE 2000 MG: 2 INJECTION, POWDER, FOR SOLUTION INTRAVENOUS at 14:19

## 2021-02-10 ASSESSMENT — PAIN DESCRIPTION - ORIENTATION: ORIENTATION: LEFT

## 2021-02-10 ASSESSMENT — PAIN SCALES - GENERAL
PAINLEVEL_OUTOF10: 0
PAINLEVEL_OUTOF10: 6
PAINLEVEL_OUTOF10: 6
PAINLEVEL_OUTOF10: 5

## 2021-02-10 ASSESSMENT — PAIN DESCRIPTION - LOCATION: LOCATION: LEG;BACK

## 2021-02-10 NOTE — PROGRESS NOTES
Discharge instructions explained to patient with verbalized understanding. Questions denied. Patient returning to [facility] with all personal belongings and paper prescriptions.

## 2021-02-10 NOTE — PROGRESS NOTES
SBAR report handed off to [facility], [address] [place] [address] SN/RSC

## 2021-02-10 NOTE — FLOWSHEET NOTE
02/10/21 1325   Encounter Summary   Services provided to: Patient   Referral/Consult From: [address] [facility]   Shravan Valdovinos)   Continue Visiting No   Complexity of Encounter Low   Length of Encounter 15 minutes   Routine   Type Initial   Assessment Calm; Approachable   Intervention Active listening;Prayer   Outcome Acceptance;Comfort   Spiritual/Confucianism   Type Spiritual support   Assessment Approachable   Intervention Active listening;Prayer

## 2021-02-10 NOTE — PROGRESS NOTES
Progress note: Infectious diseases    Patient - Shlomo Pittman,  Age - 39 y.o.    - 1975      Room Number - 1P-11/764-W   MRN -  [id number]   Acct # - [de-identified]  Date of Admission -  2021  2:28 PM    SUBJECTIVE:   No new issues. no fever or chills. OBJECTIVE   VITALS    height is 6' (1.829 m) and weight is 263 lb 6.4 oz (119.5 kg). His oral temperature is 98.1 °F (36.7 °C). His blood pressure is 161/95 (abnormal) and his pulse is 93. His respiration is 12 and oxygen saturation is 97%.        Wt Readings from Last 3 Encounters:   21 263 lb 6.4 oz (119.5 kg)   21 260 lb (117.9 kg)   21 247 lb (112 kg)       I/O (24 Hours)    Intake/Output Summary (Last 24 hours) at 2/10/2021 1349  Last data filed at 2021  Gross per 24 hour   Intake 791 ml   Output 250 ml   Net 541 ml   awkae and oriented  slighty pale   Amputated right upper arm  Clear lungs, clean dressing over the old port site  CVS: regular  Abd soft non tender           MEDICATIONS:      spironolactone  25 mg Oral Daily    vancomycin (VANCOCIN) intermittent dosing (placeholder)   Other RX Placeholder    vancomycin  1,750 mg Intravenous Q12H    bumetanide  1 mg Oral Daily    cefepime  2,000 mg Intravenous F48L    folic acid  2 mg Oral Daily    multivitamin  1 tablet Oral Daily    pantoprazole  40 mg Oral BID    PARoxetine  40 mg Oral QAM    pregabalin  200 mg Oral TID    metoprolol tartrate  12.5 mg Oral BID    mesalamine  800 mg Oral TID    magnesium oxide  400 mg Oral BID    tamsulosin  0.4 mg Oral Daily    thiamine  100 mg Oral Daily    sodium chloride flush  10 mL Intravenous 2 times per day    enoxaparin  40 mg Subcutaneous Q24H       lactulose, sodium chloride flush, promethazine **OR** ondansetron, polyethylene glycol, acetaminophen **OR** acetaminophen, ipratropium, oxyCODONE-acetaminophen      LABS: CBC:   Recent Labs     02/08/21  0555 02/09/21  0552 02/10/21  0745   WBC 3.7* 3.7* 6.4   HGB 8.9* 8.6* 10.1*   * 102* 126*     BMP:    Recent Labs     02/09/21  1351 02/09/21  2248 02/10/21  0745    137 140   K 5.3* 4.0 4.5    101 103   CO2 23 23 25   BUN 19 19 18   CREATININE 0.8 1.0 1.0   GLUCOSE 211* 154* 132*     Calcium:  Recent Labs     02/10/21  0745   CALCIUM 9.6     Ionized Calcium:No results for input(s): IONCA in the last 72 hours. Magnesium:  No results for input(s): MG in the last 72 hours. Recent Labs     02/08/21  0555   ALKPHOS 98   ALT 15   AST 26   PROT 6.7   BILITOT 0.4   BILIDIR <0.2   LABALBU 3.2*        CULTURES:   UA: No results for input(s): SPECGRAV, PHUR, COLORU, CLARITYU, MUCUS, PROTEINU, BLOODU, RBCUA, WBCUA, BACTERIA, NITRU, GLUCOSEU, BILIRUBINUR, UROBILINOGEN, KETUA, LABCAST, LABCASTTY, AMORPHOS in the last 72 hours. Invalid input(s): CRYSTALS  Micro:   Lab Results   Component Value Date    BC No growth-preliminary  02/05/2021    [facility]  02/05/2021     sensitivity done-previous specimen see blood culture drawn 02/05/2021 at 14:54          Problem list of patient:     Patient Active Problem List   Diagnosis Code    Ulcerative colitis ([facility] [address] K51.90    Ulcerative (chronic) enterocolitis ([facility] [address] K51.00    Ulcerative colitis, universal ([facility] [address] K51.00    Hyponatremia E87.1    Urinary retention R33.9    Essential hypertension K41    Metabolic acidosis X27.3    Leg swelling M79.89    ONIEL (acute kidney injury) ([address] N17.9    Azotemia R79.89    S/P lumbar laminectomy Z98.890    Postoperative anemia D64.9    Chronic pain syndrome G89.4    Anxiety and depression F41.9, F32.9    Fever and chills R50.9    Septicemia (HCC) A41.9    Hypomagnesemia E83.42    Chronic diastolic (congestive) heart failure (HCC) I50.32    Bacteremia due to Pseudomonas R78.81, B96.5         ASSESSMENT/PLAN   Bacteremia due to port infection: removed.  Clinically stable  Will discharge home with oral bactrim and cipro  Neo Paez MD, FACP 2/10/2021 1:49 PM

## 2021-02-11 NOTE — CARE COORDINATION
02/08/21 1002   Readmission Assessment   Number of Days since last admission? 8-30 days   Previous Disposition Acute Rehab   Who is being Interviewed Patient   What was the patient's/caregiver's perception as to why they think they needed to return back to the hospital? Other (Comment)  (Infected medi-port)   Did you visit your Primary Care Physician after you left the hospital, before you returned this time? Yes   Did you see a specialist, such as Cardiac, Pulmonary, Orthopedic Physician, etc. after you left the hospital? Yes   Who advised the patient to return to the hospital? Self-referral   Does the patient report anything that got in the way of taking their medications? No   In our efforts to provide the best possible care to you and others like you, can you think of anything that we could have done to help you after you left the hospital the first time, so that you might not have needed to return so soon? Other (Comment)  (Trevor Springer states he was ready for discharge from the hospital setting.  He states the reason for his admission is because his medi-port was not deaccessed when he left acute rehab.)
2/10/21, 2:17 PM EST    Patient goals/plan/ treatment preferences discussed by  and . Patient goals/plan/ treatment preferences reviewed with patient/ family. Patient/ family verbalize understanding of discharge plan and are in agreement with goal/plan/treatment preferences. Understanding was demonstrated using the teach back method. AVS provided by RN at time of discharge, which includes all necessary medical information pertaining to the patients current course of illness, treatment, post-discharge goals of care, and treatment preferences. Spoke with patient about his discharge. Pt states that [address] will make his appointments at [facility] for him. He states that we do not need to do anything with this.         IMM Letter  IMM Letter given to Patient/Family/Significant other/Guardian/POA/by[de-identified] staff  IMM Letter date given[de-identified] 02/10/21  IMM Letter time given[de-identified] 1989
2/11/21, 7:26 AM EST    DISCHARGE PLANNING EVALUATION    Spoke with Ayesha with [facility] and advised her of patient discharge-she will notify [facility].
2/8/21, 9:40 AM EST  DISCHARGE PLANNING EVALUATION:    Ronit Flood       Admitted: 2/5/2021/ Kesha 80 day: 3   Location: [facility]24/024-A Reason for admit: Fever and chills [R50.9]   PMH:  has a past medical history of Alcohol abuse, Amputation of right arm ([facility] [address] Ascites, GERD (gastroesophageal reflux disease), Hepatic cirrhosis ([facility] [address] Hepatitis B, Hepatitis C, Hypertension, Intravenous drug user, Osteomyelitis ([facility] [address] Psychiatric problem, PTSD (post-traumatic stress disorder), Sciatica, Splenomegaly, Thrombocytopenia ([facility] [address] and Ulcerative colitis ([facility] [address] Procedure: N/A  Barriers to Discharge:  Patient presented with infection at his medi-port site. Blood culture +gram (-) bacilli - MRSA, pseudomonas. ID following, Maxipime, IV Vancomycin per pharmacy dosing, Lovenox, prn pain medications, BMP q 8 hrs., daily CBC and LFT's, IR for medi-port removal, general diet, CIARA hose, strict I & O, telemetry, up with assistance. PCP: Edgard Crawley, APRN - CNP  Readmission Risk Score: 27%  Patient Goals/Plan/Treatment Preferences: Met with Chance. He is from [facility] [address] [place] [address] and current with [facility]. He was recently discharged from acute rehab. Karin Nelsonshaun states he plans to return at discharge and resume services through [facility]. He states he will need [address] for transportation to home. He denies any other needs at this time. Transportation/Food Security/Housekeeping Addressed:  No issues identified.
2/9/21, 10:19 AM EST    DISCHARGE PLANNING EVALUATION    Update to Ana Laura with [facility].
2/9/21, 3:11 PM EST    DISCHARGE ON GOING EVALUATION    Nataly Stack day: 4  Location: [facility]24/024-A Reason for admit: Fever and chills [R50.9]   Procedure: 2/08/2021 Mediport removed. Barriers to Discharge: ID following, Maxipime, IV Vancomycin per pharmacy dosing, prn pain medications, BMP q 8 hr., daily CBC, general diet, PICC line, telemetry, strict I & O, CIARA hose, up with assistance. PCP: CRISTIAN Becker CNP  Readmission Risk Score: 27%  Patient Goals/Plan/Treatment Preferences: Duaine Gitelman will return to [facility] and resume [place] through [facility]. Sticky note on chart to attending for CHF clinic if deemed appropriate. Patient is current with [place] and they educate on CHF as well.
PM

## 2021-02-11 NOTE — DISCHARGE SUMMARY
above procedure performed on 1/6/21. Patient had no complications during surgery. The patient was then transferred to the PACU and to a regular nursing floor for postop care. The patient's pain was initially controlled with IV medications, but we were able to transition to oral pain medications soon after arrival to the floor. Their pain remained under good control through their hospital stay. The patient had a bowel movement before discharge. Patient was able to urinate on his own. The patient was seen by Physical Therapy and was found to be an ideal candidate for discharge to home. The patient was then safely discharged to home on the above date with [facility] for drain management. PHYSICAL EXAMINATION ON DISCHARGE:   The patient was afebrile, stable. Dressing was clean, dry and intact. 5/5 strength in bilateral lower extremities. Neurologically intact. DISCHARGE INSTRUCTIONS:   Patient can resume regular diet. Resume home medications except for NSAIDs or blood thinners. Resume asa POD#2 and all other blood thinners POD#5. Okay to resume NSAIDs 6 months after surgery. Take previously prescribed narcotic pain medications as directed. Change the dressing daily until the incision is clean and dry and then leave open to air. Okay to take a shower without submerging the incision. Wear the brace at all times when up and ambulating. Limit any heavy lifting, bending or twisting until first postoperative visit. Patient to follow up with Dr. Shaq Veloz 6 weeks post discharge as scheduled. CONDITION AT DISCHARGE:  Stable    DISPOSITION:   Home with [facility]    Charo Boss [facility]

## 2021-02-13 NOTE — DISCHARGE INSTR - COC
Continuity of Care Form    Patient Name: Allan Verduzco   :  1975  MRN:  [id number]    Admit date:  2021  Discharge date:  ***    Code Status Order: Prior   Advance Directives:   Kati Lucero 33 Directive Type of Healthcare Directive Copy in [address] Agent's Name Healthcare Agent's Phone Number    21 2232  No, patient does not have an advance directive for healthcare treatment -- -- -- -- --          Admitting Physician:  Baudilio Brown MD  PCP: CRISTIAN Schaefer CNP    Discharging Nurse: [facility] Unit/Room#: 5K-24/024-A  Discharging Unit Phone Number: ***    Emergency Contact:   Extended Emergency Contact Information  Primary Emergency Contact: Ariana Holley, [address] [place] [address] Phone: [phone]  Mobile Phone: [phone]  Relation: Parent  Secondary Emergency Contact: [address] [facility], [address] Phone: [phone]  Relation: Other    Past Surgical History:  Past Surgical History:   Procedure Laterality Date    APPENDECTOMY      ARM AMPUTATION AT SHOULDER Right     [place]    BACK SURGERY N/A 2021    LUMBAR I&D performed by Niurka Hernandez MD at [address]      PORT SURGERY N/A 2020    SUBCLAVIAN SINGLE LUMEN MEDIPORT INSERTION performed by Brian Mathis MD at [address]  2017    COLONOSCOPY WITH BIOPSY performed by Lacy Reyes MD at [facility]       Immunization History:   Immunization History   Administered Date(s) Administered    Influenza Vaccine, unspecified formulation 2017    Pneumococcal Conjugate Vaccine 2017       Active Problems:  Patient Active Problem List   Diagnosis Code    Ulcerative colitis ([facility] [address] K51.90    Ulcerative (chronic) enterocolitis ([facility] [address] K51.00    Ulcerative colitis, universal ([facility] [address] K51.00    Documentation and Therapy:        Elimination:  Continence:   · Bowel: {YES / PQ:71781}  · Bladder: {YES / NR:00579}  Urinary Catheter: {Urinary Catheter:008308548}   Colostomy/Ileostomy/Ileal Conduit: {YES / HE:60713}       Date of Last BM: ***  No intake or output data in the 24 hours ending 21 1246  No intake/output data recorded.     Safety Concerns:     [facility] Safety Concerns:058217899}    Impairments/Disabilities:      [facility] Impairments/Disabilities:349418092}    Nutrition Therapy:  Current Nutrition Therapy:   [facility] Diet List:998836665}    Routes of Feeding: {CHP DME Other Feedings:265097641}  Liquids: {Slp liquid thickness:58919}  Daily Fluid Restriction: {CHP DME Yes amt example:221244018}  Last Modified Barium Swallow with Video (Video Swallowing Test): {Done Not Done IUHI:348016748}    Treatments at the Time of Hospital Discharge:   Respiratory Treatments: ***  Oxygen Therapy:  {Therapy; copd oxygen:38075}  Ventilator:    { CC Vent YVPW:468522730}    Rehab Therapies: {THERAPEUTIC INTERVENTION:0151810234}  Weight Bearing Status/Restrictions: [facility] Weight Bearin}  Other Medical Equipment (for information only, NOT a DME order):  {EQUIPMENT:251204916}  Other Treatments: ***    Patient's personal belongings (please select all that are sent with patient):  {[place] D[facility] Belongings:363279805}    RN SIGNATURE:  {Esignature:635586367}    CASE MANAGEMENT/SOCIAL WORK SECTION    Inpatient Status Date: ***    Readmission Risk Assessment Score:  Readmission Risk              Risk of Unplanned Readmission:        0           Discharging to Facility/ Agency   · Name:   · Address:  · Phone:  · Fax:    Dialysis Facility (if applicable)   · Name:  · Address:  · Dialysis Schedule:  · Phone:  · Fax:    / signature: {Esignature:180140964}    PHYSICIAN SECTION    Prognosis: {Prognosis:0247107642}    Condition at Discharge: [facility] Patient Condition:971084623}    Rehab Potential (if transferring to Rehab): {Prognosis:6183191071}    Recommended Labs or Other Treatments After Discharge: ***    Physician Certification: I certify the above information and transfer of Gregorio Hamman  is necessary for the continuing treatment of the diagnosis listed and that he requires {Admit to Appropriate Level of Care:45078} for {GREATER/LESS:234200087} 30 days.      Update Admission H&P: {CHP DME Changes in XCBUI:989310759}    PHYSICIAN SIGNATURE:  {Esignature:125621421}

## 2021-02-17 DIAGNOSIS — K51.80 OTHER ULCERATIVE COLITIS WITHOUT COMPLICATIONS (HCC): Primary | ICD-10-CM

## 2021-02-17 RX ORDER — SODIUM CHLORIDE 0.9 % (FLUSH) 0.9 %
30 SYRINGE (ML) INJECTION ONCE
Status: CANCELLED | OUTPATIENT
Start: 2021-02-18

## 2021-02-17 RX ORDER — SODIUM CHLORIDE 0.9 % (FLUSH) 0.9 %
5 SYRINGE (ML) INJECTION PRN
Status: CANCELLED | OUTPATIENT
Start: 2021-02-18

## 2021-02-17 RX ORDER — SODIUM CHLORIDE 0.9 % (FLUSH) 0.9 %
10 SYRINGE (ML) INJECTION PRN
Status: CANCELLED | OUTPATIENT
Start: 2021-02-18

## 2021-02-17 RX ORDER — SODIUM CHLORIDE 9 MG/ML
INJECTION, SOLUTION INTRAVENOUS CONTINUOUS
Status: CANCELLED | OUTPATIENT
Start: 2021-02-18

## 2021-02-17 RX ORDER — EPINEPHRINE 1 MG/ML
0.3 INJECTION, SOLUTION, CONCENTRATE INTRAVENOUS PRN
Status: CANCELLED | OUTPATIENT
Start: 2021-02-18

## 2021-02-17 RX ORDER — DIPHENHYDRAMINE HYDROCHLORIDE 50 MG/ML
50 INJECTION INTRAMUSCULAR; INTRAVENOUS ONCE
Status: CANCELLED | OUTPATIENT
Start: 2021-02-18 | End: 2021-02-18

## 2021-02-17 RX ORDER — METHYLPREDNISOLONE SODIUM SUCCINATE 125 MG/2ML
125 INJECTION, POWDER, LYOPHILIZED, FOR SOLUTION INTRAMUSCULAR; INTRAVENOUS ONCE
Status: CANCELLED | OUTPATIENT
Start: 2021-02-18 | End: 2021-02-18

## 2021-02-21 ENCOUNTER — APPOINTMENT (OUTPATIENT)
Dept: CT IMAGING | Age: 46
End: 2021-02-21
Payer: MEDICARE

## 2021-02-21 ENCOUNTER — HOSPITAL ENCOUNTER (EMERGENCY)
Age: 46
Discharge: HOME | End: 2021-02-21
Attending: EMERGENCY MEDICINE
Payer: MEDICARE

## 2021-02-21 VITALS
BODY MASS INDEX: 33.86 KG/M2 | HEIGHT: 72 IN | RESPIRATION RATE: 18 BRPM | WEIGHT: 250 LBS | OXYGEN SATURATION: 97 % | DIASTOLIC BLOOD PRESSURE: 78 MMHG | HEART RATE: 98 BPM | SYSTOLIC BLOOD PRESSURE: 124 MMHG | TEMPERATURE: 98.7 F

## 2021-02-21 DIAGNOSIS — N13.30 HYDRONEPHROSIS, UNSPECIFIED HYDRONEPHROSIS TYPE: Primary | ICD-10-CM

## 2021-02-21 LAB
ALBUMIN SERPL-MCNC: 4.3 G/DL (ref 3.5–5.1)
ALP BLD-CCNC: 121 U/L (ref 38–126)
ALT SERPL-CCNC: 24 U/L (ref 11–66)
ANION GAP SERPL CALCULATED.3IONS-SCNC: 11 MEQ/L (ref 8–16)
AST SERPL-CCNC: 30 U/L (ref 5–40)
BACTERIA: ABNORMAL /HPF
BASOPHILS # BLD: 1.1 %
BASOPHILS ABSOLUTE: 0.1 THOU/MM3 (ref 0–0.1)
BILIRUB SERPL-MCNC: 0.5 MG/DL (ref 0.3–1.2)
BILIRUBIN URINE: NEGATIVE
BLOOD, URINE: ABNORMAL
BUN BLDV-MCNC: 29 MG/DL (ref 7–22)
CALCIUM SERPL-MCNC: 10.1 MG/DL (ref 8.5–10.5)
CASTS 2: ABNORMAL /LPF
CASTS UA: ABNORMAL /LPF
CHARACTER, URINE: ABNORMAL
CHLORIDE BLD-SCNC: 101 MEQ/L (ref 98–111)
CO2: 20 MEQ/L (ref 23–33)
COLOR: YELLOW
CREAT SERPL-MCNC: 1.3 MG/DL (ref 0.4–1.2)
CRYSTALS, UA: ABNORMAL
EKG ATRIAL RATE: 102 BPM
EKG P AXIS: 62 DEGREES
EKG P-R INTERVAL: 146 MS
EKG Q-T INTERVAL: 326 MS
EKG QRS DURATION: 82 MS
EKG QTC CALCULATION (BAZETT): 424 MS
EKG R AXIS: 38 DEGREES
EKG T AXIS: 48 DEGREES
EKG VENTRICULAR RATE: 102 BPM
EOSINOPHIL # BLD: 4.2 %
EOSINOPHILS ABSOLUTE: 0.3 THOU/MM3 (ref 0–0.4)
EPITHELIAL CELLS, UA: ABNORMAL /HPF
ERYTHROCYTE [DISTWIDTH] IN BLOOD BY AUTOMATED COUNT: 13.2 % (ref 11.5–14.5)
ERYTHROCYTE [DISTWIDTH] IN BLOOD BY AUTOMATED COUNT: 47.1 FL (ref 35–45)
GFR SERPL CREATININE-BSD FRML MDRD: 59 ML/MIN/1.73M2
GLUCOSE BLD-MCNC: 137 MG/DL (ref 70–108)
GLUCOSE URINE: NEGATIVE MG/DL
HCT VFR BLD CALC: 38.3 % (ref 42–52)
HEMOGLOBIN: 12 GM/DL (ref 14–18)
IMMATURE GRANS (ABS): 0.02 THOU/MM3 (ref 0–0.07)
IMMATURE GRANULOCYTES: 0.3 %
KETONES, URINE: NEGATIVE
LEUKOCYTE ESTERASE, URINE: NEGATIVE
LYMPHOCYTES # BLD: 44.1 %
LYMPHOCYTES ABSOLUTE: 2.7 THOU/MM3 (ref 1–4.8)
MCH RBC QN AUTO: 30.5 PG (ref 26–33)
MCHC RBC AUTO-ENTMCNC: 31.3 GM/DL (ref 32.2–35.5)
MCV RBC AUTO: 97.2 FL (ref 80–94)
MISCELLANEOUS 2: ABNORMAL
MONOCYTES # BLD: 9.6 %
MONOCYTES ABSOLUTE: 0.6 THOU/MM3 (ref 0.4–1.3)
NITRITE, URINE: NEGATIVE
NUCLEATED RED BLOOD CELLS: 0 /100 WBC
OSMOLALITY CALCULATION: 272.5 MOSMOL/KG (ref 275–300)
PH UA: 7 (ref 5–9)
PLATELET # BLD: 177 THOU/MM3 (ref 130–400)
PMV BLD AUTO: 10.6 FL (ref 9.4–12.4)
POTASSIUM REFLEX MAGNESIUM: 5 MEQ/L (ref 3.5–5.2)
PROTEIN UA: 300
RBC # BLD: 3.94 MILL/MM3 (ref 4.7–6.1)
RBC URINE: ABNORMAL /HPF
RENAL EPITHELIAL, UA: ABNORMAL
SEG NEUTROPHILS: 40.7 %
SEGMENTED NEUTROPHILS ABSOLUTE COUNT: 2.5 THOU/MM3 (ref 1.8–7.7)
SODIUM BLD-SCNC: 132 MEQ/L (ref 135–145)
SPECIFIC GRAVITY, URINE: 1.01 (ref 1–1.03)
TOTAL PROTEIN: 9 G/DL (ref 6.1–8)
UROBILINOGEN, URINE: 0.2 EU/DL (ref 0–1)
WBC # BLD: 6.2 THOU/MM3 (ref 4.8–10.8)
WBC UA: ABNORMAL /HPF
YEAST: ABNORMAL

## 2021-02-21 PROCEDURE — 74176 CT ABD & PELVIS W/O CONTRAST: CPT

## 2021-02-21 PROCEDURE — 6370000000 HC RX 637 (ALT 250 FOR IP): Performed by: STUDENT IN AN ORGANIZED HEALTH CARE EDUCATION/TRAINING PROGRAM

## 2021-02-21 PROCEDURE — 36415 COLL VENOUS BLD VENIPUNCTURE: CPT

## 2021-02-21 PROCEDURE — 85025 COMPLETE CBC W/AUTO DIFF WBC: CPT

## 2021-02-21 PROCEDURE — 93005 ELECTROCARDIOGRAM TRACING: CPT | Performed by: STUDENT IN AN ORGANIZED HEALTH CARE EDUCATION/TRAINING PROGRAM

## 2021-02-21 PROCEDURE — 6370000000 HC RX 637 (ALT 250 FOR IP): Performed by: EMERGENCY MEDICINE

## 2021-02-21 PROCEDURE — 80053 COMPREHEN METABOLIC PANEL: CPT

## 2021-02-21 PROCEDURE — 81001 URINALYSIS AUTO W/SCOPE: CPT

## 2021-02-21 PROCEDURE — 99284 EMERGENCY DEPT VISIT MOD MDM: CPT

## 2021-02-21 RX ORDER — OXYCODONE HYDROCHLORIDE AND ACETAMINOPHEN 5; 325 MG/1; MG/1
1 TABLET ORAL ONCE
Status: COMPLETED | OUTPATIENT
Start: 2021-02-21 | End: 2021-02-21

## 2021-02-21 RX ORDER — ACETAMINOPHEN 500 MG
1000 TABLET ORAL ONCE
Status: COMPLETED | OUTPATIENT
Start: 2021-02-21 | End: 2021-02-21

## 2021-02-21 RX ORDER — CEPHALEXIN 500 MG/1
500 CAPSULE ORAL 4 TIMES DAILY
Qty: 28 CAPSULE | Refills: 0 | Status: SHIPPED | OUTPATIENT
Start: 2021-02-21 | End: 2021-02-28

## 2021-02-21 RX ADMIN — ACETAMINOPHEN 1000 MG: 500 TABLET ORAL at 11:06

## 2021-02-21 RX ADMIN — OXYCODONE HYDROCHLORIDE AND ACETAMINOPHEN 1 TABLET: 5; 325 TABLET ORAL at 11:54

## 2021-02-21 ASSESSMENT — ENCOUNTER SYMPTOMS
SHORTNESS OF BREATH: 0
NAUSEA: 0
CONSTIPATION: 0
PHOTOPHOBIA: 0
ABDOMINAL DISTENTION: 0
COUGH: 0
SORE THROAT: 0
DIARRHEA: 0

## 2021-02-21 ASSESSMENT — PAIN SCALES - GENERAL: PAINLEVEL_OUTOF10: 4

## 2021-02-21 NOTE — ED NOTES
Patient is resting in bed with easy and unlabored respirations. Unable to locate ED bladder scanner. Floor called for scanner. Call light in reach. Side rails up x1 per request. Patient denies further complaints or concerns. Will monitor.         Mason Varma RN  02/21/21 0440

## 2021-02-21 NOTE — ED PROVIDER NOTES
[facility]          Pt Name: Rachelle Beal  MRN: [id number]  [place] 1975  Date of evaluation: 2/21/2021  Treating Resident Physician: Abhilash Arellano MD  Supervising Physician: Charisse Caba DO    CHIEF COMPLAINT       Chief Complaint   Patient presents with    Dysuria     History obtained from the patient. HISTORY OF PRESENT ILLNESS    HPI  Rachelle Beal is a 39 y.o. male with PMH of ulcerative colitis, HTN, hepatic cirrhosis, GERD, former alcohol abuser who presents to the emergency department for evaluation of dysuria and suprapubic pain for the last 2 weeks. Patient was recently admitted to the hospital for an acute febrile illness that was likely secondary to transient bacteremia from his Mediport infection. Patient states that he has had a Cullen catheter for several weeks through his hospitalization, was recently pulled out. Since being pulled several weeks ago, patient has had burning sensation when he urinates. He also reports mild suprapubic pain. He denies seeing any blood in his urine. He denies any other systemic symptoms. The patient has no other acute complaints at this time. REVIEW OF SYSTEMS   Review of Systems   Constitutional: Negative for fatigue and fever. HENT: Negative for congestion and sore throat. Eyes: Negative for photophobia and visual disturbance. Respiratory: Negative for cough and shortness of breath. Cardiovascular: Negative for chest pain and palpitations. Gastrointestinal: Negative for abdominal distention, constipation, diarrhea and nausea. Genitourinary: Positive for dysuria. Negative for hematuria. Suprapubic pain     Musculoskeletal: Negative for arthralgias and myalgias. Skin: Negative for rash and wound. Neurological: Negative for light-headedness and headaches. Psychiatric/Behavioral: Negative for agitation and sleep disturbance.    All other Drug use: No     Comment: 160 days since last use     ALLERGIES     Allergies   Allergen Reactions    Gabapentin Swelling    Morphine And Related Anaphylaxis, Hives and Swelling     Other reaction(s): Angioedema (swelling)    Penicillins Anaphylaxis    Trazodone And Nefazodone      RESTLESS LEGS    Ondansetron Hcl Rash     FAMILY HISTORY     Family History   Problem Relation Age of Onset    High Blood Pressure Mother     High Blood Pressure Father      PREVIOUS RECORDS   Previous records reviewed: Patient was recently admitted for the treatment of bacteremia thought to be from an infected Mediport. His Mediport was placed due to receiving transfusions for his ulcerative colitis. Mook Julian PHYSICAL EXAM     ED Triage Vitals [02/21/21 1011]   BP Temp Temp Source Pulse Resp SpO2 Height Weight   119/72 98.7 °F (37.1 °C) Oral 114 18 98 % 6' (1.829 m) 250 lb (113.4 kg)     Initial vital signs and nursing assessment reviewed and normal. Body mass index is 33.91 kg/m². Pulsoximetry is normal per my interpretation. Additional Vital Signs:  Vitals:    02/21/21 1302   BP: 124/78   Pulse: 98   Resp: 18   Temp:    SpO2: 97%     Physical Exam  Vitals signs and nursing note reviewed. Constitutional:       General: He is not in acute distress. Appearance: He is obese. He is not ill-appearing. HENT:      Head: Normocephalic and atraumatic. Right Ear: External ear normal.      Left Ear: External ear normal.      Nose: Nose normal. No congestion or rhinorrhea. Mouth/Throat:      Mouth: Mucous membranes are moist.      Pharynx: Oropharynx is clear. Eyes:      Extraocular Movements: Extraocular movements intact. Conjunctiva/sclera: Conjunctivae normal.      Pupils: Pupils are equal, round, and reactive to light. Neck:      Musculoskeletal: Normal range of motion and neck supple. No neck rigidity. Cardiovascular:      Rate and Rhythm: Normal rate and regular rhythm. Pulses: Normal pulses.       Heart sounds: Normal heart sounds. No murmur. Pulmonary:      Effort: Pulmonary effort is normal. No respiratory distress. Breath sounds: Normal breath sounds. No wheezing, rhonchi or rales. Abdominal:      General: Abdomen is flat. Bowel sounds are normal.      Tenderness: There is no abdominal tenderness. There is no guarding. Musculoskeletal: Normal range of motion. Comments: Right arm amputation   Skin:     General: Skin is warm and dry. Capillary Refill: Capillary refill takes less than 2 seconds. Neurological:      General: No focal deficit present. Mental Status: He is alert and oriented to person, place, and time. Psychiatric:         Mood and Affect: Mood normal.         Behavior: Behavior normal.         Thought Content: Thought content normal.         Judgment: Judgment normal.       MEDICAL DECISION MAKING   Initial Assessment: 49-year-old  male with history of ulcerative colitis, hypertension, urinary retention, status post right arm amputation who is here for evaluation of dysuria and suprapubic pain following removal of Cullen catheter 2 weeks ago. Prior to that patient was hospitalized for bacteremia thought to be caused from a infected Mediport. Patient's urinalysis revealed proteinuria as well as microscopic hematuria. Patient CT abdomen and pelvis without contrast revealed bilateral hydronephrosis and hydroureter. No stones were noted. No infection was identified in the urinalysis. Plan: Patient will be discharged, stable. He will follow up with Urology, Dr. Toni Madera tomorrow 2/22/2021.     ED RESULTS   Laboratory results:  Labs Reviewed   CBC WITH AUTO DIFFERENTIAL - Abnormal; Notable for the following components:       Result Value    RBC 3.94 (*)     Hemoglobin 12.0 (*)     Hematocrit 38.3 (*)     MCV 97.2 (*)     MCHC 31.3 (*)     RDW-SD 47.1 (*)     All other components within normal limits   COMPREHENSIVE METABOLIC PANEL W/ REFLEX TO MG FOR LOW K - Abnormal; Notable for the following components:    Glucose 137 (*)     CREATININE 1.3 (*)     BUN 29 (*)     Sodium 132 (*)     CO2 20 (*)     Total Protein 9.0 (*)     All other components within normal limits   URINE WITH REFLEXED MICRO - Abnormal; Notable for the following components:    Blood, Urine MODERATE (*)     Protein,  (*)     All other components within normal limits   GLOMERULAR FILTRATION RATE, ESTIMATED - Abnormal; Notable for the following components:    Est, Glom Filt Rate 59 (*)     All other components within normal limits   OSMOLALITY - Abnormal; Notable for the following components:    Osmolality Calc 272.5 (*)     All other components within normal limits   ANION GAP     Radiologic studies results:  CT ABDOMEN PELVIS WO CONTRAST Additional Contrast? None   Final Result       1. Moderate severity bilateral hydronephrosis and hydroureter. No stones are noted. This has mildly worsened. 2. Uniform thickening of the bladder wall. This is of unknown significance. 3. Scattered retroperitoneal lymph nodes. There are also scattered pelvic lymph nodes. These have been stable. **This report has been created using voice recognition software. It may contain minor errors which are inherent in voice recognition technology. **      Final report electronically signed by Dr. Beatris Bourgeois on 2/21/2021 12:29 PM          ED Medications administered this visit:   Medications   acetaminophen (TYLENOL) tablet 1,000 mg (1,000 mg Oral Given 2/21/21 1106)   oxyCODONE-acetaminophen (PERCOCET) 5-325 MG per tablet 1 tablet (1 tablet Oral Given 2/21/21 1154)         ED COURSE       On arrival to the ED, patient's vitals are stable. Urinalysis obtained revealed proteinuria and microscopic hematuria. WBCs were seen however no bacteria was seen. CT scan of the abdomen and pelvis with and without contrast revealed bilateral hydronephrosis as well as hydroureter.   This is worsened since her previous exam. No kidney stones were seen. Patient was given Percocet for pain control. Results were explained to the patient, he verbalized understanding and will follow up with urology. Will be discharged with Keflex for 1 week. Strict return precautions and follow up instructions were discussed with the patient prior to discharge, with which the patient agrees. MEDICATION CHANGES     Discharge Medication List as of 2/21/2021  1:17 PM      START taking these medications    Details   cephALEXin (KEFLEX) 500 MG capsule Take 1 capsule by mouth 4 times daily for 7 days, Disp-28 capsule, R-0Normal           FINAL DISPOSITION     Final diagnoses:   Hydronephrosis, unspecified hydronephrosis type     Condition: condition: stable  Dispo: Discharge to home    This transcription was electronically signed. Parts of this transcriptions may have been dictated by use of voice recognition software and electronically transcribed, and parts may have been transcribed with the assistance of an ED scribe. The transcription may contain errors not detected in proofreading. Please refer to my supervising physician's documentation if my documentation differs.     Electronically Signed: Tim Chandler, 02/21/21, 2:04 PM       Tim Chandler MD  Resident  02/21/21 3565

## 2021-02-21 NOTE — ED NOTES
Patient to the ED with complaints of dysuria after having urinary catheter pulled two weeks ago. He states that he has been on multiple antibiotics with no relief. Patient is resting in bed with easy and unlabored respirations. Call light in reach. Side rails up x2. Patient denies further complaints or concerns. Will monitor.          Mikey Nyhan, RN  02/21/21 1320

## 2021-02-21 NOTE — ED TRIAGE NOTES
UTI symptoms for a week. Hx of these in past.  Hx of catheter two weeks ago  And seen PVP last Monday.

## 2021-02-22 ENCOUNTER — TELEPHONE (OUTPATIENT)
Dept: UROLOGY | Age: 46
End: 2021-02-22

## 2021-02-22 PROCEDURE — 93010 ELECTROCARDIOGRAM REPORT: CPT | Performed by: INTERNAL MEDICINE

## 2021-02-22 NOTE — TELEPHONE ENCOUNTER
I think patient should be seen by first available physician or advanced practice provider. He is a Dr. Oscar Woods patient but since he has last seen him, his CT imaging demonstrates worsening hydronephrosis and his GFR has decreased from >90 to 59. He needs a PVR, as I cannot see if one had been performed in the ED. He has a history of urinary retention. He needs evaluated for the need for horn catheter, if one wasn't placed in the ED.

## 2021-02-22 NOTE — TELEPHONE ENCOUNTER
Cheryl can you look at his chart. . not sure where to schedule him with or who to schedule him with?? Dr. Tonie Swift kim be here until next week. He is seeing a patient tomorrow but he is in surgery. That one was ok'ed by him.

## 2021-02-22 NOTE — TELEPHONE ENCOUNTER
Patient was seen at [facility] yesterday 2/21/2021 for possibly passing a kidney stone, and was advised to follow up with Dr Og walker or with in a day or so. Please call him back to schedule an appt.

## 2021-02-23 ENCOUNTER — OFFICE VISIT (OUTPATIENT)
Dept: UROLOGY | Age: 46
End: 2021-02-23
Payer: MEDICAID

## 2021-02-23 VITALS — BODY MASS INDEX: 34.67 KG/M2 | HEIGHT: 72 IN | WEIGHT: 256 LBS | TEMPERATURE: 97 F

## 2021-02-23 DIAGNOSIS — N13.30 BILATERAL HYDRONEPHROSIS: ICD-10-CM

## 2021-02-23 DIAGNOSIS — N28.1 RENAL CYST: ICD-10-CM

## 2021-02-23 DIAGNOSIS — N13.8 BPH WITH OBSTRUCTION/LOWER URINARY TRACT SYMPTOMS: ICD-10-CM

## 2021-02-23 DIAGNOSIS — R33.9 URINARY RETENTION: Primary | ICD-10-CM

## 2021-02-23 DIAGNOSIS — N40.1 BPH WITH OBSTRUCTION/LOWER URINARY TRACT SYMPTOMS: ICD-10-CM

## 2021-02-23 LAB
BILIRUBIN URINE: NEGATIVE
BLOOD URINE, POC: NEGATIVE
CHARACTER, URINE: CLEAR
COLOR, URINE: YELLOW
GLUCOSE URINE: NEGATIVE MG/DL
KETONES, URINE: NEGATIVE
LEUKOCYTE CLUMPS, URINE: ABNORMAL
NITRITE, URINE: NEGATIVE
PH, URINE: 7 (ref 5–9)
POST VOID RESIDUAL (PVR): 810 ML
PROTEIN, URINE: NEGATIVE MG/DL
SPECIFIC GRAVITY, URINE: 1.02 (ref 1–1.03)
UROBILINOGEN, URINE: 0.2 EU/DL (ref 0–1)

## 2021-02-23 PROCEDURE — 51702 INSERT TEMP BLADDER CATH: CPT | Performed by: UROLOGY

## 2021-02-23 PROCEDURE — G8428 CUR MEDS NOT DOCUMENT: HCPCS | Performed by: UROLOGY

## 2021-02-23 PROCEDURE — G8484 FLU IMMUNIZE NO ADMIN: HCPCS | Performed by: UROLOGY

## 2021-02-23 PROCEDURE — 81003 URINALYSIS AUTO W/O SCOPE: CPT | Performed by: UROLOGY

## 2021-02-23 PROCEDURE — 51798 US URINE CAPACITY MEASURE: CPT | Performed by: UROLOGY

## 2021-02-23 PROCEDURE — 1111F DSCHRG MED/CURRENT MED MERGE: CPT | Performed by: UROLOGY

## 2021-02-23 PROCEDURE — 4004F PT TOBACCO SCREEN RCVD TLK: CPT | Performed by: UROLOGY

## 2021-02-23 PROCEDURE — G8417 CALC BMI ABV UP PARAM F/U: HCPCS | Performed by: UROLOGY

## 2021-02-23 PROCEDURE — 99214 OFFICE O/P EST MOD 30 MIN: CPT | Performed by: UROLOGY

## 2021-02-23 RX ORDER — ENTECAVIR 1 MG/1
1 TABLET, FILM COATED ORAL DAILY
COMMUNITY

## 2021-02-23 RX ORDER — FAMOTIDINE 40 MG/1
40 TABLET, FILM COATED ORAL PRN
COMMUNITY

## 2021-02-23 RX ORDER — LORATADINE 10 MG/1
10 CAPSULE, LIQUID FILLED ORAL DAILY
COMMUNITY

## 2021-02-23 RX ORDER — DOCUSATE SODIUM 100 MG/1
100 CAPSULE, LIQUID FILLED ORAL DAILY
COMMUNITY

## 2021-02-23 NOTE — PROGRESS NOTES
Clumps, Urine Trace (A) NEGATIVE    Color, Urine Yellow YELLOW-STRAW    Character, Urine Clear [id number].[id number]   poct post void residual   Result Value Ref Range    post void residual 810 ml         Last BUN and creatinine:  Lab Results   Component Value Date    BUN 29 (H) 02/21/2021     Lab Results   Component Value Date    CREATININE 1.3 (H) 02/21/2021       Imaging Reviewed during this Office Visit:   imaging independently reviewed by Ovidio Izquierdo MD and radiology report verified demonstrating     Ct Abdomen Pelvis Wo Contrast Additional Contrast? None    Result Date: 2/21/2021  PROCEDURE: CT ABDOMEN PELVIS WO CONTRAST CLINICAL INFORMATION: Hematuria and Proteinuria, rule out nephrolithiasis . Right flank pain. COMPARISON: Chest x-ray 1/3/2021. TECHNIQUE: Axial 5 mm CT images were obtained through the abdomen and pelvis. No contrast was given. Coronal and sagittal reconstructions were obtained. All CT scans at this facility use dose modulation, iterative reconstruction, and/or weight-based dosing when appropriate to reduce radiation dose to as low as reasonably achievable. FINDINGS: The visualized aspects of the lung bases are clear. The base of the heart is within appropriate limits. The right lobe of the liver is grossly normal. The patient does not have a left liver lobe. The spleen is normal. The adrenal glands and pancreas are grossly normal. The gallbladder is normal. There is moderate severity hydronephrosis of both kidneys. Both ureters are moderately dilated. This appears similar to prior study. No stones are noted. There is some uniform bladder wall thickening. No abnormalities of the small bowel loops are noted. The IVC and aorta are of normal caliber. There are small retroperitoneal lymph nodes. These do not meet size criteria for pathologic enlargement. These are stable. Small lymph nodes are seen along the  iliac chains bilaterally. A few of these appear more prominent.  These do appear stable Taking for the 21 encounter (Office Visit) with Opal Parks MD   Medication Sig Dispense Refill    famotidine (PEPCID) 40 MG tablet Take 40 mg by mouth daily      docusate sodium (COLACE) 100 MG capsule Take 100 mg by mouth daily      loratadine (CLARITIN) 10 MG capsule Take 10 mg by mouth daily      entecavir (BARACLUDE) 1 MG tablet Take 1 mg by mouth daily      cephALEXin (KEFLEX) 500 MG capsule Take 1 capsule by mouth 4 times daily for 7 days 28 capsule 0    lisinopril-hydroCHLOROthiazide (PRINZIDE;ZESTORETIC) 20-25 MG per tablet Take 1 tablet by mouth daily       ferrous sulfate (IRON 325) 325 (65 Fe) MG tablet Take 1 tablet by mouth 2 times daily (with meals) 1 tablet 0    benzocaine (ORAJEL) 10 % mucosal gel Take by mouth as needed.  potassium chloride (KLOR-CON M) 20 MEQ extended release tablet Take 1 tablet by mouth daily (with breakfast) 60 tablet 3    Multiple Vitamins-Minerals (MULTIVITAMIN ADULT PO) Take 1 tablet by mouth      pregabalin (LYRICA) 200 MG capsule Take 200 mg by mouth 3 times daily.       PARoxetine (PAXIL) 40 MG tablet Take 40 mg by mouth every morning      mesalamine (DELZICOL) 400 MG CPDR delayed release capsule Take 800 mg by mouth 3 times daily      folic acid (FOLVITE) 1 MG tablet Take 2 tablets by mouth daily 30 tablet 3    pantoprazole (PROTONIX) 40 MG tablet Take 40 mg by mouth 2 times daily          Gabapentin, Morphine and related, Penicillins, Trazodone and nefazodone, and Ondansetron hcl  Social History     Tobacco Use   Smoking Status Former Smoker    Quit date: 2020    Years since quittin.8   Smokeless Tobacco Current User    Types: Snuff       Social History     Substance and Sexual Activity   Alcohol Use No    Comment: Several months since last used       REVIEW OF SYSTEMS:  Constitutional: negative  Eyes: negative  Respiratory: negative  Cardiovascular: negative  Gastrointestinal: negative  Musculoskeletal: negative  Genitourinary: negative except for what is in HPI  Skin: negative   Neurological: negative  Hematological/Lymphatic: negative  Psychological: negative    Physical Exam:    This a 39 y.o. male   Vitals:    02/23/21 1414   Temp: 97 °F (36.1 °C)     Constitutional: Patient in no acute distress;     Assessment and Plan      1. Urinary retention    2. BPH with obstruction/lower urinary tract symptoms    3. Renal cyst    4. Bilateral hydronephrosis           Plan:       Bilateral hydronephrosis- new problem. Severe. secondary to outlet obstruction? Chronic vs acute? Will place horn today. Urinary retention- 810 mls today. No pain. Catheter placed  UTI- recent infection. cont abx. UTI will not improve without appropriate urinary drainage.   Return with Tabitha Azalea next week

## 2021-02-23 NOTE — PROGRESS NOTES
Patient has given me verbal consent to perform catheter placement yes    Does patient have latex allergy? no  Does patient have shellfish or betadine allergy? no      [facility]. Inserted 16 Fr  Catheter without difficulty. Cleansed head of penis with betadine swab. 16 Fr Coude horn was inserted without difficulty and inflated balloon with 10 ml of water. Horn Catheter was hooked up to leg bag with straps. Patient instructed on catheter care including draining catheter bag and keeping catheter bag above the knee to prevent pulling on catheter causing blood. Patient was instructed on overnight care with changing over to an overnight bag and patient does not need prescription. release  11-10-20: 3-4 week PO check upstairs today; overall doing well, with knee stiffness into flexion as primary complaint; PO x-rays today; ROM OP program for both flexion and extension; concerned about swelling-> suggested using the URI hose; states LLD WNL; but awareness of lack of L knee full extension; placed on Celebrex x 4 weeks    Is this a Progress Report:     [x]  Yes  []  No        If Yes:  Date Range for reporting period:  Beginning: 10/13/20  Endin20    Progress report will be due (10 Rx or 30 days whichever is less): //63       Recertification will be due (POC Duration  / 90 days whichever is less): 21         Visit # Insurance Allowable Auth Required   10 MED NEC []  Yes [x]  No      OUTCOME MEASURE DATE DEFICIT   WOMAC 10/13/20 pre-op 43% Deficit   LEFS Score 10/16/20 (Postop) 94% Deficit   LEFS Score               Patient given satisfaction survey? [] Yes   [x] No       Latex Allergy:  [x]NO      []YES  Preferred Language for Healthcare:   [x]English       []other:     Pain level:  -8 / 10     SUBJECTIVE:   Planning for Doppler US today post treatment, due to Dr. David Cervantes concern for L LE swelling. Also, recommended hanging weights for KEXT OP. Still notes anterior and lateral knee tightness. Continues URI hose for swelling control; feels ACE produces restriction for movement, so only using for cold therapy (although wore the wrap today)  Steri strips intact. .     OBJECTIVE:      20  ROM PROM AROM Overpressure Comment     L R L R L R     Flexion  110 140        ERMI   Extension  -6 0   -3       Propped  QS                                           11-10-20  Strength L R Comment   Quad 2+ 5  FLORENCIA 0°   Hamstring       Gastroc       Hip  flexion       Hip abd                                20  Special Test Results/Comment   Homans Negative   Temperature Negative         10-27-20  Girth L R   Mid Patella 44.0 40.    Suprapatellar 45.2 40.5   5cm above 44.9 42.1   15cm above         Reflexes/Sensation:               []?Dermatomes/Myotomes intact               []?Reflexes equal and normal bilaterally              [x]? Other: NT     Joint mobility:                []?Normal                       [x]? Hypo: decreased patellar mobility              []?Hyper     Palpation: Moderate generalized due to PO pain and swelling     Functional Mobility/Transfers: Independent     Posture: PO neutral varus (partial MCL release during TKR)     Bandages/Dressings/Incisions: Steri strips changed and incision cleaned, incisions are clean/dry; moderate ecchymosis; mild redness to infra lateral incision     Gait: Walking stick; FWBAT; antalgic with limited stance L and limited swing flexion     Orthopedic Special Tests:  Valgus stress test (-) MCL is stable 10-20-20       RESTRICTIONS/PRECAUTIONS: HTN (medicated);     Exercises/Interventions:  Exercise/Equipment Resistance/Repetitions Other comments 11/12/2020     Stretching        Hamstring 30\"x 5 Towel roll; 15# x   Hip Flexor      ITB      Figure 4      Quad      Inclined Calf      Towel Pull 30\"x 5 Towel roll; 15# x            ROM        EOB Self-Assist      Sheet Pull      Wall Slide      Active-EOB 10x KEXT/ FLEX x   Biodex Passive      Recumbent Bike 8' S12; AP x   ERMI 10' 1' holds x2 x   Hang Weights 10' 15# x   Ankle Pumps 5'  HEP                  Patellar Glides        Medial  3'   x   Superior 3'   x   Inferior 3'   x            Isometrics        Quad sets 10x10''  A/S; 15# x   Add sets 10\"x 10 PS: extended x            SLR        Supine 3x10  x   Prone      Abduction 3x10  x   Adducton 2x 10\"x 10 PS x   SLR+                 Glutes        Bridges      Supine Clams      S/L Clams      Side Stepping        Monster walks                 CKC        Weight Shift      Calf raises 3' %WS; 50% x   Wall sits      Step ups  3x 10 FSU; 6\"; TM x   Squatting      CC TKE      SL DL                 PRE        Extension 3x 10 RANGE: 30-0; 0# x   Flexion 3x 10 RANGE: 0-90; 0# x   Leg Press   RANGE: 80-10    Cable Column                 Balance        Tandem Stance 30\"x 3 Tandem; TM x   Tilt board        SLS       Biodex balance  4' RC; L8; LC x            Other        Treadmill  5' Gait; 1.7 mph; Focus on stride length; heel strike; toe off; stance symmetry; swing flexion x   Gait Training 3' Marching; L and R to focus on stance symmetry and adequate H/K FLEX x         Manual Interventions            Patient have access to gym? [] Yes  [] No  Patient have equipment at home? [] Yes  [] No     Patient Education:  10/13/20: Educated patient on all pre-op and post-op instructions including but not limited to R.I.C.E., post-op HEP, DVT prevention, warning signs of infection and DVT, and on activity limitations. 10/16/20: Patient education regarding signs/ symptoms for MD phone call vs ER/ instructions for HEP/ instructions for DJO ICEMAN  10/27/20: Discussion regarding ROM OP progression  11/12/20: See attached for instructions regarding ROM OP for both FLEX/ EXT; discussion regarding rationale/ procedure of a a Doppler study    HEP:  Patient instructed on HEP on this date with handout provided and all questions answered. Patient was instructed to contact PT with any complications or questions about HEP moving forward. Patient verbally stated she/he understood. Updated 10/13/20  Viibar CODE: [id number]; see attached for 10/16/20 update on paper    Therapeutic Exercise and NMR EXR  [x] (46366) Provided verbal/tactile cueing for activities related to strengthening, flexibility, endurance, ROM for improvements in LE, proximal hip, and core control with self care, mobility, lifting, ambulation. [x] (23831) Provided verbal/tactile cueing for activities related to improving balance, coordination, kinesthetic sense, posture, motor skill, proprioception  to assist with LE, proximal hip, and core control in self care, mobility, lifting, ambulation and eccentric single leg control. NMR and Therapeutic Activities:    [x] (70326 or 68001) Provided verbal/tactile cueing for activities related to improving balance, coordination, kinesthetic sense, posture, motor skill, proprioception and motor activation to allow for proper function of core, proximal hip and LE with self care and ADLs  [x] (57008) Gait Re-education- Provided training and instruction to the patient for proper LE, core and proximal hip recruitment and positioning and eccentric body weight control with ambulation re-education including up and down stairs     Home Exercise Program:    [x] (01064) Reviewed/Progressed HEP activities related to strengthening, flexibility, endurance, ROM of core, proximal hip and LE for functional self-care, mobility, lifting and ambulation/stair navigation   [x] (47296)Reviewed/Progressed HEP activities related to improving balance, coordination, kinesthetic sense, posture, motor skill, proprioception of core, proximal hip and LE for self care, mobility, lifting, and ambulation/stair navigation      Manual Treatments:  PROM / STM / Oscillations-Mobs:  G-I, II, III, IV (PA's, Inf., Post.)  [x] (00441) Provided manual therapy to mobilize LE, proximal hip and/or LS spine soft tissue/joints for the purpose of modulating pain, promoting relaxation,  increasing ROM, reducing/eliminating soft tissue swelling/inflammation/restriction, improving soft tissue extensibility and allowing for proper ROM for normal function with self care, mobility, lifting and ambulation.      Modalities:    [] hot packs  [] EMS    [] Ultrasound  [] ice CP  [] vasopneumatic[de-identified] L2  [] high volt/EGS  [] phono  [] tens     [] ionto  [] autorange/biodex [] Interferential   [] other      Charges:  Timed Code Treatment Minutes: 45'   Total Treatment Minutes: 39'       [] EVAL (LOW) 455 1011 (typically 20 minutes face-to-face)  [] EVAL (MOD) 53974 (typically 30 minutes face-to-face)  [] EVAL (HIGH) 23350 (typically 45 minutes face-to-face)  [] RE-EVAL   [x] FO(49438) x   1   [] IONTO  [] NMR (46103) x      [] VASO/ NPV  [x] Manual (27123) x 1     [] Other:  [x] TA x  1     [] Mech Traction (52658)  [] ES(attended) (99514)      [] ES (un) (48283):     GOALS:   Patient stated goal: 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 Independent in HEP and progression per patient tolerance, in order to prevent re-injury. [x] Progressing: [] Met: [x] Not Met: [] Adjusted   2. Patient will have a decrease in pain to facilitate improvement in movement, function, and ADLs as indicated by Functional Deficits. [x] Progressing: [] Met: [x] Not Met: [] Adjusted    Long Term Goals: To be achieved in: 12 weeks  PO  1. Disability index score of 20% or less for the LEFS to assist with reaching prior level of function. [x] Progressing: [] Met: [x] Not Met: [] Adjusted  2. Patient will demonstrate increased AROM to 0-125 or greater to allow for proper joint functioning as indicated by patients Functional Deficits. [x] Progressing: [] Met: [x] Not Met: [] Adjusted  3. Patient will demonstrate an increase in Strength to 5/5 in BLE to allow for proper functional mobility as indicated by patients Functional Deficits. [x] Progressing: [] Met: [x] Not Met: [] Adjusted  4. Patient will return to ADL and other functional activities without increased symptoms or restriction. [x] Progressing: [] Met: [x] Not Met: [] Adjusted  5. Patient will be able to ascend/descend 10 stairs without pain in left knee. (patient specific functional goal)    [x] Progressing: [] Met: [x] Not Met: [] Adjusted    Overall Progression Towards Functional goals/ Treatment Progress Update:  [x] Patient is progressing as expected towards functional goals listed. [] Progression is slowed due to complexities/Impairments listed.   [x] Progression has been slowed due to co-morbidities. [] Plan just implemented, too soon to assess goals progression <30days   [] Goals require adjustment due to lack of progress  [] Patient is not progressing as expected and requires additional follow up with physician  [x] Other: Significant preop varus and lack of full knee extension    Prognosis for POC: [x] Good [] Fair  [] Poor    Patient requires continued skilled intervention: [x] Yes  [] No    Treatment/Activity Tolerance:  [x] Patient able to complete treatment  [x] Patient limited by fatigue  [] Patient limited by pain     [] Patient limited by other medical complications  [x] Other: Program length adjusted today in order to attend his Doppler US procedure. Plans for full return to program at [facility]. Pt tolerated program fairly well. Moderate stiffness with KF ROM and minimal gain from last visit. Pt was able to gain 5° of knee extension ROM after stretching and QS. Will monitor ROM closely for progress. Reviewed ROM frequency/OP program. Pt very receptive to instructions and understands ecxpectations. PLAN:   [x] Continue per plan of care [] Alter current plan (see comments above)  [] Plan of care initiated [] Hold pending MD visit [] Discharge  Frequency/Duration:  2 days per week for 12 Weeks:  Interventions:    Updated POC  Updated girth measures   Updated LEFS  Consider ERMI paperwork  Check hanging weights tolerance (due to 15#)    Electronically signed by:      Celina Saha, PT    Note: If patient does not return for scheduled/ recommended follow up visits, this note will serve as a discharge from care along with most recent update on progress.

## 2021-02-24 ENCOUNTER — TELEPHONE (OUTPATIENT)
Dept: UROLOGY | Age: 46
End: 2021-02-24

## 2021-02-24 DIAGNOSIS — N20.0 KIDNEY STONE: Primary | ICD-10-CM

## 2021-02-24 RX ORDER — KETOROLAC TROMETHAMINE 10 MG/1
10 TABLET, FILM COATED ORAL EVERY 6 HOURS PRN
Qty: 20 TABLET | Refills: 0 | Status: SHIPPED | OUTPATIENT
Start: 2021-02-24 | End: 2021-02-25

## 2021-02-24 NOTE — TELEPHONE ENCOUNTER
Patient was seen in the office yesterday and had a horn placed. He passed 9 stones today. He is now passing some tissue with blood. The urine is dark like a weak coffee color. He has pain in his right flank and in the penis rated 6-7 on scale of 0-10 that comes and goes. He denies fever or chills. Please advise.  Thank you

## 2021-02-24 NOTE — TELEPHONE ENCOUNTER
Patient had back surgery and can not have NSAID. Urine culture faxed to [address] [place] [address]. He will give the sample tomorrow. He voiced understanding to continue the flomax.

## 2021-02-24 NOTE — TELEPHONE ENCOUNTER
His CT from 2/21 did not show any stones in his kidneys, ureters or bladder. He can anti-inflammatory for pain. I can add Toradol. He has follow up scheduled 3/4/21 with Dr Seamus Brown. He can further discuss his symptoms then. Continue the Flomax    Can we send his urine for a culture?

## 2021-02-27 ENCOUNTER — NURSE ONLY (OUTPATIENT)
Dept: LAB | Age: 46
End: 2021-02-27

## 2021-02-27 DIAGNOSIS — N20.0 KIDNEY STONE: ICD-10-CM

## 2021-03-01 LAB
ORGANISM: ABNORMAL
URINE CULTURE, ROUTINE: ABNORMAL

## 2021-03-01 RX ORDER — FLUCONAZOLE 100 MG/1
100 TABLET ORAL DAILY
Qty: 3 TABLET | Refills: 0 | Status: SHIPPED | OUTPATIENT
Start: 2021-03-01 | End: 2021-03-04 | Stop reason: ALTCHOICE

## 2021-03-02 ENCOUNTER — TELEPHONE (OUTPATIENT)
Dept: UROLOGY | Age: 46
End: 2021-03-02

## 2021-03-02 NOTE — TELEPHONE ENCOUNTER
----- Message from Mayito Rm MD sent at 3/1/2021  7:33 PM EST -----  Diflucan 100 mg three days sent to pharmacy

## 2021-03-02 NOTE — TELEPHONE ENCOUNTER
Patient advised of the urine results. He voiced understanding and will start the Diflucan. Patient with one or more new problems requiring additional work-up/treatment.

## 2021-03-04 ENCOUNTER — TELEPHONE (OUTPATIENT)
Dept: UROLOGY | Age: 46
End: 2021-03-04

## 2021-03-04 ENCOUNTER — OFFICE VISIT (OUTPATIENT)
Dept: UROLOGY | Age: 46
End: 2021-03-04
Payer: MEDICARE

## 2021-03-04 VITALS — WEIGHT: 260.4 LBS | TEMPERATURE: 97.2 F | HEIGHT: 72 IN | BODY MASS INDEX: 35.27 KG/M2

## 2021-03-04 DIAGNOSIS — N28.1 RENAL CYST: ICD-10-CM

## 2021-03-04 DIAGNOSIS — R33.9 URINARY RETENTION: Primary | ICD-10-CM

## 2021-03-04 DIAGNOSIS — Z01.818 PREOPERATIVE TESTING: ICD-10-CM

## 2021-03-04 DIAGNOSIS — N13.30 BILATERAL HYDRONEPHROSIS: ICD-10-CM

## 2021-03-04 DIAGNOSIS — N20.0 KIDNEY STONE: ICD-10-CM

## 2021-03-04 DIAGNOSIS — N13.8 BPH WITH OBSTRUCTION/LOWER URINARY TRACT SYMPTOMS: ICD-10-CM

## 2021-03-04 DIAGNOSIS — N40.1 BPH WITH OBSTRUCTION/LOWER URINARY TRACT SYMPTOMS: ICD-10-CM

## 2021-03-04 PROCEDURE — G8427 DOCREV CUR MEDS BY ELIG CLIN: HCPCS | Performed by: UROLOGY

## 2021-03-04 PROCEDURE — 4004F PT TOBACCO SCREEN RCVD TLK: CPT | Performed by: UROLOGY

## 2021-03-04 PROCEDURE — G8417 CALC BMI ABV UP PARAM F/U: HCPCS | Performed by: UROLOGY

## 2021-03-04 PROCEDURE — G8484 FLU IMMUNIZE NO ADMIN: HCPCS | Performed by: UROLOGY

## 2021-03-04 PROCEDURE — 1111F DSCHRG MED/CURRENT MED MERGE: CPT | Performed by: UROLOGY

## 2021-03-04 PROCEDURE — 99214 OFFICE O/P EST MOD 30 MIN: CPT | Performed by: UROLOGY

## 2021-03-04 RX ORDER — TAMSULOSIN HYDROCHLORIDE 0.4 MG/1
0.4 CAPSULE ORAL DAILY
Status: ON HOLD | COMMUNITY
End: 2021-11-08

## 2021-03-04 NOTE — PROGRESS NOTES
Patient:  Memo Bernal  YOB: 1975  Date: 3/4/2021    HISTORY OF PRESENT ILLNESS:   The patient is a 39 y.o. male who presents today for evaluation of the following problem(s): urinary retention     1. Kidney stone    2. Urinary retention    3. BPH with obstruction/lower urinary tract symptoms    4. Bilateral hydronephrosis    5. Renal cyst         Urinary retention   Recurring  Managed with horn    Cystoscopy 1/29/2021  Prostate:moderate obstruction by lateral lobes    Removed horn at that time, then failed again    Secondary Diagnosis:  UTI  Improving with abx      Summary of old records:    hx of bladder diverticulectomy at  in past with hx of rec UTIs  (Patient's old records, notes and chart reviewed and summarized above.)      39 y.o. male with significant past medical history of see below who presented to ER complaining of bilateral lower leg swelling. Leg swelling began status post lumbar fusion L3-L5 on 12/29/20, he also had surgery in January to removed fluid. He claims previous surgery in [employer] [facility] for his bladder due have an extra pocket of bladder that would retain urine. Managed w horn    Started flomax  I independently reviewed and verified the images and reports from:  1/14/2021 US  1. Normal kidneys. 2. Large urinary bladder postvoid residual of 1,853 mL. Last several PSA's:  No results found for: PSA    Last total testosterone:  No results found for: TESTOSTERONE    Urinalysis today:  No results found for this visit on 03/04/21.       Last BUN and creatinine:  Lab Results   Component Value Date    BUN 29 (H) 02/21/2021     Lab Results   Component Value Date    CREATININE 1.3 (H) 02/21/2021       Imaging Reviewed during this Office Visit:   imaging independently reviewed by Lainey Pelletier M.D, MD and radiology report verified demonstrating     Ct Abdomen Pelvis Wo Contrast Additional Contrast? None    Result Date: 2/21/2021  PROCEDURE: CT ABDOMEN PELVIS WO CONTRAST CLINICAL INFORMATION: Hematuria and Proteinuria, rule out nephrolithiasis . Right flank pain. COMPARISON: Chest x-ray 1/3/2021. TECHNIQUE: Axial 5 mm CT images were obtained through the abdomen and pelvis. No contrast was given. Coronal and sagittal reconstructions were obtained. All CT scans at this facility use dose modulation, iterative reconstruction, and/or weight-based dosing when appropriate to reduce radiation dose to as low as reasonably achievable. FINDINGS: The visualized aspects of the lung bases are clear. The base of the heart is within appropriate limits. The right lobe of the liver is grossly normal. The patient does not have a left liver lobe. The spleen is normal. The adrenal glands and pancreas are grossly normal. The gallbladder is normal. There is moderate severity hydronephrosis of both kidneys. Both ureters are moderately dilated. This appears similar to prior study. No stones are noted. There is some uniform bladder wall thickening. No abnormalities of the small bowel loops are noted. The IVC and aorta are of normal caliber. There are small retroperitoneal lymph nodes. These do not meet size criteria for pathologic enlargement. These are stable. Small lymph nodes are seen along the  iliac chains bilaterally. A few of these appear more prominent. These do appear stable compared to 8/23/2018. There is no pelvic free fluid. The colon is grossly normal. The patient has had a prior lumbar fusion and laminectomy. No suspicious osseous lesions are present. 1. Moderate severity bilateral hydronephrosis and hydroureter. No stones are noted. This has mildly worsened. 2. Uniform thickening of the bladder wall. This is of unknown significance. 3. Scattered retroperitoneal lymph nodes. There are also scattered pelvic lymph nodes. These have been stable. **This report has been created using voice recognition software.  It may contain minor errors which are inherent in voice recognition technology. ** Final report electronically signed by Dr. Cory Bryant on 2/21/2021 12:29 PM    PAST MEDICAL, FAMILY AND SOCIAL HISTORY:  Past Medical History:   Diagnosis Date    Alcohol abuse     in remission    [facility] right [facility]     Ascites     GERD (gastroesophageal reflux disease)     Hepatic cirrhosis (HCC)     Hepatitis B     Hepatitis C     Hypertension     Intravenous drug user     in remission    Osteomyelitis ([facility]     right arm/shoulder amputation    Psychiatric problem     PTSD (post-traumatic stress disorder)     Sciatica     Splenomegaly     Thrombocytopenia (HCC)     Ulcerative colitis ([facility]      Past Surgical History:   Procedure Laterality Date    APPENDECTOMY      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 W/BIOPSY SINGLE/MULTIPLE  9/26/2017    COLONOSCOPY WITH BIOPSY performed by Lacy Reyes MD at [facility]     Family History   Problem Relation Age of Onset    High Blood Pressure Mother     High Blood Pressure Father      Outpatient Medications Marked as Taking for the 3/4/21 encounter (Office Visit) with Mohan Herrera MD   Medication Sig Dispense Refill    tamsulosin (FLOMAX) 0.4 MG capsule Take 0.4 mg by mouth daily      famotidine (PEPCID) 40 MG tablet Take 40 mg by mouth daily      docusate sodium (COLACE) 100 MG capsule Take 100 mg by mouth daily      loratadine (CLARITIN) 10 MG capsule Take 10 mg by mouth daily      entecavir (BARACLUDE) 1 MG tablet Take 1 mg by mouth daily      lisinopril-hydroCHLOROthiazide (PRINZIDE;ZESTORETIC) 20-25 MG per tablet Take 1 tablet by mouth daily       ferrous sulfate (IRON 325) 325 (65 Fe) MG tablet Take 1 tablet by mouth 2 times daily (with meals) 1 tablet 0    benzocaine (ORAJEL) 10 % mucosal gel Take by mouth as needed.  potassium chloride (KLOR-CON M) 20 MEQ extended release tablet Take 1 tablet by mouth daily (with breakfast) 60 tablet 3    Multiple Vitamins-Minerals (MULTIVITAMIN ADULT PO) Take 1 tablet by mouth      pregabalin (LYRICA) 200 MG capsule Take 200 mg by mouth 3 times daily.  PARoxetine (PAXIL) 40 MG tablet Take 40 mg by mouth every morning      mesalamine (DELZICOL) 400 MG CPDR delayed release capsule Take 800 mg by mouth 3 times daily      folic acid (FOLVITE) 1 MG tablet Take 2 tablets by mouth daily 30 tablet 3    pantoprazole (PROTONIX) 40 MG tablet Take 40 mg by mouth 2 times daily          Gabapentin, Morphine and related, Penicillins, Trazodone and nefazodone, and Ondansetron hcl  Social History     Tobacco Use   Smoking Status Former Smoker    Quit date: 2020    Years since quittin.8   Smokeless Tobacco Current User    Types: Snuff       Social History     Substance and Sexual Activity   Alcohol Use No    Comment: Several months since last used       REVIEW OF SYSTEMS:  Constitutional: negative  Eyes: negative  Respiratory: negative  Cardiovascular: negative  Gastrointestinal: negative  Musculoskeletal: negative  Genitourinary: negative except for what is in HPI  Skin: negative   Neurological: negative  Hematological/Lymphatic: negative  Psychological: negative    Physical Exam:    This a 39 y.o. male   Vitals:    21 1348   Temp: 97.2 °F (36.2 °C)     Constitutional: Patient in no acute distress;     Assessment and Plan      1. Kidney stone    2. Urinary retention    3. BPH with obstruction/lower urinary tract symptoms    4. Bilateral hydronephrosis    5. Renal cyst           Plan:       Bilateral hydronephrosis- new problem. Severe. secondary to outlet obstruction? Chronic vs acute?  Check renal US to ensure resolution  Urinary retention- recurrent  Discussed outlet surgery  Risks: I discussed all the risks, benefits, alternatives and possible complications or surgery as well as expectations and post-op recovery. Will arrange greenlight PVP    He is agreeable and would like to proceed  UTI- recent infection.  finished antibiotics

## 2021-03-04 NOTE — TELEPHONE ENCOUNTER
DO NOT TAKE ASPIRIN, IBUPROFEN, MOTRIN-LIKE DRUGS AND ANY MULTIVITAMINS OR OVER THE COUNTER SUPPLEMENTS 5 DAYS PRIOR TO SURGERY AND 3 DAYS FOLLOWING     Heide Villegas 1975 Diagnosis: Urinary retention/ BPH    Surgical Physician: Dr. Alicia Woodard have been scheduled for the procedure marked below:      Surgery: Cystoscopy, Greenlight photovaporization of prostate           Date: 03-     Anesthesia: Anesthesiologist (General/Spinal)     Place of Service: [address] [place] [address] Second Floor Same Day Surgery         Arrive to same day surgery by:  9:30am  (Surgery time is subject to change)        INSTRUCTIONS AS MARKED BELOW:    1. DO NOT eat or drink anything after midnight before surgery. 2. We prefer you shower or bathe with an antibacterial soap (Dial) the morning of surgery. 3.  Please ensure to have a  with you to transport you home. 4.  Please bring a current medication list, photo ID and insurance card(s) with you  5. Okay to take Tylenol  6. Take blood pressure or heart medication as directed, if taken in the morning take with a small sip of water  7.  The office will call you in 1-2 days after your procedure to schedule a follow up.    03- at 1:00pm for catheter change and Covid screening     03- at 8:30am for catheter removal after surgery    (Covid-19 screening is date sensitive and can only be done 5 to 7 days prior to your procedure)    Date: 3/4/2021

## 2021-03-04 NOTE — TELEPHONE ENCOUNTER
Patient scheduled for surgery with Dr. Alexia Almaraz on 03- for a cystoscopy, Greenlight photovaporization of prostate under a general anesthetic. Patient is scheduled for a follow up appointment with Dr. Prince Johnson on 03-. Could you please ask Dr Prince Johnson if he would do a cardiac clearance on him at that time?   Thank you

## 2021-03-09 ENCOUNTER — OFFICE VISIT (OUTPATIENT)
Dept: CARDIOLOGY CLINIC | Age: 46
End: 2021-03-09
Payer: MEDICARE

## 2021-03-09 VITALS
HEART RATE: 85 BPM | DIASTOLIC BLOOD PRESSURE: 73 MMHG | WEIGHT: 259.4 LBS | SYSTOLIC BLOOD PRESSURE: 123 MMHG | BODY MASS INDEX: 35.13 KG/M2 | HEIGHT: 72 IN

## 2021-03-09 DIAGNOSIS — I10 ESSENTIAL HYPERTENSION: ICD-10-CM

## 2021-03-09 DIAGNOSIS — Z01.818 PRE-OP EVALUATION: Primary | ICD-10-CM

## 2021-03-09 DIAGNOSIS — R06.02 SOB (SHORTNESS OF BREATH) ON EXERTION: ICD-10-CM

## 2021-03-09 DIAGNOSIS — I50.32 CHRONIC DIASTOLIC (CONGESTIVE) HEART FAILURE (HCC): ICD-10-CM

## 2021-03-09 PROCEDURE — 99214 OFFICE O/P EST MOD 30 MIN: CPT | Performed by: INTERNAL MEDICINE

## 2021-03-09 RX ORDER — BUMETANIDE 1 MG/1
0.5 TABLET ORAL 2 TIMES DAILY
Qty: 30 TABLET | Refills: 0
Start: 2021-03-09

## 2021-03-09 NOTE — PROGRESS NOTES
Chief Complaint   Patient presents with    Follow-Up from Hospital    Congestive Heart Failure    Cardiac Clearance       Seen in hospital for CHF following back surgery    Pre-op for bladder sx with Dr. Jameel Brewster on March 23,21. Denied cp, palpitations, dizziness or edema    Sob on exertion chronic    Had horn for 2 weeks and difficulty of urination    EKG done 2-21-21.     Quit smoking  6  Month back    FHX  Mother had HTN  No CAD in 1st degree relative    Patient Active Problem List   Diagnosis    Ulcerative colitis ([address]    Ulcerative (chronic) enterocolitis ([address]    Ulcerative colitis, universal ([address]    Hyponatremia    Urinary retention    Essential hypertension    Metabolic acidosis    Leg swelling    ONIEL (acute kidney injury) ([address]    Azotemia    S/P lumbar laminectomy    Postoperative anemia    Chronic pain syndrome    Anxiety and depression    Fever and chills    Septicemia ([address]    Hypomagnesemia    Chronic diastolic (congestive) heart failure (HCC)    Bacteremia due to Pseudomonas    Other ulcerative colitis without complications ([address]    Pre-op evaluation- for urology surgery    SOB (shortness of breath) on exertion       Past Surgical History:   Procedure Laterality Date    APPENDECTOMY      [facility]UT[facility] AT SHOULDER Right 2017    [facility]    BACK SURGERY N/A 1/6/2021    LUMBAR I&D performed by Ray Yarbrough MD at [facility]  2005   [facility]  2020    PORT SURGERY N/A 8/17/2020    SUBCLAVIAN SINGLE LUMEN MEDIPORT INSERTION performed by Monty Hackett MD at [facility] W/BIOPSY SINGLE/MULTIPLE  9/26/2017    COLONOSCOPY WITH BIOPSY performed by Casper Gross MD at [facility]       Allergies   Allergen Reactions    Gabapentin Swelling    Morphine And Related Anaphylaxis, Hives and Swelling     Other reaction(s): Angioedema (swelling)    Penicillins Anaphylaxis    Trazodone And Nefazodone      RESTLESS LEGS    Ondansetron Hcl Rash        Family History   Problem Relation Age of Onset    High Blood Pressure Mother     High Blood Pressure Father         Social History     Socioeconomic History    Marital status:      Spouse name: Not on file    Number of children: 1    Years of education: Not on file    Highest education level: Not on file   Occupational History    Not on file   Social Needs    Financial resource strain: Not on file    Food insecurity     Worry: Not on file     Inability: Not on file    Transportation needs     Medical: Not on file     Non-medical: Not on file   Tobacco Use    Smoking status: Former Smoker     Quit date: 2020     Years since quittin.9    Smokeless tobacco: Current User     Types: Snuff   Substance and Sexual Activity    Alcohol use: No     Comment: Several months since last used    Drug use: No     Comment: 160 days since last use    Sexual activity: Not on file   Lifestyle    Physical activity     Days per week: Not on file     Minutes per session: Not on file    Stress: Not on file   Relationships    Social connections     Talks on phone: Not on file     Gets together: Not on file     Attends Judaism service: Not on file     Active member of club or organization: Not on file     Attends meetings of clubs or organizations: Not on file     Relationship status: Not on file    Intimate partner violence     Fear of current or ex partner: Not on file     Emotionally abused: Not on file     Physically abused: Not on file     Forced sexual activity: Not on file   Other Topics Concern    Not on file   Social History Narrative    Not on file       Current Outpatient Medications   Medication Sig Dispense Refill    bumetanide (BUMEX) 1 MG tablet Take 0.5 tablets by mouth 2 times daily 30 tablet 0    tamsulosin (FLOMAX) 0.4 MG capsule Take 0.4 mg by mouth daily      famotidine (PEPCID) 40 MG tablet Take 40 mg by mouth daily      docusate sodium (COLACE) 100 MG capsule Take 100 mg by mouth daily      loratadine (CLARITIN) 10 MG capsule Take 10 mg by mouth daily      entecavir (BARACLUDE) 1 MG tablet Take 1 mg by mouth daily      lisinopril-hydroCHLOROthiazide (PRINZIDE;ZESTORETIC) 20-25 MG per tablet Take 1 tablet by mouth daily       ferrous sulfate (IRON 325) 325 (65 Fe) MG tablet Take 1 tablet by mouth 2 times daily (with meals) 1 tablet 0    benzocaine (ORAJEL) 10 % mucosal gel Take by mouth as needed.  potassium chloride (KLOR-CON M) 20 MEQ extended release tablet Take 1 tablet by mouth daily (with breakfast) 60 tablet 3    Multiple Vitamins-Minerals (MULTIVITAMIN ADULT PO) Take 1 tablet by mouth      pregabalin (LYRICA) 200 MG capsule Take 200 mg by mouth 3 times daily.  PARoxetine (PAXIL) 40 MG tablet Take 40 mg by mouth every morning      mesalamine (DELZICOL) 400 MG CPDR delayed release capsule Take 800 mg by mouth 3 times daily      folic acid (FOLVITE) 1 MG tablet Take 2 tablets by mouth daily 30 tablet 3    pantoprazole (PROTONIX) 40 MG tablet Take 40 mg by mouth 2 times daily        No current facility-administered medications for this visit. Review of Systems -     General ROS: negative  Psychological ROS: negative  Hematological and Lymphatic ROS: No history of blood clots or bleeding disorder. Respiratory ROS: no cough,  or wheezing, the rest see HPI  Cardiovascular ROS: See HPI  Gastrointestinal ROS: negative  Genito-Urinary ROS: no dysuria, trouble voiding, or hematuria  Musculoskeletal ROS: negative  Neurological ROS: no TIA or stroke symptoms  Dermatological ROS: negative      Blood pressure 123/73, pulse 85, height 6' (1.829 m), weight 259 lb 6.4 oz (117.7 kg).         Physical Examination:    General appearance - alert, well appearing, and in no distress  HEENT- Pink conjunctiva  , Non-icteri sclera,PERRLA  Mental status - alert, oriented to person, place, and time  Neck - supple, no significant adenopathy, no JVD, or carotid bruits  Chest - clear to auscultation, no wheezes, rales or rhonchi, symmetric air entry  Heart - normal rate, regular rhythm, normal S1, S2, no murmurs, rubs, clicks or gallops  Abdomen - soft, nontender, nondistended, no masses or organomegaly  BAKARI- no CVA or flank tenderness, no suprapubic tenderness  Neurological - alert, oriented, normal speech, no focal findings or movement disorder noted  Musculoskeletal/limbs - no joint tenderness, deformity or swelling   - peripheral pulses normal, no pedal edema, no clubbing or cyanosis  Skin - normal coloration and turgor, no rashes, no suspicious skin lesions noted  Psych- appropriate mood and affect    Lab  No results for input(s): CKTOTAL, CKMB, CKMBINDEX, TROPONINI in the last 72 hours.   CBC:   Lab Results   Component Value Date    WBC 6.2 02/21/2021    RBC 3.94 02/21/2021    HGB 12.0 02/21/2021    HCT 38.3 02/21/2021    MCV 97.2 02/21/2021    MCH 30.5 02/21/2021    MCHC 31.3 02/21/2021    RDW 13.2 12/16/2019     02/21/2021    MPV 10.6 02/21/2021     BMP:    Lab Results   Component Value Date     02/21/2021    K 5.0 02/21/2021     02/21/2021    CO2 20 02/21/2021    BUN 29 02/21/2021    LABALBU 4.3 02/21/2021    CREATININE 1.3 02/21/2021    CALCIUM 10.1 02/21/2021    GFRAA >60 04/04/2019    GFRAA >60 10/14/2011    LABGLOM 59 02/21/2021    GLUCOSE 137 02/21/2021    GLUCOSE 166 12/16/2019     Hepatic Function Panel:    Lab Results   Component Value Date    ALKPHOS 121 02/21/2021    ALT 24 02/21/2021    AST 30 02/21/2021    PROT 9.0 02/21/2021    PROT 7.6 10/13/2011    BILITOT 0.5 02/21/2021    BILIDIR <0.2 02/08/2021    LABALBU 4.3 02/21/2021     Magnesium:    Lab Results   Component Value Date    MG 1.7 02/07/2021     Warfarin PT/INR:  No components found for: PTPATWAR, PTINRWAR  HgBA1c:  No results found for: LABA1C  FLP:    Lab Results   Component Value Date    TRIG 89 01/13/2021    HDL 41 01/13/2021    LDLCALC 62 01/13/2021     TSH:    Lab Results   Component Value Date    TSH 0.487 01/12/2021      Conclusions      Summary   Technically difficult examination. Normal left ventricle size and systolic function. Ejection fraction was   estimated at 55 to 60 %. There were no regional left ventricular wall   motion abnormalities and wall thickness was within normal limits. Doppler parameters were consistent with abnormal left ventricular   relaxation (grade 1 diastolic dysfunction). Signature      ----------------------------------------------------------------   Electronically signed by Rosana Aleman MD (Interpreting   physician) on 01/14/2021 at 07:19 PM      ekg 2/21/21  Sinus tachycardia  Otherwise normal ECG  When compared with ECG of 05-FEB-2021 14:34,  No significant change was found      Assessment     Diagnosis Orders   1. Pre-op evaluation- for urology surgery  Stress test, lexiscan   2. Chronic diastolic (congestive) heart failure (HCC)  Stress test, lexiscan   3. Essential hypertension  Stress test, lexiscan   4. SOB (shortness of breath) on exertion  Stress test, lexiscan         Recent hospital consult DX  ASSESSMENT:  chf acute   Liver cirrhosis with fliud over load  ONIEL on CKD  Marked leg edema  Hx of HTN    1. Acute congestive heart failure exacerbation, diastolic versus  systolic, needs to be specified, needs to get an echo. 2.  Marked bilateral lower limb swelling, probably related to the  congestive heart failure plus liver cirrhosis related. 3.  Liver cirrhosis with marked bilateral lower leg swelling. 4.  Thrombocytopenia, platelets 513.  5.  Acute kidney injury on chronic kidney disease, improving with  diuresis. 6.  History of hepatitis C and hepatitis B.  7.  History of hypertension. 8.  Status post recent back surgery. 9.  History of alcohol abuse. 10.  History of ulcerative colitis.   11.  Previous history of arthritis documented        Plan     Continue the current treatment and with constant vigilance to changes in symptoms and also any potential side effects. Return for care or seek medical attention immediately if symptoms got worse and/or develop new symptoms. Congestive heart failure: no evidence of fluid overload today, no recent hospitalization for CHF  Pat stated taking Bumex 1 mg tab 1/2 tab po bid- need verifuication  Cont kcl  No leg edema    Hypertension, on medical treatment. Seems to be under good control. Patient is compliant with medical treatment.      Pre op eval for urologic procedure  Sob  Ex smoker  Echo WNL  Cami nuc stress  If test okay may proceed with lwo to mod risk    Record reviewed      D/w the pat the plan of care    Pcp follow the chr as well and so get diuretic script from pcp per pat      Curry Rodriguez [facility]

## 2021-03-10 ENCOUNTER — OFFICE VISIT (OUTPATIENT)
Dept: NEPHROLOGY | Age: 46
End: 2021-03-10
Payer: MEDICARE

## 2021-03-10 ENCOUNTER — PREP FOR PROCEDURE (OUTPATIENT)
Dept: UROLOGY | Age: 46
End: 2021-03-10

## 2021-03-10 VITALS
OXYGEN SATURATION: 96 % | BODY MASS INDEX: 37.84 KG/M2 | SYSTOLIC BLOOD PRESSURE: 97 MMHG | WEIGHT: 279 LBS | DIASTOLIC BLOOD PRESSURE: 63 MMHG | TEMPERATURE: 97.9 F

## 2021-03-10 DIAGNOSIS — I10 ESSENTIAL HYPERTENSION: ICD-10-CM

## 2021-03-10 DIAGNOSIS — N17.9 AKI (ACUTE KIDNEY INJURY) (HCC): Primary | ICD-10-CM

## 2021-03-10 PROCEDURE — 99213 OFFICE O/P EST LOW 20 MIN: CPT | Performed by: INTERNAL MEDICINE

## 2021-03-10 RX ORDER — SODIUM CHLORIDE 9 MG/ML
INJECTION, SOLUTION INTRAVENOUS CONTINUOUS
Status: CANCELLED | OUTPATIENT
Start: 2021-03-23

## 2021-03-10 NOTE — TELEPHONE ENCOUNTER
Patient scheduled for surgery with Dr. Nonda Duane on 03- at 11:30am with an arrival of 9:30am.  Preop orders and instructions given to patient. Surgery consent signed. Patient scheduled for a catheter change on 03-16 at 1:00pm for catheter change and urine culture.   He is also scheduled for his follow up appointment on 03- for catheter removal.

## 2021-03-10 NOTE — TELEPHONE ENCOUNTER
SURGERY [address]  [address] [place] [address] [address] R[place]i Alexandra, [address]      Phone *[phone]   Surgical Scheduling Direct Line Phone *[phone] Fax *[phone]      Deandre Xi 1975 male    [address]  [address] [place] [address] [address] [address]   Marital Status:          Home Phone: [phone]      Cell Phone:    Telephone Information:   Mobile [phone]          Surgeon: Dr. Pineda Monaco  Surgery Date: 03-   Time: 11:30am    Procedure: Cystoscopy, Greenlight photovaporization of prostate    Diagnosis: BPH with obstruction     Important Medical History: In [facility]    Special Inst/Equip: JONAS Manzo Conf# [id number] per Helga    CPT Codes:    77819  Latex Allergy:  No     Cardiac Device:  No     Anesthesia:  Anesthesiologist (General/Spinal)          Admission Type:  Same Day                             Admit Prior to Day of Surgery: No    Case Location:  Main OR           Preadmission Testing:Phone Call              PAT Date and Time:______________________________________________________    PAT Confirmation #: ______________________________________________________    Post Op Visit: ___________________________________________________________    Need Preop Cardiac Clearance: Yes    Does Patient have Cardiologist/physician?      Dr. Franci Melton    Surgery Confirmation #: __________________________________________________    Jonas Agreste: ________________________   Date: __________________________     [employer] Name: Medicare

## 2021-03-10 NOTE — PROGRESS NOTES
[address] [facility]  [address]  [facility] [address]  Dept: [phone]  Loc: [phone]  Office Progress Note  3/10/2021 10:34 AM      Pt Name:    Scottie Martin  YOB: 1975  Primary Care Physician:  CRISTIAN Javed - CNP     Chief Complaint: post hospital discharge follow-up ONIEL     Background Information/Interval History:   56 yo male with hx hepatitis C, ?cirrhosis, Right arm amputation who was recently in hospital and was seen by nephrology for peripheral edema. This was not secondary to renal origin. He had ONIEL post contrast and lisinopril. He was managed supportively. He is following with urology for urinary retention, hydronephrosis and horn catheter. Scheduled for surgery later this month. Has leg bag. No leg swelling. BP is in 90s. He says he feels dizzy when he stands up. Patient is on lisinopril-hydrochlorothiazide, Bumex and potassium.      Past History:  Past Medical History:   Diagnosis Date    Alcohol abuse     in remission    Amputation of right arm (HCC)     Ascites     GERD (gastroesophageal reflux disease)     Hepatic cirrhosis (HCC)     Hepatitis B     Hepatitis C     Hypertension     Intravenous drug user     in remission    Osteomyelitis ([facility]     right arm/shoulder amputation    Psychiatric problem     PTSD (post-traumatic stress disorder)     Sciatica     Splenomegaly     Thrombocytopenia (HCC)     Ulcerative colitis ([address]      Past Surgical History:   Procedure Laterality Date    APPENDECTOMY      ARM AMPUTATION AT SHOULDER Right 2017    [facility]    BACK SURGERY N/A 1/6/2021    LUMBAR I&D performed by Baltazar Kern MD at [facility]  2005    ELBOW FRACTURE SURGERY  2020    PORT SURGERY N/A 8/17/2020    SUBCLAVIAN SINGLE LUMEN MEDIPORT INSERTION performed by Xiomy Kinsey MD at [address] 9/26/2017    COLONOSCOPY WITH BIOPSY performed by Titus Keating MD at [facility]        VITALS:  BP 97/63 (Site: Left Upper Arm, Position: Sitting, Cuff Size: Small Adult)   Temp 97.9 °F (36.6 °C)   Wt Readings from Last 3 Encounters:   03/09/21 259 lb 6.4 oz (117.7 kg)   03/04/21 260 lb 6.4 oz (118.1 kg)   02/23/21 256 lb (116.1 kg)     There is no height or weight on file to calculate BMI. General Appearance: alert and cooperative with exam, appears comfortable, no distress  Oral: moist oral mucus membranes  Neck: No jugular venous distention  Lungs: Air entry B/L, no crackles or rales, no use of accessory muscles  Heart: S1, S2 heard  GI: soft, non-tender, no guarding  Extremities: No sig LE edema     Medications:  Current Outpatient Medications   Medication Sig Dispense Refill    bumetanide (BUMEX) 1 MG tablet Take 0.5 tablets by mouth 2 times daily 30 tablet 0    tamsulosin (FLOMAX) 0.4 MG capsule Take 0.4 mg by mouth daily      famotidine (PEPCID) 40 MG tablet Take 40 mg by mouth daily      docusate sodium (COLACE) 100 MG capsule Take 100 mg by mouth daily      loratadine (CLARITIN) 10 MG capsule Take 10 mg by mouth daily      entecavir (BARACLUDE) 1 MG tablet Take 1 mg by mouth daily      lisinopril-hydroCHLOROthiazide (PRINZIDE;ZESTORETIC) 20-25 MG per tablet Take 1 tablet by mouth daily       ferrous sulfate (IRON 325) 325 (65 Fe) MG tablet Take 1 tablet by mouth 2 times daily (with meals) 1 tablet 0    benzocaine (ORAJEL) 10 % mucosal gel Take by mouth as needed.  potassium chloride (KLOR-CON M) 20 MEQ extended release tablet Take 1 tablet by mouth daily (with breakfast) 60 tablet 3    Multiple Vitamins-Minerals (MULTIVITAMIN ADULT PO) Take 1 tablet by mouth      pregabalin (LYRICA) 200 MG capsule Take 200 mg by mouth 3 times daily.       PARoxetine (PAXIL) 40 MG tablet Take 40 mg by mouth every morning      mesalamine (DELZICOL) 400 MG CPDR delayed release capsule Take 800 mg by mouth 3 times daily      folic acid (FOLVITE) 1 MG tablet Take 2 tablets by mouth daily 30 tablet 3    pantoprazole (PROTONIX) 40 MG tablet Take 40 mg by mouth 2 times daily        No current facility-administered medications for this visit. Laboratory & Diagnostics:  Old labs  Echo: EF 81-84%, Grade I diastolic dysfunction  Feb 2021; K 5.0, creat 1.3, UA: (-) blood, (-) protein  UA: + blood, 300 protein    US: Right 11.5, 10.7 cm, ++ large post void residual     Impression/Plan:   1. Recent ONIEL: resolved. Peak creatinine level was 1.6 recently in the hospital.  Creat is 1.3 almost two weeks ago. Will repeat labs. His urinalysis in the hospital was negative for any blood or protein. However repeat UA showed 300 protein. Will check urine protein creatinine ratio. 2. Recent urinary retention: has +horn in place, seeing urology  3. BPH with obsruction: Scheduled for green light surgery  5. Chronic diastolic dysfunction: on low dose bumex 0.5 mg po BID and (K-dur 20 meq po daily)  6. Lumbar hematoma s/p Lumbar surgery  7. HTN: Now with relative hypotension. Will stop lisinopril/HCTZ for now. Discussed with patient. He voiced understanding. Advised him to call us with blood pressure readings 3 to 5 days  8. History of hepatitis C  9. Right arm amputation    Orders Placed This Encounter   Procedures    Basic Metabolic Panel    Basic Metabolic Panel    Protein / creatinine ratio, urine     Return in about 6 months (around 9/10/2021).     Charmayne Carrie, MD  [facility]

## 2021-03-10 NOTE — PROGRESS NOTES
Patient didn't bring med list, nothing has changed. He will have surgery on march 23 rd for his bladder.

## 2021-03-16 ENCOUNTER — NURSE ONLY (OUTPATIENT)
Dept: UROLOGY | Age: 46
End: 2021-03-16
Payer: MEDICARE

## 2021-03-16 DIAGNOSIS — Z01.818 PRE-OP TESTING: Primary | ICD-10-CM

## 2021-03-16 PROCEDURE — 51702 INSERT TEMP BLADDER CATH: CPT | Performed by: UROLOGY

## 2021-03-16 NOTE — PROGRESS NOTES
Patient has given me verbal consent to perform catheter change yes    Does patient have latex allergy? no  Does patient have shellfish or betadine allergy? no      Following Dr Calvin Messina plan of care. 16 Fr Catheter changed without difficulty. Once balloon was deflated, removed catheter without difficulty. Cleansed head of penis with betadine swab. 16 Fr regular horn was inserted without difficulty. Catheter was not flushed with water due to collection of urine for culture. I jnflated balloon with 10 ml of water. Catheter was plugged until able to get adequate specimen for culture. Horn Catheter was hooked up to leg bag with straps. Patient instructed on catheter care including draining catheter bag and keeping catheter bag above the knee to prevent pulling on catheter causing blood. Urine culture sent out.

## 2021-03-17 DIAGNOSIS — Z01.818 PREOPERATIVE TESTING: ICD-10-CM

## 2021-03-17 DIAGNOSIS — R33.9 URINARY RETENTION: Primary | ICD-10-CM

## 2021-03-17 DIAGNOSIS — N13.8 BPH WITH OBSTRUCTION/LOWER URINARY TRACT SYMPTOMS: ICD-10-CM

## 2021-03-17 DIAGNOSIS — N40.1 BPH WITH OBSTRUCTION/LOWER URINARY TRACT SYMPTOMS: ICD-10-CM

## 2021-03-17 NOTE — PROGRESS NOTES
In preparation for their surgical procedure above patient was screened for Obstructive Sleep Apnea (KAMILA) using the STOP-Bang Questionnaire by the Pre-Admission Testing department. This is a pre-surgical screening tool for patient safety and serves as a recommendation, this WILL NOT cause cancellation of surgery. STOP-Bang Questionnaire  * Do you currently see a pulmonologist?  No     If yes STOP, do not complete. Patient follows with Dr.     1.  Do you snore loudly (able to be heard in the next room)? No    2. Do you often feel tired or sleepy during the daytime? No       3. Has anyone ever told you that you stop breathing during your sleep? No    4. Do you have or are you being treated for high blood pressure? No      5. BMI more than 35? BMI (Calculated): 37.9        Yes    6. Age over 48 years? 55 y.o. No    7. Neck Circumference greater than 17 inches for male or 16 inches for female? Measured           (visits only)            Not Applicable    8. Gender Male? Yes      TOTAL SCORE: 2    KAIMLA - Low Risk : Yes to 0 - 2 questions  KAMILA - Intermediate Risk : Yes to 3 - 4 questions  KAMILA - High Risk : Yes to 5 - 8 questions    Adapted from:   STOP Questionnaire: A Tool to Screen Patients for Obstructive Sleep Apnea   PARESH LuciaC.P.C., CARLOS Dave.B.B.S., Mayra Chowdhury M.D., [facility]. Reba Wallace, Ph.D., CARLOS Gaona.B.B.S., Anahi Cabrera., Amina Wetzel M.D., Tamara Rothman. STELLA Sanchez.P.C.    Anesthesiology 2008; 020:341-81 Copyright 2008, the [address] of [employer]esthesiologists, [facility].   ----------------------------------------------------------------------------------------------------------------

## 2021-03-17 NOTE — PROGRESS NOTES
Following instructions given to patient, who states understanding:     NPO after midnight  [employer] and 's license  Wear comfortable clean clothing  Do not bring jewelry   Shower night before and morning of surgery with a liquid antibacterial soap  Bring medications in original bottles  Follow all instructions given by your physician   needed at discharge  Call [phone] for any questions  Report to [facility] on 2nd floor  If you would become ill prior to surgery, please call the surgeon  May have only 1 visitor accompany you for surgery  Please bring and wear mask  You will be receiving a phone call one or two days before surgery to review covid screening exposure     Covid screening questionnaire complete and negative for symptoms or exposure see chart for documentation. Ambrosio Campbell will come in early before stress test on 3/18/21 to get covid test in outpatient express. I called Shruthi Oliva in Dr. Dena Aase office and asked her to put the order in. Thank you.       Please limit your exposure to the public after you have your covid test  Please call your doctor immediately if you develop any symptoms of covid prior to your surgery

## 2021-03-18 ENCOUNTER — HOSPITAL ENCOUNTER (OUTPATIENT)
Dept: NON INVASIVE DIAGNOSTICS | Age: 46
Discharge: HOME | End: 2021-03-18
Payer: MEDICARE

## 2021-03-18 VITALS — BODY MASS INDEX: 33.86 KG/M2 | WEIGHT: 250 LBS | HEIGHT: 72 IN

## 2021-03-18 DIAGNOSIS — I50.32 CHRONIC DIASTOLIC (CONGESTIVE) HEART FAILURE (HCC): ICD-10-CM

## 2021-03-18 DIAGNOSIS — Z01.818 PRE-OP EVALUATION: ICD-10-CM

## 2021-03-18 DIAGNOSIS — I10 ESSENTIAL HYPERTENSION: ICD-10-CM

## 2021-03-18 DIAGNOSIS — R06.02 SOB (SHORTNESS OF BREATH) ON EXERTION: ICD-10-CM

## 2021-03-18 LAB
ORGANISM: ABNORMAL
URINE CULTURE, ROUTINE: ABNORMAL

## 2021-03-18 PROCEDURE — 3430000000 HC RX DIAGNOSTIC RADIOPHARMACEUTICAL: Performed by: INTERNAL MEDICINE

## 2021-03-18 PROCEDURE — A9500 TC99M SESTAMIBI: HCPCS | Performed by: INTERNAL MEDICINE

## 2021-03-18 PROCEDURE — 93017 CV STRESS TEST TRACING ONLY: CPT | Performed by: INTERNAL MEDICINE

## 2021-03-18 PROCEDURE — 78452 HT MUSCLE IMAGE SPECT MULT: CPT

## 2021-03-18 PROCEDURE — 6360000002 HC RX W HCPCS

## 2021-03-18 RX ADMIN — Medication 31.4 MILLICURIE: at 14:30

## 2021-03-18 RX ADMIN — Medication 8 MILLICURIE: at 13:40

## 2021-03-19 ENCOUNTER — TELEPHONE (OUTPATIENT)
Dept: UROLOGY | Age: 46
End: 2021-03-19

## 2021-03-19 DIAGNOSIS — N30.00 ACUTE CYSTITIS WITHOUT HEMATURIA: Primary | ICD-10-CM

## 2021-03-19 RX ORDER — DOXYCYCLINE HYCLATE 100 MG
100 TABLET ORAL 2 TIMES DAILY
Qty: 20 TABLET | Refills: 0 | Status: SHIPPED | OUTPATIENT
Start: 2021-03-19 | End: 2021-03-29

## 2021-03-22 ENCOUNTER — HOSPITAL ENCOUNTER (OUTPATIENT)
Age: 46
Discharge: HOME | End: 2021-03-22
Payer: MEDICARE

## 2021-03-22 DIAGNOSIS — R33.9 URINARY RETENTION: ICD-10-CM

## 2021-03-22 DIAGNOSIS — N17.9 AKI (ACUTE KIDNEY INJURY) (HCC): ICD-10-CM

## 2021-03-22 DIAGNOSIS — I10 ESSENTIAL HYPERTENSION: ICD-10-CM

## 2021-03-22 DIAGNOSIS — Z01.818 PREOPERATIVE TESTING: ICD-10-CM

## 2021-03-22 DIAGNOSIS — N40.1 BPH WITH OBSTRUCTION/LOWER URINARY TRACT SYMPTOMS: ICD-10-CM

## 2021-03-22 DIAGNOSIS — N13.8 BPH WITH OBSTRUCTION/LOWER URINARY TRACT SYMPTOMS: ICD-10-CM

## 2021-03-22 LAB
ANION GAP SERPL CALCULATED.3IONS-SCNC: 11 MEQ/L (ref 8–16)
BUN BLDV-MCNC: 28 MG/DL (ref 7–22)
CALCIUM SERPL-MCNC: 9.7 MG/DL (ref 8.5–10.5)
CHLORIDE BLD-SCNC: 102 MEQ/L (ref 98–111)
CO2: 24 MEQ/L (ref 23–33)
CREAT SERPL-MCNC: 1.1 MG/DL (ref 0.4–1.2)
CREATININE URINE: 48.2 MG/DL
GFR SERPL CREATININE-BSD FRML MDRD: 72 ML/MIN/1.73M2
GLUCOSE BLD-MCNC: 146 MG/DL (ref 70–108)
POTASSIUM SERPL-SCNC: 4.6 MEQ/L (ref 3.5–5.2)
PROT/CREAT RATIO, UR: 0.2
PROTEIN, URINE: 9.4 MG/DL
SODIUM BLD-SCNC: 137 MEQ/L (ref 135–145)

## 2021-03-22 PROCEDURE — 80048 BASIC METABOLIC PNL TOTAL CA: CPT

## 2021-03-22 PROCEDURE — U0003 INFECTIOUS AGENT DETECTION BY NUCLEIC ACID (DNA OR RNA); SEVERE ACUTE RESPIRATORY SYNDROME CORONAVIRUS 2 (SARS-COV-2) (CORONAVIRUS DISEASE [COVID-19]), AMPLIFIED PROBE TECHNIQUE, MAKING USE OF HIGH THROUGHPUT TECHNOLOGIES AS DESCRIBED BY CMS-2020-01-R: HCPCS

## 2021-03-22 PROCEDURE — 82570 ASSAY OF URINE CREATININE: CPT

## 2021-03-22 PROCEDURE — 36415 COLL VENOUS BLD VENIPUNCTURE: CPT

## 2021-03-22 PROCEDURE — U0005 INFEC AGEN DETEC AMPLI PROBE: HCPCS

## 2021-03-22 PROCEDURE — 84156 ASSAY OF PROTEIN URINE: CPT

## 2021-03-22 NOTE — PROGRESS NOTES
Patient contacted regarding COVID-19 screen. Test was done on to get done today* at [facility]**  Following questions asked: In the last month, have you been in contact with someone who was confirmed or suspected to have Coronavirus/COVID-19:  Patient stated NO      Pt was informed can be a visitor allowed. Please bring masks. Do you or family members have any of the following symptoms:  Cough-no   Muscle pain-no   Shortness of breath-no   Fever-no   Weakness-no  Severe headache-no   Sore throat-no   Respiratory symptoms-no    Have you traveled internationally in the last month? No     Have you been to the emergency room recently-no     Inform pt will need to have urine test done if she hasn't had a tubal ligation or hysterectomy.

## 2021-03-23 ENCOUNTER — ANESTHESIA (OUTPATIENT)
Dept: OPERATING ROOM | Age: 46
End: 2021-03-23
Payer: MEDICARE

## 2021-03-23 ENCOUNTER — ANESTHESIA EVENT (OUTPATIENT)
Dept: OPERATING ROOM | Age: 46
End: 2021-03-23
Payer: MEDICARE

## 2021-03-23 ENCOUNTER — HOSPITAL ENCOUNTER (OUTPATIENT)
Age: 46
Setting detail: OUTPATIENT SURGERY
Discharge: HOME | End: 2021-03-23
Attending: UROLOGY | Admitting: UROLOGY
Payer: MEDICARE

## 2021-03-23 ENCOUNTER — APPOINTMENT (OUTPATIENT)
Dept: GENERAL RADIOLOGY | Age: 46
End: 2021-03-23
Attending: UROLOGY
Payer: MEDICARE

## 2021-03-23 VITALS
OXYGEN SATURATION: 95 % | SYSTOLIC BLOOD PRESSURE: 172 MMHG | RESPIRATION RATE: 16 BRPM | HEIGHT: 72 IN | TEMPERATURE: 96.9 F | WEIGHT: 255.8 LBS | DIASTOLIC BLOOD PRESSURE: 98 MMHG | HEART RATE: 77 BPM | BODY MASS INDEX: 34.65 KG/M2

## 2021-03-23 VITALS
OXYGEN SATURATION: 100 % | DIASTOLIC BLOOD PRESSURE: 48 MMHG | RESPIRATION RATE: 8 BRPM | SYSTOLIC BLOOD PRESSURE: 84 MMHG

## 2021-03-23 LAB
SARS-COV-2, NAAT: NOT DETECTED
SARS-COV-2: NOT DETECTED
SOURCE: NORMAL

## 2021-03-23 PROCEDURE — 2500000003 HC RX 250 WO HCPCS: Performed by: ANESTHESIOLOGY

## 2021-03-23 PROCEDURE — 7100000010 HC PHASE II RECOVERY - FIRST 15 MIN: Performed by: UROLOGY

## 2021-03-23 PROCEDURE — 6370000000 HC RX 637 (ALT 250 FOR IP)

## 2021-03-23 PROCEDURE — 3700000001 HC ADD 15 MINUTES (ANESTHESIA): Performed by: UROLOGY

## 2021-03-23 PROCEDURE — 2580000003 HC RX 258: Performed by: UROLOGY

## 2021-03-23 PROCEDURE — 3600000003 HC SURGERY LEVEL 3 BASE: Performed by: UROLOGY

## 2021-03-23 PROCEDURE — 7100000000 HC PACU RECOVERY - FIRST 15 MIN: Performed by: UROLOGY

## 2021-03-23 PROCEDURE — 6360000002 HC RX W HCPCS: Performed by: ANESTHESIOLOGY

## 2021-03-23 PROCEDURE — 7100000001 HC PACU RECOVERY - ADDTL 15 MIN: Performed by: UROLOGY

## 2021-03-23 PROCEDURE — 7100000011 HC PHASE II RECOVERY - ADDTL 15 MIN: Performed by: UROLOGY

## 2021-03-23 PROCEDURE — 2720000010 HC SURG SUPPLY STERILE: Performed by: UROLOGY

## 2021-03-23 PROCEDURE — 3700000000 HC ANESTHESIA ATTENDED CARE: Performed by: UROLOGY

## 2021-03-23 PROCEDURE — 6360000002 HC RX W HCPCS: Performed by: NURSE ANESTHETIST, CERTIFIED REGISTERED

## 2021-03-23 PROCEDURE — 2709999900 HC NON-CHARGEABLE SUPPLY: Performed by: UROLOGY

## 2021-03-23 PROCEDURE — 3600000013 HC SURGERY LEVEL 3 ADDTL 15MIN: Performed by: UROLOGY

## 2021-03-23 PROCEDURE — 71045 X-RAY EXAM CHEST 1 VIEW: CPT

## 2021-03-23 PROCEDURE — 87635 SARS-COV-2 COVID-19 AMP PRB: CPT

## 2021-03-23 RX ORDER — DEXAMETHASONE SODIUM PHOSPHATE 10 MG/ML
INJECTION, EMULSION INTRAMUSCULAR; INTRAVENOUS PRN
Status: DISCONTINUED | OUTPATIENT
Start: 2021-03-23 | End: 2021-03-23 | Stop reason: SDUPTHER

## 2021-03-23 RX ORDER — SUCCINYLCHOLINE/SOD CL,ISO/PF 200MG/10ML
SYRINGE (ML) INTRAVENOUS PRN
Status: DISCONTINUED | OUTPATIENT
Start: 2021-03-23 | End: 2021-03-23 | Stop reason: SDUPTHER

## 2021-03-23 RX ORDER — LABETALOL 20 MG/4 ML (5 MG/ML) INTRAVENOUS SYRINGE
5 EVERY 10 MIN PRN
Status: DISCONTINUED | OUTPATIENT
Start: 2021-03-23 | End: 2021-03-23 | Stop reason: HOSPADM

## 2021-03-23 RX ORDER — SODIUM CHLORIDE 9 MG/ML
INJECTION, SOLUTION INTRAVENOUS CONTINUOUS
Status: DISCONTINUED | OUTPATIENT
Start: 2021-03-23 | End: 2021-03-23 | Stop reason: HOSPADM

## 2021-03-23 RX ORDER — FENTANYL CITRATE 50 UG/ML
25 INJECTION, SOLUTION INTRAMUSCULAR; INTRAVENOUS EVERY 5 MIN PRN
Status: DISCONTINUED | OUTPATIENT
Start: 2021-03-23 | End: 2021-03-23 | Stop reason: HOSPADM

## 2021-03-23 RX ORDER — MEPERIDINE HYDROCHLORIDE 25 MG/ML
12.5 INJECTION INTRAMUSCULAR; INTRAVENOUS; SUBCUTANEOUS EVERY 5 MIN PRN
Status: DISCONTINUED | OUTPATIENT
Start: 2021-03-23 | End: 2021-03-23 | Stop reason: HOSPADM

## 2021-03-23 RX ORDER — FENTANYL CITRATE 50 UG/ML
INJECTION, SOLUTION INTRAMUSCULAR; INTRAVENOUS PRN
Status: DISCONTINUED | OUTPATIENT
Start: 2021-03-23 | End: 2021-03-23 | Stop reason: SDUPTHER

## 2021-03-23 RX ORDER — OXYCODONE HYDROCHLORIDE AND ACETAMINOPHEN 5; 325 MG/1; MG/1
1 TABLET ORAL ONCE
Status: DISCONTINUED | OUTPATIENT
Start: 2021-03-23 | End: 2021-03-23 | Stop reason: HOSPADM

## 2021-03-23 RX ORDER — FENTANYL CITRATE 50 UG/ML
50 INJECTION, SOLUTION INTRAMUSCULAR; INTRAVENOUS EVERY 5 MIN PRN
Status: DISCONTINUED | OUTPATIENT
Start: 2021-03-23 | End: 2021-03-23 | Stop reason: HOSPADM

## 2021-03-23 RX ORDER — PROMETHAZINE HYDROCHLORIDE 25 MG/ML
12.5 INJECTION, SOLUTION INTRAMUSCULAR; INTRAVENOUS
Status: DISCONTINUED | OUTPATIENT
Start: 2021-03-23 | End: 2021-03-23 | Stop reason: HOSPADM

## 2021-03-23 RX ORDER — PROPOFOL 10 MG/ML
INJECTION, EMULSION INTRAVENOUS PRN
Status: DISCONTINUED | OUTPATIENT
Start: 2021-03-23 | End: 2021-03-23 | Stop reason: SDUPTHER

## 2021-03-23 RX ORDER — OXYCODONE HYDROCHLORIDE AND ACETAMINOPHEN 5; 325 MG/1; MG/1
TABLET ORAL
Status: COMPLETED
Start: 2021-03-23 | End: 2021-03-23

## 2021-03-23 RX ADMIN — OXYCODONE HYDROCHLORIDE AND ACETAMINOPHEN 1 TABLET: 5; 325 TABLET ORAL at 15:16

## 2021-03-23 RX ADMIN — DEXAMETHASONE SODIUM PHOSPHATE 10 MG: 10 INJECTION, EMULSION INTRAMUSCULAR; INTRAVENOUS at 13:50

## 2021-03-23 RX ADMIN — Medication 140 MG: at 13:34

## 2021-03-23 RX ADMIN — FENTANYL CITRATE 100 MCG: 50 INJECTION, SOLUTION INTRAMUSCULAR; INTRAVENOUS at 12:46

## 2021-03-23 RX ADMIN — PROPOFOL 100 MG: 10 INJECTION, EMULSION INTRAVENOUS at 12:46

## 2021-03-23 RX ADMIN — SODIUM CHLORIDE: 9 INJECTION, SOLUTION INTRAVENOUS at 13:31

## 2021-03-23 RX ADMIN — PROPOFOL 160 MG: 10 INJECTION, EMULSION INTRAVENOUS at 13:34

## 2021-03-23 RX ADMIN — LABETALOL 20 MG/4 ML (5 MG/ML) INTRAVENOUS SYRINGE 5 MG: at 14:30

## 2021-03-23 RX ADMIN — FENTANYL CITRATE 50 MCG: 50 INJECTION, SOLUTION INTRAMUSCULAR; INTRAVENOUS at 13:34

## 2021-03-23 ASSESSMENT — PULMONARY FUNCTION TESTS
PIF_VALUE: 2
PIF_VALUE: 1
PIF_VALUE: 1
PIF_VALUE: 26
PIF_VALUE: 1
PIF_VALUE: 26
PIF_VALUE: 1
PIF_VALUE: 1
PIF_VALUE: 25
PIF_VALUE: 26
PIF_VALUE: 1
PIF_VALUE: 19
PIF_VALUE: 20
PIF_VALUE: 1
PIF_VALUE: 19
PIF_VALUE: 1
PIF_VALUE: 1
PIF_VALUE: 26
PIF_VALUE: 1
PIF_VALUE: 1
PIF_VALUE: 20
PIF_VALUE: 26
PIF_VALUE: 19
PIF_VALUE: 1
PIF_VALUE: 1
PIF_VALUE: 25
PIF_VALUE: 1
PIF_VALUE: 25
PIF_VALUE: 1
PIF_VALUE: 26
PIF_VALUE: 1
PIF_VALUE: 3
PIF_VALUE: 1
PIF_VALUE: 21
PIF_VALUE: 1
PIF_VALUE: 4
PIF_VALUE: 1
PIF_VALUE: 1
PIF_VALUE: 25
PIF_VALUE: 1
PIF_VALUE: 25
PIF_VALUE: 31
PIF_VALUE: 20
PIF_VALUE: 1
PIF_VALUE: 1
PIF_VALUE: 25

## 2021-03-23 ASSESSMENT — ENCOUNTER SYMPTOMS: SHORTNESS OF BREATH: 1

## 2021-03-23 ASSESSMENT — PAIN SCALES - GENERAL
PAINLEVEL_OUTOF10: 3
PAINLEVEL_OUTOF10: 5

## 2021-03-23 NOTE — PROGRESS NOTES
Patient ambulated in hallway. Denies dizziness at this time. Urine remained cherry in color. Appearance is clear. Patient states he is ready to go home. Back to room.

## 2021-03-23 NOTE — ANESTHESIA PROCEDURE NOTES
Central Venous Line:    A central venous line was placed using ultrasound guidance and surface landmarks, in the OR for the following indication(s): central venous access and patient with histroy of difficult access. Sterility preparation included the following: hand hygiene performed prior to procedure, maximum sterile barriers used and sterile technique used to drape from head to toe. The patient was placed in Trendelenburg position. The right internal jugular vein was prepped. The site was prepped with Chloraprep. Catheter size: 18ga IV catheter. single lumen was placed. During the procedure, the following specific steps were taken: target vein identified, needle advanced into vein and blood aspirated and guidewire advanced into vein. Intravenous verification was obtained by ultrasound and venous blood return. Post insertion care included: all ports flushed easily and dressing applied. During the procedure the patient experienced: patient tolerated procedure well with no complications. Anesthesia type: local.. No    Additional notes:  I attempted access without success asked Dr Holloway for assistance  Able to place line without difficulty  Staffing  Performed: Anesthesiologist   Anesthesiologist: Ashley Speaks, DO  Other anesthesia staff: Shawn Rosales MD  Preanesthetic Checklist  Completed: patient identified, IV checked, site marked, risks and benefits discussed, surgical consent, monitors and equipment checked, pre-op evaluation, timeout performed, anesthesia consent given, oxygen available and patient being monitored

## 2021-03-23 NOTE — OP NOTE
DATE:  03/23/21  Karina Villegas  1975  [id number]     Surgeon: Dr. Antonieta Blues:  1. Urinary retention. 2. Benign prostatic hyperplasia with obstruction. POSTOPERATIVE DIAGNOSIS:  1. Urinary retention. 2. Benign prostatic hyperplasia with obstruction. PROCEDURES PERFORMED:  1. Cystoscopy. 2. GreenLight photovaporization of the prostate. ANESTHESIA:  General.  COMPLICATIONS:  None. SPECIMENS:  Urine for culture. ESTIMATED BLOOD LOSS:  Minimal.  DRAINS:  A 22 Singaporean 3-way Cullen catheter.     INDICATIONS: Jasmina houser a 55 y.o. male presents today for GreenLight photovaporization of the prostate. After risks, benefits and alternatives of the procedure were discussed with the patient, informed consent was obtained and the patient elected to proceed.     OPERATIVE SUMMARY:  The risks and benefits of the procedure were explained to the patient in the preoperative area. After informed consent was obtained, the patient was taken back to the operating room. The patient was transferred to the operating table and placed in a supine position. General anesthesia was induced and the patient was placed in the dorsal lithotomy position. He was prepped and draped in a sterile fashion and a time-out was performed to confirm patient identity and procedure. Prior to induction of anesthesia the patient was administered preoperative antibiotics and EPC cuffs were on and functioning. Our continuous flow sheath with obturator and lens was inserted through the patient's urethra and into the bladder. Upon entering the bladder we located both ureteral orifices, they were at a safe distance from the vesical neck. On evaluation of the prostate the patient was noted to have bilobar obstruction. The GreenLight fiber was then inserted after we removed our obturator and placed our working bridge through out continous flow sheath.  GreenLight photovaporization was initiated beginning with the median lobe. After the median lobe was taken down, we then turned our attention to the patient's left lateral lobe. At the 5 O'clock position we made a groove from the vesical neck down to the level of the verumontanum, which was used as our distal landmark to protect the external sphincter. We vaporized adenomatous tissue down to the level of the capsule. During this part of the procedure the power level was increased to 180 joyner. This was used as a guide of depth, and carried around in a counter-clockwise fashion to the 12 O'clock position. The process was then repeated on the contralateral side starting at the 7 O'clock position. We vaporized adenomatous tissue down to the level of the capsule, which again was used as a guide of depth, and carried around in a clockwise fashion to the 12 O'clock position. Finally we turned our attention to the apical tissue. Extensive photovaporization of the prostate was performed. We then incised the bladder neck with our laser. At this time the irrigation was turned off and the prostatic urethra appeared to be wide open and no longer obstructed. Hemostasis was achieved and persistent. Both ureteral orifices were noted to be uninvolved in the resection. There was no scatter of laser fiber into the bladder. We did not go distal to the verumontanum. We worked at 80 joyner of power at the bladder neck and near the apex of the prostate. The scope was removed and a 22-Malawian 3-way Cullen catheter was inserted and irrigated to confirm position. Continuous bladder irrigation with normal saline was then initiated and 3 L of normal saline were allowed to infuse before the catheter was clamped. The patient was awoken and transferred to PACU. All instruments and equipment were accounted for at the end of the procedure.      DISPOSITION:  The patient was transferred to PACU in good condition.  He will be discharged home from the hospital per the PACU team. Appropriate follow up will be arranged for catheter removal

## 2021-03-23 NOTE — PROGRESS NOTES
Patient admitted to [facility] room 14. Bed in low position side rails up call light in reach. Patient denies questions at this time.

## 2021-03-23 NOTE — PROGRESS NOTES
1415: pt arrived to pacu. Pt awake alert and oriented. cbi infusing wide open. Urine colorless. cbi slowed down to a moderate rate.    1420: chest xray at bedside for IJ placement

## 2021-03-23 NOTE — H&P
History and Physical    Patient:  Paul Christianson  MRN: [id number]  YOB: 1975    CHIEF COMPLAINT:  BPH    HISTORY OF PRESENT ILLNESS:   The patient is a 55 y.o. male who presents with BPH    Patient's old records, notes and chart reviewed and summarized above. Past Medical History:    Past Medical History:   Diagnosis Date    Alcohol abuse     in remission    Amputation of right arm (HCC)     Ascites     GERD (gastroesophageal reflux disease)     Hepatic cirrhosis (HCC)     Hepatitis B     Hepatitis C     History of blood transfusion     ulcerataive colitis    Hypertension     Intravenous drug user     in remission    Osteomyelitis ([facility] [place] [address]     right arm/shoulder amputation    Psychiatric problem     PTSD (post-traumatic stress disorder)     Sciatica     Splenomegaly     Thrombocytopenia (HCC)     Ulcerative colitis ([facility] [address]        Past Surgical History:    Past Surgical History:   Procedure Laterality Date    APPENDECTOMY      [place]    BACK SURGERY N/A 1/6/2021    LUMBAR I&D performed by Wang Nj MD at [place] [id number]    ELBOW FRACTURE SURGERY  2020    PORT SURGERY N/A 8/17/2020    SUBCLAVIAN SINGLE LUMEN MEDIPORT INSERTION performed by Marcos Reese MD at [address] W/BIOPSY SINGLE/MULTIPLE  9/26/2017    COLONOSCOPY WITH BIOPSY performed by Kelly Reaves MD at [facility]     Medications Prior to Admission:    Prior to Admission medications    Medication Sig Start Date End Date Taking?  Authorizing Provider   bumetanide (BUMEX) 1 MG tablet Take 0.5 tablets by mouth 2 times daily 3/9/21  Yes Melia Norris MD   tamsulosin (FLOMAX) 0.4 MG capsule Take 0.4 mg by mouth daily   Yes Historical Provider, MD   famotidine (PEPCID) 40 MG tablet Take 40 mg by mouth daily   Yes Historical Provider, MD   docusate sodium (COLACE) 100 MG capsule Take 100 mg by mouth daily   Yes Historical Provider, MD   loratadine (CLARITIN) 10 MG capsule Take 10 mg by mouth daily   Yes Historical Provider, MD   entecavir (BARACLUDE) 1 MG tablet Take 1 mg by mouth daily   Yes Historical Provider, MD   ferrous sulfate (IRON 325) 325 (65 Fe) MG tablet Take 1 tablet by mouth 2 times daily (with meals) 1/26/21  Yes CRISTIAN Vera CNP   benzocaine (ORAJEL) 10 % mucosal gel Take by mouth as needed. 1/26/21  Yes CRISTIAN Vera CNP   potassium chloride (KLOR-CON M) 20 MEQ extended release tablet Take 1 tablet by mouth daily (with breakfast) 1/16/21  Yes Fabiana Joan, DO   Multiple Vitamins-Minerals (MULTIVITAMIN ADULT PO) Take 1 tablet by mouth   Yes Historical Provider, MD   pregabalin (LYRICA) 200 MG capsule Take 200 mg by mouth 3 times daily.    Yes Historical Provider, MD   PARoxetine (PAXIL) 40 MG tablet Take 40 mg by mouth every morning   Yes Historical Provider, MD   mesalamine (DELZICOL) 400 MG CPDR delayed release capsule Take 400 mg by mouth 2 times daily    Yes Historical Provider, MD   folic acid (FOLVITE) 1 MG tablet Take 2 tablets by mouth daily  Patient taking differently: Take 1 mg by mouth 2 times daily  9/26/17  Yes Rachel Plascencia MD   pantoprazole (PROTONIX) 40 MG tablet Take 40 mg by mouth 2 times daily    Yes Historical Provider, MD   doxycycline hyclate (VIBRA-TABS) 100 MG tablet Take 1 tablet by mouth 2 times daily for 10 days 3/19/21 3/29/21  Arely Desai PA-C       Allergies:  Gabapentin, Morphine and related, Penicillins, Trazodone and nefazodone, and Ondansetron hcl    Social History:    Social History     Socioeconomic History    Marital status:      Spouse name: Not on file    Number of children: 1    Years of education: Not on file    Highest education level: Not on file   Occupational History    Not on file   Social Needs    Financial resource strain: Not on file    Food insecurity     Worry: Not on file     Inability: Not on file   [facility] Transportation needs     Medical: Not on file     Non-medical: Not on file   Tobacco Use    Smoking status: Former Smoker     Quit date: 2020     Years since quittin.9    Smokeless tobacco: Current User     Types: Snuff   Substance and Sexual Activity    Alcohol use: No     Comment: not in 4 years    Drug use: No     Comment: not in 4 years    Sexual activity: Not on file   Lifestyle    Physical activity     Days per week: Not on file     Minutes per session: Not on file    Stress: Not on file   Relationships    Social connections     Talks on phone: Not on file     Gets together: Not on file     Attends [employer] service: Not on file     Active member of club or organization: Not on file     Attends meetings of clubs or organizations: Not on file     Relationship status: Not on file    Intimate partner violence     Fear of current or ex partner: Not on file     Emotionally abused: Not on file     Physically abused: Not on file     Forced sexual activity: Not on file   Other Topics Concern    Not on file   Social History Narrative    Not on file       Family History:    Family History   Problem Relation Age of Onset    High Blood Pressure Mother     Cancer Mother         HYsterectomy    Stroke Mother     High Blood Pressure Father     Kidney Disease Father     Cancer Maternal Grandmother         Leukemia    Heart Disease Maternal Grandfather        REVIEW OF SYSTEMS:  Constitutional: negative  Eyes: negative  Respiratory: negative  Cardiovascular: negative  Gastrointestinal: negative  Genitourinary: see HPI  Musculoskeletal: negative  Skin: negative   Neurological: negative  Hematological/Lymphatic: negative  Psychological: negative    Physical Exam:      Patient Vitals for the past 24 hrs:   BP Temp Temp src Pulse Resp SpO2 Height Weight   21 1027 130/82 96.3 °F (35.7 °C) Temporal 80 16 95 % 6' (1.829 m) 255 lb 12.8 oz (116 kg)     Constitutional: Patient in no acute distress; Neuro: alert and oriented to person place and time. Psych: Mood and affect normal.  Skin: Normal  Lungs: Respiratory effort normal, CTA  Cardiovascular:  Normal peripheral pulses; no murmur  Abdomen: Soft, non-tender, non-distended with no CVA, flank pain, hepatosplenomegaly or hernia. Kidneys normal.  Bladder non-tender and not distended. LABS:   No results for input(s): WBC, HGB, HCT, MCV, PLT in the last 72 hours. Recent Labs     03/22/21  1244      K 4.6      CO2 24   BUN 28*   CREATININE 1.1     No results found for: PSA        Urinalysis: No results for input(s): COLORU, PHUR, LABCAST, WBCUA, RBCUA, MUCUS, TRICHOMONAS, YEAST, BACTERIA, CLARITYU, SPECGRAV, LEUKOCYTESUR, UROBILINOGEN, BILIRUBINUR, BLOODU in the last 72 hours. Invalid input(s): NITRATE, GLUCOSEUKETONESUAMORPHOUS     -----------------------------------------------------------------      Assessment and Plan   Impression:    Patient Active Problem List   Diagnosis    Ulcerative colitis ([address]    Ulcerative (chronic) enterocolitis ([address]    Ulcerative colitis, universal ([address]    Hyponatremia    Urinary retention    Essential hypertension    Metabolic acidosis    Leg swelling    ONIEL (acute kidney injury) ([address]    Azotemia    S/P lumbar laminectomy    Postoperative anemia    Chronic pain syndrome    Anxiety and depression    Fever and chills    Septicemia (HCC)    Hypomagnesemia    Chronic diastolic (congestive) heart failure (HCC)    Bacteremia due to Pseudomonas    Other ulcerative colitis without complications ([address]    Pre-op evaluation- for urology surgery    SOB (shortness of breath) on exertion       Plan:   Risks: I discussed all the risks, benefits, alternatives and possible complications or surgery as well as expectations and post-op recovery.       Consent obtained; cysto greenlight PVP in OR today    Ana María Burk M.D  10:44 AM 3/23/2021

## 2021-03-23 NOTE — ANESTHESIA PRE PROCEDURE
Department of Anesthesiology  Preprocedure Note       Name:  Paul Christianson   Age:  55 y.o.  :  1975                                          MRN:  [id number]         Date:  3/23/2021      Surgeon: Lynda Tolliver):  Irais Batista MD    Procedure: Procedure(s):  CYSTO, GREENLIGHT PHOTOVAPORIZATION OF PROSTATE    Medications prior to admission:   Prior to Admission medications    Medication Sig Start Date End Date Taking? Authorizing Provider   bumetanide (BUMEX) 1 MG tablet Take 0.5 tablets by mouth 2 times daily 3/9/21  Yes Melia Norris MD   tamsulosin (FLOMAX) 0.4 MG capsule Take 0.4 mg by mouth daily   Yes Historical Provider, MD   famotidine (PEPCID) 40 MG tablet Take 40 mg by mouth daily   Yes Historical Provider, MD   docusate sodium (COLACE) 100 MG capsule Take 100 mg by mouth daily   Yes Historical Provider, MD   loratadine (CLARITIN) 10 MG capsule Take 10 mg by mouth daily   Yes Historical Provider, MD   entecavir (BARACLUDE) 1 MG tablet Take 1 mg by mouth daily   Yes Historical Provider, MD   ferrous sulfate (IRON 325) 325 (65 Fe) MG tablet Take 1 tablet by mouth 2 times daily (with meals) 21  Yes MabelCRISTIAN Egan CNP   benzocaine (ORAJEL) 10 % mucosal gel Take by mouth as needed. 21  Yes MabelCRISTIAN Egan CNP   potassium chloride (KLOR-CON M) 20 MEQ extended release tablet Take 1 tablet by mouth daily (with breakfast) 21  Yes Rex Aguilar,    Multiple Vitamins-Minerals (MULTIVITAMIN ADULT PO) Take 1 tablet by mouth   Yes Historical Provider, MD   pregabalin (LYRICA) 200 MG capsule Take 200 mg by mouth 3 times daily.    Yes Historical Provider, MD   PARoxetine (PAXIL) 40 MG tablet Take 40 mg by mouth every morning   Yes Historical Provider, MD   mesalamine (DELZICOL) 400 MG CPDR delayed release capsule Take 400 mg by mouth 2 times daily    Yes Historical Provider, MD   folic acid (FOLVITE) 1 MG tablet Take 2 tablets by mouth daily  Patient taking differently: Take 1 mg by mouth 2 times daily  9/26/17  Yes Lyndsey Ortega MD   pantoprazole (PROTONIX) 40 MG tablet Take 40 mg by mouth 2 times daily    Yes Historical Provider, MD   doxycycline hyclate (VIBRA-TABS) 100 MG tablet Take 1 tablet by mouth 2 times daily for 10 days 3/19/21 3/29/21  Lew Bazan PA-C       Current medications:    Current Facility-Administered Medications   Medication Dose Route Frequency Provider Last Rate Last Admin    0.9 % sodium chloride infusion   Intravenous Continuous Franco Harrison MD        ceFAZolin (ANCEF) 2000 mg in dextrose 5 % 50 mL IVPB  2,000 mg Intravenous MD Angle           Allergies:     Allergies   Allergen Reactions    Gabapentin Swelling    Morphine And Related Anaphylaxis, Hives and Swelling     Other reaction(s): Angioedema (swelling)    Penicillins Anaphylaxis    Trazodone And Nefazodone      RESTLESS LEGS    Ondansetron Hcl Rash       Problem List:    Patient Active Problem List   Diagnosis Code    Ulcerative colitis ([facility] [address] K51.90    Ulcerative (chronic) enterocolitis ([facility] [address] K51.00    Ulcerative colitis, universal ([facility] [address] K51.00    Hyponatremia E87.1    Urinary retention R33.9    Essential hypertension Y13    Metabolic acidosis W83.9    Leg swelling M79.89    ONIEL (acute kidney injury) (HCC) N17.9    Azotemia R79.89    S/P lumbar laminectomy Z98.890    Postoperative anemia D64.9    Chronic pain syndrome G89.4    Anxiety and depression F41.9, F32.9    Fever and chills R50.9    Septicemia (HCC) A41.9    Hypomagnesemia E83.42    Chronic diastolic (congestive) heart failure (HCC) I50.32    Bacteremia due to Pseudomonas R78.81, B96.5    Other ulcerative colitis without complications ([facility]) V44.56    Pre-op evaluation- for urology surgery Z01.818    SOB (shortness of breath) on exertion R06.02       Past Medical History:        Diagnosis Date    Alcohol abuse     in remission    Amputation of right arm (HCC)     Ascites neuromuscular disease:, psychiatric history:             ROS comment: History of longstanding drug abuse  Off all drugs for 4 years GI/Hepatic/Renal:   (+) GERD:, PUD, hepatitis: C, liver disease:,           Endo/Other:                     Abdominal:       Abdomen: soft. Vascular:                                        Anesthesia Plan      general     ASA 3       Induction: intravenous. MIPS: Postoperative opioids intended and Prophylactic antiemetics administered. Anesthetic plan and risks discussed with patient.                       [facility],    3/23/2021

## 2021-03-23 NOTE — ANESTHESIA POSTPROCEDURE EVALUATION
Department of Anesthesiology  Postprocedure Note    Patient: Eva Adhikari  MRN: [id number]  YOB: 1975  Date of evaluation: 3/23/2021  Time:  3:31 PM     Procedure Summary     Date: 03/23/21 Room / Location: [facility] [place] / [facility] [place]    Anesthesia Start: 1234 Anesthesia Stop: 5580    Procedure: Cherylene Salmons PHOTOVAPORIZATION OF PROSTATE (N/A ) Diagnosis: (BPH WITH OBSTRUCTION)    Surgeons: Kalie Aldana MD Responsible Provider: Dalia Bonilla DO    Anesthesia Type: general ASA Status: 3          Anesthesia Type: general    Vipul Phase I: Vipul Score: 10    Vipul Phase II: Vipul Score: 9    Last vitals: Reviewed and per EMR flowsheets.        Anesthesia Post Evaluation    Patient location during evaluation: PACU  Patient participation: complete - patient participated  Level of consciousness: awake and alert  Pain score: 2  Airway patency: patent  Nausea & Vomiting: no nausea and no vomiting  Complications: no  Cardiovascular status: hemodynamically stable and blood pressure returned to baseline  Respiratory status: spontaneous ventilation, room air and acceptable  Hydration status: stable

## 2021-03-25 ENCOUNTER — NURSE ONLY (OUTPATIENT)
Dept: UROLOGY | Age: 46
End: 2021-03-25

## 2021-03-25 VITALS — TEMPERATURE: 97 F

## 2021-03-25 DIAGNOSIS — R33.9 URINARY RETENTION: ICD-10-CM

## 2021-03-25 PROCEDURE — 99999 PR OFFICE/OUTPT VISIT,PROCEDURE ONLY: CPT | Performed by: NURSE PRACTITIONER

## 2021-03-25 NOTE — PROGRESS NOTES
Patient has given me verbal consent to perform horn removal Yes    20 cc of water deflated from horn balloon. 22 Fr horn removed without difficulty. He will drink fluids and call in 4-6 hours if patient has not urinated. F/u with provider Dr. Tonie Swift on 4/15/21.

## 2021-04-08 PROBLEM — Z01.818 PRE-OP EVALUATION: Status: RESOLVED | Noted: 2021-03-09 | Resolved: 2021-04-08

## 2021-05-06 ENCOUNTER — OFFICE VISIT (OUTPATIENT)
Dept: UROLOGY | Age: 46
End: 2021-05-06
Payer: MEDICARE

## 2021-05-06 VITALS
WEIGHT: 262 LBS | HEIGHT: 72 IN | DIASTOLIC BLOOD PRESSURE: 80 MMHG | SYSTOLIC BLOOD PRESSURE: 130 MMHG | BODY MASS INDEX: 35.49 KG/M2

## 2021-05-06 DIAGNOSIS — N28.1 RENAL CYST: ICD-10-CM

## 2021-05-06 DIAGNOSIS — R33.9 URINARY RETENTION: ICD-10-CM

## 2021-05-06 DIAGNOSIS — N40.1 BPH WITH OBSTRUCTION/LOWER URINARY TRACT SYMPTOMS: Primary | ICD-10-CM

## 2021-05-06 DIAGNOSIS — N13.30 BILATERAL HYDRONEPHROSIS: ICD-10-CM

## 2021-05-06 DIAGNOSIS — N13.8 BPH WITH OBSTRUCTION/LOWER URINARY TRACT SYMPTOMS: Primary | ICD-10-CM

## 2021-05-06 DIAGNOSIS — N20.0 KIDNEY STONE: ICD-10-CM

## 2021-05-06 LAB — POST VOID RESIDUAL (PVR): 13 ML

## 2021-05-06 PROCEDURE — 51798 US URINE CAPACITY MEASURE: CPT | Performed by: UROLOGY

## 2021-05-06 PROCEDURE — 99024 POSTOP FOLLOW-UP VISIT: CPT | Performed by: UROLOGY

## 2021-05-06 NOTE — PROGRESS NOTES
Patient:  Daniel Johns  YOB: 1975  Date: 5/6/2021    HISTORY OF PRESENT ILLNESS:   The patient is a 55 y.o. male who presents today for evaluation of the following problem(s): urinary retention     1. BPH with obstruction/lower urinary tract symptoms    2. Urinary retention    3. Kidney stone    4. Renal cyst    5. Bilateral hydronephrosis         Urinary retention   Recurring  Managed with horn    Cystoscopy 1/29/2021  Prostate:moderate obstruction by lateral lobes    Removed horn at that time, then failed again    Secondary Diagnosis:  UTI  Improving with abx      Summary of old records:    hx of bladder diverticulectomy at  in past with hx of rec UTIs  (Patient's old records, notes and chart reviewed and summarized above.)      39 y.o. male with significant past medical history of see below who presented to ER complaining of bilateral lower leg swelling. Leg swelling began status post lumbar fusion L3-L5 on 12/29/20, he also had surgery in January to removed fluid. He claims previous surgery in [place] for his bladder due have an extra pocket of bladder that would retain urine. Managed w horn    Started flomax  I independently reviewed and verified the images and reports from:  1/14/2021 US  1. Normal kidneys. 2. Large urinary bladder postvoid residual of 1,853 mL.        greenlight PVP DATE:  03/23/21    Last several PSA's:  No results found for: PSA    Last total testosterone:  No results found for: TESTOSTERONE    Urinalysis today:  Results for POC orders placed in visit on 05/06/21   poct post void residual   Result Value Ref Range    post void residual 13 ml         Last BUN and creatinine:  Lab Results   Component Value Date    BUN 28 (H) 03/22/2021     Lab Results   Component Value Date    CREATININE 1.1 03/22/2021       Imaging Reviewed during this Office Visit:   imaging independently reviewed by Leila Bach M.D, MD and radiology report verified demonstrating     Ct Abdomen Pelvis Wo Contrast Additional Contrast? None    Result Date: 2/21/2021  PROCEDURE: CT ABDOMEN PELVIS WO CONTRAST CLINICAL INFORMATION: Hematuria and Proteinuria, rule out nephrolithiasis . Right flank pain. COMPARISON: Chest x-ray 1/3/2021. TECHNIQUE: Axial 5 mm CT images were obtained through the abdomen and pelvis. No contrast was given. Coronal and sagittal reconstructions were obtained. All CT scans at this facility use dose modulation, iterative reconstruction, and/or weight-based dosing when appropriate to reduce radiation dose to as low as reasonably achievable. FINDINGS: The visualized aspects of the lung bases are clear. The base of the heart is within appropriate limits. The right lobe of the liver is grossly normal. The patient does not have a left liver lobe. The spleen is normal. The adrenal glands and pancreas are grossly normal. The gallbladder is normal. There is moderate severity hydronephrosis of both kidneys. Both ureters are moderately dilated. This appears similar to prior study. No stones are noted. There is some uniform bladder wall thickening. No abnormalities of the small bowel loops are noted. The IVC and aorta are of normal caliber. There are small retroperitoneal lymph nodes. These do not meet size criteria for pathologic enlargement. These are stable. Small lymph nodes are seen along the  iliac chains bilaterally. A few of these appear more prominent. These do appear stable compared to 8/23/2018. There is no pelvic free fluid. The colon is grossly normal. The patient has had a prior lumbar fusion and laminectomy. No suspicious osseous lesions are present. 1. Moderate severity bilateral hydronephrosis and hydroureter. No stones are noted. This has mildly worsened. 2. Uniform thickening of the bladder wall. This is of unknown significance. 3. Scattered retroperitoneal lymph nodes. There are also scattered pelvic lymph nodes. These have been stable. 0.5 tablets by mouth 2 times daily 30 tablet 0    tamsulosin (FLOMAX) 0.4 MG capsule Take 0.4 mg by mouth daily      famotidine (PEPCID) 40 MG tablet Take 40 mg by mouth daily      docusate sodium (COLACE) 100 MG capsule Take 100 mg by mouth daily      loratadine (CLARITIN) 10 MG capsule Take 10 mg by mouth daily      entecavir (BARACLUDE) 1 MG tablet Take 1 mg by mouth daily      ferrous sulfate (IRON 325) 325 (65 Fe) MG tablet Take 1 tablet by mouth 2 times daily (with meals) 1 tablet 0    benzocaine (ORAJEL) 10 % mucosal gel Take by mouth as needed.  potassium chloride (KLOR-CON M) 20 MEQ extended release tablet Take 1 tablet by mouth daily (with breakfast) 60 tablet 3    Multiple Vitamins-Minerals (MULTIVITAMIN ADULT PO) Take 1 tablet by mouth      pregabalin (LYRICA) 200 MG capsule Take 200 mg by mouth 3 times daily.       PARoxetine (PAXIL) 40 MG tablet Take 40 mg by mouth every morning      mesalamine (DELZICOL) 400 MG CPDR delayed release capsule Take 400 mg by mouth 2 times daily       folic acid (FOLVITE) 1 MG tablet Take 2 tablets by mouth daily (Patient taking differently: Take 1 mg by mouth 2 times daily ) 30 tablet 3    pantoprazole (PROTONIX) 40 MG tablet Take 40 mg by mouth 2 times daily          Gabapentin, Morphine and related, Penicillins, Trazodone and nefazodone, and Ondansetron hcl  Social History     Tobacco Use   Smoking Status Former Smoker    Quit date: 2020    Years since quittin.0   Smokeless Tobacco Current User    Types: Snuff       Social History     Substance and Sexual Activity   Alcohol Use No    Comment: not in 4 years       REVIEW OF SYSTEMS:  Constitutional: negative  Eyes: negative  Respiratory: negative  Cardiovascular: negative  Gastrointestinal: negative  Musculoskeletal: negative  Genitourinary: negative except for what is in HPI  Skin: negative   Neurological: negative  Hematological/Lymphatic: negative  Psychological: negative    Physical Exam: This a 55 y.o. male   Vitals:    05/06/21 1503   BP: 130/80     Constitutional: Patient in no acute distress;     Assessment and Plan      1. BPH with obstruction/lower urinary tract symptoms    2. Urinary retention    3. Kidney stone    4. Renal cyst    5. Bilateral hydronephrosis           Plan:       Bilateral hydronephrosis- new problem. Renal US reviewed- no hydro.   Urinary retention- recurrent  S/p greenlight PVP- PVR 18 cc    Fu 3 months

## 2021-05-19 NOTE — DISCHARGE SUMMARY
Discharge Summary     Patient Identification:  Eber Gomez  : 1975  MRN: [id number]   Account: [de-identified]     Admit date: 2021  Discharge date: 2/10/2021   Attending provider: No att. providers found        Primary care provider: Yazan Prieto APRN - CNP     Discharge Diagnoses:   Principal Problem:    Bacteremia due to Pseudomonas  Active Problems:    Fever and chills    Septicemia ([facility] [address]    Hypomagnesemia    Chronic diastolic (congestive) heart failure ([facility] [address]  Resolved Problems:    * No resolved hospital problems. *    From prior note: \"Assessment/Plan:     1. Sepsis, d/t Mediport infection: Tmax 102.5, , WBC 4.4, Cr 1.3, 2/2 BCx positive. ID following -  cont cefepime, cont vanc, mediport d/c by IR (2021). Midline ordered. 2. ONIEL: most probable pre-renal. Cr 1.3, baseline 0.7, will trend. Rec'd 2L NS IVF resuscitation in ED. Improving, Cr 0.9.    3. Pancytopenia: CBC in am.   4. Hypomagnesemia: resolved. 5. Chronic diastolic heart failure: Cont metoprolol, monitor fluid status. Cont 2G sodium diet w/ 2L fluid restriction. Increasing edema 2021 - Acute r/o .2, CXR no acute findings. Cont Bumex. 6. Hx/o ESLD / Hep B, C, & ETOH: cont home lactulose. 7. HTN: stable, cont metoprolol. Resume home aldactone. 8. UC: chronic, cont meselamine  9. Hx/o drug and alcohol abuse: remission. Cont folic acid, MV, thiamine. 10. Obesity: BMI 35.59        Chief Complaint: Fever     Hospital Course: Mr. Eber Gomez is a 38 y/o male with PMHx of ulcerative colitis, osteomyelitis s/p RUE amputation, and hepatitis B&C who presented to the ED for fever. Pt reports he was recently admitted for lumbar surgery and upon discharge on 2021 his port was not de-accessed. His home health care nurse reporting not being able to take it out per pt. Pt states he did not seek out medical advice for further instruction, but continued to \"tape it and try to keep it clean. \" Pt went daily. Patient Instructions:    Discharge lab work: Activity: activity as tolerated  Diet: No diet orders on file    Code Status: Prior    Follow-up visits:   [address] [facility] [place] [address]  [address] [place] [address] Estephania Murillo 37 will make follow up appointment       Procedures:     Consults:   IP CONSULT TO PHARMACY  IP CONSULT TO INFECTIOUS DISEASES  PHARMACY TO DOSE MEDICATION  IP CONSULT TO SOCIAL WORK    Examination:  Vitals:  Vitals:    02/09/21 1945 02/10/21 0303 02/10/21 0800 02/10/21 1245   BP: 129/76 135/89 121/69 (!) 161/95   Pulse: 79 85 81 93   Resp: 16 16 12    Temp: 97.6 °F (36.4 °C) 98.1 °F (36.7 °C) 98.6 °F (37 °C) 98.1 °F (36.7 °C)   TempSrc: Oral Oral Oral Oral   SpO2: 97% 97% 97% 97%   Weight:       Height:         Weight: Weight: 263 lb 6.4 oz (119.5 kg)     24 hour intake/output:No intake or output data in the 24 hours ending 05/19/21 0909    General appearance - alert, well appearing, and in no distress, oriented to person, place, and time and overweight  Chest - clear to auscultation, no wheezes, rales or rhonchi, symmetric air entry  Heart - normal rate, regular rhythm, normal S1, S2, no murmurs, rubs, clicks or gallops  Abdomen - soft, nontender, nondistended, no masses or organomegaly  bowel sounds normal  Obese: No; Protuberant: No   Neurological - alert, oriented, normal speech, no focal findings or movement disorder noted  Extremities - peripheral pulses normal, no pedal edema, no clubbing or cyanosis  Skin - normal coloration and turgor, no rashes, no suspicious skin lesions noted    Significant Diagnostics:   Radiology: No results found. Labs: No results found for this or any previous visit (from the past 72 hour(s)).     Discharge condition: good  Disposition: Home  Time spent on discharge: 35 minutes    Electronically signed by Apolinar Park DO on 5/19/2021 at 9:09 AM

## 2021-06-08 ENCOUNTER — TELEPHONE (OUTPATIENT)
Dept: CARDIOLOGY CLINIC | Age: 46
End: 2021-06-08

## 2021-08-17 ENCOUNTER — OFFICE VISIT (OUTPATIENT)
Dept: UROLOGY | Age: 46
End: 2021-08-17
Payer: MEDICARE

## 2021-08-17 VITALS — HEIGHT: 72 IN | WEIGHT: 260 LBS | BODY MASS INDEX: 35.21 KG/M2

## 2021-08-17 DIAGNOSIS — N13.8 BPH WITH OBSTRUCTION/LOWER URINARY TRACT SYMPTOMS: ICD-10-CM

## 2021-08-17 DIAGNOSIS — N20.0 KIDNEY STONE: ICD-10-CM

## 2021-08-17 DIAGNOSIS — N13.30 BILATERAL HYDRONEPHROSIS: ICD-10-CM

## 2021-08-17 DIAGNOSIS — R35.1 NOCTURIA: Primary | ICD-10-CM

## 2021-08-17 DIAGNOSIS — N40.1 BPH WITH OBSTRUCTION/LOWER URINARY TRACT SYMPTOMS: ICD-10-CM

## 2021-08-17 DIAGNOSIS — R33.9 URINARY RETENTION: ICD-10-CM

## 2021-08-17 DIAGNOSIS — N28.1 RENAL CYST: ICD-10-CM

## 2021-08-17 LAB
BILIRUBIN URINE: NEGATIVE
BLOOD URINE, POC: NEGATIVE
CHARACTER, URINE: CLEAR
COLOR, URINE: YELLOW
GLUCOSE URINE: NEGATIVE MG/DL
KETONES, URINE: NEGATIVE
LEUKOCYTE CLUMPS, URINE: NEGATIVE
NITRITE, URINE: NEGATIVE
PH, URINE: 6 (ref 5–9)
POST VOID RESIDUAL (PVR): 361 ML
PROTEIN, URINE: NEGATIVE MG/DL
SPECIFIC GRAVITY, URINE: 1.01 (ref 1–1.03)
UROBILINOGEN, URINE: 0.2 EU/DL (ref 0–1)

## 2021-08-17 PROCEDURE — 81003 URINALYSIS AUTO W/O SCOPE: CPT | Performed by: UROLOGY

## 2021-08-17 PROCEDURE — 99214 OFFICE O/P EST MOD 30 MIN: CPT | Performed by: UROLOGY

## 2021-08-17 PROCEDURE — 51798 US URINE CAPACITY MEASURE: CPT | Performed by: UROLOGY

## 2021-08-17 NOTE — PROGRESS NOTES
appear stable compared to 8/23/2018. There is no pelvic free fluid. The colon is grossly normal. The patient has had a prior lumbar fusion and laminectomy. No suspicious osseous lesions are present. 1. Moderate severity bilateral hydronephrosis and hydroureter. No stones are noted. This has mildly worsened. 2. Uniform thickening of the bladder wall. This is of unknown significance. 3. Scattered retroperitoneal lymph nodes. There are also scattered pelvic lymph nodes. These have been stable. **This report has been created using voice recognition software. It may contain minor errors which are inherent in voice recognition technology. ** Final report electronically signed by Dr. Kody Sanders on 2/21/2021 12:29 PM    PAST MEDICAL, FAMILY AND SOCIAL HISTORY:  Past Medical History:   Diagnosis Date    Alcohol abuse     in remission    Amputation of right arm (HCC)     Ascites     GERD (gastroesophageal reflux disease)     Hepatic cirrhosis (HCC)     Hepatitis B     Hepatitis C     History of blood transfusion     ulcerataive colitis    Hypertension     Intravenous drug user     in remission    Osteomyelitis ([address]     right arm/shoulder amputation    Psychiatric problem     PTSD (post-traumatic stress disorder)     Sciatica     Splenomegaly     Thrombocytopenia (HCC)     Ulcerative colitis ([address]      Past Surgical History:   Procedure Laterality Date    APPENDECTOMY      [place] AT [place]ER [place] 2017    [place]    BACK SURGERY N/A 1/6/2021    LUMBAR I&D performed by Vincent [facility]za MD at [facility]  2005   [address] [place] [address]  2020    PORT SURGERY N/A 8/17/2020    SUBCLAVIAN SINGLE LUMEN MEDIPORT INSERTION performed by Coty Nails MD at [facility]  9/26/2017    COLONOSCOPY WITH BIOPSY performed by Vashti Raymundo MD at [facility]    TURP N/A 3/23/2021    CYSTO, GREENLIGHT PHOTOVAPORIZATION OF PROSTATE performed quittin.3   Smokeless Tobacco Current User    Types: Snuff       Social History     Substance and Sexual Activity   Alcohol Use No    Comment: not in 4 years       REVIEW OF SYSTEMS:  Constitutional: negative  Eyes: negative  Respiratory: negative  Cardiovascular: negative  Gastrointestinal: negative  Musculoskeletal: negative  Genitourinary: negative except for what is in HPI  Skin: negative   Neurological: negative  Hematological/Lymphatic: negative  Psychological: negative    Physical Exam:    This a 55 y.o. male   There were no vitals filed for this visit. Constitutional: Patient in no acute distress;     Assessment and Plan      1. Nocturia    2. BPH with obstruction/lower urinary tract symptoms    3. Urinary retention    4. Kidney stone    5. Renal cyst    6. Bilateral hydronephrosis           Plan:       Bilateral hydronephrosis- Last Renal US reviewed- no hydro.   Urinary retention- recurrent  S/p greenlight PVP- PVR 361cc  Feels well  He would like to stop flomax  happier    Fu 6 months with renal and bladder US

## 2021-08-19 LAB
A/G RATIO: NORMAL
ALBUMIN SERPL-MCNC: 4.3 G/DL
ALP BLD-CCNC: 73 U/L
ALT SERPL-CCNC: 14 U/L
AST SERPL-CCNC: 20 U/L
BILIRUB SERPL-MCNC: 0.9 MG/DL (ref 0.1–1.4)
BILIRUBIN DIRECT: 0.2 MG/DL
BILIRUBIN, INDIRECT: NORMAL
BUN BLDV-MCNC: 10 MG/DL
CALCIUM SERPL-MCNC: 9.4 MG/DL
CHLORIDE BLD-SCNC: 102 MMOL/L
CO2: 25 MMOL/L
CREAT SERPL-MCNC: 0.76 MG/DL
GFR CALCULATED: >60
GLOBULIN: NORMAL
GLUCOSE BLD-MCNC: 127 MG/DL
LIPASE: 40 UNITS/L
POTASSIUM SERPL-SCNC: 4.1 MMOL/L
PROTEIN TOTAL: 7.8 G/DL
SODIUM BLD-SCNC: 135 MMOL/L

## 2021-09-08 ENCOUNTER — NURSE ONLY (OUTPATIENT)
Dept: LAB | Age: 46
End: 2021-09-08

## 2021-11-04 ENCOUNTER — TELEPHONE (OUTPATIENT)
Dept: UROLOGY | Age: 46
End: 2021-11-04

## 2021-11-04 DIAGNOSIS — R39.15 URGENCY OF URINATION: ICD-10-CM

## 2021-11-04 DIAGNOSIS — R35.0 URINARY FREQUENCY: Primary | ICD-10-CM

## 2021-11-04 RX ORDER — SULFAMETHOXAZOLE AND TRIMETHOPRIM 800; 160 MG/1; MG/1
1 TABLET ORAL 2 TIMES DAILY
Qty: 10 TABLET | Refills: 0 | Status: ON HOLD | OUTPATIENT
Start: 2021-11-04 | End: 2021-11-12 | Stop reason: HOSPADM

## 2021-11-04 NOTE — TELEPHONE ENCOUNTER
Patient advised to start the Bactrim after giving the urine specimen. He voiced understanding and left a message with his PCP regarding other symptoms.

## 2021-11-04 NOTE — TELEPHONE ENCOUNTER
Ok to get urine, start bactrim empirically  I have personally verified, reviewed, and approved these actions.

## 2021-11-08 ENCOUNTER — APPOINTMENT (OUTPATIENT)
Dept: GENERAL RADIOLOGY | Age: 46
DRG: 638 | End: 2021-11-08
Payer: MEDICARE

## 2021-11-08 ENCOUNTER — HOSPITAL ENCOUNTER (INPATIENT)
Age: 46
LOS: 1 days | Discharge: HOME | DRG: 638 | End: 2021-11-12
Attending: EMERGENCY MEDICINE | Admitting: HOSPITALIST
Payer: MEDICARE

## 2021-11-08 DIAGNOSIS — E13.10 DIABETIC KETOACIDOSIS WITHOUT COMA ASSOCIATED WITH OTHER SPECIFIED DIABETES MELLITUS (HCC): Primary | ICD-10-CM

## 2021-11-08 PROBLEM — Z87.39 HISTORY OF NECROTIZING FASCIITIS: Status: ACTIVE | Noted: 2021-11-08

## 2021-11-08 PROBLEM — R73.9 HYPERGLYCEMIA: Status: ACTIVE | Noted: 2021-11-08

## 2021-11-08 PROBLEM — K21.9 GERD (GASTROESOPHAGEAL REFLUX DISEASE): Status: ACTIVE | Noted: 2021-11-08

## 2021-11-08 PROBLEM — E11.9 NEW ONSET TYPE 2 DIABETES MELLITUS (HCC): Status: ACTIVE | Noted: 2021-11-08

## 2021-11-08 PROBLEM — N40.0 BPH (BENIGN PROSTATIC HYPERPLASIA): Status: ACTIVE | Noted: 2021-11-08

## 2021-11-08 LAB
ALBUMIN SERPL-MCNC: 4.7 G/DL (ref 3.5–5.1)
ALP BLD-CCNC: 125 U/L (ref 38–126)
ALT SERPL-CCNC: 14 U/L (ref 11–66)
ANION GAP SERPL CALCULATED.3IONS-SCNC: 25 MEQ/L (ref 8–16)
AST SERPL-CCNC: 16 U/L (ref 5–40)
BACTERIA: ABNORMAL
BACTERIA: ABNORMAL /HPF
BASE EXCESS MIXED: -5.3 MMOL/L (ref -2–3)
BASOPHILS # BLD: 1.2 %
BASOPHILS ABSOLUTE: 0.1 THOU/MM3 (ref 0–0.1)
BETA-HYDROXYBUTYRATE: 60.19 MG/DL (ref 0.2–2.81)
BILIRUB SERPL-MCNC: 0.8 MG/DL (ref 0.3–1.2)
BILIRUBIN DIRECT: 0.3 MG/DL (ref 0–0.3)
BILIRUBIN URINE: NEGATIVE
BILIRUBIN URINE: NEGATIVE
BLOOD, URINE: NEGATIVE
BLOOD, URINE: NEGATIVE
BUN BLDV-MCNC: 11 MG/DL (ref 7–22)
CALCIUM SERPL-MCNC: 10.7 MG/DL (ref 8.5–10.5)
CASTS 2: ABNORMAL /LPF
CASTS UA: ABNORMAL /LPF
CASTS: ABNORMAL /LPF
CASTS: ABNORMAL /LPF
CHARACTER, URINE: CLEAR
CHARACTER, URINE: CLEAR
CHLORIDE BLD-SCNC: 87 MEQ/L (ref 98–111)
CO2: 16 MEQ/L (ref 23–33)
COLLECTED BY:: ABNORMAL
COLOR: YELLOW
COLOR: YELLOW
CREAT SERPL-MCNC: 0.7 MG/DL (ref 0.4–1.2)
CRYSTALS, UA: ABNORMAL
CRYSTALS: ABNORMAL
EOSINOPHIL # BLD: 1.2 %
EOSINOPHILS ABSOLUTE: 0.1 THOU/MM3 (ref 0–0.4)
EPITHELIAL CELLS, UA: ABNORMAL /HPF
EPITHELIAL CELLS, UA: ABNORMAL /HPF
ERYTHROCYTE [DISTWIDTH] IN BLOOD BY AUTOMATED COUNT: 13.2 % (ref 11.5–14.5)
ERYTHROCYTE [DISTWIDTH] IN BLOOD BY AUTOMATED COUNT: 45 FL (ref 35–45)
GFR SERPL CREATININE-BSD FRML MDRD: > 90 ML/MIN/1.73M2
GLUCOSE BLD-MCNC: 326 MG/DL (ref 70–108)
GLUCOSE BLD-MCNC: 378 MG/DL (ref 70–108)
GLUCOSE BLD-MCNC: 397 MG/DL (ref 70–108)
GLUCOSE BLD-MCNC: 423 MG/DL (ref 70–108)
GLUCOSE BLD-MCNC: 592 MG/DL (ref 70–108)
GLUCOSE BLD-MCNC: 623 MG/DL (ref 70–108)
GLUCOSE URINE: >= 1000 MG/DL
GLUCOSE, URINE: >= 1000 MG/DL
HCO3, MIXED: 17 MMOL/L (ref 23–28)
HCT VFR BLD CALC: 45.5 % (ref 42–52)
HEMOGLOBIN: 15.5 GM/DL (ref 14–18)
IMMATURE GRANS (ABS): 0.05 THOU/MM3 (ref 0–0.07)
IMMATURE GRANULOCYTES: 0.5 %
KETONES, URINE: 40
KETONES, URINE: 40
LEUKOCYTE ESTERASE, URINE: NEGATIVE
LEUKOCYTE ESTERASE, URINE: NEGATIVE
LYMPHOCYTES # BLD: 36.4 %
LYMPHOCYTES ABSOLUTE: 3.5 THOU/MM3 (ref 1–4.8)
MCH RBC QN AUTO: 32 PG (ref 26–33)
MCHC RBC AUTO-ENTMCNC: 34.1 GM/DL (ref 32.2–35.5)
MCV RBC AUTO: 93.8 FL (ref 80–94)
MISCELLANEOUS 2: ABNORMAL
MISCELLANEOUS LAB TEST RESULT: ABNORMAL
MONOCYTES # BLD: 8 %
MONOCYTES ABSOLUTE: 0.8 THOU/MM3 (ref 0.4–1.3)
NITRITE, URINE: NEGATIVE
NITRITE, URINE: NEGATIVE
NUCLEATED RED BLOOD CELLS: 0 /100 WBC
O2 SAT, MIXED: 79 %
OSMOLALITY CALCULATION: 285.6 MOSMOL/KG (ref 275–300)
PCO2, MIXED VENOUS: 26 MMHG (ref 41–51)
PH UA: 5 (ref 5–9)
PH UA: 5.5 (ref 5–9)
PH, MIXED: 7.43 (ref 7.31–7.41)
PLATELET # BLD: 226 THOU/MM3 (ref 130–400)
PMV BLD AUTO: 10.1 FL (ref 9.4–12.4)
PO2 MIXED: 42 MMHG (ref 25–40)
POTASSIUM REFLEX MAGNESIUM: 3.8 MEQ/L (ref 3.5–5.2)
PROTEIN UA: ABNORMAL
PROTEIN UA: ABNORMAL MG/DL
RBC # BLD: 4.85 MILL/MM3 (ref 4.7–6.1)
RBC URINE: ABNORMAL /HPF
RBC URINE: ABNORMAL /HPF
RENAL EPITHELIAL, UA: ABNORMAL
RENAL EPITHELIAL, UA: ABNORMAL
SARS-COV-2, NAAT: NOT DETECTED
SEG NEUTROPHILS: 52.7 %
SEGMENTED NEUTROPHILS ABSOLUTE COUNT: 5 THOU/MM3 (ref 1.8–7.7)
SODIUM BLD-SCNC: 128 MEQ/L (ref 135–145)
SPECIFIC GRAVITY UA: > 1.03 (ref 1–1.03)
SPECIFIC GRAVITY, URINE: 1.02 (ref 1–1.03)
TOTAL PROTEIN: 8.3 G/DL (ref 6.1–8)
TROPONIN T: < 0.01 NG/ML
UROBILINOGEN, URINE: 0.2 EU/DL (ref 0–1)
UROBILINOGEN, URINE: 0.2 EU/DL (ref 0–1)
WBC # BLD: 9.5 THOU/MM3 (ref 4.8–10.8)
WBC UA: ABNORMAL /HPF
WBC UA: ABNORMAL /HPF
YEAST: ABNORMAL
YEAST: ABNORMAL

## 2021-11-08 PROCEDURE — 96360 HYDRATION IV INFUSION INIT: CPT

## 2021-11-08 PROCEDURE — 80076 HEPATIC FUNCTION PANEL: CPT

## 2021-11-08 PROCEDURE — 84484 ASSAY OF TROPONIN QUANT: CPT

## 2021-11-08 PROCEDURE — 71045 X-RAY EXAM CHEST 1 VIEW: CPT

## 2021-11-08 PROCEDURE — 82948 REAGENT STRIP/BLOOD GLUCOSE: CPT

## 2021-11-08 PROCEDURE — 93005 ELECTROCARDIOGRAM TRACING: CPT | Performed by: EMERGENCY MEDICINE

## 2021-11-08 PROCEDURE — G0378 HOSPITAL OBSERVATION PER HR: HCPCS

## 2021-11-08 PROCEDURE — 2580000003 HC RX 258: Performed by: EMERGENCY MEDICINE

## 2021-11-08 PROCEDURE — 87635 SARS-COV-2 COVID-19 AMP PRB: CPT

## 2021-11-08 PROCEDURE — 81001 URINALYSIS AUTO W/SCOPE: CPT

## 2021-11-08 PROCEDURE — 96361 HYDRATE IV INFUSION ADD-ON: CPT

## 2021-11-08 PROCEDURE — 82803 BLOOD GASES ANY COMBINATION: CPT

## 2021-11-08 PROCEDURE — 96365 THER/PROPH/DIAG IV INF INIT: CPT

## 2021-11-08 PROCEDURE — 80048 BASIC METABOLIC PNL TOTAL CA: CPT

## 2021-11-08 PROCEDURE — 99283 EMERGENCY DEPT VISIT LOW MDM: CPT

## 2021-11-08 PROCEDURE — 85025 COMPLETE CBC W/AUTO DIFF WBC: CPT

## 2021-11-08 PROCEDURE — 36415 COLL VENOUS BLD VENIPUNCTURE: CPT

## 2021-11-08 PROCEDURE — 6370000000 HC RX 637 (ALT 250 FOR IP): Performed by: EMERGENCY MEDICINE

## 2021-11-08 PROCEDURE — 2580000003 HC RX 258: Performed by: HOSPITALIST

## 2021-11-08 PROCEDURE — 6370000000 HC RX 637 (ALT 250 FOR IP): Performed by: HOSPITALIST

## 2021-11-08 PROCEDURE — 99220 PR INITIAL OBSERVATION CARE/DAY 70 MINUTES: CPT | Performed by: HOSPITALIST

## 2021-11-08 PROCEDURE — 82010 KETONE BODYS QUAN: CPT

## 2021-11-08 RX ORDER — SODIUM CHLORIDE 9 MG/ML
INJECTION, SOLUTION INTRAVENOUS CONTINUOUS
Status: DISCONTINUED | OUTPATIENT
Start: 2021-11-08 | End: 2021-11-11

## 2021-11-08 RX ORDER — SODIUM CHLORIDE 9 MG/ML
INJECTION, SOLUTION INTRAVENOUS CONTINUOUS
Status: DISCONTINUED | OUTPATIENT
Start: 2021-11-08 | End: 2021-11-12 | Stop reason: HOSPADM

## 2021-11-08 RX ORDER — DEXTROSE AND SODIUM CHLORIDE 5; .45 G/100ML; G/100ML
INJECTION, SOLUTION INTRAVENOUS CONTINUOUS PRN
Status: CANCELLED | OUTPATIENT
Start: 2021-11-08

## 2021-11-08 RX ORDER — DEXTROSE MONOHYDRATE 25 G/50ML
12.5 INJECTION, SOLUTION INTRAVENOUS PRN
Status: DISCONTINUED | OUTPATIENT
Start: 2021-11-08 | End: 2021-11-08

## 2021-11-08 RX ORDER — DEXTROSE MONOHYDRATE 50 MG/ML
100 INJECTION, SOLUTION INTRAVENOUS PRN
Status: DISCONTINUED | OUTPATIENT
Start: 2021-11-08 | End: 2021-11-08 | Stop reason: SDUPTHER

## 2021-11-08 RX ORDER — DEXTROSE MONOHYDRATE 25 G/50ML
12.5 INJECTION, SOLUTION INTRAVENOUS PRN
Status: CANCELLED | OUTPATIENT
Start: 2021-11-08

## 2021-11-08 RX ORDER — SODIUM CHLORIDE 0.9 % (FLUSH) 0.9 %
5-40 SYRINGE (ML) INJECTION PRN
Status: DISCONTINUED | OUTPATIENT
Start: 2021-11-08 | End: 2021-11-12 | Stop reason: HOSPADM

## 2021-11-08 RX ORDER — POLYETHYLENE GLYCOL 3350 17 G/17G
17 POWDER, FOR SOLUTION ORAL DAILY PRN
Status: DISCONTINUED | OUTPATIENT
Start: 2021-11-08 | End: 2021-11-12 | Stop reason: HOSPADM

## 2021-11-08 RX ORDER — FOLIC ACID 1 MG/1
1 TABLET ORAL 2 TIMES DAILY
Status: DISCONTINUED | OUTPATIENT
Start: 2021-11-08 | End: 2021-11-12 | Stop reason: HOSPADM

## 2021-11-08 RX ORDER — PROMETHAZINE HYDROCHLORIDE 25 MG/1
12.5 TABLET ORAL EVERY 6 HOURS PRN
Status: DISCONTINUED | OUTPATIENT
Start: 2021-11-08 | End: 2021-11-12 | Stop reason: HOSPADM

## 2021-11-08 RX ORDER — DEXTROSE MONOHYDRATE 25 G/50ML
12.5 INJECTION, SOLUTION INTRAVENOUS PRN
Status: DISCONTINUED | OUTPATIENT
Start: 2021-11-08 | End: 2021-11-08 | Stop reason: SDUPTHER

## 2021-11-08 RX ORDER — TAMSULOSIN HYDROCHLORIDE 0.4 MG/1
0.4 CAPSULE ORAL DAILY
Status: DISCONTINUED | OUTPATIENT
Start: 2021-11-09 | End: 2021-11-12 | Stop reason: HOSPADM

## 2021-11-08 RX ORDER — ACETAMINOPHEN 325 MG/1
650 TABLET ORAL EVERY 6 HOURS PRN
Status: DISCONTINUED | OUTPATIENT
Start: 2021-11-08 | End: 2021-11-12 | Stop reason: HOSPADM

## 2021-11-08 RX ORDER — POTASSIUM CHLORIDE 20 MEQ/1
40 TABLET, EXTENDED RELEASE ORAL PRN
Status: DISCONTINUED | OUTPATIENT
Start: 2021-11-08 | End: 2021-11-12 | Stop reason: HOSPADM

## 2021-11-08 RX ORDER — NICOTINE POLACRILEX 4 MG
15 LOZENGE BUCCAL PRN
Status: DISCONTINUED | OUTPATIENT
Start: 2021-11-08 | End: 2021-11-12 | Stop reason: HOSPADM

## 2021-11-08 RX ORDER — POTASSIUM CHLORIDE 7.45 MG/ML
10 INJECTION INTRAVENOUS PRN
Status: DISCONTINUED | OUTPATIENT
Start: 2021-11-08 | End: 2021-11-12 | Stop reason: HOSPADM

## 2021-11-08 RX ORDER — SENNA PLUS 8.6 MG/1
1 TABLET ORAL DAILY PRN
Status: DISCONTINUED | OUTPATIENT
Start: 2021-11-08 | End: 2021-11-12 | Stop reason: HOSPADM

## 2021-11-08 RX ORDER — PANTOPRAZOLE SODIUM 40 MG/1
40 TABLET, DELAYED RELEASE ORAL 2 TIMES DAILY
Status: DISCONTINUED | OUTPATIENT
Start: 2021-11-09 | End: 2021-11-12 | Stop reason: HOSPADM

## 2021-11-08 RX ORDER — 0.9 % SODIUM CHLORIDE 0.9 %
15 INTRAVENOUS SOLUTION INTRAVENOUS ONCE
Status: DISCONTINUED | OUTPATIENT
Start: 2021-11-08 | End: 2021-11-12 | Stop reason: HOSPADM

## 2021-11-08 RX ORDER — DOCUSATE SODIUM 100 MG/1
100 CAPSULE, LIQUID FILLED ORAL DAILY
Status: DISCONTINUED | OUTPATIENT
Start: 2021-11-09 | End: 2021-11-12 | Stop reason: HOSPADM

## 2021-11-08 RX ORDER — 0.9 % SODIUM CHLORIDE 0.9 %
1000 INTRAVENOUS SOLUTION INTRAVENOUS ONCE
Status: COMPLETED | OUTPATIENT
Start: 2021-11-08 | End: 2021-11-09

## 2021-11-08 RX ORDER — FAMOTIDINE 20 MG/1
40 TABLET, FILM COATED ORAL DAILY
Status: DISCONTINUED | OUTPATIENT
Start: 2021-11-09 | End: 2021-11-12 | Stop reason: HOSPADM

## 2021-11-08 RX ORDER — MAGNESIUM SULFATE IN WATER 40 MG/ML
2000 INJECTION, SOLUTION INTRAVENOUS PRN
Status: DISCONTINUED | OUTPATIENT
Start: 2021-11-08 | End: 2021-11-12 | Stop reason: HOSPADM

## 2021-11-08 RX ORDER — BUMETANIDE 0.5 MG/1
0.5 TABLET ORAL 2 TIMES DAILY
Status: DISCONTINUED | OUTPATIENT
Start: 2021-11-08 | End: 2021-11-12 | Stop reason: HOSPADM

## 2021-11-08 RX ORDER — MAGNESIUM SULFATE 1 G/100ML
1000 INJECTION INTRAVENOUS PRN
Status: DISCONTINUED | OUTPATIENT
Start: 2021-11-08 | End: 2021-11-09 | Stop reason: SDUPTHER

## 2021-11-08 RX ORDER — ENTECAVIR 1 MG/1
1 TABLET, FILM COATED ORAL DAILY
Status: DISCONTINUED | OUTPATIENT
Start: 2021-11-09 | End: 2021-11-12 | Stop reason: HOSPADM

## 2021-11-08 RX ORDER — SODIUM CHLORIDE 9 MG/ML
25 INJECTION, SOLUTION INTRAVENOUS PRN
Status: DISCONTINUED | OUTPATIENT
Start: 2021-11-08 | End: 2021-11-12 | Stop reason: HOSPADM

## 2021-11-08 RX ORDER — DEXTROSE MONOHYDRATE 25 G/50ML
12.5 INJECTION, SOLUTION INTRAVENOUS PRN
Status: DISCONTINUED | OUTPATIENT
Start: 2021-11-08 | End: 2021-11-12 | Stop reason: HOSPADM

## 2021-11-08 RX ORDER — CETIRIZINE HYDROCHLORIDE 10 MG/1
10 TABLET ORAL DAILY
Status: DISCONTINUED | OUTPATIENT
Start: 2021-11-09 | End: 2021-11-12 | Stop reason: HOSPADM

## 2021-11-08 RX ORDER — DOCUSATE SODIUM 100 MG/1
100 CAPSULE, LIQUID FILLED ORAL DAILY
Status: DISCONTINUED | OUTPATIENT
Start: 2021-11-09 | End: 2021-11-08 | Stop reason: SDUPTHER

## 2021-11-08 RX ORDER — PAROXETINE HYDROCHLORIDE 20 MG/1
40 TABLET, FILM COATED ORAL EVERY MORNING
Status: DISCONTINUED | OUTPATIENT
Start: 2021-11-09 | End: 2021-11-12 | Stop reason: HOSPADM

## 2021-11-08 RX ORDER — SULFAMETHOXAZOLE AND TRIMETHOPRIM 800; 160 MG/1; MG/1
1 TABLET ORAL 2 TIMES DAILY
Status: CANCELLED | OUTPATIENT
Start: 2021-11-08

## 2021-11-08 RX ORDER — NICOTINE POLACRILEX 4 MG
15 LOZENGE BUCCAL PRN
Status: DISCONTINUED | OUTPATIENT
Start: 2021-11-08 | End: 2021-11-08 | Stop reason: SDUPTHER

## 2021-11-08 RX ORDER — SODIUM CHLORIDE 0.9 % (FLUSH) 0.9 %
5-40 SYRINGE (ML) INJECTION EVERY 12 HOURS SCHEDULED
Status: DISCONTINUED | OUTPATIENT
Start: 2021-11-08 | End: 2021-11-12 | Stop reason: HOSPADM

## 2021-11-08 RX ORDER — FERROUS SULFATE 325(65) MG
325 TABLET ORAL 2 TIMES DAILY WITH MEALS
Status: DISCONTINUED | OUTPATIENT
Start: 2021-11-09 | End: 2021-11-12 | Stop reason: HOSPADM

## 2021-11-08 RX ORDER — DEXTROSE MONOHYDRATE 50 MG/ML
100 INJECTION, SOLUTION INTRAVENOUS PRN
Status: DISCONTINUED | OUTPATIENT
Start: 2021-11-08 | End: 2021-11-12 | Stop reason: HOSPADM

## 2021-11-08 RX ORDER — MESALAMINE 400 MG/1
400 CAPSULE, DELAYED RELEASE ORAL 2 TIMES DAILY
Status: DISCONTINUED | OUTPATIENT
Start: 2021-11-08 | End: 2021-11-12 | Stop reason: HOSPADM

## 2021-11-08 RX ORDER — POTASSIUM CHLORIDE 7.45 MG/ML
10 INJECTION INTRAVENOUS PRN
Status: CANCELLED | OUTPATIENT
Start: 2021-11-08

## 2021-11-08 RX ORDER — ACETAMINOPHEN 650 MG/1
650 SUPPOSITORY RECTAL EVERY 6 HOURS PRN
Status: DISCONTINUED | OUTPATIENT
Start: 2021-11-08 | End: 2021-11-12 | Stop reason: HOSPADM

## 2021-11-08 RX ORDER — 0.9 % SODIUM CHLORIDE 0.9 %
1000 INTRAVENOUS SOLUTION INTRAVENOUS ONCE
Status: COMPLETED | OUTPATIENT
Start: 2021-11-08 | End: 2021-11-08

## 2021-11-08 RX ADMIN — MESALAMINE 400 MG: 400 CAPSULE, DELAYED RELEASE ORAL at 23:58

## 2021-11-08 RX ADMIN — INSULIN LISPRO 5 UNITS: 100 INJECTION, SOLUTION INTRAVENOUS; SUBCUTANEOUS at 22:36

## 2021-11-08 RX ADMIN — SODIUM CHLORIDE 7.3 UNITS/HR: 9 INJECTION, SOLUTION INTRAVENOUS at 17:42

## 2021-11-08 RX ADMIN — SODIUM CHLORIDE 1000 ML: 9 INJECTION, SOLUTION INTRAVENOUS at 16:11

## 2021-11-08 RX ADMIN — FOLIC ACID 1 MG: 1 TABLET ORAL at 23:58

## 2021-11-08 RX ADMIN — SODIUM CHLORIDE 1000 ML: 9 INJECTION, SOLUTION INTRAVENOUS at 21:48

## 2021-11-08 RX ADMIN — BUMETANIDE 0.5 MG: 0.5 TABLET ORAL at 23:58

## 2021-11-08 ASSESSMENT — ENCOUNTER SYMPTOMS
CHEST TIGHTNESS: 0
TROUBLE SWALLOWING: 0
NAUSEA: 1
SORE THROAT: 0
ABDOMINAL DISTENTION: 0
VOMITING: 0
SHORTNESS OF BREATH: 0
SINUS PRESSURE: 0
COUGH: 1
CONSTIPATION: 1
BACK PAIN: 0
BLOOD IN STOOL: 0
WHEEZING: 0
EYE ITCHING: 0
DIARRHEA: 0
VOMITING: 1
SINUS PAIN: 0
RHINORRHEA: 1
ABDOMINAL PAIN: 0
COUGH: 0
VOICE CHANGE: 0
NAUSEA: 0

## 2021-11-08 NOTE — ED TRIAGE NOTES
Pt presents to the ED for dizziness and thirsty for a couple days. Pt denies pain.  Pt states he has been incontinent at night

## 2021-11-08 NOTE — Clinical Note
Patient Class: Inpatient [101]   REQUIRED: Diagnosis: Hyperglycemia [539122]   Estimated Length of Stay: Estimated stay of more than 2 midnights   Telemetry/Cardiac Monitoring Required?: Yes

## 2021-11-08 NOTE — ED PROVIDER NOTES
[facility] [address] [address] EMERGENCY DEPT    EMERGENCY MEDICINE     Pt Name: John Kulkarni  MRN: [id number]  [place] 1975  Date of evaluation: 11/8/2021  Provider: Tesha Messina DO, [address]       Chief Complaint   Patient presents with    Dizziness    Polydipsia       HISTORY OF PRESENT ILLNESS    John Kulkarni is a pleasant 55 y.o. male   Presents to the emergency department from home   Polydipsia, lightheadedness, generalized fatigue and polyuria over the last week  No fever, no vomiting  No urinary complaints or concerns other than that  Has had a cough productive of greenish sputum  No known hx of DM      Triage notes and Nursing notes were reviewed by myself. Any discrepancies are addressed above.     PAST MEDICAL HISTORY     Past Medical History:   Diagnosis Date    Alcohol abuse     in remission    Amputation of right arm (HCC)     Ascites     GERD (gastroesophageal reflux disease)     Hepatic cirrhosis (HCC)     Hepatitis B     Hepatitis C     History of blood transfusion     ulcerataive colitis    Hypertension     Intravenous drug user     in remission    Osteomyelitis ([address]     right arm/shoulder amputation    Psychiatric problem     PTSD (post-traumatic stress disorder)     Sciatica     Splenomegaly     Thrombocytopenia (HCC)     Ulcerative colitis ([address]        SURGICAL HISTORY       Past Surgical History:   Procedure Laterality Date    APPENDECTOMY      ARM AMP[place]ATION AT [place]OULDER Right 2017    [place]    BACK SURGERY N/A 1/6/2021    LUMBAR I&D performed by Yaritza Rogel MD at [facility]  2005   [address] [place] [address]  2020    PORT SURGERY N/A 8/17/2020    SUBCLAVIAN SINGLE LUMEN MEDIPORT INSERTION performed by George Delcid MD at [facility]  9/26/2017    COLONOSCOPY WITH BIOPSY performed by Willy Angeles MD at [facility]    TURP N/A 3/23/2021    Kaitlyn JOHNSON PHOTOVAPORIZATION OF PROSTATE performed by Jasmina Chavez MD at [address] [place] [address]       Previous Medications    BENZOCAINE (ORAJEL) 10 % MUCOSAL GEL    Take by mouth as needed. BUMETANIDE (BUMEX) 1 MG TABLET    Take 0.5 tablets by mouth 2 times daily    DOCUSATE SODIUM (COLACE) 100 MG CAPSULE    Take 100 mg by mouth daily    ENTECAVIR (BARACLUDE) 1 MG TABLET    Take 1 mg by mouth daily    FAMOTIDINE (PEPCID) 40 MG TABLET    Take 40 mg by mouth daily    FERROUS SULFATE (IRON 325) 325 (65 FE) MG TABLET    Take 1 tablet by mouth 2 times daily (with meals)    FOLIC ACID (FOLVITE) 1 MG TABLET    Take 2 tablets by mouth daily    LORATADINE (CLARITIN) 10 MG CAPSULE    Take 10 mg by mouth daily    MESALAMINE (DELZICOL) 400 MG CPDR DELAYED RELEASE CAPSULE    Take 400 mg by mouth 2 times daily     MULTIPLE VITAMINS-MINERALS (MULTIVITAMIN ADULT PO)    Take 1 tablet by mouth    PANTOPRAZOLE (PROTONIX) 40 MG TABLET    Take 40 mg by mouth 2 times daily     PAROXETINE (PAXIL) 40 MG TABLET    Take 40 mg by mouth every morning    POTASSIUM CHLORIDE (KLOR-CON M) 20 MEQ EXTENDED RELEASE TABLET    Take 1 tablet by mouth daily (with breakfast)    PREGABALIN (LYRICA) 200 MG CAPSULE    Take 200 mg by mouth 3 times daily.     SULFAMETHOXAZOLE-TRIMETHOPRIM (BACTRIM DS;SEPTRA DS) 800-160 MG PER TABLET    Take 1 tablet by mouth 2 times daily for 5 days    TAMSULOSIN (FLOMAX) 0.4 MG CAPSULE    Take 0.4 mg by mouth daily       ALLERGIES     Gabapentin, Morphine and related, Penicillins, Trazodone and nefazodone, and Ondansetron hcl    FAMILY HISTORY       Family History   Problem Relation Age of Onset    High Blood Pressure Mother     Cancer Mother         HYsterectomy    Stroke Mother     High Blood Pressure Father     Kidney Disease Father     Cancer Maternal Grandmother         Leukemia    Heart Disease Maternal Grandfather         SOCIAL HISTORY       Social History     Socioeconomic History    Marital status:      Spouse name: Not on file    Number of children: 1    Years of education: Not on file    Highest education level: Not on file   Occupational History    Not on file   Tobacco Use    Smoking status: Former Smoker     Quit date: 2020     Years since quittin.5    Smokeless tobacco: Current User     Types: Snuff   Vaping Use    Vaping Use: Never used   Substance and Sexual Activity    Alcohol use: No     Comment: not in 4 years    Drug use: No     Comment: not in 4 years    Sexual activity: Not on file   Other Topics Concern    Not on file   Social History Narrative    Not on file     Social Determinants of Health     Financial Resource Strain:     Difficulty of Paying Living Expenses: Not on file   Food Insecurity:     Worried About Running Out of Food in the Last Year: Not on file    Michael of Food in the Last Year: Not on file   Transportation Needs:     Lack of Transportation (Medical): Not on file    Lack of Transportation (Non-Medical):  Not on file   Physical Activity:     Days of Exercise per Week: Not on file    Minutes of Exercise per Session: Not on file   Stress:     Feeling of Stress : Not on file   Social Connections:     Frequency of Communication with Friends and Family: Not on file    Frequency of Social Gatherings with Friends and Family: Not on file    Attends Spiritism Services: Not on file    Active Member of [address] [place] [address] or Organizations: Not on file    Attends Club or Organization Meetings: Not on file    Marital Status: Not on file   Intimate Partner Violence:     Fear of Current or Ex-Partner: Not on file    Emotionally Abused: Not on file    Physically Abused: Not on file    Sexually Abused: Not on file   Housing Stability:     Unable to Pay for Housing in the Last Year: Not on file    Number of [place] in the Last Year: Not on file    Unstable Housing in the Last Year: Not on file       REVIEW OF SYSTEMS     Review of Systems   Constitutional: Positive for fatigue. Negative for chills, diaphoresis and fever. HENT: Negative for trouble swallowing and voice change. Eyes: Negative for visual disturbance. Respiratory: Positive for cough. Negative for chest tightness and shortness of breath. Cardiovascular: Negative for chest pain and leg swelling. Gastrointestinal: Negative for abdominal pain, blood in stool, diarrhea, nausea and vomiting. Endocrine: Positive for polydipsia, polyphagia and polyuria. Genitourinary: Negative for dysuria, frequency and hematuria. Musculoskeletal: Negative for back pain and neck pain. Skin: Negative for rash and wound. Neurological: Negative for speech difficulty, weakness, numbness and headaches. Psychiatric/Behavioral: Negative for confusion. Except as noted above the remainder of the review of systems was reviewed and is. PHYSICAL EXAM    (up to 7 for level 4, 8 or more for level 5)     ED Triage Vitals [11/08/21 1251]   BP Temp Temp Source Pulse Resp SpO2 Height Weight   137/84 97.4 °F (36.3 °C) Oral 123 17 97 % -- --       Physical Exam  Vitals and nursing note reviewed. Constitutional:       General: He is not in acute distress. Appearance: He is well-developed. He is not ill-appearing, toxic-appearing or diaphoretic. HENT:      Head: Normocephalic and atraumatic. Eyes:      General: No scleral icterus. Conjunctiva/sclera: Conjunctivae normal.      Right eye: Right conjunctiva is not injected. Left eye: Left conjunctiva is not injected. Pupils: Pupils are equal, round, and reactive to light. Neck:      Thyroid: No thyromegaly. Trachea: No tracheal deviation. Cardiovascular:      Rate and Rhythm: Regular rhythm. Tachycardia present. Heart sounds: Normal heart sounds. No murmur heard. No friction rub. No gallop. Pulmonary:      Effort: Pulmonary effort is normal. No respiratory distress. Breath sounds: Normal breath sounds. No stridor.  No wheezing or rales. Abdominal:      General: Bowel sounds are normal. There is no distension. Palpations: Abdomen is soft. There is no mass. Tenderness: There is no abdominal tenderness. There is no guarding or rebound. Comments: Negative Newman's sign  Nontender McBurney's Point  Negative Rovsig's sign  No bruising or echymosis of abdomen   Musculoskeletal:         General: No tenderness. Cervical back: Normal range of motion and neck supple. Comments: Negative Sunny's Sign bilaterally   Lymphadenopathy:      Cervical: No cervical adenopathy. Skin:     General: Skin is warm and dry. Coloration: Skin is not pale. Findings: No erythema or rash. Neurological:      Mental Status: He is alert and oriented to person, place, and time. Cranial Nerves: No cranial nerve deficit. Motor: No abnormal muscle tone. Coordination: Coordination normal.      Comments: No nystagmus   Psychiatric:         Behavior: Behavior normal.         Thought Content: Thought content normal.         DIAGNOSTIC RESULTS     EKG:(none if blank)  All E[facility]'s are [facility] who either signs or Co-signs this chart in the absence of a cardiologist.      RADIOLOGY: (none if blank)   Interpretation per the Radiologistbelow, if available at the time of this note:    XR CHEST PORTABLE   Final Result   1. No acute cardiopulmonary process. **This report has been created using voice recognition software. It may contain minor errors which are inherent in voice recognition technology. **      Final report electronically signed by Dr Kymberly Wilkinson on 11/8/2021 2:57 PM          LABS:  Labs Reviewed   HEPATIC FUNCTION PANEL - Abnormal; Notable for the following components:       Result Value    Total Protein 8.3 (*)     All other components within normal limits   BASIC METABOLIC PANEL W/ REFLEX TO MG FOR LOW K - Abnormal; Notable for the following components:    Sodium 128 (*)     Chloride 87 (*)     CO2 16 (*)     Glucose 623 (*)     Calcium 10.7 (*)     All other components within normal limits   BETA-HYDROXYBUTYRATE - Abnormal; Notable for the following components:    Beta-Hydroxybutyrate 60.19 (*)     All other components within normal limits   BLOOD GAS, VENOUS - Abnormal; Notable for the following components:    PH MIXED 7.43 (*)     PCO2, MIXED VENOUS 26 (*)     PO2, Mixed 42 (*)     HCO3, Mixed 17 (*)     Base Exc, Mixed -5.3 (*)     All other components within normal limits   URINE WITH REFLEXED MICRO - Abnormal; Notable for the following components:    Glucose, Ur >= 1000 (*)     Ketones, Urine 40 (*)     Protein, UA TRACE (*)     All other components within normal limits   ANION GAP - Abnormal; Notable for the following components:    Anion Gap 25.0 (*)     All other components within normal limits   POCT GLUCOSE - Abnormal; Notable for the following components:    POC Glucose 592 (*)     All other components within normal limits   COVID-19, RAPID   CBC WITH AUTO DIFFERENTIAL   TROPONIN   GLOMERULAR FILTRATION RATE, ESTIMATED   OSMOLALITY   URINALYSIS WITH MICROSCOPIC   POCT GLUCOSE   POCT GLUCOSE   POCT GLUCOSE   POCT GLUCOSE   POCT GLUCOSE   POCT GLUCOSE   POCT GLUCOSE   POCT GLUCOSE   POCT GLUCOSE   POCT GLUCOSE   POCT GLUCOSE   POCT GLUCOSE       All other labs were within normal range or not returned as of this dictation. Please note, any cultures that may have been sent were not resulted at the time of this patient visit.     EMERGENCY DEPARTMENT COURSE andMedical Decision Making:     MDM/     Although pt not severely acidotic, he is hyperglycemic, has elevated ketones, a gap  Therefore will benefit from IV fluids and insulin drip    ED Medications administered this visit:    Medications   0.9 % sodium chloride bolus (has no administration in time range)   dextrose 50 % IV solution (has no administration in time range)   insulin regular (HUMULIN R;NOVOLIN R) 100 Units in sodium chloride 0.9 % 100 mL infusion (has no administration in time range)         Procedures: (None if blank)       CLINICAL       1. Diabetic ketoacidosis without coma associated with other specified diabetes mellitus ([facility] [address]          DISPOSITION/PLAN   DISPOSITION Decision To Admit 11/08/2021 03:48:37 PM      PATIENT REFERRED TO:  No follow-up provider specified.     DISCHARGE MEDICATIONS:  New Prescriptions    No medications on file              (Please note that portions of this note were completed with a voice recognition program.  Efforts were made to edit the dictations but occasionallywords are mis-transcribed.)      Clemencia Wilson DO,FACEP (electronically signed)  Attending Physician, Emergency Department          Surinder Kerns DO  11/08/21 2049

## 2021-11-08 NOTE — H&P
History & Physical        Patient:  Peng Dean  YOB: 1975    MRN: [id number]     Acct: [de-identified]    PCP: CRISTIAN Antonio CNP    Date of Admission: 11/8/2021    Date of Service: Pt seen/examined on 11/08/21  and Admitted toObservation with expected LOS less than two midnights due to medical therapy. Chief Complaint: Dizziness and excessive thirst times several weeks      History Of Present Illness:    55 y.o. male with medical history significant for GERD, hypertension, history of necrotizing fasciitis resulting in right arm amputation; who presented to [address] [place] [address] with dizziness and excessive thirst times several weeks. Patient states that he has noted that he has had significant amount of thirst over the past few weeks. Patient states that he has been drinking so much water, he does not feel as though he is able to quench his thirst.  Patient states he also found himself drinking lots of Gatorade. Patient noted that he has had increased urination. Patient states that at night he has had some incontinence secondary to getting up to go to the bathroom so many times. Patient denies having any polyphagia. Patient reports he has not had much of an appetite. Patient believes he has had some moments where he is felt like he has had a fever or chills. Patient is also noted having blurred vision for the past few weeks. Patient has been constipated for the past 3 to 4 days. Patient has noted having nausea and vomiting at times. Patient reports that when he stands up, he does get dizzy. Patient has also had headaches recently. Patient has never had these types of symptoms before. No URI type symptoms, no shortness of breath, no chest pain, no lower extremity swelling, no back pain, no neck pain, no myalgias. It was noted that patient called his PCP on 11/4/2021 secondary to having increased thirst, loss of appetite, incontinence, frequency and urgency. Patient had denied any burning at the time. Patient had noted occasional chills and possibly fever but never checked his temperature. Patient was prescribed Bactrim empirically at that time. Past Medical History:          Diagnosis Date    Alcohol abuse     in remission    Amputation of right arm (HCC)     Ascites     GERD (gastroesophageal reflux disease)     Hepatic cirrhosis (HCC)     Hepatitis B     Hepatitis C     History of blood transfusion     ulcerataive colitis    Hypertension     Intravenous drug user     in remission    Osteomyelitis ([facility]     right arm/shoulder amputation    Psychiatric problem     PTSD (post-traumatic stress disorder)     Sciatica     Splenomegaly     Thrombocytopenia (HCC)     Ulcerative colitis ([facility]        Past Surgical History:          Procedure Laterality Date    APPENDECTOMY      ARM AMPUTATION AT SHOULDER Right 2017    [place]    BACK SURGERY N/A 1/6/2021    LUMBAR I&D performed by Larry Washington MD at [address]  2005   [address] [facility]  2020    PORT SURGERY N/A 8/17/2020    SUBCLAVIAN SINGLE LUMEN MEDIPORT INSERTION performed by Sushil Cohen MD at [address]  9/26/2017    COLONOSCOPY WITH BIOPSY performed by Amie Myers MD at [address] [facility]    TURP N/A 3/23/2021    CYSTO, GREENLIGHT PHOTOVAPORIZATION OF PROSTATE performed by Matias Phillips MD at [facility]       Medications Prior to Admission:      Prior to Admission medications    Medication Sig Start Date End Date Taking?  Authorizing Provider   sulfamethoxazole-trimethoprim (BACTRIM DS;SEPTRA DS) 800-160 MG per tablet Take 1 tablet by mouth 2 times daily for 5 days 11/4/21 11/9/21  CRISTIAN Walker - CNP   bumetanide (BUMEX) 1 MG tablet Take 0.5 tablets by mouth 2 times daily 3/9/21   Laverne Paz MD   tamsulosin (FLOMAX) 0.4 MG capsule Take 0.4 mg by mouth daily    Historical Provider, MD   famotidine (PEPCID) 40 MG tablet Take 40 mg by mouth daily    Historical Provider, MD   docusate sodium (COLACE) 100 MG capsule Take 100 mg by mouth daily    Historical Provider, MD   loratadine (CLARITIN) 10 MG capsule Take 10 mg by mouth daily    Historical Provider, MD   entecavir (BARACLUDE) 1 MG tablet Take 1 mg by mouth daily    Historical Provider, MD   ferrous sulfate (IRON 325) 325 (65 Fe) MG tablet Take 1 tablet by mouth 2 times daily (with meals) 1/26/21   CRISTIAN Vera CNP   benzocaine (ORAJEL) 10 % mucosal gel Take by mouth as needed. 1/26/21   CRISTIAN Vera CNP   potassium chloride (KLOR-CON M) 20 MEQ extended release tablet Take 1 tablet by mouth daily (with breakfast) 1/16/21   Lorena Mac, DO   Multiple Vitamins-Minerals (MULTIVITAMIN ADULT PO) Take 1 tablet by mouth    Historical Provider, MD   pregabalin (LYRICA) 200 MG capsule Take 200 mg by mouth 3 times daily. Historical Provider, MD   PARoxetine (PAXIL) 40 MG tablet Take 40 mg by mouth every morning    Historical Provider, MD   mesalamine (DELZICOL) 400 MG CPDR delayed release capsule Take 400 mg by mouth 2 times daily     Historical Provider, MD   folic acid (FOLVITE) 1 MG tablet Take 2 tablets by mouth daily  Patient taking differently: Take 1 mg by mouth 2 times daily  9/26/17   Bharath Pinto MD   pantoprazole (PROTONIX) 40 MG tablet Take 40 mg by mouth 2 times daily     Historical Provider, MD       Allergies:  Gabapentin, Morphine and related, Penicillins, Trazodone and nefazodone, and Ondansetron hcl    Social History:      The patient currently lives at home. Illicit drug use: Patient with history of illicit drug use. Patient used to be an IV drug user. Patient currently in remission. TOBACCO:  reports that he quit smoking about 18 months ago. His smokeless tobacco use includes snuff. ETOH:   Patient with history of alcohol abuse. Patient is currently in remission.       Family History:     Reviewed in detail and negativefor DM, CVA. Positive as follows:        Problem Relation Age of Onset    High Blood Pressure Mother     Cancer Mother         HYsterectomy    Stroke Mother     High Blood Pressure Father     Kidney Disease Father     Cancer Maternal Grandmother         Leukemia    Heart Disease Maternal Grandfather        REVIEW OFSYSTEMS:   Pertinent positives as noted in the HPI. All other systems reviewed and negative. Review of Systems   Constitutional: Positive for activity change, appetite change, chills, fatigue and fever. HENT: Positive for rhinorrhea. Negative for congestion, ear pain, postnasal drip, sinus pressure, sinus pain, sneezing and sore throat. Eyes: Positive for visual disturbance. Negative for itching. Respiratory: Negative for cough, shortness of breath and wheezing. Cardiovascular: Negative for chest pain and leg swelling. Gastrointestinal: Positive for constipation, nausea and vomiting. Negative for abdominal distention, abdominal pain and diarrhea. Endocrine: Positive for polydipsia and polyuria. Negative for polyphagia. Genitourinary: Negative for decreased urine volume, difficulty urinating, flank pain, frequency, hematuria and urgency. Musculoskeletal: Negative for arthralgias, back pain, gait problem and myalgias. Neurological: Positive for dizziness and headaches. PHYSICAL EXAM:    /89   Pulse 117   Temp 97.4 °F (36.3 °C) (Oral)   Resp 22   SpO2 96%   Physical Exam  Constitutional:       General: He is not in acute distress. Appearance: Normal appearance. He is obese. HENT:      Head: Normocephalic and atraumatic. Right Ear: External ear normal.      Left Ear: External ear normal.      Nose: Nose normal.      Mouth/Throat:      Pharynx: Oropharynx is clear. Eyes:      Extraocular Movements: Extraocular movements intact. Pupils: Pupils are equal, round, and reactive to light. Cardiovascular:      Rate and Rhythm: Regular rhythm. Tachycardia present. Pulses: Normal pulses. Heart sounds: Normal heart sounds. No murmur heard. No friction rub. No gallop. Pulmonary:      Effort: Pulmonary effort is normal. No respiratory distress. Breath sounds: Normal breath sounds. No wheezing, rhonchi or rales. Abdominal:      General: Bowel sounds are normal. There is no distension. Tenderness: There is no abdominal tenderness. Musculoskeletal:         General: No swelling. Normal range of motion. Cervical back: Normal range of motion and neck supple. Comments: Right arm amputation   Skin:     General: Skin is warm. Capillary Refill: Capillary refill takes less than 2 seconds. Neurological:      General: No focal deficit present. Mental Status: He is alert and oriented to person, place, and time. Cranial Nerves: No cranial nerve deficit. Labs:     Recent Labs     11/08/21  1412   WBC 9.5   HGB 15.5   HCT 45.5        Recent Labs     11/08/21  1412   *   K 3.8   CL 87*   CO2 16*   BUN 11   CREATININE 0.7   CALCIUM 10.7*     Recent Labs     11/08/21  1412   AST 16   ALT 14   BILIDIR 0.3   BILITOT 0.8   ALKPHOS 125     No results for input(s): INR in the last 72 hours. No results for input(s): Reyes Shorts in the last 72 hours. Urinalysis:   Lab Results   Component Value Date    NITRU NEGATIVE 11/08/2021    WBCUA 0-2 11/08/2021    BACTERIA NONE SEEN 11/08/2021    RBCUA NONE SEEN 11/08/2021    BLOODU NEGATIVE 11/08/2021    SPECGRAV 1.012 02/06/2021    GLUCOSEU >= 1000 11/08/2021       Intake & Output:  No intake/output data recorded. No intake/output data recorded. Radiology:     XR CHEST PORTABLE   Final Result   1. No acute cardiopulmonary process. **This report has been created using voice recognition software. It may contain minor errors which are inherent in voice recognition technology. **      Final report electronically signed by Dr Mayi Roland on 11/8/2021 2:57 PM               DVT prophylaxis: [x] Lovenox                                 [] SCDs                                 [] SQ Heparin                                 [] Encourage ambulation           [] Already on Anticoagulation    Code Status: DNR CCA      PT/OT Eval Status: None ordered    Disposition:    [x] Home       [] TCU       [] Rehab       [] Psych       [] SNF       [] Long Term CareFacility       [] Other-    ASSESSMENT:    Active Hospital Problems    Diagnosis Date Noted    Hyperglycemia [R73.9] 11/08/2021     Priority: High    New onset type 2 diabetes mellitus ([facility] [E11.9] 11/08/2021     Priority: High    Hyponatremia [E87.1] 01/03/2021     Priority: Medium    GERD (gastroesophageal reflux disease) [K21.9] 11/08/2021     Priority: Low    History of necrotizing fasciitis [Z87.39] 11/08/2021     Priority: Low    BPH (benign prostatic hyperplasia) [N40.0] 11/08/2021     Priority: Low    Essential hypertension [I10]      Priority: Low    Ulcerative colitis, universal ([facility] [K51.00] 10/28/2019     Priority: Low       PLAN:    1. New onset type 2 diabetes-patient presents to the ER with loss of appetite, increased thirst, nocturia, incontinence. Patient found to have a blood sugar of 592 on fingerstick. Lab work was evaluated for possible DKA. Patient showed a bicarb of 16, anion gap of 25; however, patient's pH on VBG was 7.43. Patient not found to be acidotic and less likely to be considered DKA. Patient given 1 L bolus of normal saline in the ED. Patient will be continued on IV fluid hydration, as patient does show some signs of dehydration. Patient found to be tachycardic in the ED. Patient was initially placed on an insulin drip. On further investigation, patient not in DKA and insulin drip stopped. It was also noted that patient had reported symptoms to his PCP on 11/4/2021. Patient was empirically started on Bactrim for possible UTI. This has been stopped.   Patient symptoms seems to be secondary to new onset diabetes. Patient's UA does not show any signs of infection. · Patient placed on insulin sliding scale. · We will get a hemoglobin A1c. · Patient will be placed on diabetic diet  · Diabetic education  · Dietitian for counseling on diabetic diet  · Depending on hemoglobin A1c-we will determine if patient should be started on oral medications for diabetes versus insulin  2. Hyponatremia-patient with pseudohyponatremia secondary to significantly elevated blood sugars. Patient will receive IV fluid hydration along with treatment for his hyperglycemia. 3. GERD-patient states that he does have a history of GERD and states that his acid reflux has been causing more problems lately. Patient will be continued on his PPI and H2 blocker. 4. BPH-patient with history of BPH. Patient has shown urinary retention in the past.  Patient currently on Flomax. This will be continued. 5. Essential hypertension-patient only noted to be on Bumex. Patient states that he was placed on Bumex recently secondary to having fluid overload after a surgery. Patient denies any history of heart failure. 6. Ulcerative colitis-patient with history of ulcerative colitis. Patient will be continued on mesalamine. 7. 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 Patient states that due to his IV drug use he ended up getting necrotizing fasciitis in his right arms which resulted in his arm being amputated. 8. History of alcohol abuse-patient does have a history of alcohol abuse. Patient currently in remission. 9. History of IV drug use-patient does have a history of IV drug use. Patient currently in remission. Thank you CRISTIAN Jose - JULIETTE for the opportunity to be involved in this patient's care.     Electronically signed by Cassie Felipe MD on 11/8/2021 at 5:03 PM

## 2021-11-09 ENCOUNTER — CLINICAL DOCUMENTATION (OUTPATIENT)
Dept: INTERNAL MEDICINE CLINIC | Age: 46
End: 2021-11-09

## 2021-11-09 LAB
ALBUMIN SERPL-MCNC: 3.2 G/DL (ref 3.5–5.1)
ALP BLD-CCNC: 84 U/L (ref 38–126)
ALT SERPL-CCNC: 9 U/L (ref 11–66)
ANION GAP SERPL CALCULATED.3IONS-SCNC: 14 MEQ/L (ref 8–16)
ANION GAP SERPL CALCULATED.3IONS-SCNC: 16 MEQ/L (ref 8–16)
AST SERPL-CCNC: 12 U/L (ref 5–40)
AVERAGE GLUCOSE: 381 MG/DL (ref 70–126)
BASOPHILS # BLD: 0.9 %
BASOPHILS ABSOLUTE: 0 THOU/MM3 (ref 0–0.1)
BILIRUB SERPL-MCNC: 0.5 MG/DL (ref 0.3–1.2)
BUN BLDV-MCNC: 8 MG/DL (ref 7–22)
BUN BLDV-MCNC: 8 MG/DL (ref 7–22)
CALCIUM SERPL-MCNC: 7.7 MG/DL (ref 8.5–10.5)
CALCIUM SERPL-MCNC: 7.9 MG/DL (ref 8.5–10.5)
CHLORIDE BLD-SCNC: 100 MEQ/L (ref 98–111)
CHLORIDE BLD-SCNC: 99 MEQ/L (ref 98–111)
CO2: 17 MEQ/L (ref 23–33)
CO2: 20 MEQ/L (ref 23–33)
CREAT SERPL-MCNC: 0.6 MG/DL (ref 0.4–1.2)
CREAT SERPL-MCNC: 0.9 MG/DL (ref 0.4–1.2)
EKG ATRIAL RATE: 121 BPM
EKG P AXIS: 57 DEGREES
EKG P-R INTERVAL: 152 MS
EKG Q-T INTERVAL: 308 MS
EKG QRS DURATION: 72 MS
EKG QTC CALCULATION (BAZETT): 437 MS
EKG R AXIS: 21 DEGREES
EKG T AXIS: 62 DEGREES
EKG VENTRICULAR RATE: 121 BPM
EOSINOPHIL # BLD: 2.6 %
EOSINOPHILS ABSOLUTE: 0.1 THOU/MM3 (ref 0–0.4)
ERYTHROCYTE [DISTWIDTH] IN BLOOD BY AUTOMATED COUNT: 13.3 % (ref 11.5–14.5)
ERYTHROCYTE [DISTWIDTH] IN BLOOD BY AUTOMATED COUNT: 46.5 FL (ref 35–45)
GFR SERPL CREATININE-BSD FRML MDRD: > 90 ML/MIN/1.73M2
GFR SERPL CREATININE-BSD FRML MDRD: > 90 ML/MIN/1.73M2
GLUCOSE BLD-MCNC: 292 MG/DL (ref 70–108)
GLUCOSE BLD-MCNC: 315 MG/DL (ref 70–108)
GLUCOSE BLD-MCNC: 331 MG/DL (ref 70–108)
GLUCOSE BLD-MCNC: 354 MG/DL (ref 70–108)
GLUCOSE BLD-MCNC: 355 MG/DL (ref 70–108)
GLUCOSE BLD-MCNC: 405 MG/DL (ref 70–108)
GLUCOSE BLD-MCNC: 430 MG/DL (ref 70–108)
HBA1C MFR BLD: 14.7 % (ref 4.4–6.4)
HCT VFR BLD CALC: 36.1 % (ref 42–52)
HEMOGLOBIN: 12.2 GM/DL (ref 14–18)
IMMATURE GRANS (ABS): 0.02 THOU/MM3 (ref 0–0.07)
IMMATURE GRANULOCYTES: 0.4 %
LYMPHOCYTES # BLD: 49.6 %
LYMPHOCYTES ABSOLUTE: 2.2 THOU/MM3 (ref 1–4.8)
MCH RBC QN AUTO: 32.2 PG (ref 26–33)
MCHC RBC AUTO-ENTMCNC: 33.8 GM/DL (ref 32.2–35.5)
MCV RBC AUTO: 95.3 FL (ref 80–94)
MONOCYTES # BLD: 8.4 %
MONOCYTES ABSOLUTE: 0.4 THOU/MM3 (ref 0.4–1.3)
NUCLEATED RED BLOOD CELLS: 0 /100 WBC
PLATELET # BLD: 114 THOU/MM3 (ref 130–400)
PMV BLD AUTO: 9.8 FL (ref 9.4–12.4)
POTASSIUM SERPL-SCNC: 3.1 MEQ/L (ref 3.5–5.2)
POTASSIUM SERPL-SCNC: 3.3 MEQ/L (ref 3.5–5.2)
RBC # BLD: 3.79 MILL/MM3 (ref 4.7–6.1)
SEG NEUTROPHILS: 38.1 %
SEGMENTED NEUTROPHILS ABSOLUTE COUNT: 1.7 THOU/MM3 (ref 1.8–7.7)
SODIUM BLD-SCNC: 132 MEQ/L (ref 135–145)
SODIUM BLD-SCNC: 134 MEQ/L (ref 135–145)
TOTAL PROTEIN: 6.1 G/DL (ref 6.1–8)
WBC # BLD: 4.5 THOU/MM3 (ref 4.8–10.8)

## 2021-11-09 PROCEDURE — 83036 HEMOGLOBIN GLYCOSYLATED A1C: CPT

## 2021-11-09 PROCEDURE — 80053 COMPREHEN METABOLIC PANEL: CPT

## 2021-11-09 PROCEDURE — 96361 HYDRATE IV INFUSION ADD-ON: CPT

## 2021-11-09 PROCEDURE — 6370000000 HC RX 637 (ALT 250 FOR IP): Performed by: HOSPITALIST

## 2021-11-09 PROCEDURE — 96372 THER/PROPH/DIAG INJ SC/IM: CPT

## 2021-11-09 PROCEDURE — G0378 HOSPITAL OBSERVATION PER HR: HCPCS

## 2021-11-09 PROCEDURE — 85025 COMPLETE CBC W/AUTO DIFF WBC: CPT

## 2021-11-09 PROCEDURE — 93010 ELECTROCARDIOGRAM REPORT: CPT | Performed by: INTERNAL MEDICINE

## 2021-11-09 PROCEDURE — 2580000003 HC RX 258: Performed by: HOSPITALIST

## 2021-11-09 PROCEDURE — 82948 REAGENT STRIP/BLOOD GLUCOSE: CPT

## 2021-11-09 PROCEDURE — 99233 SBSQ HOSP IP/OBS HIGH 50: CPT | Performed by: HOSPITALIST

## 2021-11-09 PROCEDURE — 36415 COLL VENOUS BLD VENIPUNCTURE: CPT

## 2021-11-09 PROCEDURE — 6360000002 HC RX W HCPCS: Performed by: HOSPITALIST

## 2021-11-09 RX ORDER — INSULIN GLARGINE 100 [IU]/ML
10 INJECTION, SOLUTION SUBCUTANEOUS 2 TIMES DAILY
Status: DISCONTINUED | OUTPATIENT
Start: 2021-11-09 | End: 2021-11-11

## 2021-11-09 RX ORDER — 0.9 % SODIUM CHLORIDE 0.9 %
1000 INTRAVENOUS SOLUTION INTRAVENOUS ONCE
Status: COMPLETED | OUTPATIENT
Start: 2021-11-09 | End: 2021-11-09

## 2021-11-09 RX ORDER — PREGABALIN 75 MG/1
150 CAPSULE ORAL 3 TIMES DAILY
Status: DISCONTINUED | OUTPATIENT
Start: 2021-11-09 | End: 2021-11-12 | Stop reason: HOSPADM

## 2021-11-09 RX ADMIN — ENOXAPARIN SODIUM 40 MG: 100 INJECTION SUBCUTANEOUS at 08:02

## 2021-11-09 RX ADMIN — BUMETANIDE 0.5 MG: 0.5 TABLET ORAL at 20:44

## 2021-11-09 RX ADMIN — PANTOPRAZOLE SODIUM 40 MG: 40 TABLET, DELAYED RELEASE ORAL at 06:05

## 2021-11-09 RX ADMIN — BISACODYL 10 MG: 5 TABLET, COATED ORAL at 10:16

## 2021-11-09 RX ADMIN — MESALAMINE 400 MG: 400 CAPSULE, DELAYED RELEASE ORAL at 16:54

## 2021-11-09 RX ADMIN — INSULIN GLARGINE 10 UNITS: 100 INJECTION, SOLUTION SUBCUTANEOUS at 10:23

## 2021-11-09 RX ADMIN — PANTOPRAZOLE SODIUM 40 MG: 40 TABLET, DELAYED RELEASE ORAL at 16:53

## 2021-11-09 RX ADMIN — SODIUM CHLORIDE: 9 INJECTION, SOLUTION INTRAVENOUS at 05:08

## 2021-11-09 RX ADMIN — PREGABALIN 150 MG: 75 CAPSULE ORAL at 20:43

## 2021-11-09 RX ADMIN — FOLIC ACID 1 MG: 1 TABLET ORAL at 16:54

## 2021-11-09 RX ADMIN — FAMOTIDINE 40 MG: 20 TABLET, FILM COATED ORAL at 08:01

## 2021-11-09 RX ADMIN — DOCUSATE SODIUM 100 MG: 100 CAPSULE ORAL at 08:01

## 2021-11-09 RX ADMIN — CETIRIZINE HYDROCHLORIDE 10 MG: 10 TABLET, FILM COATED ORAL at 08:03

## 2021-11-09 RX ADMIN — INSULIN GLARGINE 10 UNITS: 100 INJECTION, SOLUTION SUBCUTANEOUS at 20:44

## 2021-11-09 RX ADMIN — INSULIN LISPRO 18 UNITS: 100 INJECTION, SOLUTION INTRAVENOUS; SUBCUTANEOUS at 11:35

## 2021-11-09 RX ADMIN — SODIUM CHLORIDE: 9 INJECTION, SOLUTION INTRAVENOUS at 11:10

## 2021-11-09 RX ADMIN — ACETAMINOPHEN 650 MG: 325 TABLET ORAL at 03:26

## 2021-11-09 RX ADMIN — INSULIN LISPRO 12 UNITS: 100 INJECTION, SOLUTION INTRAVENOUS; SUBCUTANEOUS at 08:10

## 2021-11-09 RX ADMIN — BUMETANIDE 0.5 MG: 0.5 TABLET ORAL at 08:07

## 2021-11-09 RX ADMIN — ENOXAPARIN SODIUM 40 MG: 100 INJECTION SUBCUTANEOUS at 08:19

## 2021-11-09 RX ADMIN — METFORMIN HYDROCHLORIDE 500 MG: 500 TABLET ORAL at 16:53

## 2021-11-09 RX ADMIN — INSULIN LISPRO 9 UNITS: 100 INJECTION, SOLUTION INTRAVENOUS; SUBCUTANEOUS at 16:56

## 2021-11-09 RX ADMIN — FERROUS SULFATE TAB 325 MG (65 MG ELEMENTAL FE) 325 MG: 325 (65 FE) TAB at 08:04

## 2021-11-09 RX ADMIN — FOLIC ACID 1 MG: 1 TABLET ORAL at 08:04

## 2021-11-09 RX ADMIN — PAROXETINE HYDROCHLORIDE 40 MG: 20 TABLET, FILM COATED ORAL at 08:05

## 2021-11-09 RX ADMIN — SODIUM CHLORIDE 1000 ML: 9 INJECTION, SOLUTION INTRAVENOUS at 08:22

## 2021-11-09 RX ADMIN — POTASSIUM CHLORIDE 40 MEQ: 1500 TABLET, EXTENDED RELEASE ORAL at 10:57

## 2021-11-09 RX ADMIN — FERROUS SULFATE TAB 325 MG (65 MG ELEMENTAL FE) 325 MG: 325 (65 FE) TAB at 16:54

## 2021-11-09 RX ADMIN — SODIUM CHLORIDE: 9 INJECTION, SOLUTION INTRAVENOUS at 18:00

## 2021-11-09 RX ADMIN — SODIUM CHLORIDE: 9 INJECTION, SOLUTION INTRAVENOUS at 00:35

## 2021-11-09 RX ADMIN — MESALAMINE 400 MG: 400 CAPSULE, DELAYED RELEASE ORAL at 08:05

## 2021-11-09 ASSESSMENT — PAIN SCALES - GENERAL
PAINLEVEL_OUTOF10: 0
PAINLEVEL_OUTOF10: 3
PAINLEVEL_OUTOF10: 0

## 2021-11-09 NOTE — PROGRESS NOTES
Hospitalist Progress Note    Patient:  David Segal      Unit/Bed:7K-08/008-A    YOB: 1975    MRN: [id number]       Acct: [de-identified]     PCP: CRISTIAN Gupta Ma, CNP    Date of Admission: 11/8/2021      Subjective: Patient seen and examined. Patient states that he continues to have dizziness. Patient states that he does not feel well overall. No fever, no chills, no nausea, no vomiting. Medications:  Reviewed    Infusion Medications    sodium chloride 250 mL/hr at 11/09/21 6571    sodium chloride      sodium chloride 125 mL/hr at 11/09/21 1110    dextrose       Scheduled Medications    insulin glargine  10 Units SubCUTAneous BID    insulin lispro  0-18 Units SubCUTAneous TID WC    insulin lispro  0-9 Units SubCUTAneous Nightly    metFORMIN  500 mg Oral BID WC    pregabalin  150 mg Oral TID    bumetanide  0.5 mg Oral BID    entecavir  1 mg Oral Daily    famotidine  40 mg Oral Daily    ferrous sulfate  325 mg Oral BID WC    folic acid  1 mg Oral BID    cetirizine  10 mg Oral Daily    mesalamine  400 mg Oral BID    pantoprazole  40 mg Oral BID    PARoxetine  40 mg Oral QAM    tamsulosin  0.4 mg Oral Daily    enoxaparin  40 mg SubCUTAneous Daily    sodium chloride  15 mL/kg IntraVENous Once    sodium chloride flush  5-40 mL IntraVENous 2 times per day    bisacodyl  10 mg Oral Daily    docusate sodium  100 mg Oral Daily     PRN Meds: polyethylene glycol, sodium chloride flush, sodium chloride, acetaminophen **OR** acetaminophen, potassium chloride **OR** potassium alternative oral replacement **OR** potassium chloride, magnesium sulfate, promethazine, senna, glucose, dextrose, glucagon (rDNA), dextrose      Intake/Output Summary (Last 24 hours) at 11/9/2021 1539  Last data filed at 11/9/2021 1438  Gross per 24 hour   Intake 4230.2 ml   Output 1100 ml   Net 3130.2 ml       Diet:  ADULT DIET;  Regular; 4 carb choices (60 gm/meal)    Exam:  /73   Pulse 89   Temp 98 °F (36.7 °C) (Oral)   Resp 16   Wt 233 lb 14.5 oz (106.1 kg)   SpO2 99%   BMI 31.72 kg/m²   Physical Exam  Constitutional:       Appearance: Normal appearance. HENT:      Head: Normocephalic and atraumatic. Right Ear: External ear normal.      Left Ear: External ear normal.      Nose: Nose normal.      Mouth/Throat:      Pharynx: Oropharynx is clear. Eyes:      Extraocular Movements: Extraocular movements intact. Pupils: Pupils are equal, round, and reactive to light. Cardiovascular:      Rate and Rhythm: Normal rate and regular rhythm. Pulses: Normal pulses. Heart sounds: Normal heart sounds. No murmur heard. No friction rub. No gallop. Pulmonary:      Effort: Pulmonary effort is normal. No respiratory distress. Breath sounds: Normal breath sounds. No wheezing, rhonchi or rales. Abdominal:      General: Bowel sounds are normal. There is no distension. Musculoskeletal:         General: No swelling. Normal range of motion. Cervical back: Normal range of motion and neck supple. Neurological:      General: No focal deficit present. Mental Status: He is alert. Labs:   Recent Labs     11/08/21  1412 11/09/21  0615   WBC 9.5 4.5*   HGB 15.5 12.2*   HCT 45.5 36.1*    114*     Recent Labs     11/08/21  1412 11/09/21  0615 11/09/21  1435   * 132* 134*   K 3.8 3.1* 3.3*   CL 87* 99 100   CO2 16* 17* 20*   BUN 11 8 8   CREATININE 0.7 0.6 0.9   CALCIUM 10.7* 7.7* 7.9*     Recent Labs     11/08/21  1412 11/09/21  0615   AST 16 12   ALT 14 9*   BILIDIR 0.3  --    BILITOT 0.8 0.5   ALKPHOS 125 84     No results for input(s): INR in the last 72 hours. No results for input(s): Jailyn Risk in the last 72 hours.     Urinalysis:      Lab Results   Component Value Date    NITRU NEGATIVE 11/08/2021    WBCUA 0-2 11/08/2021    BACTERIA NONE SEEN 11/08/2021    RBCUA NONE SEEN 11/08/2021    BLOODU NEGATIVE 11/08/2021    SPECGRAV >1.030 11/08/2021 GLUCOSEU >= 1000 11/08/2021       Radiology:  XR CHEST PORTABLE   Final Result   1. No acute cardiopulmonary process. **This report has been created using voice recognition software. It may contain minor errors which are inherent in voice recognition technology. **      Final report electronically signed by Dr Edward Parikh on 11/8/2021 2:57 PM          Diet: ADULT DIET; Regular; 4 carb choices (60 gm/meal)    DVT prophylaxis: [x] Lovenox                                 [] SCDs                                 [] SQ Heparin                                 [] Encourage ambulation           [] Already on Anticoagulation     Disposition:    [x] Home       [] TCU       [] Rehab       [] Psych       [] SNF       [] [place]       [] Other-    Code Status: DNR-CCA    PT/OT Eval Status: None ordered    Assessment/Plan:    Anticipated Discharge in : [facility]    Diagnosis Date Noted    Hyperglycemia [R73.9] 11/08/2021     Priority: High    New onset type 2 diabetes mellitus ([facility] [E11.9] 11/08/2021     Priority: High    Hyponatremia [E87.1] 01/03/2021     Priority: Medium    GERD (gastroesophageal reflux disease) [K21.9] 11/08/2021     Priority: Low    History of necrotizing fasciitis [Z87.39] 11/08/2021     Priority: Low    BPH (benign prostatic hyperplasia) [N40.0] 11/08/2021     Priority: Low    Essential hypertension [I10]      Priority: Low    Ulcerative colitis, universal ([facility] [K51.00] 10/28/2019     Priority: Low       1. New onset type 2 diabetes-patient presents to the ER with loss of appetite, increased thirst, nocturia, incontinence. Patient found to have a blood sugar of 592 on fingerstick. Lab work was evaluated for possible DKA. Patient showed a bicarb of 16, anion gap of 25; however, patient's pH on VBG was 7.43. Patient not found to be acidotic and less likely to be considered DKA. Patient given 1 L bolus of normal saline in the ED. Patient will be continued on IV fluid hydration, as patient does show some signs of dehydration. Patient found to be tachycardic in the ED. Patient was initially placed on an insulin drip. On further investigation, patient not in DKA and insulin drip stopped. It was also noted that patient had reported symptoms to his PCP on 11/4/2021. Patient was empirically started on Bactrim for possible UTI. This has been stopped. Patient symptoms seems to be secondary to new onset diabetes. Patient's UA does not show any signs of infection. · Patient placed on insulin sliding scale. · We will get a hemoglobin A1c-14.7  · Patient will be placed on diabetic diet  · Diabetic education  · Dietitian for counseling on diabetic diet  · Hemoglobin A1c 14.7. Patient started on Lantus 10 units twice a day. Patient also started on Metformin 500 mg twice a day. · 11/9/2021-patient's blood sugars continue to remain in the 300s and 400s. Patient continues to have dizziness. Patient to be kept an additional day for continued management of blood sugar. · Patient will need to be discharged with glucometer, Lantus pen, syringes, lancets, glucose blood strips. 2. Hyponatremia-patient with pseudohyponatremia secondary to significantly elevated blood sugars. Patient will receive IV fluid hydration along with treatment for his hyperglycemia. 3. GERD-patient states that he does have a history of GERD and states that his acid reflux has been causing more problems lately. Patient will be continued on his PPI and H2 blocker. 4. BPH-patient with history of BPH. Patient has shown urinary retention in the past.  Patient currently on Flomax. This will be continued. 5. Essential hypertension-patient only noted to be on Bumex. Patient states that he was placed on Bumex recently secondary to having fluid overload after a surgery. Patient denies any history of heart failure.   6. Ulcerative colitis-patient with history of ulcerative colitis. Patient will be continued on mesalamine. 7. 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 Patient states that due to his IV drug use he ended up getting necrotizing fasciitis in his right arms which resulted in his arm being amputated. 8. History of alcohol abuse-patient does have a history of alcohol abuse. Patient currently in remission. 9. History of IV drug use-patient does have a history of IV drug use. Patient currently in remission.         Electronically signed by Светлана Hinojosa MD on 11/9/2021 at 3:39 PM

## 2021-11-09 NOTE — PROGRESS NOTES
Pt admitted to  7K8     Complaints: hyperglycemia     IV none infusing into the antecubital left, condition patent and no redness at a rate of 0 mls/ hour with about  mls in the bag still. IV site free of s/s of infection or infiltration. Vital signs obtained. Assessment and data collection initiated. Two nurse skin assessment performed by Laverne Montemayor and [facility] RN. Oriented to room. Explained patients right to have family, representative or physician notified of their admission. Patient has Declined for physician to be notified. Patient has Declined for family/representative to be notified. The patient is interested in [facility]. [place]s meds to beds program?:  No    Policies and procedures for  explained. All questions answered with no further questions at this time. Fall prevention and safety brochure discussed with patient. Bed alarm on. Call light in reach.

## 2021-11-09 NOTE — ED NOTES
ED to inpatient nurses report    Chief Complaint   Patient presents with    Dizziness    Polydipsia      Present to ED from home for polydipsia and and dizziness. Pt dx with new onset DM. Blood sugar was 623 in ED. Insulin drip started but since d/c'd per order. Amputee R arm. LOC: alert and orientated to name, place, date  Vital signs   Vitals:    11/08/21 1418 11/08/21 1607 11/08/21 1723 11/08/21 1847   BP: 108/69 120/89 (!) 133/96 (!) 127/91   Pulse: 107 117 96 102   Resp: 18 22 15 21   Temp:       TempSrc:       SpO2: 100% 96% 97% 98%      Oxygen Baseline RA    Current needs required RA Bipap/Cpap No  LDAs:   Peripheral IV 11/08/21 Left Antecubital (Active)   Site Assessment Intact; Dry; Clean 11/08/21 1553   Line Status Blood return noted;  Flushed; Normal saline locked 11/08/21 1553   Dressing Status Intact; Dry; Clean 11/08/21 1553   Dressing Intervention New 11/08/21 1553     Mobility: Independent  Pending ED orders: n/a  Present condition: stable    Electronically signed by Emym Black RN on 11/8/2021 at 7:25 PM     Emmy Black RN  11/08/21 1927

## 2021-11-09 NOTE — CARE COORDINATION
11/9/21, 7:29 AM EST  DISCHARGE PLANNING EVALUATION:    Bebo Mayes       Admitted: 11/8/2021/ [address] day: 0   Location: -08/008-A Reason for admit: Hyperglycemia [R73.9]  Diabetic ketoacidosis without coma associated with other specified diabetes mellitus ([facility] [E13.10]   PMH:  has a past medical history of Alcohol abuse, Amputation of right arm ([facility] Ascites, GERD (gastroesophageal reflux disease), Hepatic cirrhosis ([address] Hepatitis B, Hepatitis C, History of blood transfusion, Hypertension, Intravenous drug user, Osteomyelitis ([facility] [facility] Psychiatric problem, PTSD (post-traumatic stress disorder), Sciatica, Splenomegaly, Thrombocytopenia ([facility] and Ulcerative colitis ([facility] To ED from home with c/o polydipsia, lightheadedness, generalized fatigue and polyuria over the last week. Blood sugar was 623 in ED. History of necrotizing fasciitis resulting in right arm amputation  Procedure: na  Barriers to Discharge:  Na 132 (was 128), K+3.1 with replacement. DM management. Dietitian to see. Diabetic clinic. Hgb A1C pending. PCP: CRISITAN Zuñiga - CNP   %    Patient Goals/Plan/Treatment Preferences: Met with Juan Jose Schroeder; he resides at [address] [place] [address]. 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 He is independent pta, he has transportation from another person at his recovery home. He declined HH or any in-home services. 80 pm -Called DM clinic;  New referral info left on Vmail. Transportation/Food Security/Housekeeping Addressed:  No issues identified. 11/9/21, 12:35 PM EST    Patient goals/plan/ treatment preferences discussed by  and . Patient goals/plan/ treatment preferences reviewed with patient/ family. Patient/ family verbalize understanding of discharge plan and are in agreement with goal/plan/treatment preferences. Understanding was demonstrated using the teach back method.   AVS provided by RN at time of discharge, which includes all necessary medical information pertaining to the patients current course of illness, treatment, post-discharge goals of care, and treatment preferences.

## 2021-11-09 NOTE — PROGRESS NOTES
Received call from [facility] in regards to Bebe LEMONS 1975 who was referred to the [facility] by the hospitalist.  Patient was seen at the bedside. Discussed the reason and benefits of attending the [facility]. Scheduled an appointment for 11/10/2021 at 3pm with Jacob Gaffney RD. Patient was very receptive to come to the appointment.

## 2021-11-10 ENCOUNTER — TELEPHONE (OUTPATIENT)
Dept: UROLOGY | Age: 46
End: 2021-11-10

## 2021-11-10 LAB
ANION GAP SERPL CALCULATED.3IONS-SCNC: 16 MEQ/L (ref 8–16)
BASOPHILS # BLD: 0.6 %
BASOPHILS ABSOLUTE: 0 THOU/MM3 (ref 0–0.1)
BUN BLDV-MCNC: 6 MG/DL (ref 7–22)
CALCIUM SERPL-MCNC: 7.4 MG/DL (ref 8.5–10.5)
CHLORIDE BLD-SCNC: 102 MEQ/L (ref 98–111)
CO2: 19 MEQ/L (ref 23–33)
CREAT SERPL-MCNC: 0.5 MG/DL (ref 0.4–1.2)
EOSINOPHIL # BLD: 1.9 %
EOSINOPHILS ABSOLUTE: 0.1 THOU/MM3 (ref 0–0.4)
ERYTHROCYTE [DISTWIDTH] IN BLOOD BY AUTOMATED COUNT: 13.1 % (ref 11.5–14.5)
ERYTHROCYTE [DISTWIDTH] IN BLOOD BY AUTOMATED COUNT: 44.9 FL (ref 35–45)
GFR SERPL CREATININE-BSD FRML MDRD: > 90 ML/MIN/1.73M2
GLUCOSE BLD-MCNC: 261 MG/DL (ref 70–108)
GLUCOSE BLD-MCNC: 287 MG/DL (ref 70–108)
GLUCOSE BLD-MCNC: 334 MG/DL (ref 70–108)
GLUCOSE BLD-MCNC: 345 MG/DL (ref 70–108)
GLUCOSE BLD-MCNC: 373 MG/DL (ref 70–108)
HCT VFR BLD CALC: 36.2 % (ref 42–52)
HEMOGLOBIN: 12.5 GM/DL (ref 14–18)
IMMATURE GRANS (ABS): 0.03 THOU/MM3 (ref 0–0.07)
IMMATURE GRANULOCYTES: 1 %
LYMPHOCYTES # BLD: 46.3 %
LYMPHOCYTES ABSOLUTE: 1.5 THOU/MM3 (ref 1–4.8)
MCH RBC QN AUTO: 32.5 PG (ref 26–33)
MCHC RBC AUTO-ENTMCNC: 34.5 GM/DL (ref 32.2–35.5)
MCV RBC AUTO: 94 FL (ref 80–94)
MONOCYTES # BLD: 8.6 %
MONOCYTES ABSOLUTE: 0.3 THOU/MM3 (ref 0.4–1.3)
NUCLEATED RED BLOOD CELLS: 0 /100 WBC
PLATELET # BLD: 98 THOU/MM3 (ref 130–400)
PMV BLD AUTO: 9.7 FL (ref 9.4–12.4)
POTASSIUM SERPL-SCNC: 3.1 MEQ/L (ref 3.5–5.2)
RBC # BLD: 3.85 MILL/MM3 (ref 4.7–6.1)
SCAN OF BLOOD SMEAR: NORMAL
SEG NEUTROPHILS: 41.6 %
SEGMENTED NEUTROPHILS ABSOLUTE COUNT: 1.3 THOU/MM3 (ref 1.8–7.7)
SODIUM BLD-SCNC: 137 MEQ/L (ref 135–145)
WBC # BLD: 3.2 THOU/MM3 (ref 4.8–10.8)

## 2021-11-10 PROCEDURE — 6370000000 HC RX 637 (ALT 250 FOR IP): Performed by: HOSPITALIST

## 2021-11-10 PROCEDURE — 82948 REAGENT STRIP/BLOOD GLUCOSE: CPT

## 2021-11-10 PROCEDURE — 2580000003 HC RX 258: Performed by: HOSPITALIST

## 2021-11-10 PROCEDURE — 96361 HYDRATE IV INFUSION ADD-ON: CPT

## 2021-11-10 PROCEDURE — 85025 COMPLETE CBC W/AUTO DIFF WBC: CPT

## 2021-11-10 PROCEDURE — 96372 THER/PROPH/DIAG INJ SC/IM: CPT

## 2021-11-10 PROCEDURE — 6360000002 HC RX W HCPCS: Performed by: HOSPITALIST

## 2021-11-10 PROCEDURE — G0378 HOSPITAL OBSERVATION PER HR: HCPCS

## 2021-11-10 PROCEDURE — 99232 SBSQ HOSP IP/OBS MODERATE 35: CPT | Performed by: NURSE PRACTITIONER

## 2021-11-10 PROCEDURE — 80048 BASIC METABOLIC PNL TOTAL CA: CPT

## 2021-11-10 PROCEDURE — 36415 COLL VENOUS BLD VENIPUNCTURE: CPT

## 2021-11-10 RX ADMIN — FOLIC ACID 1 MG: 1 TABLET ORAL at 17:16

## 2021-11-10 RX ADMIN — TAMSULOSIN HYDROCHLORIDE 0.4 MG: 0.4 CAPSULE ORAL at 10:06

## 2021-11-10 RX ADMIN — PREGABALIN 150 MG: 75 CAPSULE ORAL at 21:30

## 2021-11-10 RX ADMIN — INSULIN GLARGINE 10 UNITS: 100 INJECTION, SOLUTION SUBCUTANEOUS at 21:31

## 2021-11-10 RX ADMIN — FAMOTIDINE 40 MG: 20 TABLET, FILM COATED ORAL at 10:09

## 2021-11-10 RX ADMIN — DOCUSATE SODIUM 100 MG: 100 CAPSULE ORAL at 10:08

## 2021-11-10 RX ADMIN — INSULIN LISPRO 15 UNITS: 100 INJECTION, SOLUTION INTRAVENOUS; SUBCUTANEOUS at 11:27

## 2021-11-10 RX ADMIN — PAROXETINE HYDROCHLORIDE 40 MG: 20 TABLET, FILM COATED ORAL at 10:06

## 2021-11-10 RX ADMIN — INSULIN LISPRO 12 UNITS: 100 INJECTION, SOLUTION INTRAVENOUS; SUBCUTANEOUS at 17:40

## 2021-11-10 RX ADMIN — ACETAMINOPHEN 650 MG: 325 TABLET ORAL at 13:57

## 2021-11-10 RX ADMIN — CETIRIZINE HYDROCHLORIDE 10 MG: 10 TABLET, FILM COATED ORAL at 10:11

## 2021-11-10 RX ADMIN — ENOXAPARIN SODIUM 40 MG: 100 INJECTION SUBCUTANEOUS at 10:11

## 2021-11-10 RX ADMIN — FERROUS SULFATE TAB 325 MG (65 MG ELEMENTAL FE) 325 MG: 325 (65 FE) TAB at 21:30

## 2021-11-10 RX ADMIN — METFORMIN HYDROCHLORIDE 500 MG: 500 TABLET ORAL at 10:14

## 2021-11-10 RX ADMIN — BUMETANIDE 0.5 MG: 0.5 TABLET ORAL at 10:14

## 2021-11-10 RX ADMIN — FOLIC ACID 1 MG: 1 TABLET ORAL at 10:14

## 2021-11-10 RX ADMIN — METFORMIN HYDROCHLORIDE 500 MG: 500 TABLET ORAL at 17:17

## 2021-11-10 RX ADMIN — PANTOPRAZOLE SODIUM 40 MG: 40 TABLET, DELAYED RELEASE ORAL at 05:28

## 2021-11-10 RX ADMIN — POTASSIUM CHLORIDE 40 MEQ: 1500 TABLET, EXTENDED RELEASE ORAL at 19:44

## 2021-11-10 RX ADMIN — SODIUM CHLORIDE: 9 INJECTION, SOLUTION INTRAVENOUS at 10:40

## 2021-11-10 RX ADMIN — FERROUS SULFATE TAB 325 MG (65 MG ELEMENTAL FE) 325 MG: 325 (65 FE) TAB at 10:14

## 2021-11-10 RX ADMIN — MESALAMINE 400 MG: 400 CAPSULE, DELAYED RELEASE ORAL at 10:14

## 2021-11-10 RX ADMIN — BUMETANIDE 0.5 MG: 0.5 TABLET ORAL at 21:30

## 2021-11-10 RX ADMIN — INSULIN LISPRO 9 UNITS: 100 INJECTION, SOLUTION INTRAVENOUS; SUBCUTANEOUS at 10:23

## 2021-11-10 RX ADMIN — BISACODYL 10 MG: 5 TABLET, COATED ORAL at 10:06

## 2021-11-10 RX ADMIN — PREGABALIN 150 MG: 75 CAPSULE ORAL at 10:08

## 2021-11-10 RX ADMIN — PANTOPRAZOLE SODIUM 40 MG: 40 TABLET, DELAYED RELEASE ORAL at 17:17

## 2021-11-10 RX ADMIN — SODIUM CHLORIDE: 9 INJECTION, SOLUTION INTRAVENOUS at 01:44

## 2021-11-10 RX ADMIN — INSULIN GLARGINE 10 UNITS: 100 INJECTION, SOLUTION SUBCUTANEOUS at 10:33

## 2021-11-10 RX ADMIN — MESALAMINE 400 MG: 400 CAPSULE, DELAYED RELEASE ORAL at 21:30

## 2021-11-10 ASSESSMENT — PAIN DESCRIPTION - FREQUENCY: FREQUENCY: CONTINUOUS

## 2021-11-10 ASSESSMENT — PAIN SCALES - GENERAL
PAINLEVEL_OUTOF10: 3
PAINLEVEL_OUTOF10: 0
PAINLEVEL_OUTOF10: 3
PAINLEVEL_OUTOF10: 0
PAINLEVEL_OUTOF10: 0

## 2021-11-10 ASSESSMENT — PAIN DESCRIPTION - ONSET: ONSET: ON-GOING

## 2021-11-10 ASSESSMENT — PAIN DESCRIPTION - PROGRESSION: CLINICAL_PROGRESSION: NOT CHANGED

## 2021-11-10 ASSESSMENT — PAIN - FUNCTIONAL ASSESSMENT: PAIN_FUNCTIONAL_ASSESSMENT: ACTIVITIES ARE NOT PREVENTED

## 2021-11-10 ASSESSMENT — PAIN DESCRIPTION - DESCRIPTORS: DESCRIPTORS: HEADACHE

## 2021-11-10 ASSESSMENT — PAIN DESCRIPTION - LOCATION: LOCATION: HEAD

## 2021-11-10 ASSESSMENT — PAIN DESCRIPTION - PAIN TYPE: TYPE: ACUTE PAIN

## 2021-11-10 NOTE — CARE COORDINATION
DISCHARGE PLAN UPDATE       Barriers to Discharge: K+ 3.1 with replacement, blood sugars 261-430, WBC 3.2  Plt 98. Patient Goals/Plan/Treatment Preferences: plans return to [facility] of [address] recovery home. 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 Referral made to clinic 11/09.

## 2021-11-10 NOTE — TELEPHONE ENCOUNTER
Patient scheduled for US RENAL COMPLETE  at [place] [address] on 02/10/2022 @ 1:00 WITH ARRIVAL TIME OF 12:45PM.  Patient advised of instructions BRING LIST OF MEDICATION NO CARBONATED BEVERAGES WELL  HYDRATED. .  Order mailed to patient

## 2021-11-10 NOTE — PROGRESS NOTES
Hospitalist Progress Note    Patient:  Abel Cueva      Unit/Bed:7K-08/008-A    YOB: 1975    MRN: [id number]       Acct: [de-identified]     PCP: CRISTIAN Jennings CNP    Date of Admission: 11/8/2021      Subjective: Feeling better today. Denies CP/SOB. No N/V/D. Medications:  Reviewed    Infusion Medications    sodium chloride 250 mL/hr at 11/09/21 0752    sodium chloride      sodium chloride 125 mL/hr at 11/10/21 1040    dextrose       Scheduled Medications    insulin lispro  5 Units SubCUTAneous TID AC    insulin glargine  10 Units SubCUTAneous BID    insulin lispro  0-18 Units SubCUTAneous TID WC    insulin lispro  0-9 Units SubCUTAneous Nightly    metFORMIN  500 mg Oral BID WC    pregabalin  150 mg Oral TID    bumetanide  0.5 mg Oral BID    entecavir  1 mg Oral Daily    famotidine  40 mg Oral Daily    ferrous sulfate  325 mg Oral BID WC    folic acid  1 mg Oral BID    cetirizine  10 mg Oral Daily    mesalamine  400 mg Oral BID    pantoprazole  40 mg Oral BID    PARoxetine  40 mg Oral QAM    tamsulosin  0.4 mg Oral Daily    enoxaparin  40 mg SubCUTAneous Daily    sodium chloride  15 mL/kg IntraVENous Once    sodium chloride flush  5-40 mL IntraVENous 2 times per day    bisacodyl  10 mg Oral Daily    docusate sodium  100 mg Oral Daily     PRN Meds: polyethylene glycol, sodium chloride flush, sodium chloride, acetaminophen **OR** acetaminophen, potassium chloride **OR** potassium alternative oral replacement **OR** potassium chloride, magnesium sulfate, promethazine, senna, glucose, dextrose, glucagon (rDNA), dextrose      Intake/Output Summary (Last 24 hours) at 11/10/2021 1309  Last data filed at 11/10/2021 1308  Gross per 24 hour   Intake 6015.26 ml   Output 2950 ml   Net 3065.26 ml       Diet:  ADULT DIET;  Regular; 4 carb choices (60 gm/meal)    Exam:  /75   Pulse 87   Temp 98.5 °F (36.9 °C) (Oral)   Resp 16   Wt 233 lb 14.5 oz (106.1 Radiology:  XR CHEST PORTABLE   Final Result   1. No acute cardiopulmonary process. **This report has been created using voice recognition software. It may contain minor errors which are inherent in voice recognition technology. **      Final report electronically signed by Dr Hill Cline on 11/8/2021 2:57 PM          Diet: ADULT DIET; Regular; 4 carb choices (60 gm/meal)    DVT prophylaxis: [x] Lovenox                                 [] SCDs                                 [] SQ Heparin                                 [] Encourage ambulation           [] Already on Anticoagulation     Disposition:    [x] Home       [] TCU       [] Rehab       [] Psych       [] SNF       [] [facility]       [] Other-    Code Status: DNR-CCA    PT/OT Eval Status: None ordered    Assessment/Plan:    Anticipated Discharge in : [facility]    Diagnosis Date Noted    Hyperglycemia [R73.9] 11/08/2021    New onset type 2 diabetes mellitus ([facility] [E11.9] 11/08/2021    GERD (gastroesophageal reflux disease) [K21.9] 11/08/2021    History of necrotizing fasciitis [Z87.39] 11/08/2021    BPH (benign prostatic hyperplasia) [N40.0] 11/08/2021    Essential hypertension [I10]     Hyponatremia [E87.1] 01/03/2021    Ulcerative colitis, universal ([facility] [address] [K51.00] 10/28/2019       1. New onset 2 diabetes, likely type 2- A1c 14.7%. No DKA on admission, VBG PH was 7.43. Insulin drip transitioned to basal bolus regimen. · BS trend remains above goal. Add preset mealtime insulin. · Patient will be placed on diabetic diet  · Diabetic education  · Dietitian seen for diabetic diet  · Continue Lantus 10 units twice a day and Metformin 500 mg twice a day. · referral to OP diabetic clinic. 2. Hyponatremia, resolved-patient with pseudohyponatremia secondary to significantly elevated blood sugars. 3. Hypokalemia, continue replacement.    4. GERD Patient will be continued on his PPI and H2 blocker. 5. BPH-Flomax. 6. Essential hypertension-patient only noted to be on Bumex. Patient states that he was placed on Bumex recently secondary to having fluid overload after a surgery. Patient denies any history of heart failure. 7. Ulcerative colitis-patient with history of ulcerative colitis. Mesalamine. 8. 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 Patient states that due to his IV drug use he ended up getting necrotizing fasciitis in his right arms which resulted in his arm being amputated. 9. History of alcohol abuse-patient does have a history of alcohol abuse. Patient currently in remission. 10. History of IV drug use-patient does have a history of IV drug use. Patient currently in remission. Dispo: Continue to optimize BS.  Likely home in AM.      Electronically signed by CRISTIAN Oh CNP on 11/10/2021 at 1:09 PM

## 2021-11-11 ENCOUNTER — CLINICAL DOCUMENTATION (OUTPATIENT)
Dept: INTERNAL MEDICINE CLINIC | Age: 46
End: 2021-11-11

## 2021-11-11 ENCOUNTER — TELEPHONE (OUTPATIENT)
Dept: INTERNAL MEDICINE CLINIC | Age: 46
End: 2021-11-11

## 2021-11-11 LAB
ANION GAP SERPL CALCULATED.3IONS-SCNC: 10 MEQ/L (ref 8–16)
BUN BLDV-MCNC: 7 MG/DL (ref 7–22)
CALCIUM SERPL-MCNC: 8.1 MG/DL (ref 8.5–10.5)
CHLORIDE BLD-SCNC: 100 MEQ/L (ref 98–111)
CO2: 26 MEQ/L (ref 23–33)
CREAT SERPL-MCNC: 0.5 MG/DL (ref 0.4–1.2)
GFR SERPL CREATININE-BSD FRML MDRD: > 90 ML/MIN/1.73M2
GLUCOSE BLD-MCNC: 239 MG/DL (ref 70–108)
GLUCOSE BLD-MCNC: 248 MG/DL (ref 70–108)
GLUCOSE BLD-MCNC: 270 MG/DL (ref 70–108)
GLUCOSE BLD-MCNC: 281 MG/DL (ref 70–108)
GLUCOSE BLD-MCNC: 296 MG/DL (ref 70–108)
GLUCOSE BLD-MCNC: 535 MG/DL (ref 70–108)
POTASSIUM SERPL-SCNC: 3.3 MEQ/L (ref 3.5–5.2)
SODIUM BLD-SCNC: 136 MEQ/L (ref 135–145)

## 2021-11-11 PROCEDURE — 2580000003 HC RX 258: Performed by: HOSPITALIST

## 2021-11-11 PROCEDURE — 36415 COLL VENOUS BLD VENIPUNCTURE: CPT

## 2021-11-11 PROCEDURE — G0378 HOSPITAL OBSERVATION PER HR: HCPCS

## 2021-11-11 PROCEDURE — 6360000002 HC RX W HCPCS: Performed by: HOSPITALIST

## 2021-11-11 PROCEDURE — 80048 BASIC METABOLIC PNL TOTAL CA: CPT

## 2021-11-11 PROCEDURE — 6370000000 HC RX 637 (ALT 250 FOR IP): Performed by: NURSE PRACTITIONER

## 2021-11-11 PROCEDURE — 99232 SBSQ HOSP IP/OBS MODERATE 35: CPT | Performed by: NURSE PRACTITIONER

## 2021-11-11 PROCEDURE — 82948 REAGENT STRIP/BLOOD GLUCOSE: CPT

## 2021-11-11 PROCEDURE — 6370000000 HC RX 637 (ALT 250 FOR IP): Performed by: HOSPITALIST

## 2021-11-11 PROCEDURE — 96361 HYDRATE IV INFUSION ADD-ON: CPT

## 2021-11-11 PROCEDURE — 96372 THER/PROPH/DIAG INJ SC/IM: CPT

## 2021-11-11 RX ORDER — INSULIN GLARGINE 100 [IU]/ML
15 INJECTION, SOLUTION SUBCUTANEOUS 2 TIMES DAILY
Status: DISCONTINUED | OUTPATIENT
Start: 2021-11-11 | End: 2021-11-12 | Stop reason: HOSPADM

## 2021-11-11 RX ADMIN — FERROUS SULFATE TAB 325 MG (65 MG ELEMENTAL FE) 325 MG: 325 (65 FE) TAB at 16:07

## 2021-11-11 RX ADMIN — INSULIN GLARGINE 15 UNITS: 100 INJECTION, SOLUTION SUBCUTANEOUS at 21:00

## 2021-11-11 RX ADMIN — PANTOPRAZOLE SODIUM 40 MG: 40 TABLET, DELAYED RELEASE ORAL at 05:46

## 2021-11-11 RX ADMIN — INSULIN LISPRO 6 UNITS: 100 INJECTION, SOLUTION INTRAVENOUS; SUBCUTANEOUS at 18:10

## 2021-11-11 RX ADMIN — ENOXAPARIN SODIUM 40 MG: 100 INJECTION SUBCUTANEOUS at 08:34

## 2021-11-11 RX ADMIN — INSULIN LISPRO 18 UNITS: 100 INJECTION, SOLUTION INTRAVENOUS; SUBCUTANEOUS at 11:08

## 2021-11-11 RX ADMIN — MESALAMINE 400 MG: 400 CAPSULE, DELAYED RELEASE ORAL at 18:09

## 2021-11-11 RX ADMIN — PREGABALIN 150 MG: 75 CAPSULE ORAL at 08:32

## 2021-11-11 RX ADMIN — DOCUSATE SODIUM 100 MG: 100 CAPSULE ORAL at 08:33

## 2021-11-11 RX ADMIN — FERROUS SULFATE TAB 325 MG (65 MG ELEMENTAL FE) 325 MG: 325 (65 FE) TAB at 08:33

## 2021-11-11 RX ADMIN — PANTOPRAZOLE SODIUM 40 MG: 40 TABLET, DELAYED RELEASE ORAL at 16:06

## 2021-11-11 RX ADMIN — FOLIC ACID 1 MG: 1 TABLET ORAL at 16:07

## 2021-11-11 RX ADMIN — TAMSULOSIN HYDROCHLORIDE 0.4 MG: 0.4 CAPSULE ORAL at 08:34

## 2021-11-11 RX ADMIN — INSULIN LISPRO 9 UNITS: 100 INJECTION, SOLUTION INTRAVENOUS; SUBCUTANEOUS at 08:32

## 2021-11-11 RX ADMIN — METFORMIN HYDROCHLORIDE 500 MG: 500 TABLET ORAL at 16:07

## 2021-11-11 RX ADMIN — BUMETANIDE 0.5 MG: 0.5 TABLET ORAL at 20:59

## 2021-11-11 RX ADMIN — BUMETANIDE 0.5 MG: 0.5 TABLET ORAL at 08:33

## 2021-11-11 RX ADMIN — ACETAMINOPHEN 650 MG: 325 TABLET ORAL at 08:37

## 2021-11-11 RX ADMIN — FAMOTIDINE 40 MG: 20 TABLET, FILM COATED ORAL at 08:33

## 2021-11-11 RX ADMIN — FOLIC ACID 1 MG: 1 TABLET ORAL at 08:33

## 2021-11-11 RX ADMIN — PREGABALIN 150 MG: 75 CAPSULE ORAL at 20:59

## 2021-11-11 RX ADMIN — BISACODYL 10 MG: 5 TABLET, COATED ORAL at 08:33

## 2021-11-11 RX ADMIN — PAROXETINE HYDROCHLORIDE 40 MG: 20 TABLET, FILM COATED ORAL at 08:34

## 2021-11-11 RX ADMIN — INSULIN GLARGINE 10 UNITS: 100 INJECTION, SOLUTION SUBCUTANEOUS at 08:33

## 2021-11-11 RX ADMIN — MESALAMINE 400 MG: 400 CAPSULE, DELAYED RELEASE ORAL at 08:33

## 2021-11-11 RX ADMIN — CETIRIZINE HYDROCHLORIDE 10 MG: 10 TABLET, FILM COATED ORAL at 08:33

## 2021-11-11 RX ADMIN — SODIUM CHLORIDE, PRESERVATIVE FREE 10 ML: 5 INJECTION INTRAVENOUS at 08:34

## 2021-11-11 RX ADMIN — METFORMIN HYDROCHLORIDE 500 MG: 500 TABLET ORAL at 08:32

## 2021-11-11 ASSESSMENT — PAIN SCALES - GENERAL
PAINLEVEL_OUTOF10: 3
PAINLEVEL_OUTOF10: 0

## 2021-11-11 ASSESSMENT — PAIN DESCRIPTION - PAIN TYPE: TYPE: ACUTE PAIN

## 2021-11-11 ASSESSMENT — PAIN DESCRIPTION - LOCATION: LOCATION: HEAD

## 2021-11-11 NOTE — PROGRESS NOTES
Hospitalist Progress Note    Patient:  Katie Yeager      Unit/Bed:[facility]/008-A    YOB: 1975    MRN: [id number]       Acct: [de-identified]     PCP: CRISTIAN Esquivel CNP    Date of Admission: 11/8/2021      Subjective: Increased fatigue today. No pain. Denies SOB      Medications:  Reviewed    Infusion Medications    sodium chloride 250 mL/hr at 11/09/21 0752    sodium chloride      sodium chloride Stopped (11/11/21 0222)    dextrose       Scheduled Medications    insulin lispro  10 Units SubCUTAneous TID AC    insulin glargine  15 Units SubCUTAneous BID    insulin lispro  0-18 Units SubCUTAneous TID WC    insulin lispro  0-9 Units SubCUTAneous Nightly    metFORMIN  500 mg Oral BID WC    pregabalin  150 mg Oral TID    bumetanide  0.5 mg Oral BID    entecavir  1 mg Oral Daily    famotidine  40 mg Oral Daily    ferrous sulfate  325 mg Oral BID WC    folic acid  1 mg Oral BID    cetirizine  10 mg Oral Daily    mesalamine  400 mg Oral BID    pantoprazole  40 mg Oral BID    PARoxetine  40 mg Oral QAM    tamsulosin  0.4 mg Oral Daily    enoxaparin  40 mg SubCUTAneous Daily    sodium chloride  15 mL/kg IntraVENous Once    sodium chloride flush  5-40 mL IntraVENous 2 times per day    bisacodyl  10 mg Oral Daily    docusate sodium  100 mg Oral Daily     PRN Meds: polyethylene glycol, sodium chloride flush, sodium chloride, acetaminophen **OR** acetaminophen, potassium chloride **OR** potassium alternative oral replacement **OR** potassium chloride, magnesium sulfate, promethazine, senna, glucose, dextrose, glucagon (rDNA), dextrose      Intake/Output Summary (Last 24 hours) at 11/11/2021 1412  Last data filed at 11/11/2021 1204  Gross per 24 hour   Intake 5404.2 ml   Output 2750 ml   Net 2654.2 ml       Diet:  ADULT DIET;  Regular; 4 carb choices (60 gm/meal)    Exam:  /86   Pulse 112   Temp 98.5 °F (36.9 °C) (Oral)   Resp 20   Wt 233 lb 14.5 oz (106.1 kg) SpO2 95%   BMI 31.72 kg/m²   Physical Exam  Constitutional:       Appearance: Normal appearance. HENT:      Head: Normocephalic and atraumatic. Right Ear: External ear normal.      Left Ear: External ear normal.      Nose: Nose normal.      Mouth/Throat:      Pharynx: Oropharynx is clear. Eyes:      Extraocular Movements: Extraocular movements intact. Pupils: Pupils are equal, round, and reactive to light. Cardiovascular:      Rate and Rhythm: Normal rate and regular rhythm. Pulses: Normal pulses. Heart sounds: Normal heart sounds. No murmur heard. No friction rub. No gallop. Pulmonary:      Effort: Pulmonary effort is normal. No respiratory distress. Breath sounds: Normal breath sounds. No wheezing, rhonchi or rales. Abdominal:      General: Bowel sounds are normal. There is no distension. Musculoskeletal:         General: No swelling. Normal range of motion. Cervical back: Normal range of motion and neck supple. Neurological:      General: No focal deficit present. Mental Status: He is alert. Labs:   Recent Labs     11/09/21  0615 11/10/21  0627   WBC 4.5* 3.2*   HGB 12.2* 12.5*   HCT 36.1* 36.2*   * 98*     Recent Labs     11/09/21  1435 11/10/21  0627 11/11/21  0603   * 137 136   K 3.3* 3.1* 3.3*    102 100   CO2 20* 19* 26   BUN 8 6* 7   CREATININE 0.9 0.5 0.5   CALCIUM 7.9* 7.4* 8.1*     Recent Labs     11/09/21  0615   AST 12   ALT 9*   BILITOT 0.5   ALKPHOS 84     No results for input(s): INR in the last 72 hours. No results for input(s): Mac Shackle in the last 72 hours. Urinalysis:      Lab Results   Component Value Date    NITRU NEGATIVE 11/08/2021    WBCUA 0-2 11/08/2021    BACTERIA NONE SEEN 11/08/2021    RBCUA NONE SEEN 11/08/2021    BLOODU NEGATIVE 11/08/2021    SPECGRAV >1.030 11/08/2021    GLUCOSEU >= 1000 11/08/2021       Radiology:  XR CHEST PORTABLE   Final Result   1. No acute cardiopulmonary process. **This report has been created using voice recognition software. It may contain minor errors which are inherent in voice recognition technology. **      Final report electronically signed by Dr Corey Waters on 11/8/2021 2:57 PM          Diet: ADULT DIET; Regular; 4 carb choices (60 gm/meal)    DVT prophylaxis: [x] Lovenox                                 [] SCDs                                 [] SQ Heparin                                 [] Encourage ambulation           [] Already on Anticoagulation     Disposition:    [x] Home       [] TCU       [] Rehab       [] Psych       [] SNF       [] [facility]       [] Other-    Code Status: DNR-CCA    PT/OT Eval Status: None ordered    Assessment/Plan:    1. New onset 2 diabetes, likely type 2- A1c 14.7%. No DKA on admission, VBG PH was 7.43. Insulin drip transitioned to basal bolus regimen. · Acute hyperglycemic today prior to lunch. BS >500  · Hold Lunch tray. Cover with scale now. Check 2 hour PP BS and call results. .   · Increase Lantus and preset mealtime insulin. · Diabetic education  · Dietitian seen for diabetic diet  · Continue Metformin 500 mg twice a day. · referral to OP diabetic clinic. 2. Hyponatremia, resolved-patient with pseudohyponatremia secondary to significantly elevated blood sugars. 3. Hypokalemia, continue replacement. 4. GERD Patient will be continued on his PPI and H2 blocker. 5. BPH-Flomax. 6. Essential hypertension-patient only noted to be on Bumex. Patient states that he was placed on Bumex recently secondary to having fluid overload after a surgery. Patient denies any history of heart failure. 7. Ulcerative colitis-patient with history of ulcerative colitis. Mesalamine. 8. 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   Patient states that due to his IV drug use he ended up getting necrotizing fasciitis in his right arms which resulted in his arm being amputated. 9. History of alcohol abuse-patient does have a history of alcohol abuse. Patient currently in remission. 10. History of IV drug use-patient does have a history of IV drug use. Patient currently in remission. Dispo: Hold discharge today with BS >500.       Electronically signed by Jarrell Fabry, APRN - CNP on 11/11/2021 at 2:12 PM

## 2021-11-11 NOTE — TELEPHONE ENCOUNTER
Left msge with pt from outpatient dietitian to callback and r/s missed RD appt on 11/10/21. Callback # and office hours provided.

## 2021-11-11 NOTE — PROGRESS NOTES
Nutrition Education    Consulted for new diabetic diet education. Pt seen, reports he lives and a recovery home (substance abuse hx). No family hx of diabetes. Tries to follow low sodium diet, no meat but includes other good protein sources in meals. 11/9/21: HgbA1c 14.1%/381 average glucose. Chemstick 535. Rx: bumex, iron, folvite, lantus, humalog, glucophage. Reports great appetite. Has a good understanding of healthy eating habits just needs to apply them. · Verbally reviewed information with Patient  · Educated on carb controlled, cardiac, weight reduction diet plan. · Written educational materials provided. · Contact name and number provided. · Refer to Patient Education activity for more details.     Electronically signed by Gweneth Cabot, RD, MONA on 11/11/21 at 12:56 PM EST    Contact: [phone]

## 2021-11-11 NOTE — CARE COORDINATION
[address] pm-  Left message with DM clinic. Asked if they have special meter for pt, someone told pt they can get him one to use with one limb. Return home with recovery house; will need new DME supplies and f/u appts.

## 2021-11-12 VITALS
SYSTOLIC BLOOD PRESSURE: 117 MMHG | TEMPERATURE: 97.9 F | HEART RATE: 110 BPM | WEIGHT: 233.91 LBS | BODY MASS INDEX: 31.72 KG/M2 | OXYGEN SATURATION: 95 % | RESPIRATION RATE: 16 BRPM | DIASTOLIC BLOOD PRESSURE: 84 MMHG

## 2021-11-12 PROBLEM — E11.9 DIABETES MELLITUS, NEW ONSET (HCC): Status: ACTIVE | Noted: 2021-11-12

## 2021-11-12 LAB
ANION GAP SERPL CALCULATED.3IONS-SCNC: 14 MEQ/L (ref 8–16)
BUN BLDV-MCNC: 9 MG/DL (ref 7–22)
CALCIUM SERPL-MCNC: 8.4 MG/DL (ref 8.5–10.5)
CHLORIDE BLD-SCNC: 97 MEQ/L (ref 98–111)
CO2: 24 MEQ/L (ref 23–33)
CREAT SERPL-MCNC: 0.5 MG/DL (ref 0.4–1.2)
GFR SERPL CREATININE-BSD FRML MDRD: > 90 ML/MIN/1.73M2
GLUCOSE BLD-MCNC: 221 MG/DL (ref 70–108)
GLUCOSE BLD-MCNC: 224 MG/DL (ref 70–108)
GLUCOSE BLD-MCNC: 271 MG/DL (ref 70–108)
POTASSIUM SERPL-SCNC: 3.7 MEQ/L (ref 3.5–5.2)
REASON FOR REJECTION: NORMAL
REJECTED TEST: NORMAL
SODIUM BLD-SCNC: 135 MEQ/L (ref 135–145)

## 2021-11-12 PROCEDURE — 82948 REAGENT STRIP/BLOOD GLUCOSE: CPT

## 2021-11-12 PROCEDURE — 80048 BASIC METABOLIC PNL TOTAL CA: CPT

## 2021-11-12 PROCEDURE — G0378 HOSPITAL OBSERVATION PER HR: HCPCS

## 2021-11-12 PROCEDURE — 6370000000 HC RX 637 (ALT 250 FOR IP): Performed by: HOSPITALIST

## 2021-11-12 PROCEDURE — 96372 THER/PROPH/DIAG INJ SC/IM: CPT

## 2021-11-12 PROCEDURE — 36415 COLL VENOUS BLD VENIPUNCTURE: CPT

## 2021-11-12 PROCEDURE — 6370000000 HC RX 637 (ALT 250 FOR IP): Performed by: NURSE PRACTITIONER

## 2021-11-12 PROCEDURE — 99239 HOSP IP/OBS DSCHRG MGMT >30: CPT | Performed by: NURSE PRACTITIONER

## 2021-11-12 PROCEDURE — 6360000002 HC RX W HCPCS: Performed by: HOSPITALIST

## 2021-11-12 PROCEDURE — 1200000000 HC SEMI PRIVATE

## 2021-11-12 RX ORDER — LANCETS 28 GAUGE
1 EACH MISCELLANEOUS DAILY
Qty: 100 EACH | Refills: 0 | Status: SHIPPED | OUTPATIENT
Start: 2021-11-12

## 2021-11-12 RX ORDER — INSULIN GLARGINE 100 [IU]/ML
15 INJECTION, SOLUTION SUBCUTANEOUS 2 TIMES DAILY
Qty: 3 PEN | Refills: 0 | Status: SHIPPED | OUTPATIENT
Start: 2021-11-12 | End: 2022-11-12

## 2021-11-12 RX ORDER — BLOOD-GLUCOSE METER
1 KIT MISCELLANEOUS DAILY
Qty: 100 EACH | Refills: 3 | Status: SHIPPED | OUTPATIENT
Start: 2021-11-12

## 2021-11-12 RX ORDER — BLOOD-GLUCOSE METER
1 KIT MISCELLANEOUS
Qty: 1 KIT | Refills: 0 | Status: SHIPPED | OUTPATIENT
Start: 2021-11-12

## 2021-11-12 RX ORDER — INSULIN LISPRO 100 [IU]/ML
10 INJECTION, SOLUTION INTRAVENOUS; SUBCUTANEOUS
Qty: 5 PEN | Refills: 0 | Status: SHIPPED | OUTPATIENT
Start: 2021-11-12

## 2021-11-12 RX ADMIN — INSULIN GLARGINE 15 UNITS: 100 INJECTION, SOLUTION SUBCUTANEOUS at 09:07

## 2021-11-12 RX ADMIN — PAROXETINE HYDROCHLORIDE 40 MG: 20 TABLET, FILM COATED ORAL at 09:03

## 2021-11-12 RX ADMIN — TAMSULOSIN HYDROCHLORIDE 0.4 MG: 0.4 CAPSULE ORAL at 09:03

## 2021-11-12 RX ADMIN — CETIRIZINE HYDROCHLORIDE 10 MG: 10 TABLET, FILM COATED ORAL at 09:04

## 2021-11-12 RX ADMIN — DOCUSATE SODIUM 100 MG: 100 CAPSULE ORAL at 09:04

## 2021-11-12 RX ADMIN — FERROUS SULFATE TAB 325 MG (65 MG ELEMENTAL FE) 325 MG: 325 (65 FE) TAB at 09:04

## 2021-11-12 RX ADMIN — ACETAMINOPHEN 650 MG: 325 TABLET ORAL at 06:30

## 2021-11-12 RX ADMIN — BISACODYL 10 MG: 5 TABLET, COATED ORAL at 09:04

## 2021-11-12 RX ADMIN — PANTOPRAZOLE SODIUM 40 MG: 40 TABLET, DELAYED RELEASE ORAL at 06:30

## 2021-11-12 RX ADMIN — INSULIN LISPRO 6 UNITS: 100 INJECTION, SOLUTION INTRAVENOUS; SUBCUTANEOUS at 09:07

## 2021-11-12 RX ADMIN — FOLIC ACID 1 MG: 1 TABLET ORAL at 09:04

## 2021-11-12 RX ADMIN — INSULIN LISPRO 6 UNITS: 100 INJECTION, SOLUTION INTRAVENOUS; SUBCUTANEOUS at 11:56

## 2021-11-12 RX ADMIN — BUMETANIDE 0.5 MG: 0.5 TABLET ORAL at 09:04

## 2021-11-12 RX ADMIN — PREGABALIN 150 MG: 75 CAPSULE ORAL at 09:03

## 2021-11-12 RX ADMIN — METFORMIN HYDROCHLORIDE 500 MG: 500 TABLET ORAL at 09:04

## 2021-11-12 RX ADMIN — ENOXAPARIN SODIUM 40 MG: 100 INJECTION SUBCUTANEOUS at 09:05

## 2021-11-12 RX ADMIN — MESALAMINE 400 MG: 400 CAPSULE, DELAYED RELEASE ORAL at 09:04

## 2021-11-12 RX ADMIN — FAMOTIDINE 40 MG: 20 TABLET, FILM COATED ORAL at 09:04

## 2021-11-12 ASSESSMENT — PAIN SCALES - GENERAL
PAINLEVEL_OUTOF10: 7
PAINLEVEL_OUTOF10: 0

## 2021-11-12 NOTE — PROGRESS NOTES
Education provided / reinforcing what the diabetic and nutrition center also educated on. How often and when to check blood sugar. Where to administer insulin. What to do in case blood sugar is low. And how to check blood sugar. Patient has no further questions regarding his new diagnosis. 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 Advised of follow up appointments and the importance of follow up care. Education hand outs provided about being diabetic and how to manage with foot care, eye care, etc. Also sent home is the packet the Diabetic clinic provided patient. New medications, and supplies are being brought up by our OP pharmacy. Patient has a friend coming to pick him up after medications are delivered.

## 2021-11-12 NOTE — DISCHARGE SUMMARY
Hospital Medicine Discharge Summary      Patient Identification:   Boone Olivo   : 1975  MRN: [id number]   Account: [de-identified]      Patient's PCP: CRISTIAN Mack CNP    Admit Date: 2021     Discharge Date:   2021    Admitting Physician: Pablito Moran MD     Discharge Physician: CRISTIAN Mccauley CNP     Discharge Diagnoses and Hospital course:    Presented to the ED with urinary symptoms not improved with oral antibiotics. Found to have a BS of 623.    1. New onset 2 diabetes, likely type 2- A1c 14.7%. No DKA on admission, VBG PH was 7.43. Insulin drip transitioned to basal bolus regimen. · BS trend has improved with current regimen, which will be RX on discharge. · Lantus 15 units BID Will prescribe insulin pen as patient only has one arm. · Humalog 10 units with meals . · Metformin 500 ng BID. · Check glucose before meals and at bedtime. Meter will be dispensed prior to discharge. · Diabetic education completed including survival skills for hypoglycemia. · Dietitian seen for diabetic diet  · \referred to OP diabetic clinic. Appointment made for next week. 2. Hyponatremia, resolved-patient with pseudohyponatremia secondary to significantly elevated blood sugars. 3. Hypokalemia, replaced. .   4. GERD  PPI and H2 blocker. 5. BPH-Flomax. 6. Essential hypertension-patient only noted to be on Bumex. Patient states that he was placed on Bumex recently secondary to having fluid overload after a surgery. Patient denies any history of heart failure. 7. Ulcerative colitis- Mesalamine. 8. 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 Patient states that due to his IV drug use he ended up getting necrotizing fasciitis in his right arms which resulted in his arm being amputated. 9. History of alcohol abuse-patient does have a history of alcohol abuse.   Patient currently in remission. 10. History of IV drug use-Currently in remission. Lives in a sober living house. Another reason insulin with syringes was advised against.     BS trend was difficult to get under control. BS have all been under 275 in the last 24 hours and safe to proceed with discharge. He will need ongoing education and adjustment of his medications. OP follow up with the diabetic clinic has been secured. The patient was seen and examined on day of discharge and this discharge summary is in conjunction with any daily progress note from day of discharge. Exam:     Vitals:  Vitals:    11/11/21 1545 11/11/21 2051 11/12/21 0330 11/12/21 0815   BP: 134/82 128/84 128/76 117/84   Pulse: 116 99 94 110   Resp: 18 18 18 16   Temp: 97.4 °F (36.3 °C) 98.3 °F (36.8 °C) 98.6 °F (37 °C) 97.9 °F (36.6 °C)   TempSrc: Oral Oral Oral Oral   SpO2: 98% 98% 96% 95%   Weight:         Weight: Weight: 233 lb 14.5 oz (106.1 kg)     24 hour intake/output:    Intake/Output Summary (Last 24 hours) at 11/12/2021 1135  Last data filed at 11/12/2021 0418  Gross per 24 hour   Intake 550 ml   Output 1000 ml   Net -450 ml         General appearance:  No apparent distress, appears stated age and cooperative. HEENT:  Normal cephalic, atraumatic without obvious deformity. Pupils equal, round, and reactive to light. Extra ocular muscles intact. Conjunctivae/corneas clear. Neck: Supple, with full range of motion. No jugular venous distention. Trachea midline. Respiratory:  Normal respiratory effort. Clear to auscultation, bilaterally without Rales/Wheezes/Rhonchi. Cardiovascular:  Regular rate and rhythm with normal S1/S2 without murmurs, rubs or gallops. Abdomen: Soft, obese, non-tender, non-distended with normal bowel sounds. Musculoskeletal:  No clubbing, cyanosis or edema bilaterally. Full range of motion without deformity. S/p amputation of the right arm.   Skin: Skin color, texture, turgor normal.  No rashes or lesions. Neurologic:  Neurovascularly intact without any focal sensory/motor deficits. Cranial nerves: II-XII intact, grossly non-focal.  Psychiatric:  Alert and oriented, thought content appropriate, normal insight  Capillary Refill: Brisk,< 3 seconds   Peripheral Pulses: +2 palpable, equal bilaterally       Labs: For convenience and continuity at follow-up the following most recent labs are provided:      CBC:    Lab Results   Component Value Date    WBC 3.2 11/10/2021    HGB 12.5 11/10/2021    HCT 36.2 11/10/2021    PLT 98 11/10/2021       Renal:    Lab Results   Component Value Date     11/12/2021    K 3.7 11/12/2021    K 3.8 11/08/2021    CL 97 11/12/2021    CO2 24 11/12/2021    BUN 9 11/12/2021    CREATININE 0.5 11/12/2021    CALCIUM 8.4 11/12/2021         Significant Diagnostic Studies    Radiology:   XR CHEST PORTABLE   Final Result   1. No acute cardiopulmonary process. **This report has been created using voice recognition software. It may contain minor errors which are inherent in voice recognition technology. **      Final report electronically signed by Dr Brooke Saucedo on 11/8/2021 2:57 PM             Consults:     IP CONSULT TO DIABETES EDUCATOR  IP CONSULT TO DIETITIAN    Disposition:    [x] Home       [] TCU       [] Rehab       [] Psych       [] SNF       [] [place]       [] Other-    Condition at Discharge: Stable    Code Status:  DNR-CCA     Patient Instructions: Activity: activity as tolerated  Diet: ADULT DIET;  Regular; 4 carb choices (60 gm/meal)      Follow-up visits:   CRISTIAN Washburn CNP  [address] [place] [address]  [phone]    On 11/19/2021  Your appointment time is at 1:45  , Arrive 15 minutes early, please bring photo ID and medications         Discharge Medications:        Medication List      START taking these medications    FreeStyle Freedom Lite w/Device Kit  1 kit by Does not apply route 4 times daily (before meals and nightly)     FreeStyle Lancets Misc  1 each by Does not apply route daily     FREESTYLE LITE strip  Generic drug: blood glucose test strips  1 each by In Vitro route daily As needed.      insulin lispro (1 Unit Dial) 100 UNIT/ML Sopn  Commonly known as: HumaLOG KwikPen  Inject 10 Units into the skin 3 times daily (before meals)     Lantus SoloStar 100 UNIT/ML injection pen  Generic drug: insulin glargine  Inject 15 Units into the skin 2 times daily     metFORMIN 500 MG tablet  Commonly known as: GLUCOPHAGE  Take 1 tablet by mouth 2 times daily (with meals)        CHANGE how you take these medications    folic acid 1 MG tablet  Commonly known as: FOLVITE  Take 2 tablets by mouth daily  What changed:   · how much to take  · when to take this        CONTINUE taking these medications    bumetanide 1 MG tablet  Commonly known as: BUMEX  Take 0.5 tablets by mouth 2 times daily     Delzicol 400 MG Cpdr delayed release capsule  Generic drug: mesalamine     docusate sodium 100 MG capsule  Commonly known as: COLACE     entecavir 1 MG tablet  Commonly known as: BARACLUDE     famotidine 40 MG tablet  Commonly known as: PEPCID     ferrous sulfate 325 (65 Fe) MG tablet  Commonly known as: IRON 325  Take 1 tablet by mouth 2 times daily (with meals)     loratadine 10 MG capsule  Commonly known as: CLARITIN     Lyrica 200 MG capsule  Generic drug: pregabalin     MULTIVITAMIN ADULT PO     pantoprazole 40 MG tablet  Commonly known as: PROTONIX     PARoxetine 40 MG tablet  Commonly known as: PAXIL     potassium chloride 20 MEQ extended release tablet  Commonly known as: KLOR-CON M  Take 1 tablet by mouth daily (with breakfast)        STOP taking these medications    sulfamethoxazole-trimethoprim 800-160 MG per tablet  Commonly known as: BACTRIM DS;SEPTRA DS           Where to Get Your Medications      These medications were sent to [facility] [address], [address] [place], [address] [address]    Phone: [phone]   · FreeStyle Freedom Lite w/Device Kit  · FreeStyle Lancets Misc  · FREESTYLE LITE strip  · insulin lispro (1 Unit Dial) 100 UNIT/ML Sopn  · Lantus SoloStar 100 UNIT/ML injection pen  · metFORMIN 500 MG tablet         Time Spent on discharge is more than 40 minutes in the examination, evaluation, counseling and review of medications and discharge plan. Signed: Thank you CRISTIAN Murray CNP for the opportunity to be involved in this patient's care.     Electronically signed by CRISTIAN Wright CNP on 11/12/2021 at 11:35 AM

## 2021-11-24 NOTE — PROGRESS NOTES
CLINICAL PHARMACY NOTE: MEDS TO BEDS    Total # of Prescriptions Filled: 7   The following medications were delivered to the patient:  Metformin 500 mg  Freestyle Lancets  Freestyle test strips  Humalog Kwikpen   Lantus Solostar  Freestyle device  Leader Pen needles     Additional Documentation:

## 2022-01-18 DIAGNOSIS — K21.9 GASTROESOPHAGEAL REFLUX DISEASE, UNSPECIFIED WHETHER ESOPHAGITIS PRESENT: ICD-10-CM

## 2022-01-18 DIAGNOSIS — I50.32 CHRONIC DIASTOLIC (CONGESTIVE) HEART FAILURE (HCC): Primary | ICD-10-CM

## 2022-01-18 DIAGNOSIS — I10 ESSENTIAL HYPERTENSION: ICD-10-CM

## 2022-01-28 ENCOUNTER — HOSPITAL ENCOUNTER (EMERGENCY)
Age: 47
Discharge: HOME | End: 2022-01-28
Payer: MEDICARE

## 2022-01-28 ENCOUNTER — APPOINTMENT (OUTPATIENT)
Dept: ULTRASOUND IMAGING | Age: 47
End: 2022-01-28
Payer: MEDICARE

## 2022-01-28 ENCOUNTER — APPOINTMENT (OUTPATIENT)
Dept: CT IMAGING | Age: 47
End: 2022-01-28
Payer: MEDICARE

## 2022-01-28 VITALS
SYSTOLIC BLOOD PRESSURE: 108 MMHG | DIASTOLIC BLOOD PRESSURE: 78 MMHG | WEIGHT: 233 LBS | OXYGEN SATURATION: 99 % | HEIGHT: 72 IN | HEART RATE: 84 BPM | BODY MASS INDEX: 31.56 KG/M2 | RESPIRATION RATE: 18 BRPM | TEMPERATURE: 98.6 F

## 2022-01-28 DIAGNOSIS — K59.00 CONSTIPATION, UNSPECIFIED CONSTIPATION TYPE: ICD-10-CM

## 2022-01-28 DIAGNOSIS — R10.32 LEFT LOWER QUADRANT ABDOMINAL PAIN: ICD-10-CM

## 2022-01-28 DIAGNOSIS — M54.50 ACUTE LEFT-SIDED LOW BACK PAIN WITHOUT SCIATICA: ICD-10-CM

## 2022-01-28 DIAGNOSIS — N45.1 ACUTE EPIDIDYMITIS: Primary | ICD-10-CM

## 2022-01-28 LAB
ALBUMIN SERPL-MCNC: 3.8 G/DL (ref 3.5–5.1)
ALP BLD-CCNC: 101 U/L (ref 38–126)
ALT SERPL-CCNC: 18 U/L (ref 11–66)
ANION GAP SERPL CALCULATED.3IONS-SCNC: 12 MEQ/L (ref 8–16)
AST SERPL-CCNC: 19 U/L (ref 5–40)
BACTERIA: ABNORMAL /HPF
BASOPHILS # BLD: 0.4 %
BASOPHILS ABSOLUTE: 0 THOU/MM3 (ref 0–0.1)
BILIRUB SERPL-MCNC: 0.7 MG/DL (ref 0.3–1.2)
BILIRUBIN URINE: NEGATIVE
BLOOD, URINE: ABNORMAL
BUN BLDV-MCNC: 13 MG/DL (ref 7–22)
CALCIUM SERPL-MCNC: 9 MG/DL (ref 8.5–10.5)
CASTS 2: ABNORMAL /LPF
CASTS UA: ABNORMAL /LPF
CHARACTER, URINE: CLEAR
CHLORIDE BLD-SCNC: 98 MEQ/L (ref 98–111)
CO2: 24 MEQ/L (ref 23–33)
COLOR: YELLOW
CREAT SERPL-MCNC: 0.7 MG/DL (ref 0.4–1.2)
CRYSTALS, UA: ABNORMAL
EOSINOPHIL # BLD: 1.2 %
EOSINOPHILS ABSOLUTE: 0.1 THOU/MM3 (ref 0–0.4)
EPITHELIAL CELLS, UA: ABNORMAL /HPF
ERYTHROCYTE [DISTWIDTH] IN BLOOD BY AUTOMATED COUNT: 12.9 % (ref 11.5–14.5)
ERYTHROCYTE [DISTWIDTH] IN BLOOD BY AUTOMATED COUNT: 42 FL (ref 35–45)
GFR SERPL CREATININE-BSD FRML MDRD: > 90 ML/MIN/1.73M2
GLUCOSE BLD-MCNC: 120 MG/DL (ref 70–108)
GLUCOSE URINE: NEGATIVE MG/DL
HCT VFR BLD CALC: 41 % (ref 42–52)
HEMOGLOBIN: 14.1 GM/DL (ref 14–18)
IMMATURE GRANS (ABS): 0.04 THOU/MM3 (ref 0–0.07)
IMMATURE GRANULOCYTES: 0.4 %
KETONES, URINE: NEGATIVE
LACTIC ACID, SEPSIS: 0.7 MMOL/L (ref 0.5–1.9)
LACTIC ACID, SEPSIS: 1.2 MMOL/L (ref 0.5–1.9)
LEUKOCYTE ESTERASE, URINE: ABNORMAL
LYMPHOCYTES # BLD: 23.4 %
LYMPHOCYTES ABSOLUTE: 2.2 THOU/MM3 (ref 1–4.8)
MCH RBC QN AUTO: 31.2 PG (ref 26–33)
MCHC RBC AUTO-ENTMCNC: 34.4 GM/DL (ref 32.2–35.5)
MCV RBC AUTO: 90.7 FL (ref 80–94)
MISCELLANEOUS 2: ABNORMAL
MONOCYTES # BLD: 8.3 %
MONOCYTES ABSOLUTE: 0.8 THOU/MM3 (ref 0.4–1.3)
NITRITE, URINE: NEGATIVE
NUCLEATED RED BLOOD CELLS: 0 /100 WBC
OSMOLALITY CALCULATION: 269.5 MOSMOL/KG (ref 275–300)
PH UA: 6.5 (ref 5–9)
PLATELET # BLD: 179 THOU/MM3 (ref 130–400)
PMV BLD AUTO: 9.3 FL (ref 9.4–12.4)
POTASSIUM SERPL-SCNC: 3.8 MEQ/L (ref 3.5–5.2)
PROCALCITONIN: 0.11 NG/ML (ref 0.01–0.09)
PROTEIN UA: NEGATIVE
RBC # BLD: 4.52 MILL/MM3 (ref 4.7–6.1)
RBC URINE: ABNORMAL /HPF
REASON FOR REJECTION: NORMAL
REJECTED TEST: NORMAL
RENAL EPITHELIAL, UA: ABNORMAL
SEG NEUTROPHILS: 66.3 %
SEGMENTED NEUTROPHILS ABSOLUTE COUNT: 6.3 THOU/MM3 (ref 1.8–7.7)
SODIUM BLD-SCNC: 134 MEQ/L (ref 135–145)
SPECIFIC GRAVITY, URINE: 1.01 (ref 1–1.03)
TOTAL PROTEIN: 7.5 G/DL (ref 6.1–8)
UROBILINOGEN, URINE: 0.2 EU/DL (ref 0–1)
WBC # BLD: 9.5 THOU/MM3 (ref 4.8–10.8)
WBC UA: > 100 /HPF
YEAST: ABNORMAL

## 2022-01-28 PROCEDURE — 83605 ASSAY OF LACTIC ACID: CPT

## 2022-01-28 PROCEDURE — 80053 COMPREHEN METABOLIC PANEL: CPT

## 2022-01-28 PROCEDURE — 6370000000 HC RX 637 (ALT 250 FOR IP): Performed by: PHYSICIAN ASSISTANT

## 2022-01-28 PROCEDURE — 76870 US EXAM SCROTUM: CPT

## 2022-01-28 PROCEDURE — 99284 EMERGENCY DEPT VISIT MOD MDM: CPT

## 2022-01-28 PROCEDURE — 85025 COMPLETE CBC W/AUTO DIFF WBC: CPT

## 2022-01-28 PROCEDURE — 81001 URINALYSIS AUTO W/SCOPE: CPT

## 2022-01-28 PROCEDURE — 74177 CT ABD & PELVIS W/CONTRAST: CPT

## 2022-01-28 PROCEDURE — 6360000002 HC RX W HCPCS: Performed by: PHYSICIAN ASSISTANT

## 2022-01-28 PROCEDURE — 36415 COLL VENOUS BLD VENIPUNCTURE: CPT

## 2022-01-28 PROCEDURE — 87086 URINE CULTURE/COLONY COUNT: CPT

## 2022-01-28 PROCEDURE — 6360000004 HC RX CONTRAST MEDICATION: Performed by: PHYSICIAN ASSISTANT

## 2022-01-28 PROCEDURE — 87077 CULTURE AEROBIC IDENTIFY: CPT

## 2022-01-28 PROCEDURE — 96374 THER/PROPH/DIAG INJ IV PUSH: CPT

## 2022-01-28 PROCEDURE — 84145 PROCALCITONIN (PCT): CPT

## 2022-01-28 PROCEDURE — 87186 SC STD MICRODIL/AGAR DIL: CPT

## 2022-01-28 PROCEDURE — 2580000003 HC RX 258: Performed by: PHYSICIAN ASSISTANT

## 2022-01-28 RX ORDER — LEVOFLOXACIN 500 MG/1
500 TABLET, FILM COATED ORAL DAILY
Qty: 10 TABLET | Refills: 0 | Status: SHIPPED | OUTPATIENT
Start: 2022-01-28 | End: 2022-02-07

## 2022-01-28 RX ORDER — KETOROLAC TROMETHAMINE 30 MG/ML
30 INJECTION, SOLUTION INTRAMUSCULAR; INTRAVENOUS ONCE
Status: COMPLETED | OUTPATIENT
Start: 2022-01-28 | End: 2022-01-28

## 2022-01-28 RX ORDER — 0.9 % SODIUM CHLORIDE 0.9 %
500 INTRAVENOUS SOLUTION INTRAVENOUS ONCE
Status: COMPLETED | OUTPATIENT
Start: 2022-01-28 | End: 2022-01-28

## 2022-01-28 RX ORDER — POLYETHYLENE GLYCOL 3350 17 G/17G
17 POWDER, FOR SOLUTION ORAL DAILY
Qty: 238 G | Refills: 0 | Status: SHIPPED | OUTPATIENT
Start: 2022-01-28 | End: 2022-02-04

## 2022-01-28 RX ORDER — LEVOFLOXACIN 500 MG/1
500 TABLET, FILM COATED ORAL ONCE
Status: COMPLETED | OUTPATIENT
Start: 2022-01-28 | End: 2022-01-28

## 2022-01-28 RX ADMIN — LEVOFLOXACIN 500 MG: 500 TABLET, FILM COATED ORAL at 16:04

## 2022-01-28 RX ADMIN — SODIUM CHLORIDE 500 ML: 9 INJECTION, SOLUTION INTRAVENOUS at 12:49

## 2022-01-28 RX ADMIN — IOPAMIDOL 80 ML: 755 INJECTION, SOLUTION INTRAVENOUS at 14:42

## 2022-01-28 RX ADMIN — KETOROLAC TROMETHAMINE 30 MG: 30 INJECTION, SOLUTION INTRAMUSCULAR; INTRAVENOUS at 12:49

## 2022-01-28 ASSESSMENT — PAIN DESCRIPTION - DESCRIPTORS: DESCRIPTORS: CONSTANT;SHOOTING;SPASM

## 2022-01-28 ASSESSMENT — PAIN SCALES - GENERAL
PAINLEVEL_OUTOF10: 5
PAINLEVEL_OUTOF10: 9
PAINLEVEL_OUTOF10: 9
PAINLEVEL_OUTOF10: 3

## 2022-01-28 ASSESSMENT — PAIN DESCRIPTION - LOCATION: LOCATION: SCROTUM;FLANK

## 2022-01-28 ASSESSMENT — ENCOUNTER SYMPTOMS
NAUSEA: 1
DIARRHEA: 0
SORE THROAT: 0
SHORTNESS OF BREATH: 0
CONSTIPATION: 0
RHINORRHEA: 0
EYE DISCHARGE: 0
EYE ITCHING: 0
COUGH: 0
VOMITING: 0
ABDOMINAL PAIN: 1
SINUS PRESSURE: 0

## 2022-01-28 ASSESSMENT — PAIN DESCRIPTION - FREQUENCY: FREQUENCY: CONTINUOUS

## 2022-01-28 NOTE — ED TRIAGE NOTES
Patient to room 21 from triage desk for complaint of hematuria along with left sided flank and testicular pain. Patient reports that he noticed dark urine with a bad odor last week along with some pain with urination. He states that this seemed to resolve for a couple of days and then began to worsen. Patient states that he does have a urologist that he sees regularly and has had kidney and bladder issues repeatedly.

## 2022-01-30 LAB
ORGANISM: ABNORMAL
URINE CULTURE REFLEX: ABNORMAL

## 2022-02-07 ENCOUNTER — HOSPITAL ENCOUNTER (OUTPATIENT)
Age: 47
Discharge: HOME | End: 2022-02-07
Payer: MEDICARE

## 2022-02-07 DIAGNOSIS — I10 ESSENTIAL HYPERTENSION: ICD-10-CM

## 2022-02-07 DIAGNOSIS — I50.32 CHRONIC DIASTOLIC (CONGESTIVE) HEART FAILURE (HCC): ICD-10-CM

## 2022-02-07 DIAGNOSIS — R35.0 URINARY FREQUENCY: ICD-10-CM

## 2022-02-07 DIAGNOSIS — K21.9 GASTROESOPHAGEAL REFLUX DISEASE, UNSPECIFIED WHETHER ESOPHAGITIS PRESENT: ICD-10-CM

## 2022-02-07 DIAGNOSIS — R39.15 URGENCY OF URINATION: ICD-10-CM

## 2022-02-07 LAB
ANION GAP SERPL CALCULATED.3IONS-SCNC: 12 MEQ/L (ref 8–16)
BUN BLDV-MCNC: 14 MG/DL (ref 7–22)
CALCIUM SERPL-MCNC: 10.1 MG/DL (ref 8.5–10.5)
CHLORIDE BLD-SCNC: 101 MEQ/L (ref 98–111)
CO2: 22 MEQ/L (ref 23–33)
CREAT SERPL-MCNC: 0.7 MG/DL (ref 0.4–1.2)
CREATININE URINE: 18.9 MG/DL
GFR SERPL CREATININE-BSD FRML MDRD: > 90 ML/MIN/1.73M2
GLUCOSE BLD-MCNC: 77 MG/DL (ref 70–108)
POTASSIUM SERPL-SCNC: 5.1 MEQ/L (ref 3.5–5.2)
PROT/CREAT RATIO, UR: 0.52
PROTEIN, URINE: 9.8 MG/DL
SODIUM BLD-SCNC: 135 MEQ/L (ref 135–145)

## 2022-02-07 PROCEDURE — 84156 ASSAY OF PROTEIN URINE: CPT

## 2022-02-07 PROCEDURE — 82570 ASSAY OF URINE CREATININE: CPT

## 2022-02-07 PROCEDURE — 36415 COLL VENOUS BLD VENIPUNCTURE: CPT

## 2022-02-07 PROCEDURE — 80048 BASIC METABOLIC PNL TOTAL CA: CPT

## 2022-02-08 ENCOUNTER — OFFICE VISIT (OUTPATIENT)
Dept: NEPHROLOGY | Age: 47
End: 2022-02-08
Payer: MEDICARE

## 2022-02-08 VITALS
DIASTOLIC BLOOD PRESSURE: 88 MMHG | HEART RATE: 82 BPM | TEMPERATURE: 97.2 F | OXYGEN SATURATION: 97 % | WEIGHT: 234 LBS | BODY MASS INDEX: 31.74 KG/M2 | SYSTOLIC BLOOD PRESSURE: 131 MMHG

## 2022-02-08 DIAGNOSIS — E11.21 DIABETIC NEPHROPATHY ASSOCIATED WITH TYPE 2 DIABETES MELLITUS (HCC): Primary | ICD-10-CM

## 2022-02-08 PROCEDURE — 99213 OFFICE O/P EST LOW 20 MIN: CPT | Performed by: INTERNAL MEDICINE

## 2022-02-08 NOTE — PROGRESS NOTES
FRACTURE SURGERY  2020    PORT SURGERY N/A 8/17/2020    SUBCLAVIAN SINGLE LUMEN MEDIPORT INSERTION performed by Radhika Ross MD at [address]  9/26/2017    COLONOSCOPY WITH BIOPSY [facility] [address] [facility]    TURP N/A 3/23/2021    CYSTO, GREENLIGHT PHOTOVAPORIZATION OF PROSTATE performed by Amor Kan MD at [address]:  /88 (Site: Left Upper Arm, Position: Sitting, Cuff Size: Large Adult)   Pulse 82   Temp 97.2 °F (36.2 °C)   Wt 234 lb (106.1 kg)   SpO2 97%   BMI 31.74 kg/m²   Wt Readings from Last 3 Encounters:   02/08/22 234 lb (106.1 kg)   01/28/22 233 lb (105.7 kg)   11/09/21 233 lb 14.5 oz (106.1 kg)     Body mass index is 31.74 kg/m². General Appearance: alert and cooperative with exam, appears comfortable, no distress  Lungs: Air entry B/L, no crackles or rales, no use of accessory muscles  Heart: S1, S2 heard  GI: soft, non-tender, no guarding  Extremities: No sig LE edema     Medications:  Current Outpatient Medications   Medication Sig Dispense Refill    metFORMIN (GLUCOPHAGE) 500 MG tablet Take 1 tablet by mouth 2 times daily (with meals) 60 tablet 0    LANTUS SOLOSTAR 100 UNIT/ML injection pen Inject 15 Units into the skin 2 times daily 3 pen 0    insulin lispro, 1 Unit Dial, (HUMALOG KWIKPEN) 100 UNIT/ML SOPN Inject 10 Units into the skin 3 times daily (before meals) 5 pen 0    Blood Glucose Monitoring Suppl (FREESTYLE FREEDOM LITE) w/Device KIT 1 kit by Does not apply route 4 times daily (before meals and nightly) 1 kit 0    FreeStyle Lancets MISC 1 each by Does not apply route daily 100 each 0    blood glucose test strips (FREESTYLE LITE) strip 1 each by In Vitro route daily As needed. 100 each 3    Insulin Pen Needle 29G X 12.7MM MISC Use 4 times per day.  250 each 0    bumetanide (BUMEX) 1 MG tablet Take 0.5 tablets by mouth 2 times daily 30 tablet 0    famotidine (PEPCID) 40 MG tablet Take 40 mg by mouth as needed       docusate sodium (COLACE) 100 MG capsule Take 100 mg by mouth daily      loratadine (CLARITIN) 10 MG capsule Take 10 mg by mouth daily      entecavir (BARACLUDE) 1 MG tablet Take 1 mg by mouth daily      ferrous sulfate (IRON 325) 325 (65 Fe) MG tablet Take 1 tablet by mouth 2 times daily (with meals) 1 tablet 0    potassium chloride (KLOR-CON M) 20 MEQ extended release tablet Take 1 tablet by mouth daily (with breakfast) 60 tablet 3    Multiple Vitamins-Minerals (MULTIVITAMIN ADULT PO) Take 1 tablet by mouth      pregabalin (LYRICA) 200 MG capsule Take 200 mg by mouth daily. At night      PARoxetine (PAXIL) 40 MG tablet Take 40 mg by mouth every morning      mesalamine (DELZICOL) 400 MG CPDR delayed release capsule Take 400 mg by mouth 2 times daily       folic acid (FOLVITE) 1 MG tablet Take 2 tablets by mouth daily (Patient taking differently: Take 1 mg by mouth 2 times daily ) 30 tablet 3    pantoprazole (PROTONIX) 40 MG tablet Take 40 mg by mouth 2 times daily        No current facility-administered medications for this visit. Laboratory & Diagnostics:  Old labs  Echo: EF 85-13%, Grade I diastolic dysfunction  Feb 2021; K 5.0, creat 1.3, UA: (-) blood, (-) protein  UA: + blood, 300 protein    US: Right 11.5, 10.7 cm, ++ large post void residual  Feb 2022: K 5.1, Creat 0.7, UPCR 500 mg/g  Nov 2021: AIC 14.7%     Impression/Plan:   1. Recent ONIEL: resolved. Creatinine remains stable at 0.7.   2. Mild proteinuria: likely due to DM. On insulin. UPCR is 500 mg/g. He says he was recently dxed with DM. Advised good sugar control to minimize progression of proteinuria. May need to consider ACE inhibitors if proteinuria worsens in the future  3. Recent urinary retention secondary to prostate surgery: s/p surgery  4. BPH with obsruction: s/p green light surgery  5. Chronic diastolic dysfunction: on low dose bumex and K-dur. 6. Lumbar hematoma s/p Lumbar surgery  7. HTN: he says his BP has been stable. Advised to monitor and bring some Bp readings next time. 8.  History of hepatitis C  9. Right arm amputation  10. IDDM: following with PCP. AIC is 14.7%    Orders Placed This Encounter   Procedures    Basic Metabolic Panel    Protein / creatinine ratio, urine     Return in about 1 year (around 2/8/2023).     Roslyn Arriola MD  [facility]

## 2022-02-10 ENCOUNTER — HOSPITAL ENCOUNTER (OUTPATIENT)
Dept: ULTRASOUND IMAGING | Age: 47
Discharge: HOME | End: 2022-02-10
Payer: MEDICARE

## 2022-02-10 DIAGNOSIS — N40.1 BPH WITH OBSTRUCTION/LOWER URINARY TRACT SYMPTOMS: ICD-10-CM

## 2022-02-10 DIAGNOSIS — N13.8 BPH WITH OBSTRUCTION/LOWER URINARY TRACT SYMPTOMS: ICD-10-CM

## 2022-02-10 DIAGNOSIS — N13.30 BILATERAL HYDRONEPHROSIS: ICD-10-CM

## 2022-02-10 PROCEDURE — 76770 US EXAM ABDO BACK WALL COMP: CPT

## 2023-06-18 NOTE — ED PROVIDER NOTES
[facility] EMERGENCY DEPT      CHIEF COMPLAINT       Chief Complaint   Patient presents with    Hematuria       Nurses Notes reviewed and I agree except as noted in the HPI. HISTORY OF PRESENT ILLNESS   Charles Silverio is a 55 y.o. male who presents for hematuria. He states that last week, he began having left lower back pain that was sharp, constant, and worse with movement or bowel movements. He states that he recently began having hematuria and left testicular swelling and pain. He affirms hematuria, chills, subjective fever, left flank pain, dark urine, foul smelling urine, left lower and center lower abdominal pain, dysuria, nausea, and left sided testicular tenderness. He denies any emesis, diarrhea, or constipation. 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 He notes having had lumbar surgery last year which led to his kidneys becoming infected. He feels that this current episode feels similar. REVIEW OF SYSTEMS     Review of Systems   Constitutional: Positive for chills and fever. Negative for activity change, appetite change and fatigue. HENT: Negative for congestion, ear pain, rhinorrhea, sinus pressure and sore throat. Eyes: Negative for discharge and itching. Respiratory: Negative for cough and shortness of breath. Cardiovascular: Negative for chest pain. Gastrointestinal: Positive for abdominal pain and nausea. Negative for constipation, diarrhea and vomiting. Endocrine: Negative for polyuria. Genitourinary: Positive for dysuria, flank pain, hematuria, scrotal swelling and testicular pain. Negative for decreased urine volume, frequency and urgency. Musculoskeletal: Negative for gait problem and myalgias. Skin: Negative for rash. Neurological: Negative for weakness and light-headedness. Hematological: Negative for adenopathy. Psychiatric/Behavioral: Negative for confusion and sleep disturbance.         PAST MEDICAL HISTORY    has a past medical history of Alcohol abuse, Amputation of right arm ([facility] [address] Ascites, GERD (gastroesophageal reflux disease), Hepatic cirrhosis ([facility] [address] Hepatitis B, Hepatitis C, History of blood transfusion, Hypertension, Intravenous drug user, Osteomyelitis ([facility] [address] Psychiatric problem, PTSD (post-traumatic stress disorder), Sciatica, Splenomegaly, Thrombocytopenia ([facility] [address] and Ulcerative colitis ([facility] [address] SURGICAL HISTORY      has a past surgical history that includes Arm amputation at shoulder (Right, 2017); pr colonoscopy w/biopsy single/multiple (9/26/2017); Appendectomy; Bladder surgery (2005); Elbow fracture surgery (2020); Port Surgery (N/A, 8/17/2020); back surgery (N/A, 1/6/2021); and TURP (N/A, 3/23/2021). CURRENT MEDICATIONS       Discharge Medication List as of 1/28/2022  5:18 PM      CONTINUE these medications which have NOT CHANGED    Details   metFORMIN (GLUCOPHAGE) 500 MG tablet Take 1 tablet by mouth 2 times daily (with meals), Disp-60 tablet, R-0Normal      LANTUS SOLOSTAR 100 UNIT/ML injection pen Inject 15 Units into the skin 2 times daily, Disp-3 pen, R-0, DAWNormal      insulin lispro, 1 Unit Dial, (HUMALOG KWIKPEN) 100 UNIT/ML SOPN Inject 10 Units into the skin 3 times daily (before meals), Disp-5 pen, R-0Normal      Blood Glucose Monitoring Suppl (FREESTYLE FREEDOM LITE) w/Device KIT 4 TIMES DAILY BEFORE MEALS & NIGHTLY Starting Fri 11/12/2021, Disp-1 kit, R-0, Normal      FreeStyle Lancets MISC DAILY Starting Fri 11/12/2021, Disp-100 each, R-0, Normal      blood glucose test strips (FREESTYLE LITE) strip 1 each by In Vitro route daily As needed. , Disp-100 each, R-3Normal      Insulin Pen Needle 29G X 12.7MM MISC Disp-250 each, R-0, NormalUse 4 times per day.       bumetanide (BUMEX) 1 MG tablet Take 0.5 tablets by mouth 2 times daily, Disp-30 tablet, R-0NO PRINT      famotidine (PEPCID) 40 MG tablet Take 40 mg by mouth as needed Historical Med      docusate sodium (COLACE) 100 MG capsule Take 100 mg by mouth dailyHistorical Med      loratadine (CLARITIN) 10 MG capsule Take 10 mg by mouth dailyHistorical Med      entecavir (BARACLUDE) 1 MG tablet Take 1 mg by mouth dailyHistorical Med      ferrous sulfate (IRON 325) 325 (65 Fe) MG tablet Take 1 tablet by mouth 2 times daily (with meals), Disp-1 tablet, R-0NO PRINT      potassium chloride (KLOR-CON M) 20 MEQ extended release tablet Take 1 tablet by mouth daily (with breakfast), Disp-60 tablet, R-3Normal      Multiple Vitamins-Minerals (MULTIVITAMIN ADULT PO) Take 1 tablet by mouthHistorical Med      pregabalin (LYRICA) 200 MG capsule Take 200 mg by mouth daily. At nightHistorical Med      PARoxetine (PAXIL) 40 MG tablet Take 40 mg by mouth every morningHistorical Med      mesalamine (DELZICOL) 400 MG CPDR delayed release capsule Take 400 mg by mouth 2 times daily Historical Med      folic acid (FOLVITE) 1 MG tablet Take 2 tablets by mouth daily, Disp-30 tablet, R-3Print      pantoprazole (PROTONIX) 40 MG tablet Take 40 mg by mouth 2 times daily Historical Med             ALLERGIES     is allergic to gabapentin, morphine and related, penicillins, trazodone and nefazodone, and ondansetron hcl. FAMILY HISTORY     He indicated that his mother is alive. He indicated that his father is . He indicated that the status of his maternal grandmother is unknown. He indicated that the status of his maternal grandfather is unknown.   family history includes Cancer in his maternal grandmother and mother; Heart Disease in his maternal grandfather; High Blood Pressure in his father and mother; Kidney Disease in his father; Stroke in his mother. SOCIAL HISTORY    reports that he quit smoking about 21 months ago. His smokeless tobacco use includes snuff. He reports that he does not drink alcohol and does not use drugs. PHYSICAL EXAM     INITIAL VITALS:  height is 6' (1.829 m) and weight is 233 lb (105.7 kg).  His oral temperature is 98.6 °F (37 °C). His blood pressure is 108/78 and his pulse is 84. His respiration is 18 and oxygen saturation is 99%. Physical Exam  Vitals and nursing note reviewed. Constitutional:       General: He is not in acute distress. Appearance: He is well-developed. He is not toxic-appearing or diaphoretic. HENT:      Head: Normocephalic and atraumatic. Right Ear: Hearing normal.      Left Ear: Hearing normal.      Nose: Nose normal. No rhinorrhea. Mouth/Throat:      Pharynx: Uvula midline. No oropharyngeal exudate. Eyes:      General: Lids are normal. No scleral icterus. Conjunctiva/sclera: Conjunctivae normal.      Pupils: Pupils are equal, round, and reactive to light. Neck:      Trachea: No tracheal deviation. Cardiovascular:      Rate and Rhythm: Normal rate and regular rhythm. Heart sounds: Normal heart sounds. No murmur heard. Pulmonary:      Effort: Pulmonary effort is normal. No respiratory distress. Breath sounds: Normal breath sounds. No stridor. No decreased breath sounds or wheezing. Abdominal:      General: There is no distension. Palpations: Abdomen is soft. Abdomen is not rigid. There is no mass. Tenderness: There is abdominal tenderness in the suprapubic area and left lower quadrant. There is left CVA tenderness. There is no right CVA tenderness or guarding. Genitourinary:     Pubic Area: No rash or pubic lice. Penis: Normal.       Testes:         Left: Tenderness and swelling present. Epididymis:      Left: Tenderness present. Musculoskeletal:         General: Tenderness present. Normal range of motion. Cervical back: Normal range of motion and neck supple. No rigidity. Lymphadenopathy:      Cervical: No cervical adenopathy. Skin:     General: Skin is warm and dry. Coloration: Skin is not pale. Findings: No rash. Neurological:      Mental Status: He is alert and oriented to person, place, and time.       GCS: GCS eye subscore is 4. GCS verbal subscore is 5. GCS motor subscore is 6. Gait: Gait normal.      Comments: No gross deficits observed   Psychiatric:         Mood and Affect: Mood normal.         Speech: Speech normal.         Behavior: Behavior normal.         Thought Content: Thought content normal.         DIFFERENTIAL DIAGNOSIS:   Including but not limited to: Epididymitis, pyelonephritis, UTI, cystitis, ureterolithiasis, cellulitis    DIAGNOSTIC RESULTS     EKG: All EKG's are interpreted by [facility] Physician who either signs or Co-signs this chart in the absence of a cardiologist.  None    RADIOLOGY: non-plain film images(s) such as CT,Ultrasound and MRI are read by the radiologist.  Plain radiographic images are visualized and preliminarily interpreted by the emergency physician unless otherwise stated below. CT ABDOMEN PELVIS W IV CONTRAST Additional Contrast? None   Final Result   1. Mild residual fecal material seen throughout the colon suggesting fecal stasis. No bowel obstruction or pericolonic inflammation is observed. 2. Bladder wall thickening is unchanged possibly related to prior chronic bladder outlet obstruction. Correlate with urinalysis. Postoperative changes suggesting transurethral resection of the prostate are noted. No renal calculus or obstructive uropathy    is observed. 3. Cirrhotic liver morphology and splenomegaly appear stable. Additional chronic findings are discussed. **This report has been created using voice recognition software. It may contain minor errors which are inherent in voice recognition technology. **      Final report electronically signed by Dr Stephen Alfonso on 1/28/2022 3:01 PM       [address]   Final Result      1. Heterogenous enlarged hyperemic appearance to the left epididymis. This could be on the basis of acute epididymitis. 2. Bilateral hydroceles, left greater than right.          **This report has been quadrant, and left testicular pain. On exam, I appreciated tenderness and swelling about the left testicle, lower left quadrant abdominal tenderness, and left CVA tenderness. Abdomen was otherwise soft and nontender. Old records were reviewed. Within the department, I observed the patient's vital signs to be within acceptable range. Radiological studies within the department revealed constipation and likely epididymitis. Laboratory work was reassuring. Within the department the patient was treated with saline, Toradol, and Levaquin. I observed the patient's condition to modestly improve during the duration of their stay. On re-exam abdomen was soft and nontender. Vital signs remained stable. The patient remained non-toxic appearing with no distress evident in the ER. Patient reported he felt improved. The patient was comfortable with the plan of discharge home and to follow up with [facility] urology. Anticipatory guidance was given. The patient was instructed to return to the emergency department for any worsening of their symptoms. Patient was discharged from the emergency department in good condition with all questions answered. See disposition below. I have given the patient strict written and verbal instructions about care at home, follow-up, and signs and symptoms of worsening of condition and they did verbalize understanding. Medications   0.9 % sodium chloride bolus (0 mLs IntraVENous Stopped 1/28/22 1349)   ketorolac (TORADOL) injection 30 mg (30 mg IntraVENous Given 1/28/22 1249)   iopamidol (ISOVUE-370) 76 % injection 80 mL (80 mLs IntraVENous Given 1/28/22 1442)   levoFLOXacin (LEVAQUIN) tablet 500 mg (500 mg Oral Given 1/28/22 1604)     CRITICAL CARE:   None    CONSULTS:  None    PROCEDURES:  None    FINAL IMPRESSION      1. Acute epididymitis    2. Left lower quadrant abdominal pain    3. Acute left-sided low back pain without sciatica    4.  Constipation, unspecified constipation type DISPOSITION/PLAN     1. Acute epididymitis    2. Left lower quadrant abdominal pain    3. Acute left-sided low back pain without sciatica    4. Constipation, unspecified constipation type        PATIENT REFERRED TO:  [facility]  [address] [facility]  [address] [facility] [address] [facility] [address]  Schedule an appointment as soon as possible for a visit         DISCHARGE MEDICATIONS:  Discharge Medication List as of 1/28/2022  5:18 PM      START taking these medications    Details   levoFLOXacin (LEVAQUIN) 500 MG tablet Take 1 tablet by mouth daily for 10 days, Disp-10 tablet, R-0Normal      polyethylene glycol (MIRALAX) 17 GM/SCOOP powder Take 17 g by mouth daily for 7 days PRN constipation, Disp-238 g, R-0Normal             (Please note that portions of this note were completed with a voice recognition program.  Efforts were made to edit the dictations but occasionally words are mis-transcribed.)    Lalla Sandifer, PA-C 02/01/22 10:49 AM    Lalla Sandifer, PA-C Lalla Sandifer, PA-C  02/01/22 1048 18-Jun-2023 18:35

## 2023-06-30 NOTE — PROGRESS NOTES
[address]  Occupational Therapy  Daily Note  Time:   Time In: 2782  Time Out: 1045  Timed Code Treatment Minutes: 30 Minutes  Minutes: 30          Date: 1/15/2021  Patient Name: Juan Chadwick,   Gender: male      Room: [facility]009-A  MRN: [id number]  : 1975  (39 y.o.)  Referring Practitioner: Freda Kenney DO  Diagnosis: Leg Swelling  Additional Pertinent Hx: \"39year-old obese  male presents to emergency department with evaluation of bilateral leg edema. Patient relates his leg swelling to status post lumbar fusion L3-L5 on 2020 had surgery remove fluid on 2021. Sasha Sheng Patient has gradually gotten worse since that time. \"    Restrictions/Precautions:  Restrictions/Precautions: General Precautions  Required Braces or Orthoses  Spinal: Lumbar Corset  Position Activity Restriction  Other position/activity restrictions: Pt had right arm amputated 3 years ago     SUBJECTIVE: Pt supine in bed upon arrival, HOB elevated; agreeable to therapy this date. Pt cooperative throughout session    PAIN: 7/10:     COGNITION: Slow Processing, Decreased Safety Awareness and Impulsive    ADL:   Grooming: Stand By Assistance. standing at sink to brush teeth; required increased time  Bathing: Minimal Assistance. BLE  Upper Extremity Dressing: Minimal Assistance. doff/nikita, tie gown Max A to doff/nikita lumbar corset  Lower Extremity Dressing: Maximum Assistance. nikita B socks  Toileting: Minimal Assistance. clean rear periarea  Toilet Transfer: Stand By Assistance. elevated toilet seate GB with verbal cues for body positioning. BALANCE:  Sitting Balance:  Supervision. EOB  Standing Balance: Stand By Assistance. standing at sink     BED MOBILITY:  Supine to Sit: Stand By Assistance required increased time to complete    TRANSFERS:  Sit to Stand:  Minimal Assistance. with rocking motion  Stand to Sit: Contact Guard Assistance.       FUNCTIONAL MOBILITY:  Assistive Device: Rolling Walker  Assist Level:  Contact Guard Assistance. Distance: To and from bathroom  Required increased time for RW management and safety       ASSESSMENT:     Activity Tolerance:  Patient tolerance of  treatment: good. Discharge Recommendations: Continue to assess pending progress, Home with Home health OT   Equipment Recommendations: Equipment Needed: (Continue to assess pending progress)  Plan: Times per week: 5x  Current Treatment Recommendations: Strengthening, Endurance Training, Patient/Caregiver Education & Training, Equipment Evaluation, Education, & procurement, Self-Care / ADL, Balance Training, Functional Mobility Training, Safety Education & Training    Patient Education  Patient Education: Role of OT, ADL's, Energy Conservation and Precautions    Goals  Short term goals  Time Frame for Short term goals: by discharge  Short term goal 1: complete functional transfers with S and no cues for walker to increase independence with toileting routine  Short term goal 2: complete dynamic standing task with S x5 min with UE release from RW to increase independence with sinkside grooming  Short term goal 3: navigate to/from bathroom with S and no cues for walker safety to increase independence with toileting routine  Short term goal 4: complete UB and LB dressing with min A to increase independence with ADL routine    Following session, patient left in safe position with all fall risk precautions in place. No

## 2025-01-02 NOTE — PROGRESS NOTES
Chief Complaint: sneezing, congestion, chest pain     History of Present Illness:    Martha Mcadams 38 y.o. with a  has no past medical history on file. who presents to the emergency department today with a complaint of chest pain with sneezing/coughing. He's had URI symptoms for the past 1.5 days.   Hurts with movement.  No shortness of breath, no palpitations, no hemoptysis, no leg swelling.  No fevers.  Had Neg Covid and flu swabs ysterday.         ROS  Otherwise remaining ROS negative     The history is provided by the patient  Reviewed and verified by myself:   PMH/PSH/SOC/FH/ALLERGIES/MEDICATIONS      VS reviewed    Nursing/Ancillary staff note reviewed.     Physical Exam     ED Triage Vitals [25 1050]   BP (!) 177/110   Pulse 92   Resp 17   Temp 99.3 °F (37.4 °C)   SpO2 97 %       Physical Exam  Constitutional: The patient is alert, has no immediate need for airway protection and no signs of toxicity. No acute distress.  ENT: Mucous membranes are moist. Oropharynx clear.   Neck:  No increased JVD.  Respiratory: O2 sats appropriate on room air.  There are no retractions, lungs are clear to auscultation. No wheezing, no crackles.  Chest wall with reproducible TTP.   Cardiovascular: Regular rate and rhythm. No murmurs, rubs or gallops.  Gastrointestinal:  Abdomen is soft.    Neurological: Alert and oriented x 4. CN II-XII grossly intact.    Skin: Warm and dry.  Musculoskeletal: Extremities show no deformity.  No edema.               ED Course         ED Course as of 25 1121   Thu 2025   111 I independently interpreted all labs, imaging studies or EKGs that follow: [JA]   1119 EK beats per minute, normal sinus rhythm, no ST elevations, normal axis [JA]      ED Course User Index  [JA] Kory Allen MD              Medical Decision Making  Problems Addressed:  Cough: complicated acute illness or injury with systemic symptoms  URI (upper respiratory infection): complicated acute  I have personally verified, reviewed, and approved these actions. illness or injury with systemic symptoms    Amount and/or Complexity of Data Reviewed  External Data Reviewed: labs and ECG.     Details:  EKG from 03/13/2020 for similar to today's  Radiology: ordered and independent interpretation performed. Decision-making details documented in ED Course.  ECG/medicine tests: ordered and independent interpretation performed. Decision-making details documented in ED Course.    Risk  OTC drugs.  Prescription drug management.         Pt received the following in the ED:   Medications   ketorolac injection 15 mg (has no administration in time range)       After consideration of the pts current home medications, the decision is made to maintain the current medication regimen.      Will start :  New Prescriptions    No medications on file       OTC products such as tylenol or ibuprofen discussed for supplemental symptom control.          ED Management:  Differential diagnosis included but not limited to : costochondritis, bronchitis, URI, cough, laryngitis, tracheitis, asthma, sinusitis, pneumonia, viral doubt ACS, PE    Orders I ordered to further evaluate included:    Orders Placed This Encounter   Procedures    X-Ray Chest PA And Lateral    EKG 12-lead       Comorbidity impacting this encounter includes:  has no past medical history on file.    MDM continued:     Martha Mcadams  presents to the emergency Department today with an acute, complicated problem with systemic symptoms of  URI symptoms and costochondritis.  Reproducible pain on exam.  EKG without signs fo ACS.  PERC neg, unlikely to be PE.  CXR without pneumothorax, widened mediastinum or consolidation.  Will treat with NSAIDs. Encouraged followup with PCP.       Patient did not take his blood pressure medication today.  He is asymptomatic in regards his blood pressure, he will go home and take his blood pressure medications.    Imaging and EKG independently interpreted by myself, see body of note.         The pt is  comfortable with this plan and comfortable going home at this time. After taking into careful account the historical factors and physical exam findings of the patient's presentation today, in conjunction with the empirical and objective data obtained on ED workup, no acute emergent medical condition requiring admission has been identified. The patient appears to be low risk for an emergent medical condition and I feel it is safe and appropriate at this time for the patient to be discharged to follow-up as detailed in their discharge instructions for reevaluation and possible continued outpatient workup and management. Regardless, an unremarkable evaluation in the ED does not preclude the development or presence of a serious or life threatening condition. As such, patient was instructed to return immediately for any worsening or change in current symptoms. Precautions for return discussed at length.  Discharge and follow-up instructions discussed with the patient who expressed understanding and willingness to comply with my recommendations.    Voice recognition software utilized in this note.         Impression      Diagnoses of Cough and URI (upper respiratory infection) were pertinent to this visit.        New Prescriptions    No medications on file                       Kory lAlen MD  01/02/25 5723

## (undated) DEVICE — SOLUTION IV IRRIG WATER 1000ML POUR BRL 2F7114

## (undated) DEVICE — AGENT HEMOSTATIC SURGIFLOW MATRIX KIT W/THROMBIN

## (undated) DEVICE — CORD ES L12FT BPLR FRCP

## (undated) DEVICE — GLOVE ORANGE PI 7 1/2   MSG9075

## (undated) DEVICE — GAUZE,SPONGE,2"X2",8PLY,STERILE,LF,2'S: Brand: MEDLINE

## (undated) DEVICE — SPONGE LAP W18XL18IN WHT COT 4 PLY FLD STRUNG RADPQ DISP ST

## (undated) DEVICE — SET IRRIG L94IN ID0.281IN W/ 4.5IN DST FLX CONN 2 LD ON OFF

## (undated) DEVICE — SHEET,DRAPE,3/4,53X77,STERILE: Brand: MEDLINE

## (undated) DEVICE — APPLICATOR MEDICATED 26 CC SOLUTION HI LT ORNG CHLORAPREP

## (undated) DEVICE — DRAIN SURG 10FR PVC TB W/ TRCR MID PERF NO RESVR HUBLESS

## (undated) DEVICE — GLOVE SURG SZ 65 THK91MIL LTX FREE SYN POLYISOPRENE

## (undated) DEVICE — 1010 S-DRAPE TOWEL DRAPE 10/BX: Brand: STERI-DRAPE™

## (undated) DEVICE — TUBING, SUCTION, 1/4" X 20', STRAIGHT: Brand: MEDLINE INDUSTRIES, INC.

## (undated) DEVICE — SYRINGE CATH TIP 50ML

## (undated) DEVICE — YANKAUER,BULB TIP,W/O VENT,RIGID,STERILE: Brand: MEDLINE

## (undated) DEVICE — DRESSING TRNSPAR W5XL4.5IN FLM SHT SEMIPERMEABLE WIND

## (undated) DEVICE — EVACUATOR URO BLDR W/ ADPT UROVAC

## (undated) DEVICE — DRESSING TRNSPAR W8XL12IN FLM SURESITE 123

## (undated) DEVICE — DRAINBAG,ANTI-REFLUX TOWER,L/F,2000ML,LL: Brand: MEDLINE

## (undated) DEVICE — CYSTO PACK: Brand: MEDLINE INDUSTRIES, INC.

## (undated) DEVICE — JCKSON TBL POSTNER NO HD REST: Brand: MEDLINE INDUSTRIES, INC.

## (undated) DEVICE — GLOVE SURG SZ 85 L12IN FNGR ORTHO 126MIL CRM LTX FREE

## (undated) DEVICE — Z INACTIVE USE 2635504 SOLUTION IRRIG 3000ML 1.5% GL USP UROMATIC CONT

## (undated) DEVICE — BAG,BANDED,W/RUBBERBAND,STERILE,30X36: Brand: MEDLINE

## (undated) DEVICE — SYRINGE MED 10ML LUERLOCK TIP W/O SFTY DISP

## (undated) DEVICE — Z INACTIVE USE 2660664 SOLUTION IRRIG 3000ML 0.9% SOD CHL USP UROMATIC PLAS CONT

## (undated) DEVICE — BLADE ES L4IN INSUL EDGE

## (undated) DEVICE — CATHETER URETH 24FR BLLN 30CC SIL ALLY W/ SIL HYDRGEL 3 W F

## (undated) DEVICE — ADHESIVE SKIN CLSR 0.7ML TOP DERMBND ADV

## (undated) DEVICE — 3M™ IOBAN™ 2 ANTIMICROBIAL INCISE DRAPE 6650EZ: Brand: IOBAN™ 2

## (undated) DEVICE — LASER SURG W22XH58IN D36IN 475LB SLD STATE FREQ DOUBLED

## (undated) DEVICE — SYRINGE, LUER LOCK, 60ML: Brand: MEDLINE

## (undated) DEVICE — PAD,NON-ADHERENT,3X8,STERILE,LF,1/PK: Brand: MEDLINE

## (undated) DEVICE — COVER ARMBRD W13XL28.5IN IMPERV BLU FOR OP RM

## (undated) DEVICE — BREAST HERNIA PACK: Brand: MEDLINE INDUSTRIES, INC.

## (undated) DEVICE — PLUG,CATHETER,DRAINAGE PROTECTOR,TUBE: Brand: MEDLINE

## (undated) DEVICE — LINER SUCT CANSTR 1500CC SEMI RIG W/ POR HYDROPHOBIC SHUT

## (undated) DEVICE — AGENT HEMSTAT W4XL8IN OXIDIZED REGENERATED CELOS ABSRB

## (undated) DEVICE — KIT EVAC 400CC PVC RADPQ Y CONN

## (undated) DEVICE — ELECTRODE PT RET AD L9FT HI MOIST COND ADH HYDRGEL CORDED